# Patient Record
Sex: FEMALE | Race: WHITE | Employment: OTHER | URBAN - METROPOLITAN AREA
[De-identification: names, ages, dates, MRNs, and addresses within clinical notes are randomized per-mention and may not be internally consistent; named-entity substitution may affect disease eponyms.]

---

## 2017-01-30 PROBLEM — M25.532 LEFT WRIST PAIN: Status: ACTIVE | Noted: 2017-01-30

## 2018-03-16 ENCOUNTER — TELEPHONE (OUTPATIENT)
Dept: FAMILY MEDICINE CLINIC | Age: 78
End: 2018-03-16

## 2018-03-16 ENCOUNTER — OFFICE VISIT (OUTPATIENT)
Dept: FAMILY MEDICINE CLINIC | Age: 78
End: 2018-03-16
Payer: MEDICARE

## 2018-03-16 VITALS
WEIGHT: 208 LBS | TEMPERATURE: 98.3 F | BODY MASS INDEX: 36.86 KG/M2 | HEART RATE: 70 BPM | HEIGHT: 63 IN | OXYGEN SATURATION: 96 % | RESPIRATION RATE: 18 BRPM | DIASTOLIC BLOOD PRESSURE: 78 MMHG | SYSTOLIC BLOOD PRESSURE: 118 MMHG

## 2018-03-16 DIAGNOSIS — E78.5 DYSLIPIDEMIA: ICD-10-CM

## 2018-03-16 DIAGNOSIS — E11.9 TYPE 2 DIABETES MELLITUS WITHOUT COMPLICATION, WITHOUT LONG-TERM CURRENT USE OF INSULIN (HCC): Primary | ICD-10-CM

## 2018-03-16 DIAGNOSIS — R53.83 FATIGUE, UNSPECIFIED TYPE: ICD-10-CM

## 2018-03-16 DIAGNOSIS — B00.1 COLD SORE: ICD-10-CM

## 2018-03-16 DIAGNOSIS — R79.89 LOW VITAMIN D LEVEL: ICD-10-CM

## 2018-03-16 DIAGNOSIS — I10 ESSENTIAL HYPERTENSION: ICD-10-CM

## 2018-03-16 DIAGNOSIS — R73.01 IFG (IMPAIRED FASTING GLUCOSE): ICD-10-CM

## 2018-03-16 PROCEDURE — 1123F ACP DISCUSS/DSCN MKR DOCD: CPT | Performed by: FAMILY MEDICINE

## 2018-03-16 PROCEDURE — 1036F TOBACCO NON-USER: CPT | Performed by: FAMILY MEDICINE

## 2018-03-16 PROCEDURE — 3046F HEMOGLOBIN A1C LEVEL >9.0%: CPT | Performed by: FAMILY MEDICINE

## 2018-03-16 PROCEDURE — G8400 PT W/DXA NO RESULTS DOC: HCPCS | Performed by: FAMILY MEDICINE

## 2018-03-16 PROCEDURE — G8427 DOCREV CUR MEDS BY ELIG CLIN: HCPCS | Performed by: FAMILY MEDICINE

## 2018-03-16 PROCEDURE — 4040F PNEUMOC VAC/ADMIN/RCVD: CPT | Performed by: FAMILY MEDICINE

## 2018-03-16 PROCEDURE — 1090F PRES/ABSN URINE INCON ASSESS: CPT | Performed by: FAMILY MEDICINE

## 2018-03-16 PROCEDURE — G8482 FLU IMMUNIZE ORDER/ADMIN: HCPCS | Performed by: FAMILY MEDICINE

## 2018-03-16 PROCEDURE — 3017F COLORECTAL CA SCREEN DOC REV: CPT | Performed by: FAMILY MEDICINE

## 2018-03-16 PROCEDURE — G8417 CALC BMI ABV UP PARAM F/U: HCPCS | Performed by: FAMILY MEDICINE

## 2018-03-16 PROCEDURE — 99214 OFFICE O/P EST MOD 30 MIN: CPT | Performed by: FAMILY MEDICINE

## 2018-03-16 RX ORDER — MEMANTINE HYDROCHLORIDE 28 MG/1
CAPSULE, EXTENDED RELEASE ORAL
Qty: 90 CAPSULE | Refills: 1 | Status: SHIPPED | OUTPATIENT
Start: 2018-03-16 | End: 2018-11-06 | Stop reason: SDUPTHER

## 2018-03-16 RX ORDER — ERGOCALCIFEROL 1.25 MG/1
CAPSULE ORAL
Qty: 12 CAPSULE | Refills: 5 | Status: SHIPPED | OUTPATIENT
Start: 2018-03-16 | End: 2019-04-05 | Stop reason: SDUPTHER

## 2018-03-16 RX ORDER — ACYCLOVIR 50 MG/G
OINTMENT TOPICAL
Qty: 1 TUBE | Refills: 0 | Status: SHIPPED | OUTPATIENT
Start: 2018-03-16 | End: 2018-03-23

## 2018-03-16 RX ORDER — RAMIPRIL 5 MG/1
CAPSULE ORAL
Qty: 90 CAPSULE | Refills: 1 | Status: SHIPPED | OUTPATIENT
Start: 2018-03-16 | End: 2018-10-02 | Stop reason: SDUPTHER

## 2018-03-16 RX ORDER — HYDROCHLOROTHIAZIDE 25 MG/1
TABLET ORAL
Qty: 90 TABLET | Refills: 1 | Status: SHIPPED | OUTPATIENT
Start: 2018-03-16 | End: 2018-11-05 | Stop reason: SDUPTHER

## 2018-03-16 ASSESSMENT — ENCOUNTER SYMPTOMS
SPUTUM PRODUCTION: 0
RESPIRATORY NEGATIVE: 1
HEMOPTYSIS: 0
BACK PAIN: 1
ABDOMINAL PAIN: 0
BLURRED VISION: 0
BOWEL INCONTINENCE: 0
BLOOD IN STOOL: 0
VISUAL CHANGE: 0
SHORTNESS OF BREATH: 0
HEARTBURN: 0
EYES NEGATIVE: 1
GASTROINTESTINAL NEGATIVE: 1
ORTHOPNEA: 0
SINUS PAIN: 0

## 2018-03-16 ASSESSMENT — PATIENT HEALTH QUESTIONNAIRE - PHQ9
SUM OF ALL RESPONSES TO PHQ9 QUESTIONS 1 & 2: 0
SUM OF ALL RESPONSES TO PHQ QUESTIONS 1-9: 0
2. FEELING DOWN, DEPRESSED OR HOPELESS: 0
1. LITTLE INTEREST OR PLEASURE IN DOING THINGS: 0

## 2018-03-16 NOTE — PROGRESS NOTES
in about 3 months (around 6/16/2018).         Reviewed recent labs related to [place]'s current problems      Discussed importance of regular Health Maintenance follow up  Health Maintenance   Topic    DTaP/Tdap/Td vaccine (1 - Tdap)    Shingles Vaccine (1 of 2 - 2 Dose Series)    Diabetic foot exam     Pneumococcal low/med risk (2 of 2 - PPSV23)    Diabetic retinal exam     A1C test (Diabetic or Prediabetic)     Lipid screen     Potassium monitoring     Creatinine monitoring     Colon cancer screen colonoscopy     DEXA (modify frequency per FRAX score)     Flu vaccine

## 2018-03-16 NOTE — PATIENT INSTRUCTIONS
sugar levels. A registered dietitian or diabetes educator can help you plan how much carbohydrate to include in each meal and snack. A guideline for your daily amount of carbohydrate is:  · 45 to 60 grams at each meal. That's about the same as 3 to 4 carbohydrate servings. · 15 to 20 grams at each snack. That's about the same as 1 carbohydrate serving. The Nutrition Facts label on packaged foods tells you how much carbohydrate is in a serving of the food. First, look at the serving size on the food label. Is that the amount you eat in a serving? All of the nutrition information on a food label is based on that serving size. So if you eat more or less than that, you'll need to adjust the other numbers. Total carbohydrate is the next thing you need to look for on the label. If you count carbohydrate servings, one serving of carbohydrate is 15 grams. For foods that don't come with labels, such as fresh fruits and vegetables, you'll need a guide that lists carbohydrate in these foods. Ask your doctor, dietitian, or diabetes educator about books or other nutrition guides you can use. If you take insulin, you need to know how many grams of carbohydrate are in a meal. This lets you know how much rapid-acting insulin to take before you eat. If you use an insulin pump, you get a constant rate of insulin during the day. So the pump must be programmed at meals to give you extra insulin to cover the rise in blood sugar after meals. When you know how much carbohydrate you will eat, you can take the right amount of insulin. Or, if you always use the same amount of insulin, you need to make sure that you eat the same amount of carbohydrate at meals. If you need more help to understand carbohydrate counting and food labels, ask your doctor, dietitian, or diabetes educator. How do you get started with meal planning? Here are some tips to get started:  · Plan your meals a week at a time.  Don't forget to include snacks

## 2018-03-26 ENCOUNTER — TELEPHONE (OUTPATIENT)
Dept: FAMILY MEDICINE CLINIC | Age: 78
End: 2018-03-26

## 2018-03-27 NOTE — TELEPHONE ENCOUNTER
PA resubmitted to CoverMyMeds- awaiting response. Electronically signed by Sherita Millan MA on 3/27/18 at 8:21 AM    Outcome- This medication is on your plan's list of covered drugs. Prior authorization is not required at this time. If your pharmacy has questions regarding the processing of your prescription, please have them call the [employer] pharmacy help desk at [phone]. For additional information, the member can contact Member Services by calling the number on the back of their ID Card. Drug- Ramipril 5MG capsules    Form-[employer] Medicare Part D Electronic Prior Authorization Form    MA contacted [facility] in [place] and spoke with Hoang Crowell, [employer] who informed MA that the last Rx was picked up on 03/13/2018 and their systems states that it is too soon to fill until 04/04/2018. Ming Asher believes there is no issue with the medication coverage due to it being generic Ramipril and [facility].      Electronically signed by Sherita Millan MA on 3/27/18 at 9:31 AM

## 2018-07-06 ENCOUNTER — TELEPHONE (OUTPATIENT)
Dept: ADMINISTRATIVE | Age: 78
End: 2018-07-06

## 2018-07-16 ENCOUNTER — OFFICE VISIT (OUTPATIENT)
Dept: FAMILY MEDICINE CLINIC | Age: 78
End: 2018-07-16
Payer: MEDICARE

## 2018-07-16 VITALS
OXYGEN SATURATION: 98 % | WEIGHT: 216.2 LBS | SYSTOLIC BLOOD PRESSURE: 120 MMHG | RESPIRATION RATE: 18 BRPM | HEIGHT: 63 IN | HEART RATE: 67 BPM | DIASTOLIC BLOOD PRESSURE: 60 MMHG | BODY MASS INDEX: 38.31 KG/M2

## 2018-07-16 DIAGNOSIS — G30.1 LATE ONSET ALZHEIMER'S DISEASE WITHOUT BEHAVIORAL DISTURBANCE (HCC): ICD-10-CM

## 2018-07-16 DIAGNOSIS — F02.80 LATE ONSET ALZHEIMER'S DISEASE WITHOUT BEHAVIORAL DISTURBANCE (HCC): ICD-10-CM

## 2018-07-16 DIAGNOSIS — E11.9 TYPE 2 DIABETES MELLITUS WITHOUT COMPLICATION, WITHOUT LONG-TERM CURRENT USE OF INSULIN (HCC): Primary | ICD-10-CM

## 2018-07-16 DIAGNOSIS — E78.2 MIXED HYPERLIPIDEMIA: ICD-10-CM

## 2018-07-16 DIAGNOSIS — I10 ESSENTIAL HYPERTENSION: ICD-10-CM

## 2018-07-16 PROCEDURE — 1036F TOBACCO NON-USER: CPT | Performed by: FAMILY MEDICINE

## 2018-07-16 PROCEDURE — 1123F ACP DISCUSS/DSCN MKR DOCD: CPT | Performed by: FAMILY MEDICINE

## 2018-07-16 PROCEDURE — G8417 CALC BMI ABV UP PARAM F/U: HCPCS | Performed by: FAMILY MEDICINE

## 2018-07-16 PROCEDURE — G8400 PT W/DXA NO RESULTS DOC: HCPCS | Performed by: FAMILY MEDICINE

## 2018-07-16 PROCEDURE — 1101F PT FALLS ASSESS-DOCD LE1/YR: CPT | Performed by: FAMILY MEDICINE

## 2018-07-16 PROCEDURE — 4040F PNEUMOC VAC/ADMIN/RCVD: CPT | Performed by: FAMILY MEDICINE

## 2018-07-16 PROCEDURE — 1090F PRES/ABSN URINE INCON ASSESS: CPT | Performed by: FAMILY MEDICINE

## 2018-07-16 PROCEDURE — 99214 OFFICE O/P EST MOD 30 MIN: CPT | Performed by: FAMILY MEDICINE

## 2018-07-16 PROCEDURE — G8427 DOCREV CUR MEDS BY ELIG CLIN: HCPCS | Performed by: FAMILY MEDICINE

## 2018-07-16 ASSESSMENT — ENCOUNTER SYMPTOMS
BLURRED VISION: 0
VOMITING: 0
COUGH: 0
WHEEZING: 0
BLOOD IN STOOL: 0
EYE PAIN: 0
ORTHOPNEA: 0
GASTROINTESTINAL NEGATIVE: 1
ABDOMINAL PAIN: 0
CONSTIPATION: 0
NAUSEA: 0
HEMOPTYSIS: 0
EYE DISCHARGE: 0
SORE THROAT: 0
SPUTUM PRODUCTION: 0
DIARRHEA: 0
RESPIRATORY NEGATIVE: 1
STRIDOR: 0
SHORTNESS OF BREATH: 0
HEARTBURN: 0
PHOTOPHOBIA: 0
SINUS PAIN: 0
BACK PAIN: 1
DOUBLE VISION: 0
EYES NEGATIVE: 1
EYE REDNESS: 0

## 2018-07-16 NOTE — PROGRESS NOTES
frequency, hematuria, pelvic pain and urgency. Musculoskeletal: Positive for back pain and joint pain. Negative for falls, myalgias and neck pain. Skin: Negative for itching and rash. Neurological: Positive for weakness. Negative for dizziness, tingling, tremors, sensory change, speech change, focal weakness, seizures, loss of consciousness, numbness, headaches and paresthesias. Endo/Heme/Allergies: Negative. Negative for environmental allergies, polydipsia and polyphagia. Does not bruise/bleed easily. Psychiatric/Behavioral: Positive for memory loss. Negative for depression, hallucinations, substance abuse and suicidal ideas. The patient has insomnia. The patient is not nervous/anxious. Past Medical/Surgical Hx;  Reviewed with patient      Diagnosis Date    Dementia     Diabetes mellitus ([facility] [address]     GERD (gastroesophageal reflux disease)     Headache     Hypertension      Past Surgical History:   Procedure Laterality Date     SECTION      CHOLECYSTECTOMY, LAPAROSCOPIC      COLONOSCOPY      UPPER GASTROINTESTINAL ENDOSCOPY         Past Family Hx:  Reviewed with patient  Family History   Problem Relation Age of Onset    Heart Disease Mother        Social Hx:  Reviewed with patient  Social History   Substance Use Topics    Smoking status: Never Smoker    Smokeless tobacco: Never Used    Alcohol use No      Comment: Rare       OBJECTIVE  /60   Pulse 67   Resp 18   Ht 5' 3\" (1.6 m)   Wt 216 lb 3.2 oz (98.1 kg)   LMP  (LMP Unknown)   SpO2 98%   BMI 38.30 kg/m²     Problem List:  [place]  does not have any pertinent problems on file. PHYS EX:  Physical Exam   Constitutional: She is oriented to person, place, and time. She appears well-developed and well-nourished. No distress. HENT:   Head: Normocephalic and atraumatic.    Right Ear: External ear normal.   Left Ear: External ear normal.   Nose: Nose normal.   Mouth/Throat: Oropharynx is clear and moist. No oropharyngeal exudate. Eyes: Right eye exhibits no discharge. Left eye exhibits no discharge. No scleral icterus. Neck: Normal range of motion. Neck supple. No JVD present. No tracheal deviation present. No thyromegaly present. Cardiovascular: Normal rate, regular rhythm and normal heart sounds. Exam reveals no gallop and no friction rub. No murmur heard. Pulmonary/Chest: Effort normal and breath sounds normal. No stridor. No respiratory distress. She has no wheezes. She has no rales. She exhibits no tenderness. Abdominal: Soft. Bowel sounds are normal. She exhibits no distension and no mass. There is no tenderness. There is no rebound and no guarding. No hernia. Musculoskeletal: She exhibits tenderness. She exhibits no edema or deformity. Lumbar back: She exhibits decreased range of motion, tenderness, bony tenderness, pain and spasm. She exhibits no swelling, no edema, no deformity, no laceration and normal pulse. Back:    She has multiple joint pain of osteoarthritis    Lymphadenopathy:     She has no cervical adenopathy. Neurological: She is alert and oriented to person, place, and time. She has normal reflexes. She displays normal reflexes. No cranial nerve deficit or sensory deficit. She exhibits normal muscle tone. Coordination normal.   numbness to her fingers   Skin: Skin is warm. No rash noted. She is not diaphoretic. No erythema. No pallor. Psychiatric:   Anxiety  Alzheimer's dementia   Nursing note and vitals reviewed. ASSESSMENT/PLAN  [place] was seen today for check-up.     Diagnoses and all orders for this visit:    Type 2 diabetes mellitus without complication, without long-term current use of insulin (HCC)  --diabetic diet     Late onset Alzheimer's disease without behavioral disturbance  -  and care discussion  --stable and doing well    Essential hypertension--controlled  --low salt  -- CARDIAC--asa, ACE, beta, statin, HCTZ, ( ccb )    Mixed hyperlipidemia  - talk weight loss and diet         Outpatient Encounter Prescriptions as of 7/16/2018   Medication Sig Dispense Refill    ramipril (ALTACE) 5 MG capsule TAKE ONE CAPSULE BY MOUTH EVERY DAY 90 capsule 1    hydrochlorothiazide (HYDRODIURIL) 25 MG tablet TAKE ONE TABLET BY MOUTH EVERY DAY 90 tablet 1    memantine ER (NAMENDA XR) 28 MG CP24 extended release capsule TAKE ONE CAPSULE BY MOUTH EVERY DAY 90 capsule 1    vitamin D (ERGOCALCIFEROL) 94042 units CAPS capsule take one capsule by mouth once a week 12 capsule 5    Saccharomyces boulardii (PROBIOTIC) 250 MG CAPS Take by mouth daily       Multiple Vitamins-Minerals (EYE VITAMINS) CAPS Take by mouth      vitamin E 400 UNIT capsule Take 400 Units by mouth daily. No facility-administered encounter medications on file as of 7/16/2018. Return in about 6 months (around 1/16/2019).         Reviewed recent labs related to [place]'s current problems      Discussed importance of regular Health Maintenance follow up  Health Maintenance   Topic    DTaP/Tdap/Td vaccine (1 - Tdap)    Shingles Vaccine (1 of 2 - 2 Dose Series)    Pneumococcal low/med risk (2 of 2 - PPSV23)    Flu vaccine (1)    Potassium monitoring     Creatinine monitoring     DEXA (modify frequency per FRAX score)

## 2018-07-17 ASSESSMENT — PATIENT HEALTH QUESTIONNAIRE - PHQ9
SUM OF ALL RESPONSES TO PHQ9 QUESTIONS 1 & 2: 2
1. LITTLE INTEREST OR PLEASURE IN DOING THINGS: 1
2. FEELING DOWN, DEPRESSED OR HOPELESS: 1
SUM OF ALL RESPONSES TO PHQ QUESTIONS 1-9: 2

## 2018-07-17 ASSESSMENT — ENCOUNTER SYMPTOMS
VISUAL CHANGE: 0
BOWEL INCONTINENCE: 0

## 2018-09-26 ENCOUNTER — TELEPHONE (OUTPATIENT)
Dept: FAMILY MEDICINE CLINIC | Age: 78
End: 2018-09-26

## 2018-09-26 DIAGNOSIS — G30.1 LATE ONSET ALZHEIMER'S DISEASE WITHOUT BEHAVIORAL DISTURBANCE (HCC): Primary | ICD-10-CM

## 2018-09-26 DIAGNOSIS — F02.80 LATE ONSET ALZHEIMER'S DISEASE WITHOUT BEHAVIORAL DISTURBANCE (HCC): Primary | ICD-10-CM

## 2018-09-26 RX ORDER — DIVALPROEX SODIUM 125 MG/1
125 TABLET, DELAYED RELEASE ORAL 2 TIMES DAILY
Qty: 90 TABLET | Refills: 3 | Status: SHIPPED | OUTPATIENT
Start: 2018-09-26 | End: 2018-10-03 | Stop reason: DRUGHIGH

## 2018-09-26 NOTE — TELEPHONE ENCOUNTER
Patient's daughter contacted office stating patient is staying at [facility] and has been argumentative with residents and staff.  Patient does have appointment for 10/3, but Cheryl Marquez is asking if there is something otc her mom can take to keep her calm until she is seen

## 2018-10-02 DIAGNOSIS — E11.9 TYPE 2 DIABETES MELLITUS WITHOUT COMPLICATION, WITHOUT LONG-TERM CURRENT USE OF INSULIN (HCC): ICD-10-CM

## 2018-10-02 DIAGNOSIS — I10 ESSENTIAL HYPERTENSION: ICD-10-CM

## 2018-10-02 RX ORDER — RAMIPRIL 5 MG/1
CAPSULE ORAL
Qty: 90 CAPSULE | Refills: 0 | Status: SHIPPED | OUTPATIENT
Start: 2018-10-02 | End: 2018-12-21 | Stop reason: SDUPTHER

## 2018-10-03 ENCOUNTER — OFFICE VISIT (OUTPATIENT)
Dept: FAMILY MEDICINE CLINIC | Age: 78
End: 2018-10-03
Payer: MEDICARE

## 2018-10-03 ENCOUNTER — HOSPITAL ENCOUNTER (OUTPATIENT)
Age: 78
Discharge: HOME | End: 2018-10-05
Payer: MEDICARE

## 2018-10-03 VITALS
HEART RATE: 67 BPM | DIASTOLIC BLOOD PRESSURE: 80 MMHG | RESPIRATION RATE: 18 BRPM | HEIGHT: 63 IN | WEIGHT: 211 LBS | SYSTOLIC BLOOD PRESSURE: 118 MMHG | OXYGEN SATURATION: 95 % | BODY MASS INDEX: 37.39 KG/M2

## 2018-10-03 DIAGNOSIS — F02.80 LATE ONSET ALZHEIMER'S DISEASE WITHOUT BEHAVIORAL DISTURBANCE (HCC): ICD-10-CM

## 2018-10-03 DIAGNOSIS — G30.1 LATE ONSET ALZHEIMER'S DISEASE WITHOUT BEHAVIORAL DISTURBANCE (HCC): ICD-10-CM

## 2018-10-03 DIAGNOSIS — Z00.00 MEDICARE ANNUAL WELLNESS VISIT, INITIAL: Primary | ICD-10-CM

## 2018-10-03 DIAGNOSIS — Z00.00 ROUTINE GENERAL MEDICAL EXAMINATION AT A HEALTH CARE FACILITY: ICD-10-CM

## 2018-10-03 DIAGNOSIS — Z23 IMMUNIZATION DUE: ICD-10-CM

## 2018-10-03 LAB
BASOPHILS ABSOLUTE: 0.05 E9/L (ref 0–0.2)
BASOPHILS RELATIVE PERCENT: 0.8 % (ref 0–2)
EOSINOPHILS ABSOLUTE: 0.22 E9/L (ref 0.05–0.5)
EOSINOPHILS RELATIVE PERCENT: 3.3 % (ref 0–6)
HCT VFR BLD CALC: 43.3 % (ref 34–48)
HEMOGLOBIN: 13.4 G/DL (ref 11.5–15.5)
IMMATURE GRANULOCYTES #: 0.02 E9/L
IMMATURE GRANULOCYTES %: 0.3 % (ref 0–5)
LYMPHOCYTES ABSOLUTE: 1.76 E9/L (ref 1.5–4)
LYMPHOCYTES RELATIVE PERCENT: 26.5 % (ref 20–42)
MCH RBC QN AUTO: 28.5 PG (ref 26–35)
MCHC RBC AUTO-ENTMCNC: 30.9 % (ref 32–34.5)
MCV RBC AUTO: 92.1 FL (ref 80–99.9)
MONOCYTES ABSOLUTE: 0.68 E9/L (ref 0.1–0.95)
MONOCYTES RELATIVE PERCENT: 10.2 % (ref 2–12)
NEUTROPHILS ABSOLUTE: 3.92 E9/L (ref 1.8–7.3)
NEUTROPHILS RELATIVE PERCENT: 58.9 % (ref 43–80)
PDW BLD-RTO: 13 FL (ref 11.5–15)
PLATELET # BLD: 212 E9/L (ref 130–450)
PMV BLD AUTO: 12.2 FL (ref 7–12)
RBC # BLD: 4.7 E12/L (ref 3.5–5.5)
WBC # BLD: 6.7 E9/L (ref 4.5–11.5)

## 2018-10-03 PROCEDURE — 85025 COMPLETE CBC W/AUTO DIFF WBC: CPT

## 2018-10-03 PROCEDURE — 80061 LIPID PANEL: CPT

## 2018-10-03 PROCEDURE — 84443 ASSAY THYROID STIM HORMONE: CPT

## 2018-10-03 PROCEDURE — 82306 VITAMIN D 25 HYDROXY: CPT

## 2018-10-03 PROCEDURE — G0008 ADMIN INFLUENZA VIRUS VAC: HCPCS | Performed by: FAMILY MEDICINE

## 2018-10-03 PROCEDURE — 80048 BASIC METABOLIC PNL TOTAL CA: CPT

## 2018-10-03 PROCEDURE — 4040F PNEUMOC VAC/ADMIN/RCVD: CPT | Performed by: FAMILY MEDICINE

## 2018-10-03 PROCEDURE — G8482 FLU IMMUNIZE ORDER/ADMIN: HCPCS | Performed by: FAMILY MEDICINE

## 2018-10-03 PROCEDURE — 90662 IIV NO PRSV INCREASED AG IM: CPT | Performed by: FAMILY MEDICINE

## 2018-10-03 PROCEDURE — 83036 HEMOGLOBIN GLYCOSYLATED A1C: CPT

## 2018-10-03 PROCEDURE — G0438 PPPS, INITIAL VISIT: HCPCS | Performed by: FAMILY MEDICINE

## 2018-10-03 RX ORDER — DIVALPROEX SODIUM 125 MG/1
125 TABLET, DELAYED RELEASE ORAL 3 TIMES DAILY
Qty: 90 TABLET | Refills: 3 | Status: SHIPPED | OUTPATIENT
Start: 2018-10-03 | End: 2019-02-22 | Stop reason: SDUPTHER

## 2018-10-03 ASSESSMENT — ANXIETY QUESTIONNAIRES: GAD7 TOTAL SCORE: 0

## 2018-10-03 ASSESSMENT — LIFESTYLE VARIABLES: HOW OFTEN DO YOU HAVE A DRINK CONTAINING ALCOHOL: 0

## 2018-10-03 ASSESSMENT — PATIENT HEALTH QUESTIONNAIRE - PHQ9
SUM OF ALL RESPONSES TO PHQ QUESTIONS 1-9: 0
SUM OF ALL RESPONSES TO PHQ QUESTIONS 1-9: 0

## 2018-10-03 NOTE — PROGRESS NOTES
Series) 04/04/1992    Pneumococcal low/med risk (2 of 2 - PPSV23) 08/26/2017    Flu vaccine (1) 09/01/2018    Potassium monitoring  10/20/2018    Creatinine monitoring  10/20/2018    DEXA (modify frequency per FRAX score)  Addressed     Recommendations for Preventive Services Due: see orders and patient instructions/AVS.  .   Recommended screening schedule for the next 5-10 years is provided to the patient in written form: see Patient Instructions/AVS.

## 2018-10-04 ENCOUNTER — TELEPHONE (OUTPATIENT)
Dept: FAMILY MEDICINE CLINIC | Age: 78
End: 2018-10-04

## 2018-10-04 LAB
ANION GAP SERPL CALCULATED.3IONS-SCNC: 13 MMOL/L (ref 7–16)
BUN BLDV-MCNC: 19 MG/DL (ref 8–23)
CALCIUM SERPL-MCNC: 9.9 MG/DL (ref 8.6–10.2)
CHLORIDE BLD-SCNC: 98 MMOL/L (ref 98–107)
CHOLESTEROL, TOTAL: 224 MG/DL (ref 0–199)
CO2: 28 MMOL/L (ref 22–29)
CREAT SERPL-MCNC: 0.9 MG/DL (ref 0.5–1)
GFR AFRICAN AMERICAN: >60
GFR NON-AFRICAN AMERICAN: >60 ML/MIN/1.73
GLUCOSE BLD-MCNC: 117 MG/DL (ref 74–109)
HBA1C MFR BLD: 6.3 % (ref 4–5.6)
HDLC SERPL-MCNC: 44 MG/DL
LDL CHOLESTEROL CALCULATED: 146 MG/DL (ref 0–99)
POTASSIUM SERPL-SCNC: 5 MMOL/L (ref 3.5–5)
SODIUM BLD-SCNC: 139 MMOL/L (ref 132–146)
TRIGL SERPL-MCNC: 168 MG/DL (ref 0–149)
TSH SERPL DL<=0.05 MIU/L-ACNC: 1.34 UIU/ML (ref 0.27–4.2)
VITAMIN D 25-HYDROXY: 19 NG/ML (ref 30–100)
VLDLC SERPL CALC-MCNC: 34 MG/DL

## 2018-11-06 ENCOUNTER — TELEPHONE (OUTPATIENT)
Dept: NEUROLOGY | Age: 78
End: 2018-11-06

## 2018-11-06 RX ORDER — MEMANTINE HYDROCHLORIDE 28 MG/1
CAPSULE, EXTENDED RELEASE ORAL
Qty: 90 CAPSULE | Refills: 1 | Status: SHIPPED | OUTPATIENT
Start: 2018-11-06 | End: 2019-04-30 | Stop reason: SDUPTHER

## 2018-11-06 NOTE — TELEPHONE ENCOUNTER
MA received a message on Rx refill line from [facility] 11/5 requesting a refill for pt of Namenda XR 28mg. MA left a message on [facility] refill line stating pt has not been seen by our office since 2017 so she will need an appt prior to refills. MA also informed them that recent Rx's for Namenda have been filled by pt's PCP, Dr. Mundo Richardson.   Electronically signed by Jerman Zarco on 11/6/18 at 7:56 AM

## 2018-11-19 ENCOUNTER — TELEPHONE (OUTPATIENT)
Dept: FAMILY MEDICINE CLINIC | Age: 78
End: 2018-11-19

## 2018-12-21 DIAGNOSIS — E11.9 TYPE 2 DIABETES MELLITUS WITHOUT COMPLICATION, WITHOUT LONG-TERM CURRENT USE OF INSULIN (HCC): ICD-10-CM

## 2018-12-21 DIAGNOSIS — I10 ESSENTIAL HYPERTENSION: ICD-10-CM

## 2018-12-21 RX ORDER — RAMIPRIL 5 MG/1
CAPSULE ORAL
Qty: 90 CAPSULE | Refills: 1 | Status: SHIPPED | OUTPATIENT
Start: 2018-12-21 | End: 2020-01-11 | Stop reason: SDUPTHER

## 2018-12-21 NOTE — TELEPHONE ENCOUNTER
[facility] message on Clinical Care line requesting refill of Ramipril for patient. Medication sent to patient's pharmacy.   Electronically signed by Nadeem Duarte on 12/21/2018 at 2:38 PM

## 2019-02-22 ENCOUNTER — TELEPHONE (OUTPATIENT)
Dept: FAMILY MEDICINE CLINIC | Age: 79
End: 2019-02-22

## 2019-02-22 DIAGNOSIS — G30.1 LATE ONSET ALZHEIMER'S DISEASE WITHOUT BEHAVIORAL DISTURBANCE (HCC): ICD-10-CM

## 2019-02-22 DIAGNOSIS — F02.80 LATE ONSET ALZHEIMER'S DISEASE WITHOUT BEHAVIORAL DISTURBANCE (HCC): ICD-10-CM

## 2019-02-22 RX ORDER — DIVALPROEX SODIUM 125 MG/1
125 TABLET, DELAYED RELEASE ORAL 3 TIMES DAILY
Qty: 90 TABLET | Refills: 3 | Status: ON HOLD | OUTPATIENT
Start: 2019-02-22 | End: 2019-09-06 | Stop reason: HOSPADM

## 2019-03-06 ENCOUNTER — HOSPITAL ENCOUNTER (OUTPATIENT)
Age: 79
Discharge: HOME | End: 2019-03-08
Payer: MEDICARE

## 2019-03-06 ENCOUNTER — OFFICE VISIT (OUTPATIENT)
Dept: FAMILY MEDICINE CLINIC | Age: 79
End: 2019-03-06
Payer: MEDICARE

## 2019-03-06 VITALS
OXYGEN SATURATION: 99 % | DIASTOLIC BLOOD PRESSURE: 78 MMHG | SYSTOLIC BLOOD PRESSURE: 118 MMHG | BODY MASS INDEX: 37.21 KG/M2 | RESPIRATION RATE: 18 BRPM | TEMPERATURE: 98.7 F | HEART RATE: 71 BPM | WEIGHT: 210 LBS | HEIGHT: 63 IN

## 2019-03-06 DIAGNOSIS — E11.9 TYPE 2 DIABETES MELLITUS WITHOUT COMPLICATION, WITHOUT LONG-TERM CURRENT USE OF INSULIN (HCC): ICD-10-CM

## 2019-03-06 DIAGNOSIS — E78.2 MIXED HYPERLIPIDEMIA: ICD-10-CM

## 2019-03-06 DIAGNOSIS — R53.83 FATIGUE, UNSPECIFIED TYPE: ICD-10-CM

## 2019-03-06 DIAGNOSIS — I48.91 ATRIAL FIBRILLATION, UNSPECIFIED TYPE (HCC): ICD-10-CM

## 2019-03-06 DIAGNOSIS — I48.91 ATRIAL FIBRILLATION, UNSPECIFIED TYPE (HCC): Primary | ICD-10-CM

## 2019-03-06 DIAGNOSIS — I10 ESSENTIAL HYPERTENSION: ICD-10-CM

## 2019-03-06 LAB
ALBUMIN SERPL-MCNC: 4.1 G/DL (ref 3.5–5.2)
ALP BLD-CCNC: 63 U/L (ref 35–104)
ALT SERPL-CCNC: 12 U/L (ref 0–32)
ANION GAP SERPL CALCULATED.3IONS-SCNC: 8 MMOL/L (ref 7–16)
AST SERPL-CCNC: 17 U/L (ref 0–31)
BASOPHILS ABSOLUTE: 0.03 E9/L (ref 0–0.2)
BASOPHILS RELATIVE PERCENT: 0.4 % (ref 0–2)
BILIRUB SERPL-MCNC: 0.4 MG/DL (ref 0–1.2)
BUN BLDV-MCNC: 18 MG/DL (ref 8–23)
CALCIUM SERPL-MCNC: 9.3 MG/DL (ref 8.6–10.2)
CHLORIDE BLD-SCNC: 89 MMOL/L (ref 98–107)
CHOLESTEROL, TOTAL: 204 MG/DL (ref 0–199)
CO2: 30 MMOL/L (ref 22–29)
CREAT SERPL-MCNC: 1 MG/DL (ref 0.5–1)
EOSINOPHILS ABSOLUTE: 0.16 E9/L (ref 0.05–0.5)
EOSINOPHILS RELATIVE PERCENT: 2 % (ref 0–6)
GFR AFRICAN AMERICAN: >60
GFR NON-AFRICAN AMERICAN: 54 ML/MIN/1.73
GLUCOSE BLD-MCNC: 114 MG/DL (ref 74–99)
HBA1C MFR BLD: 6.3 % (ref 4–5.6)
HCT VFR BLD CALC: 41 % (ref 34–48)
HDLC SERPL-MCNC: 36 MG/DL
HEMOGLOBIN: 13.2 G/DL (ref 11.5–15.5)
IMMATURE GRANULOCYTES #: 0.03 E9/L
IMMATURE GRANULOCYTES %: 0.4 % (ref 0–5)
LDL CHOLESTEROL CALCULATED: 125 MG/DL (ref 0–99)
LYMPHOCYTES ABSOLUTE: 1.93 E9/L (ref 1.5–4)
LYMPHOCYTES RELATIVE PERCENT: 24.2 % (ref 20–42)
MCH RBC QN AUTO: 29.2 PG (ref 26–35)
MCHC RBC AUTO-ENTMCNC: 32.2 % (ref 32–34.5)
MCV RBC AUTO: 90.7 FL (ref 80–99.9)
MONOCYTES ABSOLUTE: 0.78 E9/L (ref 0.1–0.95)
MONOCYTES RELATIVE PERCENT: 9.8 % (ref 2–12)
NEUTROPHILS ABSOLUTE: 5.04 E9/L (ref 1.8–7.3)
NEUTROPHILS RELATIVE PERCENT: 63.2 % (ref 43–80)
PDW BLD-RTO: 12.8 FL (ref 11.5–15)
PLATELET # BLD: 178 E9/L (ref 130–450)
PMV BLD AUTO: 13.3 FL (ref 7–12)
POTASSIUM SERPL-SCNC: 4.4 MMOL/L (ref 3.5–5)
RBC # BLD: 4.52 E12/L (ref 3.5–5.5)
SODIUM BLD-SCNC: 127 MMOL/L (ref 132–146)
TOTAL PROTEIN: 7.1 G/DL (ref 6.4–8.3)
TRIGL SERPL-MCNC: 213 MG/DL (ref 0–149)
TSH SERPL DL<=0.05 MIU/L-ACNC: 1.16 UIU/ML (ref 0.27–4.2)
VLDLC SERPL CALC-MCNC: 43 MG/DL
WBC # BLD: 8 E9/L (ref 4.5–11.5)

## 2019-03-06 PROCEDURE — 99215 OFFICE O/P EST HI 40 MIN: CPT | Performed by: FAMILY MEDICINE

## 2019-03-06 PROCEDURE — 93000 ELECTROCARDIOGRAM COMPLETE: CPT | Performed by: FAMILY MEDICINE

## 2019-03-06 PROCEDURE — 1090F PRES/ABSN URINE INCON ASSESS: CPT | Performed by: FAMILY MEDICINE

## 2019-03-06 PROCEDURE — 84443 ASSAY THYROID STIM HORMONE: CPT

## 2019-03-06 PROCEDURE — 1123F ACP DISCUSS/DSCN MKR DOCD: CPT | Performed by: FAMILY MEDICINE

## 2019-03-06 PROCEDURE — G8400 PT W/DXA NO RESULTS DOC: HCPCS | Performed by: FAMILY MEDICINE

## 2019-03-06 PROCEDURE — 80053 COMPREHEN METABOLIC PANEL: CPT

## 2019-03-06 PROCEDURE — G8427 DOCREV CUR MEDS BY ELIG CLIN: HCPCS | Performed by: FAMILY MEDICINE

## 2019-03-06 PROCEDURE — 1101F PT FALLS ASSESS-DOCD LE1/YR: CPT | Performed by: FAMILY MEDICINE

## 2019-03-06 PROCEDURE — 1036F TOBACCO NON-USER: CPT | Performed by: FAMILY MEDICINE

## 2019-03-06 PROCEDURE — 4040F PNEUMOC VAC/ADMIN/RCVD: CPT | Performed by: FAMILY MEDICINE

## 2019-03-06 PROCEDURE — 85025 COMPLETE CBC W/AUTO DIFF WBC: CPT

## 2019-03-06 PROCEDURE — G8417 CALC BMI ABV UP PARAM F/U: HCPCS | Performed by: FAMILY MEDICINE

## 2019-03-06 PROCEDURE — G8482 FLU IMMUNIZE ORDER/ADMIN: HCPCS | Performed by: FAMILY MEDICINE

## 2019-03-06 PROCEDURE — 83036 HEMOGLOBIN GLYCOSYLATED A1C: CPT

## 2019-03-06 PROCEDURE — 80061 LIPID PANEL: CPT

## 2019-03-06 ASSESSMENT — ENCOUNTER SYMPTOMS
APNEA: 0
BACK PAIN: 0
VOICE CHANGE: 0
SHORTNESS OF BREATH: 0
RECTAL PAIN: 0
WHEEZING: 0
ABDOMINAL DISTENTION: 0
COLOR CHANGE: 0
SINUS PAIN: 0
BLOOD IN STOOL: 0
CHEST TIGHTNESS: 0
CHOKING: 0
ABDOMINAL PAIN: 0
SORE THROAT: 0
EYE PAIN: 0
DIARRHEA: 0
STRIDOR: 0
EYE DISCHARGE: 0
BOWEL INCONTINENCE: 0
CONSTIPATION: 0
BLURRED VISION: 0
SINUS PRESSURE: 0
ALLERGIC/IMMUNOLOGIC NEGATIVE: 1
RHINORRHEA: 0
COUGH: 0
TROUBLE SWALLOWING: 0
VISUAL CHANGE: 0
NAUSEA: 0
EYE REDNESS: 0
FACIAL SWELLING: 0
ANAL BLEEDING: 0
EYE ITCHING: 0
VOMITING: 0
ORTHOPNEA: 0
PHOTOPHOBIA: 0
RESPIRATORY NEGATIVE: 1
GASTROINTESTINAL NEGATIVE: 1

## 2019-03-06 ASSESSMENT — PATIENT HEALTH QUESTIONNAIRE - PHQ9
SUM OF ALL RESPONSES TO PHQ QUESTIONS 1-9: 0
2. FEELING DOWN, DEPRESSED OR HOPELESS: 0
1. LITTLE INTEREST OR PLEASURE IN DOING THINGS: 0
SUM OF ALL RESPONSES TO PHQ QUESTIONS 1-9: 0
SUM OF ALL RESPONSES TO PHQ9 QUESTIONS 1 & 2: 0

## 2019-04-05 DIAGNOSIS — R79.89 LOW VITAMIN D LEVEL: ICD-10-CM

## 2019-04-05 RX ORDER — ERGOCALCIFEROL 1.25 MG/1
CAPSULE ORAL
Qty: 12 CAPSULE | Refills: 2 | Status: SHIPPED | OUTPATIENT
Start: 2019-04-05 | End: 2019-12-29 | Stop reason: SDUPTHER

## 2019-04-30 DIAGNOSIS — I10 ESSENTIAL HYPERTENSION: ICD-10-CM

## 2019-04-30 RX ORDER — HYDROCHLOROTHIAZIDE 25 MG/1
TABLET ORAL
Qty: 90 TABLET | Refills: 1 | Status: SHIPPED
Start: 2019-04-30 | End: 2020-03-06 | Stop reason: ALTCHOICE

## 2019-04-30 RX ORDER — MEMANTINE HYDROCHLORIDE 28 MG/1
CAPSULE, EXTENDED RELEASE ORAL
Qty: 90 CAPSULE | Refills: 1 | Status: SHIPPED | OUTPATIENT
Start: 2019-04-30 | End: 2020-01-11 | Stop reason: SDUPTHER

## 2019-04-30 NOTE — TELEPHONE ENCOUNTER
[address] [place] [address]  called the clinical care line requesting medication refill. Chart reviewed and medication sent to the pharmacy.     Electronically signed by Candi Schilder on 4/30/2019 at 2:11 PM

## 2019-05-07 ENCOUNTER — TELEPHONE (OUTPATIENT)
Dept: FAMILY MEDICINE CLINIC | Age: 79
End: 2019-05-07

## 2019-05-07 DIAGNOSIS — R94.31 ABNORMAL EKG: ICD-10-CM

## 2019-05-07 DIAGNOSIS — R07.9 CHEST PAIN, UNSPECIFIED TYPE: Primary | ICD-10-CM

## 2019-05-07 NOTE — TELEPHONE ENCOUNTER
Patient's daughter contacted office regarding the EKG done 3/6 Carla Velasquez was not sure if a referral was going to be placed

## 2019-09-04 ENCOUNTER — HOSPITAL ENCOUNTER (INPATIENT)
Age: 79
LOS: 2 days | Discharge: HOME | DRG: 309 | End: 2019-09-06
Attending: EMERGENCY MEDICINE | Admitting: INTERNAL MEDICINE
Payer: MEDICARE

## 2019-09-04 ENCOUNTER — APPOINTMENT (OUTPATIENT)
Dept: GENERAL RADIOLOGY | Age: 79
DRG: 309 | End: 2019-09-04
Payer: MEDICARE

## 2019-09-04 DIAGNOSIS — I48.91 NEW ONSET ATRIAL FIBRILLATION (HCC): Primary | ICD-10-CM

## 2019-09-04 LAB
ALBUMIN SERPL-MCNC: 4 G/DL (ref 3.5–5.2)
ALP BLD-CCNC: 61 U/L (ref 35–104)
ALT SERPL-CCNC: 17 U/L (ref 0–32)
ANION GAP SERPL CALCULATED.3IONS-SCNC: 12 MMOL/L (ref 7–16)
AST SERPL-CCNC: 20 U/L (ref 0–31)
BACTERIA: ABNORMAL /HPF
BASOPHILS ABSOLUTE: 0.04 E9/L (ref 0–0.2)
BASOPHILS RELATIVE PERCENT: 0.6 % (ref 0–2)
BILIRUB SERPL-MCNC: 0.7 MG/DL (ref 0–1.2)
BILIRUBIN URINE: NEGATIVE
BLOOD, URINE: NEGATIVE
BUN BLDV-MCNC: 24 MG/DL (ref 8–23)
CALCIUM SERPL-MCNC: 9.4 MG/DL (ref 8.6–10.2)
CHLORIDE BLD-SCNC: 96 MMOL/L (ref 98–107)
CLARITY: CLEAR
CO2: 30 MMOL/L (ref 22–29)
COLOR: YELLOW
CREAT SERPL-MCNC: 1.2 MG/DL (ref 0.5–1)
EKG ATRIAL RATE: 105 BPM
EKG Q-T INTERVAL: 388 MS
EKG QRS DURATION: 88 MS
EKG QTC CALCULATION (BAZETT): 482 MS
EKG R AXIS: 34 DEGREES
EKG T AXIS: 105 DEGREES
EKG VENTRICULAR RATE: 93 BPM
EOSINOPHILS ABSOLUTE: 0.12 E9/L (ref 0.05–0.5)
EOSINOPHILS RELATIVE PERCENT: 1.8 % (ref 0–6)
GFR AFRICAN AMERICAN: 53
GFR NON-AFRICAN AMERICAN: 44 ML/MIN/1.73
GLUCOSE BLD-MCNC: 158 MG/DL (ref 74–99)
GLUCOSE URINE: NEGATIVE MG/DL
HCT VFR BLD CALC: 41.3 % (ref 34–48)
HEMOGLOBIN: 13.4 G/DL (ref 11.5–15.5)
IMMATURE GRANULOCYTES #: 0.02 E9/L
IMMATURE GRANULOCYTES %: 0.3 % (ref 0–5)
KETONES, URINE: NEGATIVE MG/DL
LACTIC ACID, SEPSIS: 2.4 MMOL/L (ref 0.5–1.9)
LACTIC ACID: 1.2 MMOL/L (ref 0.5–2.2)
LEUKOCYTE ESTERASE, URINE: NEGATIVE
LIPASE: 25 U/L (ref 13–60)
LYMPHOCYTES ABSOLUTE: 1.85 E9/L (ref 1.5–4)
LYMPHOCYTES RELATIVE PERCENT: 28.1 % (ref 20–42)
MAGNESIUM: 2 MG/DL (ref 1.6–2.6)
MCH RBC QN AUTO: 29.7 PG (ref 26–35)
MCHC RBC AUTO-ENTMCNC: 32.4 % (ref 32–34.5)
MCV RBC AUTO: 91.6 FL (ref 80–99.9)
MONOCYTES ABSOLUTE: 0.67 E9/L (ref 0.1–0.95)
MONOCYTES RELATIVE PERCENT: 10.2 % (ref 2–12)
NEUTROPHILS ABSOLUTE: 3.89 E9/L (ref 1.8–7.3)
NEUTROPHILS RELATIVE PERCENT: 59 % (ref 43–80)
NITRITE, URINE: NEGATIVE
PDW BLD-RTO: 13 FL (ref 11.5–15)
PH UA: 7 (ref 5–9)
PLATELET # BLD: 187 E9/L (ref 130–450)
PMV BLD AUTO: 12.4 FL (ref 7–12)
POTASSIUM SERPL-SCNC: 3.9 MMOL/L (ref 3.5–5)
PROTEIN UA: NEGATIVE MG/DL
RBC # BLD: 4.51 E12/L (ref 3.5–5.5)
RBC UA: ABNORMAL /HPF (ref 0–2)
SODIUM BLD-SCNC: 138 MMOL/L (ref 132–146)
SPECIFIC GRAVITY UA: 1.01 (ref 1–1.03)
TOTAL PROTEIN: 7.1 G/DL (ref 6.4–8.3)
TROPONIN: <0.01 NG/ML (ref 0–0.03)
TSH SERPL DL<=0.05 MIU/L-ACNC: 0.91 UIU/ML (ref 0.27–4.2)
UROBILINOGEN, URINE: 4 E.U./DL
WBC # BLD: 6.6 E9/L (ref 4.5–11.5)
WBC UA: ABNORMAL /HPF (ref 0–5)

## 2019-09-04 PROCEDURE — 83735 ASSAY OF MAGNESIUM: CPT

## 2019-09-04 PROCEDURE — 81001 URINALYSIS AUTO W/SCOPE: CPT

## 2019-09-04 PROCEDURE — 99285 EMERGENCY DEPT VISIT HI MDM: CPT

## 2019-09-04 PROCEDURE — 2580000003 HC RX 258: Performed by: STUDENT IN AN ORGANIZED HEALTH CARE EDUCATION/TRAINING PROGRAM

## 2019-09-04 PROCEDURE — 85025 COMPLETE CBC W/AUTO DIFF WBC: CPT

## 2019-09-04 PROCEDURE — 6370000000 HC RX 637 (ALT 250 FOR IP): Performed by: INTERNAL MEDICINE

## 2019-09-04 PROCEDURE — 80053 COMPREHEN METABOLIC PANEL: CPT

## 2019-09-04 PROCEDURE — 36415 COLL VENOUS BLD VENIPUNCTURE: CPT

## 2019-09-04 PROCEDURE — 87088 URINE BACTERIA CULTURE: CPT

## 2019-09-04 PROCEDURE — 71045 X-RAY EXAM CHEST 1 VIEW: CPT

## 2019-09-04 PROCEDURE — G0378 HOSPITAL OBSERVATION PER HR: HCPCS

## 2019-09-04 PROCEDURE — 84443 ASSAY THYROID STIM HORMONE: CPT

## 2019-09-04 PROCEDURE — 83690 ASSAY OF LIPASE: CPT

## 2019-09-04 PROCEDURE — 83605 ASSAY OF LACTIC ACID: CPT

## 2019-09-04 PROCEDURE — 93005 ELECTROCARDIOGRAM TRACING: CPT | Performed by: STUDENT IN AN ORGANIZED HEALTH CARE EDUCATION/TRAINING PROGRAM

## 2019-09-04 PROCEDURE — 2140000000 HC CCU INTERMEDIATE R&B

## 2019-09-04 PROCEDURE — 84484 ASSAY OF TROPONIN QUANT: CPT

## 2019-09-04 PROCEDURE — 93010 ELECTROCARDIOGRAM REPORT: CPT | Performed by: INTERNAL MEDICINE

## 2019-09-04 RX ORDER — IBUPROFEN 200 MG
400 TABLET ORAL EVERY 4 HOURS PRN
Status: ON HOLD | COMMUNITY
End: 2019-09-06 | Stop reason: HOSPADM

## 2019-09-04 RX ORDER — LACTOBACILLUS RHAMNOSUS GG 10B CELL
1 CAPSULE ORAL DAILY
Status: DISCONTINUED | OUTPATIENT
Start: 2019-09-04 | End: 2019-09-06 | Stop reason: HOSPADM

## 2019-09-04 RX ORDER — RAMIPRIL 5 MG/1
5 CAPSULE ORAL DAILY
Status: DISCONTINUED | OUTPATIENT
Start: 2019-09-04 | End: 2019-09-06 | Stop reason: HOSPADM

## 2019-09-04 RX ORDER — GUAIFENESIN AND DEXTROMETHORPHAN HYDROBROMIDE 100; 10 MG/5ML; MG/5ML
10 SOLUTION ORAL EVERY 6 HOURS PRN
COMMUNITY
End: 2022-01-17

## 2019-09-04 RX ORDER — PANTOPRAZOLE SODIUM 40 MG/1
40 TABLET, DELAYED RELEASE ORAL
Status: DISCONTINUED | OUTPATIENT
Start: 2019-09-05 | End: 2019-09-06 | Stop reason: HOSPADM

## 2019-09-04 RX ORDER — 0.9 % SODIUM CHLORIDE 0.9 %
500 INTRAVENOUS SOLUTION INTRAVENOUS ONCE
Status: COMPLETED | OUTPATIENT
Start: 2019-09-04 | End: 2019-09-04

## 2019-09-04 RX ORDER — GUAIFENESIN/DEXTROMETHORPHAN 100-10MG/5
10 SYRUP ORAL EVERY 6 HOURS PRN
Status: DISCONTINUED | OUTPATIENT
Start: 2019-09-04 | End: 2019-09-06 | Stop reason: HOSPADM

## 2019-09-04 RX ORDER — MENTHOL AND ZINC OXIDE .44; 20.625 G/100G; G/100G
OINTMENT TOPICAL DAILY PRN
COMMUNITY
End: 2022-01-17

## 2019-09-04 RX ORDER — VITS A,C,E/LUTEIN/MINERALS 300MCG-200
1 TABLET ORAL DAILY
Status: DISCONTINUED | OUTPATIENT
Start: 2019-09-04 | End: 2019-09-06 | Stop reason: HOSPADM

## 2019-09-04 RX ORDER — DIPHENHYDRAMINE HCL 25 MG
25 TABLET ORAL EVERY 6 HOURS PRN
Status: DISCONTINUED | OUTPATIENT
Start: 2019-09-04 | End: 2019-09-05

## 2019-09-04 RX ORDER — MEMANTINE HYDROCHLORIDE 28 MG/1
1 CAPSULE, EXTENDED RELEASE ORAL DAILY
Status: DISCONTINUED | OUTPATIENT
Start: 2019-09-04 | End: 2019-09-04 | Stop reason: CLARIF

## 2019-09-04 RX ORDER — SERTRALINE HYDROCHLORIDE 25 MG/1
25 TABLET, FILM COATED ORAL DAILY
Status: ON HOLD | COMMUNITY
End: 2019-09-06 | Stop reason: HOSPADM

## 2019-09-04 RX ORDER — VITAMIN E 268 MG
400 CAPSULE ORAL DAILY
Status: DISCONTINUED | OUTPATIENT
Start: 2019-09-04 | End: 2019-09-06 | Stop reason: HOSPADM

## 2019-09-04 RX ORDER — DIAPER,BRIEF,INFANT-TODD,DISP
EACH MISCELLANEOUS 2 TIMES DAILY
COMMUNITY
End: 2022-01-17

## 2019-09-04 RX ORDER — ERGOCALCIFEROL 1.25 MG/1
50000 CAPSULE ORAL WEEKLY
Status: DISCONTINUED | OUTPATIENT
Start: 2019-09-08 | End: 2019-09-06 | Stop reason: HOSPADM

## 2019-09-04 RX ORDER — MEMANTINE HYDROCHLORIDE 10 MG/1
10 TABLET ORAL 2 TIMES DAILY
Status: DISCONTINUED | OUTPATIENT
Start: 2019-09-04 | End: 2019-09-06 | Stop reason: HOSPADM

## 2019-09-04 RX ORDER — DIAPER,BRIEF,INFANT-TODD,DISP
EACH MISCELLANEOUS 2 TIMES DAILY PRN
Status: DISCONTINUED | OUTPATIENT
Start: 2019-09-04 | End: 2019-09-06 | Stop reason: HOSPADM

## 2019-09-04 RX ORDER — IBUPROFEN 200 MG
400 TABLET ORAL EVERY 4 HOURS PRN
Status: DISCONTINUED | OUTPATIENT
Start: 2019-09-04 | End: 2019-09-05

## 2019-09-04 RX ORDER — DIVALPROEX SODIUM 125 MG/1
125 CAPSULE, COATED PELLETS ORAL 3 TIMES DAILY
Status: DISCONTINUED | OUTPATIENT
Start: 2019-09-04 | End: 2019-09-06 | Stop reason: HOSPADM

## 2019-09-04 RX ORDER — DIPHENHYDRAMINE HCL 25 MG
25 CAPSULE ORAL EVERY 6 HOURS PRN
Status: ON HOLD | COMMUNITY
End: 2019-09-06 | Stop reason: HOSPADM

## 2019-09-04 RX ORDER — HYDROCHLOROTHIAZIDE 25 MG/1
25 TABLET ORAL DAILY
Status: DISCONTINUED | OUTPATIENT
Start: 2019-09-05 | End: 2019-09-06 | Stop reason: HOSPADM

## 2019-09-04 RX ADMIN — MEMANTINE HYDROCHLORIDE 10 MG: 10 TABLET, FILM COATED ORAL at 21:42

## 2019-09-04 RX ADMIN — SODIUM CHLORIDE 500 ML: 9 INJECTION, SOLUTION INTRAVENOUS at 14:29

## 2019-09-04 RX ADMIN — DIVALPROEX SODIUM 125 MG: 125 CAPSULE, COATED PELLETS ORAL at 21:41

## 2019-09-04 NOTE — ED PROVIDER NOTES
SpO2 Height Weight   09/04/19 1537 (!) 148/90 -- -- 85 14 97 % -- --   09/04/19 1338 101/80 98.2 °F (36.8 °C) Temporal -- 17 -- 5' 2\" (1.575 m) 195 lb (88.5 kg)   09/04/19 1329 -- -- -- 78 -- 96 % -- --       Oxygen Saturation Interpretation: Normal      ------------------------------------------ PROGRESS NOTES ------------------------------------------  Re-evaluation(s):    I have spoken with the patient and discussed todays results, in addition to providing specific details for the plan of care and counseling regarding the diagnosis and prognosis. Their questions are answered at this time and they are agreeable with the plan.      --------------------------------- ADDITIONAL PROVIDER NOTES ---------------------------------  Consultations:  Spoke with Dr. Violette Acosta, he will admit this patient. This patient's ED course included: a personal history and physicial examination, re-evaluation prior to disposition, multiple bedside re-evaluations, cardiac monitoring, continuous pulse oximetry and complex medical decision making and emergency management    This patient has been closely monitored during their ED course. Please note that the withdrawal or failure to initiate urgent interventions for this patient would likely result in a life threatening deterioration or permanent disability. Accordingly this patient received 0 minutes of critical care time, excluding separately billable procedures. Systems at risk for deterioration include: cardiovascular. Clinical Impression  1. New onset atrial fibrillation ([facility] [address]          Disposition  Patient's disposition: Admit to telemetry  Patient's condition is stable.        Leonie Soriano DO  Resident  09/04/19 9543

## 2019-09-05 PROCEDURE — 97530 THERAPEUTIC ACTIVITIES: CPT

## 2019-09-05 PROCEDURE — G0378 HOSPITAL OBSERVATION PER HR: HCPCS

## 2019-09-05 PROCEDURE — 6370000000 HC RX 637 (ALT 250 FOR IP): Performed by: INTERNAL MEDICINE

## 2019-09-05 PROCEDURE — 99223 1ST HOSP IP/OBS HIGH 75: CPT | Performed by: INTERNAL MEDICINE

## 2019-09-05 PROCEDURE — 97161 PT EVAL LOW COMPLEX 20 MIN: CPT

## 2019-09-05 PROCEDURE — 6370000000 HC RX 637 (ALT 250 FOR IP): Performed by: NURSE PRACTITIONER

## 2019-09-05 PROCEDURE — 2140000000 HC CCU INTERMEDIATE R&B

## 2019-09-05 RX ADMIN — DIVALPROEX SODIUM 125 MG: 125 CAPSULE, COATED PELLETS ORAL at 21:59

## 2019-09-05 RX ADMIN — DIVALPROEX SODIUM 125 MG: 125 CAPSULE, COATED PELLETS ORAL at 08:49

## 2019-09-05 RX ADMIN — DIVALPROEX SODIUM 125 MG: 125 CAPSULE, COATED PELLETS ORAL at 14:00

## 2019-09-05 RX ADMIN — RAMIPRIL 5 MG: 5 CAPSULE ORAL at 08:49

## 2019-09-05 RX ADMIN — MEMANTINE HYDROCHLORIDE 10 MG: 10 TABLET, FILM COATED ORAL at 21:59

## 2019-09-05 RX ADMIN — HYDROCHLOROTHIAZIDE 25 MG: 25 TABLET ORAL at 08:49

## 2019-09-05 RX ADMIN — Medication 400 UNITS: at 08:49

## 2019-09-05 RX ADMIN — MEMANTINE HYDROCHLORIDE 10 MG: 10 TABLET, FILM COATED ORAL at 08:49

## 2019-09-05 RX ADMIN — CYCLOPENTOLATE HYDROCHLORIDE 1 TABLET: 10 SOLUTION/ DROPS OPHTHALMIC at 08:49

## 2019-09-05 RX ADMIN — SERTRALINE 25 MG: 50 TABLET, FILM COATED ORAL at 08:49

## 2019-09-05 RX ADMIN — APIXABAN 5 MG: 5 TABLET, FILM COATED ORAL at 12:15

## 2019-09-05 RX ADMIN — PANTOPRAZOLE SODIUM 40 MG: 40 TABLET, DELAYED RELEASE ORAL at 06:58

## 2019-09-05 RX ADMIN — Medication 1 CAPSULE: at 08:49

## 2019-09-05 ASSESSMENT — PAIN SCALES - GENERAL: PAINLEVEL_OUTOF10: 0

## 2019-09-05 NOTE — CONSULTS
topically daily as needed Max 30 ml per day. Yes Historical Provider, MD   sertraline (ZOLOFT) 25 MG tablet Take 25 mg by mouth daily   Yes Historical Provider, MD   Bilberry 1000 MG CAPS Take 1 capsule by mouth daily   Yes Historical Provider, MD   Dextromethorphan-guaiFENesin  MG/5ML SYRP Take 10 mLs by mouth every 6 hours as needed for Cough   Yes Historical Provider, MD   hydrochlorothiazide (HYDRODIURIL) 25 MG tablet TAKE ONE TABLET BY MOUTH EVERY DAY 4/30/19  Yes Angel Paul Catterlin, DO   memantine ER (NAMENDA XR) 28 MG CP24 extended release capsule TAKE ONE CAPSULE BY MOUTH EVERY DAY 4/30/19  Yes Angel Paul Catterlin, DO   vitamin D (ERGOCALCIFEROL) 29109 units CAPS capsule take one capsule by mouth once a week  Patient taking differently: Take 50,000 Units by mouth once a week On sundays 4/5/19  Yes Angel Humberto Catterlin, DO   divalproex (DEPAKOTE) 125 MG DR tablet Take 1 tablet by mouth 3 times daily 2/22/19  Yes Angel Humberto Catterlin, DO   ramipril (ALTACE) 5 MG capsule TAKE ONE CAPSULE BY MOUTH EVERY DAY 12/21/18  Yes Angel Humberto Catterlin, DO   Saccharomyces boulardii (PROBIOTIC) 250 MG CAPS Take by mouth daily    Yes Historical Provider, MD   Multiple Vitamins-Minerals (EYE VITAMINS) CAPS Take 1 capsule by mouth daily    Yes Historical Provider, MD   vitamin E 400 UNIT capsule Take 400 Units by mouth daily.    Yes Historical Provider, MD       Current Medications:    Current Facility-Administered Medications: guaiFENesin-dextromethorphan (ROBITUSSIN DM) 100-10 MG/5ML syrup 10 mL, 10 mL, Oral, Q6H PRN  diphenhydrAMINE (BENADRYL) tablet 25 mg, 25 mg, Oral, Q6H PRN  divalproex (DEPAKOTE SPRINKLE) capsule 125 mg, 125 mg, Oral, TID  hydrochlorothiazide (HYDRODIURIL) tablet 25 mg, 25 mg, Oral, Daily  hydrocortisone 1 % ointment, , Topical, BID PRN  ibuprofen (ADVIL;MOTRIN) tablet 400 mg, 400 mg, Oral, Q4H PRN  antioxidant multivitamin (OCUVITE) tablet, 1 tablet, Oral, Daily  ramipril (ALTACE)

## 2019-09-06 VITALS
HEIGHT: 62 IN | SYSTOLIC BLOOD PRESSURE: 92 MMHG | OXYGEN SATURATION: 96 % | DIASTOLIC BLOOD PRESSURE: 50 MMHG | WEIGHT: 195 LBS | HEART RATE: 88 BPM | TEMPERATURE: 97.7 F | BODY MASS INDEX: 35.88 KG/M2 | RESPIRATION RATE: 16 BRPM

## 2019-09-06 LAB
ANION GAP SERPL CALCULATED.3IONS-SCNC: 7 MMOL/L (ref 7–16)
BUN BLDV-MCNC: 14 MG/DL (ref 8–23)
CALCIUM SERPL-MCNC: 8.9 MG/DL (ref 8.6–10.2)
CHLORIDE BLD-SCNC: 101 MMOL/L (ref 98–107)
CO2: 31 MMOL/L (ref 22–29)
CREAT SERPL-MCNC: 0.9 MG/DL (ref 0.5–1)
FOLATE: 7.1 NG/ML (ref 4.8–24.2)
GFR AFRICAN AMERICAN: >60
GFR NON-AFRICAN AMERICAN: >60 ML/MIN/1.73
GLUCOSE BLD-MCNC: 107 MG/DL (ref 74–99)
LV EF: 75 %
LVEF MODALITY: NORMAL
POTASSIUM SERPL-SCNC: 3.7 MMOL/L (ref 3.5–5)
SODIUM BLD-SCNC: 139 MMOL/L (ref 132–146)
URINE CULTURE, ROUTINE: NORMAL
VITAMIN B-12: 644 PG/ML (ref 211–946)

## 2019-09-06 PROCEDURE — 6370000000 HC RX 637 (ALT 250 FOR IP): Performed by: INTERNAL MEDICINE

## 2019-09-06 PROCEDURE — 82607 VITAMIN B-12: CPT

## 2019-09-06 PROCEDURE — G0378 HOSPITAL OBSERVATION PER HR: HCPCS

## 2019-09-06 PROCEDURE — 93306 TTE W/DOPPLER COMPLETE: CPT

## 2019-09-06 PROCEDURE — 97535 SELF CARE MNGMENT TRAINING: CPT

## 2019-09-06 PROCEDURE — 36415 COLL VENOUS BLD VENIPUNCTURE: CPT

## 2019-09-06 PROCEDURE — 99232 SBSQ HOSP IP/OBS MODERATE 35: CPT | Performed by: INTERNAL MEDICINE

## 2019-09-06 PROCEDURE — 97166 OT EVAL MOD COMPLEX 45 MIN: CPT

## 2019-09-06 PROCEDURE — 82746 ASSAY OF FOLIC ACID SERUM: CPT

## 2019-09-06 PROCEDURE — 80048 BASIC METABOLIC PNL TOTAL CA: CPT

## 2019-09-06 RX ORDER — PANTOPRAZOLE SODIUM 40 MG/1
40 TABLET, DELAYED RELEASE ORAL
Qty: 30 TABLET | Refills: 3 | Status: SHIPPED | OUTPATIENT
Start: 2019-09-07 | End: 2019-12-28 | Stop reason: SDUPTHER

## 2019-09-06 RX ADMIN — Medication 1 CAPSULE: at 09:34

## 2019-09-06 RX ADMIN — HYDROCHLOROTHIAZIDE 25 MG: 25 TABLET ORAL at 09:34

## 2019-09-06 RX ADMIN — DIVALPROEX SODIUM 125 MG: 125 CAPSULE, COATED PELLETS ORAL at 15:00

## 2019-09-06 RX ADMIN — DIVALPROEX SODIUM 125 MG: 125 CAPSULE, COATED PELLETS ORAL at 09:34

## 2019-09-06 RX ADMIN — CYCLOPENTOLATE HYDROCHLORIDE 1 TABLET: 10 SOLUTION/ DROPS OPHTHALMIC at 09:35

## 2019-09-06 RX ADMIN — PANTOPRAZOLE SODIUM 40 MG: 40 TABLET, DELAYED RELEASE ORAL at 06:41

## 2019-09-06 RX ADMIN — MEMANTINE HYDROCHLORIDE 10 MG: 10 TABLET, FILM COATED ORAL at 09:34

## 2019-09-06 RX ADMIN — APIXABAN 5 MG: 5 TABLET, FILM COATED ORAL at 15:30

## 2019-09-06 RX ADMIN — RAMIPRIL 5 MG: 5 CAPSULE ORAL at 09:34

## 2019-09-06 RX ADMIN — Medication 400 UNITS: at 09:34

## 2019-09-06 ASSESSMENT — PAIN SCALES - GENERAL
PAINLEVEL_OUTOF10: 0

## 2019-09-06 NOTE — PLAN OF CARE
Problem: Falls - Risk of:  Goal: Will remain free from falls  Description  Will remain free from falls  Outcome: Completed  Goal: Absence of physical injury  Description  Absence of physical injury  Outcome: Completed     Problem: Risk for Impaired Skin Integrity  Goal: Tissue integrity - skin and mucous membranes  Description  Structural intactness and normal physiological function of skin and  mucous membranes.   Outcome: Completed

## 2019-09-06 NOTE — PROGRESS NOTES
the last 72 hours. APTT:   No results for input(s): APTT in the last 72 hours. Fasting Lipid Panel:    Lab Results   Component Value Date    CHOL 204 03/06/2019    TRIG 213 03/06/2019    HDL 36 03/06/2019       Cardiac Enzymes:    Lab Results   Component Value Date    TROPONINI <0.01 09/04/2019       Resident's Assessment and Plan     Assessment  1. Atrial fibrillation, rate controlled without addition of rate controlling meds  2. HTN, on hctz and ramipril. 3. JOSE DE JESUS, resolved with fluids. 4. DM  5. GERD, on protonix. Epigastric pain improved today. 6. Dementia, on namenda    Plan  1. To start Eliquis but we will hold for now as she might have scopes for GI. Still for Echo.  2. Continue meds for HTN  3.  Hold zoloft and diphenhydramine  4. GI consult    PT/OT evaluation: consulted  DVT prophylaxis/ GI prophylaxis: diet/protonix  Diet: General  Disposition: GI work up then possible discharge    Dahlia Hashimoto, MD, PGY-3  Attending physician: Dr. Marge Centeno

## 2019-09-06 NOTE — PROGRESS NOTES
Occupational Therapy  OCCUPATIONAL THERAPY INITIAL EVALUATION      Date:2019  Patient Name: Homa Maher  MRN: [id number]  : 1940  Room: [address] [place] [address]-A    Evaluating OT:  Keena Soto, SCOTT, OTR/L #[id number]      AM-PAC Daily Activity Raw Score:  15/24  Recommended Adaptive Equipment:  TBD     Reason for Admission:  Pt was admitted w/ abnormal EKG     Diagnosis:  New Onset A Fib      Procedures this admission:  None     Pertinent Medical History:  Dementia, DM, HTN       Precautions:  Falls  General Diet    Home Living: Pt is a Resident of [facility]   Bathroom setup:  [facility] w/ shower chair, standard commode, grab bars throughout   Equipment owned:  [address] [place] [address] Street:  Staff assist, Aides from [address] [place] [address] 4 days/week 7-9 AM and PM    Prior Level of Function:  Receives assist PRN with ADLs, IND w/ Transfers and Mobility using FWW for ambulation.    Driving:  No  Occupation:  None reported    Pain Level:  denies;  Nsg Notified   Additional Complaints:  General Fatigue    Vitals/Lab Values:  WFL Room Air    Cognition: A & O x 3 - generally oriented   Able to Follow Multi-Step Commands w/ Min VCs   Memory:  fair    Sequencing:  fair    Problem solving:  fair    Judgement/safety:  fair   Additional Comments:  Pt was pleasant, cooperative, slightly impulsive, unsafe w/ walker and transfers       Functional Assessment:   Initial Eval Status  Date: 19 Treatment Status  Date: Short Term Goals  Treatment frequency: PRN 1-3 x/week   Feeding SUP/Set up    Required occasional Assist to open containers on tray, able to feed self w/ utensils, drink from cup     Grooming Min A/Set up/Min VCs    Able to stand at sink to wash hands, initiate task of combing hair, leaning on elbows during ax, increased back pain w/ standing upright - declined to complete task of combing hair d/t back discomfort  SUP  Standing At The Sink   UB Dressing Min A/Set up    Required Min A to don SUP/Assist w/ Pt safety, Proper Positioning, ADLs, Transfers and Functional Mobility as noted above, as well as set up and clean up for session. Skilled monitoring of Vitals and pts response to treatment. Consulted RN, Dtr     [] Malnutrition indicators have been identified and nursing has been notified to ensure a dietitian consult is ordered. Comments/Treatment:  Upon arrival, pt was found semi-supine in bed. She was agreeable to participate in assessment ax. Her Dtr was present during assessment. Received permission from RN prior to engaging pt in assessment ax. At the end of the session, patient was properly positioned in b/s chair w/ Chair Alarm in place, call light and phone within reach, all lines and tubes intact. Oriented pt to call bell. Made all appropriate Environmental Modifications to facilitate pt's level of IND and safety. All needs met. Dtr remained at b/s.   RN made aware of pt in chair w/ chair alarm       Pt would benefit from continued skilled OT services to increase safety and independence with completion of ADL/IADL tasks for functional independence and quality of life    Eval Complexity: Moderate     Assessment of current deficits   Functional mobility [x]  ADLs [x] Strength []  Cognition [x]  Functional transfers  [x] IADLs [x] Safety Awareness [x]  Endurance [x]  Fine Motor Coordination [] Balance [x] Vision/perception [] Sensation []   Gross Motor Coordination [] ROM [] Delirium []                  Motor Control []    Plan of Care:   ADL retraining [x]   Equipment needs [x]   Neuromuscular re-education [x] Energy Conservation Techniques [x]  Functional Transfer training [x] Patient and/or Family Education [x]  Functional Mobility training [x]  Environmental Modifications [x]  Cognitive re-training [x]   Compensatory techniques for ADLs [x]  Splinting Needs []   Positioning to improve overall function [x]   Therapeutic Activity [x]   Therapeutic Exercise

## 2019-09-06 NOTE — PROGRESS NOTES
Report called to [facility] spoke with [facility]

## 2019-09-06 NOTE — PROGRESS NOTES
30* 31*   BUN 24* 14   CREATININE 1.2* 0.9   GLUCOSE 158* 107*   CALCIUM 9.4 8.9     Lab Results   Component Value Date    MG 2.0 09/04/2019     Recent Labs     09/04/19  1428   ALKPHOS 61   ALT 17   AST 20   PROT 7.1   BILITOT 0.7   LABALBU 4.0     Recent Labs     09/04/19  1428   WBC 6.6   RBC 4.51   HGB 13.4   HCT 41.3   MCV 91.6   MCH 29.7   MCHC 32.4   RDW 13.0      MPV 12.4*     Lab Results   Component Value Date    TROPONINI <0.01 09/04/2019     No results found for: INR, PROTIME  Lab Results   Component Value Date    TSH 0.907 09/04/2019     Lab Results   Component Value Date    LABA1C 6.3 (H) 03/06/2019     No results found for: EAG  Lab Results   Component Value Date    CHOL 204 (H) 03/06/2019    CHOL 224 (H) 10/03/2018    CHOL 231 (H) 10/20/2017     Lab Results   Component Value Date    TRIG 213 (H) 03/06/2019    TRIG 168 (H) 10/03/2018    TRIG 268 (H) 10/20/2017     Lab Results   Component Value Date    HDL 36 03/06/2019    HDL 44 10/03/2018    HDL 45 10/20/2017     Lab Results   Component Value Date    LDLCALC 125 (H) 03/06/2019    LDLCALC 146 (H) 10/03/2018    LDLCALC 132 (H) 10/20/2017     Lab Results   Component Value Date    LABVLDL 43 03/06/2019    LABVLDL 34 10/03/2018    LABVLDL 54 10/20/2017     No results found for: CHOLHDLRATIO    Cardiac Tests:  Telemetry findings reviewed: A. fib at rate 90s, no new tachy/bradyarrhythmias overnight     EKG: Atrial fibrillation with controlled rate, nonspecific ST-T changes, abnormal EKG.     Labs were reviewed: Bun/creatinine 24/1.2>>14/0.9, lactic acidosis 2.4 and repeat was 1.2, troponin less than 0.01, LFTs are normal, TSH 0.9, CBC normal.     TTE- LVEF >75% with hyperdynamic LV function. Normal RV size and function.   No VHD    Vitals blood pressure 117/62.     Assessment:  · Persistent atrial fibrillation with controlled rate  · [id number] Vasc score-5  · Hypertension well-controlled  · Hyperlipidemia  · Type 2 diabetes  · Alzheimer's

## 2019-09-09 ENCOUNTER — CARE COORDINATION (OUTPATIENT)
Dept: CARE COORDINATION | Age: 79
End: 2019-09-09

## 2019-09-09 NOTE — CARE COORDINATION
Spoke with Tomás Hastings discussed with her RN CTN was informed by nurse at [facility] patient is not longer a patient with Dr. Saira Hale. Patient changed PCP's approximately 1 month ago to Dr. Erma Olszewski as Dr. Erma Olszewski comes to the [place].

## 2019-09-30 NOTE — DISCHARGE SUMMARY
EXAMINATION:  VITAL SIGNS:  On admission, blood pressure 117/62, pulse 92, temperature  97.4, respirations 18, height 5 feet 2 inches, weight 195 pounds. GENERAL:  The patient is well-developed, well-nourished woman who  appeared pleasant. HEENT:  Head normocephalic. Eyes:  PERRLA. Ears, nose, and throat:   Essentially negative. NECK:  Supple. No JVD, no thyromegaly, no bruits. CHEST:  Symmetric. LUNGS:  Clear to P and A. HEART:  Irregularly irregular. No murmurs or gallops. ABDOMEN:  No hepatosplenomegaly. No guarding. Rotund. EXTREMITIES:  No cyanosis, no edema, no clubbing. Distal pulses are  intact. IMPRESSION:  1. Atrial fibrillation, rate controlled, which is apparently subacute  or chronic at this time. 2.  Hypertension, on hydrochlorothiazide and ramipril. 3.  Acute kidney injury. Received fluid in emergency and probably  dehydration. 4.  Diabetes type 2, well controlled. 5.  GERD. Has not been on Protonix and is exhibiting active symptoms at  this time. 6.  Dementia, on Namenda, which is probably vascular versus Alzheimer's. Only time will tell. PLAN:  We will have Cardiology see her. Rehydrate her. Have continue  her Namenda and any brain imagery studies that we might need to, we will  order. In the meantime, we will have PT/OT see her, but she seems to be  independent at least recently in her ADL activities, but not in her  instrumental activities of daily living. MEDICATIONS ON DISCHARGE:  Include:  1. Eliquis 5 mg b.i.d.  2.  Protonix 40 mg before breakfast.  3.  Robitussin-DM q.i.d.  4.  Hydrochlorothiazide 25 mg daily. 5.  Memantine extended release 20 mg daily. 6.  Vitamin D 50,000 weekly. 7.  Ramipril 5 mg daily. 8.  Multivitamin one daily. HOSPITAL COURSE[de-identified]  So, in the hospital, the patient had a noninvasive  workup.  _____ Cardiology consultation and their impression why is that  she should be kept on lifetime Eliquis.   Her [id number] score was 5 to  6.7,

## 2019-10-26 ENCOUNTER — HOSPITAL ENCOUNTER (EMERGENCY)
Age: 79
Discharge: HOME | End: 2019-10-26
Payer: MEDICARE

## 2019-10-26 ENCOUNTER — APPOINTMENT (OUTPATIENT)
Dept: CT IMAGING | Age: 79
End: 2019-10-26
Payer: MEDICARE

## 2019-10-26 ENCOUNTER — APPOINTMENT (OUTPATIENT)
Dept: GENERAL RADIOLOGY | Age: 79
End: 2019-10-26
Payer: MEDICARE

## 2019-10-26 VITALS
OXYGEN SATURATION: 97 % | HEART RATE: 102 BPM | TEMPERATURE: 98.7 F | BODY MASS INDEX: 35.67 KG/M2 | RESPIRATION RATE: 16 BRPM | WEIGHT: 195 LBS | DIASTOLIC BLOOD PRESSURE: 80 MMHG | SYSTOLIC BLOOD PRESSURE: 134 MMHG

## 2019-10-26 DIAGNOSIS — S40.011A CONTUSION OF RIGHT SHOULDER, INITIAL ENCOUNTER: ICD-10-CM

## 2019-10-26 DIAGNOSIS — S09.90XA MINOR CLOSED HEAD INJURY: Primary | ICD-10-CM

## 2019-10-26 PROCEDURE — 70450 CT HEAD/BRAIN W/O DYE: CPT

## 2019-10-26 PROCEDURE — 99284 EMERGENCY DEPT VISIT MOD MDM: CPT

## 2019-10-26 PROCEDURE — 73030 X-RAY EXAM OF SHOULDER: CPT

## 2019-10-26 PROCEDURE — 72125 CT NECK SPINE W/O DYE: CPT

## 2019-10-26 RX ORDER — ACETAMINOPHEN 325 MG/1
650 TABLET ORAL EVERY 4 HOURS PRN
COMMUNITY

## 2019-10-26 RX ORDER — SPIRONOLACTONE 25 MG
TABLET ORAL DAILY
COMMUNITY
End: 2022-01-17

## 2019-10-26 RX ORDER — DIVALPROEX SODIUM 125 MG/1
125 TABLET, DELAYED RELEASE ORAL 3 TIMES DAILY
COMMUNITY
End: 2019-12-06 | Stop reason: SDUPTHER

## 2019-10-26 ASSESSMENT — PAIN SCALES - WONG BAKER: WONGBAKER_NUMERICALRESPONSE: 8

## 2019-10-26 ASSESSMENT — PAIN DESCRIPTION - LOCATION: LOCATION: ARM

## 2019-10-26 ASSESSMENT — PAIN DESCRIPTION - ORIENTATION: ORIENTATION: RIGHT

## 2019-12-06 RX ORDER — DIVALPROEX SODIUM 125 MG/1
125 TABLET, DELAYED RELEASE ORAL 3 TIMES DAILY
Qty: 90 TABLET | Refills: 5 | Status: SHIPPED | OUTPATIENT
Start: 2019-12-06 | End: 2020-01-11 | Stop reason: SDUPTHER

## 2019-12-15 RX ORDER — DONEPEZIL HYDROCHLORIDE 5 MG/1
5 TABLET, FILM COATED ORAL
Qty: 30 TABLET | Refills: 3 | Status: SHIPPED | OUTPATIENT
Start: 2019-12-15 | End: 2020-01-11 | Stop reason: SDUPTHER

## 2019-12-17 RX ORDER — MEGESTROL ACETATE 40 MG/ML
400 SUSPENSION ORAL DAILY
Qty: 240 ML | Refills: 3 | Status: SHIPPED | OUTPATIENT
Start: 2019-12-17 | End: 2020-01-22 | Stop reason: SDUPTHER

## 2019-12-28 RX ORDER — PANTOPRAZOLE SODIUM 40 MG/1
40 TABLET, DELAYED RELEASE ORAL
Qty: 30 TABLET | Refills: 10 | Status: SHIPPED | OUTPATIENT
Start: 2019-12-28 | End: 2020-01-11 | Stop reason: SDUPTHER

## 2019-12-29 DIAGNOSIS — R79.89 LOW VITAMIN D LEVEL: ICD-10-CM

## 2019-12-29 RX ORDER — ERGOCALCIFEROL 1.25 MG/1
CAPSULE ORAL
Qty: 12 CAPSULE | Refills: 2 | Status: SHIPPED | OUTPATIENT
Start: 2019-12-29 | End: 2020-01-11 | Stop reason: SDUPTHER

## 2019-12-29 RX ORDER — CLOTRIMAZOLE 1 %
CREAM (GRAM) TOPICAL
Qty: 1 TUBE | Refills: 2 | Status: SHIPPED | OUTPATIENT
Start: 2019-12-29 | End: 2020-01-05

## 2020-01-11 RX ORDER — DIVALPROEX SODIUM 125 MG/1
125 TABLET, DELAYED RELEASE ORAL 3 TIMES DAILY
Qty: 270 TABLET | Refills: 3 | Status: SHIPPED
Start: 2020-01-11 | End: 2022-01-17 | Stop reason: ALTCHOICE

## 2020-01-11 RX ORDER — RAMIPRIL 5 MG/1
CAPSULE ORAL
Qty: 90 CAPSULE | Refills: 3 | Status: SHIPPED
Start: 2020-01-11 | End: 2020-03-06 | Stop reason: ALTCHOICE

## 2020-01-11 RX ORDER — DONEPEZIL HYDROCHLORIDE 5 MG/1
2.5 TABLET, FILM COATED ORAL
Qty: 90 TABLET | Refills: 3 | Status: ON HOLD | OUTPATIENT
Start: 2020-01-11 | End: 2020-02-03 | Stop reason: HOSPADM

## 2020-01-11 RX ORDER — ERGOCALCIFEROL 1.25 MG/1
CAPSULE ORAL
Qty: 12 CAPSULE | Refills: 3 | Status: SHIPPED
Start: 2020-01-11 | End: 2022-01-17 | Stop reason: ALTCHOICE

## 2020-01-11 RX ORDER — MEMANTINE HYDROCHLORIDE 28 MG/1
CAPSULE, EXTENDED RELEASE ORAL
Qty: 90 CAPSULE | Refills: 3 | Status: SHIPPED
Start: 2020-01-11 | End: 2022-01-17 | Stop reason: ALTCHOICE

## 2020-01-11 RX ORDER — PANTOPRAZOLE SODIUM 40 MG/1
40 TABLET, DELAYED RELEASE ORAL
Qty: 90 TABLET | Refills: 3 | Status: SHIPPED
Start: 2020-01-11 | End: 2020-04-28 | Stop reason: ALTCHOICE

## 2020-01-22 RX ORDER — MEGESTROL ACETATE 40 MG/ML
200 SUSPENSION ORAL DAILY
Qty: 240 ML | Refills: 3 | Status: ON HOLD
Start: 2020-01-22 | End: 2022-02-09 | Stop reason: HOSPADM

## 2020-01-30 ENCOUNTER — APPOINTMENT (OUTPATIENT)
Dept: GENERAL RADIOLOGY | Age: 80
DRG: 310 | End: 2020-01-30
Payer: MEDICARE

## 2020-01-30 ENCOUNTER — HOSPITAL ENCOUNTER (INPATIENT)
Age: 80
LOS: 2 days | Discharge: ANOTHER HOSPITAL | DRG: 310 | End: 2020-02-03
Attending: EMERGENCY MEDICINE | Admitting: INTERNAL MEDICINE
Payer: MEDICARE

## 2020-01-30 PROBLEM — I48.92 ATRIAL FLUTTER WITH RAPID VENTRICULAR RESPONSE (HCC): Status: ACTIVE | Noted: 2020-01-30

## 2020-01-30 LAB
ALBUMIN SERPL-MCNC: 3.6 G/DL (ref 3.5–5.2)
ALP BLD-CCNC: 47 U/L (ref 35–104)
ALT SERPL-CCNC: 7 U/L (ref 0–32)
ANION GAP SERPL CALCULATED.3IONS-SCNC: 10 MMOL/L (ref 7–16)
AST SERPL-CCNC: 15 U/L (ref 0–31)
BASOPHILS ABSOLUTE: 0.03 E9/L (ref 0–0.2)
BASOPHILS RELATIVE PERCENT: 0.4 % (ref 0–2)
BILIRUB SERPL-MCNC: 0.5 MG/DL (ref 0–1.2)
BUN BLDV-MCNC: 21 MG/DL (ref 8–23)
CALCIUM SERPL-MCNC: 9.2 MG/DL (ref 8.6–10.2)
CHLORIDE BLD-SCNC: 108 MMOL/L (ref 98–107)
CO2: 21 MMOL/L (ref 22–29)
CREAT SERPL-MCNC: 1.1 MG/DL (ref 0.5–1)
EKG ATRIAL RATE: 60 BPM
EKG P AXIS: 79 DEGREES
EKG P-R INTERVAL: 180 MS
EKG Q-T INTERVAL: 418 MS
EKG QRS DURATION: 76 MS
EKG QTC CALCULATION (BAZETT): 418 MS
EKG R AXIS: 38 DEGREES
EKG T AXIS: 83 DEGREES
EKG VENTRICULAR RATE: 60 BPM
EOSINOPHILS ABSOLUTE: 0.09 E9/L (ref 0.05–0.5)
EOSINOPHILS RELATIVE PERCENT: 1.3 % (ref 0–6)
GFR AFRICAN AMERICAN: 58
GFR NON-AFRICAN AMERICAN: 48 ML/MIN/1.73
GLUCOSE BLD-MCNC: 132 MG/DL (ref 74–99)
HCT VFR BLD CALC: 39.9 % (ref 34–48)
HEMOGLOBIN: 12.3 G/DL (ref 11.5–15.5)
IMMATURE GRANULOCYTES #: 0.02 E9/L
IMMATURE GRANULOCYTES %: 0.3 % (ref 0–5)
LYMPHOCYTES ABSOLUTE: 2.3 E9/L (ref 1.5–4)
LYMPHOCYTES RELATIVE PERCENT: 34.1 % (ref 20–42)
MCH RBC QN AUTO: 28.7 PG (ref 26–35)
MCHC RBC AUTO-ENTMCNC: 30.8 % (ref 32–34.5)
MCV RBC AUTO: 93 FL (ref 80–99.9)
MONOCYTES ABSOLUTE: 0.71 E9/L (ref 0.1–0.95)
MONOCYTES RELATIVE PERCENT: 10.5 % (ref 2–12)
NEUTROPHILS ABSOLUTE: 3.59 E9/L (ref 1.8–7.3)
NEUTROPHILS RELATIVE PERCENT: 53.4 % (ref 43–80)
PDW BLD-RTO: 13 FL (ref 11.5–15)
PLATELET # BLD: 167 E9/L (ref 130–450)
PMV BLD AUTO: 12.4 FL (ref 7–12)
POTASSIUM REFLEX MAGNESIUM: 4.1 MMOL/L (ref 3.5–5)
PRO-BNP: 1702 PG/ML (ref 0–450)
RBC # BLD: 4.29 E12/L (ref 3.5–5.5)
REASON FOR REJECTION: NORMAL
REJECTED TEST: NORMAL
SODIUM BLD-SCNC: 139 MMOL/L (ref 132–146)
TOTAL PROTEIN: 6.5 G/DL (ref 6.4–8.3)
TROPONIN: <0.01 NG/ML (ref 0–0.03)
WBC # BLD: 6.7 E9/L (ref 4.5–11.5)

## 2020-01-30 PROCEDURE — 96374 THER/PROPH/DIAG INJ IV PUSH: CPT

## 2020-01-30 PROCEDURE — 93005 ELECTROCARDIOGRAM TRACING: CPT | Performed by: EMERGENCY MEDICINE

## 2020-01-30 PROCEDURE — 96375 TX/PRO/DX INJ NEW DRUG ADDON: CPT

## 2020-01-30 PROCEDURE — 71045 X-RAY EXAM CHEST 1 VIEW: CPT

## 2020-01-30 PROCEDURE — G0378 HOSPITAL OBSERVATION PER HR: HCPCS

## 2020-01-30 PROCEDURE — 2500000003 HC RX 250 WO HCPCS: Performed by: EMERGENCY MEDICINE

## 2020-01-30 PROCEDURE — 36415 COLL VENOUS BLD VENIPUNCTURE: CPT

## 2020-01-30 PROCEDURE — 84484 ASSAY OF TROPONIN QUANT: CPT

## 2020-01-30 PROCEDURE — 83880 ASSAY OF NATRIURETIC PEPTIDE: CPT

## 2020-01-30 PROCEDURE — 85025 COMPLETE CBC W/AUTO DIFF WBC: CPT

## 2020-01-30 PROCEDURE — 99285 EMERGENCY DEPT VISIT HI MDM: CPT

## 2020-01-30 PROCEDURE — 93010 ELECTROCARDIOGRAM REPORT: CPT | Performed by: INTERNAL MEDICINE

## 2020-01-30 PROCEDURE — 94760 N-INVAS EAR/PLS OXIMETRY 1: CPT

## 2020-01-30 PROCEDURE — 2580000003 HC RX 258: Performed by: EMERGENCY MEDICINE

## 2020-01-30 PROCEDURE — 6370000000 HC RX 637 (ALT 250 FOR IP): Performed by: INTERNAL MEDICINE

## 2020-01-30 PROCEDURE — 80053 COMPREHEN METABOLIC PANEL: CPT

## 2020-01-30 RX ORDER — 0.9 % SODIUM CHLORIDE 0.9 %
500 INTRAVENOUS SOLUTION INTRAVENOUS ONCE
Status: COMPLETED | OUTPATIENT
Start: 2020-01-30 | End: 2020-01-30

## 2020-01-30 RX ORDER — LACTOBACILLUS RHAMNOSUS GG 10B CELL
1 CAPSULE ORAL DAILY
Status: DISCONTINUED | OUTPATIENT
Start: 2020-01-31 | End: 2020-02-04 | Stop reason: HOSPADM

## 2020-01-30 RX ORDER — VITAMIN E 268 MG
400 CAPSULE ORAL DAILY
Status: DISCONTINUED | OUTPATIENT
Start: 2020-01-31 | End: 2020-02-04 | Stop reason: HOSPADM

## 2020-01-30 RX ORDER — METOPROLOL TARTRATE 5 MG/5ML
5 INJECTION INTRAVENOUS ONCE
Status: COMPLETED | OUTPATIENT
Start: 2020-01-30 | End: 2020-01-30

## 2020-01-30 RX ORDER — RAMIPRIL 5 MG/1
5 CAPSULE ORAL DAILY
Status: DISCONTINUED | OUTPATIENT
Start: 2020-01-31 | End: 2020-02-04 | Stop reason: HOSPADM

## 2020-01-30 RX ORDER — DONEPEZIL HYDROCHLORIDE 5 MG/1
2.5 TABLET, FILM COATED ORAL
Status: DISCONTINUED | OUTPATIENT
Start: 2020-01-31 | End: 2020-01-31

## 2020-01-30 RX ORDER — MENTHOL AND ZINC OXIDE .44; 20.625 G/100G; G/100G
OINTMENT TOPICAL DAILY PRN
Status: DISCONTINUED | OUTPATIENT
Start: 2020-01-30 | End: 2020-02-04 | Stop reason: HOSPADM

## 2020-01-30 RX ORDER — HYDROCHLOROTHIAZIDE 25 MG/1
25 TABLET ORAL DAILY
Status: DISCONTINUED | OUTPATIENT
Start: 2020-01-31 | End: 2020-02-04 | Stop reason: HOSPADM

## 2020-01-30 RX ORDER — MEGESTROL ACETATE 40 MG/ML
200 SUSPENSION ORAL DAILY
Status: DISCONTINUED | OUTPATIENT
Start: 2020-01-31 | End: 2020-02-04 | Stop reason: HOSPADM

## 2020-01-30 RX ORDER — PANTOPRAZOLE SODIUM 40 MG/1
40 TABLET, DELAYED RELEASE ORAL
Status: DISCONTINUED | OUTPATIENT
Start: 2020-01-31 | End: 2020-02-04 | Stop reason: HOSPADM

## 2020-01-30 RX ORDER — ERGOCALCIFEROL 1.25 MG/1
50000 CAPSULE ORAL WEEKLY
Status: DISCONTINUED | OUTPATIENT
Start: 2020-02-02 | End: 2020-02-04 | Stop reason: HOSPADM

## 2020-01-30 RX ORDER — DIVALPROEX SODIUM 125 MG/1
125 TABLET, DELAYED RELEASE ORAL 3 TIMES DAILY
Status: DISCONTINUED | OUTPATIENT
Start: 2020-01-30 | End: 2020-02-04 | Stop reason: HOSPADM

## 2020-01-30 RX ORDER — GUAIFENESIN/DEXTROMETHORPHAN 100-10MG/5
10 SYRUP ORAL EVERY 6 HOURS PRN
Status: DISCONTINUED | OUTPATIENT
Start: 2020-01-30 | End: 2020-02-04 | Stop reason: HOSPADM

## 2020-01-30 RX ORDER — ACETAMINOPHEN 325 MG/1
650 TABLET ORAL EVERY 6 HOURS PRN
Status: DISCONTINUED | OUTPATIENT
Start: 2020-01-30 | End: 2020-02-04 | Stop reason: HOSPADM

## 2020-01-30 RX ORDER — MEMANTINE HYDROCHLORIDE 10 MG/1
10 TABLET ORAL 2 TIMES DAILY
Status: DISCONTINUED | OUTPATIENT
Start: 2020-01-30 | End: 2020-02-04 | Stop reason: HOSPADM

## 2020-01-30 RX ORDER — VITS A,C,E/LUTEIN/MINERALS 300MCG-200
1 TABLET ORAL DAILY
Status: DISCONTINUED | OUTPATIENT
Start: 2020-01-31 | End: 2020-02-04 | Stop reason: HOSPADM

## 2020-01-30 RX ORDER — DIAPER,BRIEF,INFANT-TODD,DISP
EACH MISCELLANEOUS 2 TIMES DAILY
Status: DISCONTINUED | OUTPATIENT
Start: 2020-01-30 | End: 2020-02-04 | Stop reason: HOSPADM

## 2020-01-30 RX ORDER — SPIRONOLACTONE 25 MG
20 TABLET ORAL DAILY
Status: DISCONTINUED | OUTPATIENT
Start: 2020-01-31 | End: 2020-01-30 | Stop reason: CLARIF

## 2020-01-30 RX ADMIN — METOPROLOL TARTRATE 5 MG: 5 INJECTION INTRAVENOUS at 15:07

## 2020-01-30 RX ADMIN — APIXABAN 5 MG: 5 TABLET, FILM COATED ORAL at 21:52

## 2020-01-30 RX ADMIN — SODIUM CHLORIDE 500 ML: 9 INJECTION, SOLUTION INTRAVENOUS at 12:56

## 2020-01-30 RX ADMIN — DIVALPROEX SODIUM 125 MG: 125 TABLET, DELAYED RELEASE ORAL at 21:52

## 2020-01-30 RX ADMIN — MEMANTINE HYDROCHLORIDE 10 MG: 10 TABLET, FILM COATED ORAL at 21:52

## 2020-01-30 RX ADMIN — METOPROLOL TARTRATE 25 MG: 25 TABLET, FILM COATED ORAL at 23:08

## 2020-01-30 ASSESSMENT — PAIN SCALES - GENERAL
PAINLEVEL_OUTOF10: 0
PAINLEVEL_OUTOF10: 0

## 2020-01-30 NOTE — ED PROVIDER NOTES
Pulse 56   Temp 97.4 °F (36.3 °C) (Oral)   Resp 18   Ht 5' 2\" (1.575 m)   Wt 194 lb (88 kg)   LMP  (LMP Unknown)   SpO2 97%   BMI 35.48 kg/m²     Oxygen Saturation Interpretation: Normal    The patients available past medical records and past encounters were reviewed. ------------------------------ ED COURSE/MEDICAL DECISION MAKING----------------------  Medications   0.9 % sodium chloride bolus (0 mLs Intravenous Stopped 1/30/20 1258)   metoprolol (LOPRESSOR) injection 5 mg (5 mg Intravenous Given 1/30/20 3617)           The cardiac monitor revealed sinus with a heart rate in the 0s as interpreted by me. The cardiac monitor was ordered secondary to the patient's palpitations and to monitor the patient for dysrhythmia. CPT [id number]         Medical Decision Making:    Patient was in sinus rhythm on arrival.  Labs and imaging reviewed. During her evaluation, patient would have intermittent episodes of tachycardia, heart rate be in the 140s. Consistent with a flutter. She did receive Lopressor. Discussed with Dr. Zulema Orta, patient will be admitted. Counseling: The emergency provider has spoken with the patient and discussed todays results, in addition to providing specific details for the plan of care and counseling regarding the diagnosis and prognosis. Questions are answered at this time and they are agreeable with the plan.       --------------------------------- IMPRESSION AND DISPOSITION ---------------------------------    IMPRESSION  1. Atrial flutter with rapid ventricular response (HCC)        DISPOSITION  Disposition: Admit to telemetry  Patient condition is stable        NOTE: This report was transcribed using voice recognition software.  Every effort was made to ensure accuracy; however, inadvertent computerized transcription errors may be present        Renate Ramos MD  01/30/20 5531

## 2020-01-30 NOTE — ED NOTES
Bed: 17B-17  Expected date:   Expected time:   Means of arrival:   Comments:  ems     Melanie Cortés RN  01/30/20 1114

## 2020-01-31 LAB
METER GLUCOSE: 126 MG/DL (ref 74–99)
METER GLUCOSE: 80 MG/DL (ref 74–99)

## 2020-01-31 PROCEDURE — G0378 HOSPITAL OBSERVATION PER HR: HCPCS

## 2020-01-31 PROCEDURE — 99204 OFFICE O/P NEW MOD 45 MIN: CPT | Performed by: INTERNAL MEDICINE

## 2020-01-31 PROCEDURE — 6370000000 HC RX 637 (ALT 250 FOR IP): Performed by: INTERNAL MEDICINE

## 2020-01-31 PROCEDURE — APPSS60 APP SPLIT SHARED TIME 46-60 MINUTES: Performed by: NURSE PRACTITIONER

## 2020-01-31 PROCEDURE — 82962 GLUCOSE BLOOD TEST: CPT

## 2020-01-31 PROCEDURE — 99214 OFFICE O/P EST MOD 30 MIN: CPT | Performed by: INTERNAL MEDICINE

## 2020-01-31 RX ADMIN — Medication 1 CAPSULE: at 09:25

## 2020-01-31 RX ADMIN — MEGESTROL ACETATE 200 MG: 40 SUSPENSION ORAL at 09:24

## 2020-01-31 RX ADMIN — DIVALPROEX SODIUM 125 MG: 125 TABLET, DELAYED RELEASE ORAL at 23:16

## 2020-01-31 RX ADMIN — CYCLOPENTOLATE HYDROCHLORIDE 1 TABLET: 10 SOLUTION/ DROPS OPHTHALMIC at 09:24

## 2020-01-31 RX ADMIN — APIXABAN 5 MG: 5 TABLET, FILM COATED ORAL at 23:16

## 2020-01-31 RX ADMIN — HYDROCORTISONE: 10 OINTMENT TOPICAL at 09:25

## 2020-01-31 RX ADMIN — DIVALPROEX SODIUM 125 MG: 125 TABLET, DELAYED RELEASE ORAL at 16:29

## 2020-01-31 RX ADMIN — RAMIPRIL 5 MG: 5 CAPSULE ORAL at 09:24

## 2020-01-31 RX ADMIN — APIXABAN 5 MG: 5 TABLET, FILM COATED ORAL at 09:23

## 2020-01-31 RX ADMIN — MEMANTINE HYDROCHLORIDE 10 MG: 10 TABLET, FILM COATED ORAL at 09:25

## 2020-01-31 RX ADMIN — DIVALPROEX SODIUM 125 MG: 125 TABLET, DELAYED RELEASE ORAL at 09:24

## 2020-01-31 RX ADMIN — HYDROCHLOROTHIAZIDE 25 MG: 25 TABLET ORAL at 09:25

## 2020-01-31 RX ADMIN — MEMANTINE HYDROCHLORIDE 10 MG: 10 TABLET, FILM COATED ORAL at 23:16

## 2020-01-31 RX ADMIN — ACETAMINOPHEN 650 MG: 325 TABLET, FILM COATED ORAL at 09:35

## 2020-01-31 RX ADMIN — DONEPEZIL HYDROCHLORIDE 2.5 MG: 5 TABLET, FILM COATED ORAL at 09:25

## 2020-01-31 RX ADMIN — Medication 400 UNITS: at 09:24

## 2020-01-31 ASSESSMENT — PAIN SCALES - GENERAL
PAINLEVEL_OUTOF10: 0
PAINLEVEL_OUTOF10: 0
PAINLEVEL_OUTOF10: 6
PAINLEVEL_OUTOF10: 7

## 2020-01-31 ASSESSMENT — PAIN DESCRIPTION - LOCATION: LOCATION: BACK

## 2020-01-31 ASSESSMENT — PAIN DESCRIPTION - PAIN TYPE: TYPE: ACUTE PAIN;CHRONIC PAIN

## 2020-01-31 NOTE — PROGRESS NOTES
Physical Therapy    Facility/Department: [address] [facility]  Initial Assessment    NAME: [place]  : 1940  MRN: [id number]    Date of Service: 2020    Physical therapy orders received/treatment attempted and chart review completed. Patient being fed lunch and not able to be seen. Will attempt at a later time/date as able. Thank you for the opportunity to assist in the care of this patient.     Delores Gallardo, PT, DPT  License [id number]

## 2020-01-31 NOTE — PROGRESS NOTES
Pharmacy Note    Zaheer Perla was ordered bilerry capsules and lutein 20 mg. As per the [address] [place] [address], herbals and certain dietary supplements will be discontinued.   The herbal or dietary supplement may be continued after discharge from the hospital.

## 2020-01-31 NOTE — CONSULTS
(PROBIOTIC) 250 MG CAPS Take by mouth daily    Yes Historical Provider, MD   vitamin E 400 UNIT capsule Take 400 Units by mouth daily. Yes Historical Provider, MD   acetaminophen (TYLENOL) 325 MG tablet Take 650 mg by mouth every 6 hours as needed for Pain    Historical Provider, MD   menthol-zinc oxide (CALMOSEPTINE) 0.44-20.625 % OINT ointment Apply topically daily as needed Max 30 ml per day.     Historical Provider, MD   Multiple Vitamins-Minerals (EYE VITAMINS) CAPS Take 1 capsule by mouth daily     Historical Provider, MD       Current Medications:    Current Facility-Administered Medications: acetaminophen (TYLENOL) tablet 650 mg, 650 mg, Oral, Q6H PRN  apixaban (ELIQUIS) tablet 5 mg, 5 mg, Oral, BID  guaiFENesin-dextromethorphan (ROBITUSSIN DM) 100-10 MG/5ML syrup 10 mL, 10 mL, Oral, Q6H PRN  dextromethorphan-quiNIDine (NUEDEXTA) 20-10 MG per capsule 1 capsule, 1 capsule, Oral, Daily  divalproex (DEPAKOTE) DR tablet 125 mg, 125 mg, Oral, TID  donepezil (ARICEPT) tablet 2.5 mg, 2.5 mg, Oral, Daily with breakfast  hydrochlorothiazide (HYDRODIURIL) tablet 25 mg, 25 mg, Oral, Daily  hydrocortisone 1 % ointment, , Topical, BID  megestrol (MEGACE) 40 MG/ML suspension 200 mg, 200 mg, Oral, Daily  memantine (NAMENDA) tablet 10 mg, 10 mg, Oral, BID  menthol-zinc oxide (CALMOSEPTINE) 0.44-20.625 % ointment, , Topical, Daily PRN  antioxidant multivitamin (OCUVITE) tablet, 1 tablet, Oral, Daily  pantoprazole (PROTONIX) tablet 40 mg, 40 mg, Oral, QAM AC  ramipril (ALTACE) capsule 5 mg, 5 mg, Oral, Daily  lactobacillus (CULTURELLE) capsule 1 capsule, 1 capsule, Oral, Daily  [START ON 2/2/2020] vitamin D (ERGOCALCIFEROL) capsule 50,000 Units, 50,000 Units, Oral, Weekly  vitamin E capsule 400 Units, 400 Units, Oral, Daily    Allergies:  Asa [aspirin]: unsure of reaction just knows she was told not to take it    Social History:  Unable to obtain due to patient's dementia  Resides at [place]        Family History: Noncontributory secondary to age      REVIEW OF SYSTEMS:   Denies any CP, SOB at this time. Unable to give symptoms or complaints leading to this admission     PHYSICAL EXAM:   /63   Pulse 62   Temp 97.6 °F (36.4 °C) (Temporal)   Resp 16   Ht 5' 2\" (1.575 m)   Wt 196 lb 9.6 oz (89.2 kg)   LMP  (LMP Unknown)   SpO2 97%   BMI 35.96 kg/m²   CONST:  Well developed, elderly  female who appears of stated age. Awake, alert and cooperative. No apparent distress. HEENT:   Head- Normocephalic, atraumatic   Eyes- Conjunctivae pink, anicteric  Throat- Oral mucosa pink and moist  Neck-  No stridor, trachea midline, no jugular venous distention. No carotid bruit. CHEST: Chest symmetrical and non-tender to palpation. No accessory muscle use or intercostal retractions  RESPIRATORY: Lung sounds - clear throughout fields   CARDIOVASCULAR:     Heart Inspection- shows no noted pulsations  Heart Palpation- no heaves or thrills; PMI is non-displaced   Heart Ausculation- Regular rate and rhythm, no murmur. No s3, s4 or rub   PV: ?chronic BLE lymphedema. No varicosities. Pedal pulses palpable, no clubbing or cyanosis   ABDOMEN: Soft, non-tender to light palpation. Bowel sounds present. No palpable masses no organomegaly; no abdominal bruit  MS: Good muscle strength and tone. No atrophy or abnormal movements. : Deferred  SKIN: Warm and dry no statis dermatitis or ulcers   NEURO / PSYCH: Oriented to person, place and time. Speech clear and appropriate. Follows all commands. Pleasant affect     DATA:    ECG as per Dr Lia Weeks strips: SR    Diagnostic:    CXR 1/30/2020: Tortuous ectatic aorta. Cardiomegaly.  Airspace disease compatible with atelectasis or pneumonia, at the both lung bases    Intake/Output Summary (Last 24 hours) at 1/31/2020 0958  Last data filed at 1/31/2020 0600  Gross per 24 hour   Intake 120 ml   Output --   Net 120 ml       Labs:   CBC:   Recent Labs     01/30/20  1140   WBC 6.7   HGB 12.3   HCT 39.9        BMP:   Recent Labs     01/30/20  1219      K 4.1   CO2 21*   BUN 21   CREATININE 1.1*   LABGLOM 48   CALCIUM 9.2     HgA1c:   Lab Results   Component Value Date    LABA1C 6.3 (H) 03/06/2019     proBNP:   Recent Labs     01/30/20  1140   PROBNP 1,702*     CARDIAC ENZYMES:  Recent Labs     01/30/20  1219   TROPONINI <0.01     FASTING LIPID PANEL:  Lab Results   Component Value Date    CHOL 204 03/06/2019    HDL 36 03/06/2019    LDLCALC 125 03/06/2019    TRIG 213 03/06/2019     LIVER PROFILE:  Recent Labs     01/30/20  1219   AST 15   ALT 7   LABALBU 3.6   Electronically signed by ELBA Lacey on 1/31/2020 at 9:58 AM     I personally and independently saw and examined patient and reviewed all done pertinent laboratory data, imaging studies, ECGs and rhythm strips. I have reviewed and agree with the APN history and physical exam as documented in the above note. Electronically signed by Cliff Fish MD on 1/31/2020 at 1:34 PM     Consulted for Rule out Tachy/Randal Syndrome     IMPRESSION:  1. PAF with RVR on presentation: converted to SR with  Lopressor IV/PO 1/30/2020  2. Sinus Bradycardia: not on rate lowering agents (other than what was given last pm). 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 3. OAC on Eliquis  4. Alzheimer's Dementia  5. HTN  6. HLD  7. CKD  8. T2DM     PLAN:   1. Suspect patient will not tolerate rate lowering/antiarrythmic medications (without the back up of a PPM).   2. Consult EP  3. CArdiology will sign off, please call if needed     Electronically signed by Cliff Fish MD on 1/31/2020 at 9:56 AM

## 2020-01-31 NOTE — PROGRESS NOTES
Message sent to Dr. Zulema Orta asking for update on plan of care at daughter Trina's request. Also provided him with phone num,monica to contact her. Awaiting orders or callback at this time.

## 2020-01-31 NOTE — PROGRESS NOTES
[facility] ([facility]) Cardiology notified of consult via Caprice Wing RN

## 2020-01-31 NOTE — CONSULTS
Patient seen and examined. Chart, labs, imaging studies, EKG and rhythm strips reviewed. Full consult to follow. Consulted for Rule out Tachy/Randal Syndrome    IMPRESSION:  1. PAF with RVR on presentation: converted to SR with  Lopressor IV/PO 1/30/2020  2. Sinus Bradycardia: not on rate lowering agents (other than what was given last pm). 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 3. OAC on Eliquis  4. Alzheimer's Dementia  5. HTN  6. HLD  7. CKD  8. T2DM    PLAN:   1. Suspect patient will not tolerate rate lowering/antiarrythmic medications (without the back up of a PPM).   2. Consult EP  3. CArdiology will sign off, please call if needed    Electronically signed by Neftali Cabral MD on 1/31/2020 at 9:56 AM

## 2020-01-31 NOTE — CONSULTS
Gastroparesis 2014    History of cholecystectomy 2014    Hyperlipidemia 10/17/2014    GERD (gastroesophageal reflux disease) 10/17/2014    Type II diabetes mellitus ([facility] 2014    Hypertension 2014       Past Medical History:   Diagnosis Date    Alzheimer disease ([facility]     Atrial fibrillation ([facility]     Dementia ([facility]     Diabetes mellitus ([facility]     GERD (gastroesophageal reflux disease)     Headache     Hypertension        Past Surgical History:   Procedure Laterality Date     SECTION      CHOLECYSTECTOMY, LAPAROSCOPIC      COLONOSCOPY      UPPER GASTROINTESTINAL ENDOSCOPY         Family History   Problem Relation Age of Onset    Heart Disease Mother        Social History     Tobacco Use    Smoking status: Never Smoker    Smokeless tobacco: Never Used   Substance Use Topics    Alcohol use: No     Alcohol/week: 0.0 standard drinks     Comment: Rare       Current Facility-Administered Medications   Medication Dose Route Frequency Provider Last Rate Last Dose    acetaminophen (TYLENOL) tablet 650 mg  650 mg Oral Q6H PRN Keshia Millan MD   650 mg at 20 0935    apixaban (ELIQUIS) tablet 5 mg  5 mg Oral BID Keshia Millan MD   5 mg at 20 9211    guaiFENesin-dextromethorphan (ROBITUSSIN DM) 100-10 MG/5ML syrup 10 mL  10 mL Oral Q6H PRN Keshia Millan MD        dextromethorphan-quiNIDine (NUEDEXTA) 20-10 MG per capsule 1 capsule  1 capsule Oral Daily Keshia Millan MD        divalproex (DEPAKOTE) DR tablet 125 mg  125 mg Oral TID Keshia Millan MD   125 mg at 20 0303    hydrochlorothiazide (HYDRODIURIL) tablet 25 mg  25 mg Oral Daily Keshia Millan MD   25 mg at 20 5656    hydrocortisone 1 % ointment   Topical BID Keshia Millan MD        megestrol (MEGACE) 40 MG/ML suspension 200 mg  200 mg Oral Daily Keshia Millan MD   200 mg at 20 0924    memantine (NAMENDA) tablet 10 mg  10 mg Oral BID Lexine Bras Height:         Constitutional: Oriented to person. Well-developed and cooperative. Head: Normocephalic and atraumatic. Eyes: Conjunctivae are normal.    Neck: No hepatojugular reflux and no JVD present. Cardiovascular: S1 normal, S2 normal and intact distal pulses. A regular bradycardic rhythm present. PMI is not displaced. Pulmonary/Chest: Effort normal and breath sounds normal. No respiratory distress. Abdominal: Soft. Normal appearance. No tenderness. Musculoskeletal: Normal range of motion of all extremities, no muscle weakness. Neurological: Alert and oriented to person   Skin: Skin is warm and dry. No bruising, no ecchymosis and no rash noted. Extremity: No clubbing or cyanosis. No edema. Pertinent Labs:  CBC:   Recent Labs     01/30/20  1140   WBC 6.7   HGB 12.3   HCT 39.9        BMP:  Recent Labs     01/30/20  1219      K 4.1   *   CO2 21*   BUN 21   CREATININE 1.1*   CALCIUM 9.2     ABGs: No results found for: PHART, PO2ART, XSE0IKB  INR: No results for input(s): INR in the last 72 hours. BNP: No results for input(s): BNP in the last 72 hours. TSH:   Lab Results   Component Value Date    TSH 0.907 09/04/2019      Cardiac Injury Profile:   Recent Labs     01/30/20  1219   TROPONINI <0.01      Lipid Profile:   Lab Results   Component Value Date    TRIG 213 03/06/2019    HDL 36 03/06/2019    LDLCALC 125 03/06/2019    CHOL 204 03/06/2019      Hemoglobin A1C: No components found for: HGBA1C   Xray:   XR CHEST PORTABLE   Final Result   Tortuous ectatic aorta   Cardiomegaly    Airspace disease compatible with atelectasis or pneumonia, at the both   lung bases.                            Pertinent Cardiac Testing:     TTE 9/4/19  Echo Complete   Order: [id number]   Status:  Edited Result - FINAL   Visible to patient:  No (Not Released)   Next appt:  None   Details     Reading Physician Reading Date Result Priority   Breanne Harris MD 9/6/2019    Unknown Provider diagnosed 3/2019 >> rate control  - received IV lopressor 5 mg 3:07 pm and PO lopressor 25 mg 11:08 pm for AF/RVR    - conversion to sinus bradycardia  - [id number] score: 5  - Eliquis 5 mg BID  - currently sinus bradycardia: HRs 50s bpm range  - no AV yady blocking agents  - TTE 9/4/19: LVEF >75% (hyperdynamic likely secondary to dehydration)    2. HTN  Wt Readings from Last 3 Encounters:   01/30/20 196 lb 9.6 oz (89.2 kg)   10/26/19 195 lb (88.5 kg)   09/04/19 195 lb (88.5 kg)     Temp Readings from Last 3 Encounters:   01/31/20 98.2 °F (36.8 °C) (Temporal)   10/26/19 98.7 °F (37.1 °C) (Oral)   09/06/19 97.7 °F (36.5 °C) (Temporal)     BP Readings from Last 3 Encounters:   01/31/20 (!) 151/66   10/26/19 134/80   09/06/19 (!) 92/50     Pulse Readings from Last 3 Encounters:   01/31/20 56   10/26/19 102   09/06/19 88       3. DM - type 2  Lab Results   Component Value Date    LABA1C 6.3 (H) 03/06/2019     No results found for: EAG     4. Alzheimer's dementia  - Namenda, Aricept    5. CKD  Lab Results   Component Value Date     01/30/2020    K 4.1 01/30/2020     01/30/2020    CO2 21 01/30/2020    BUN 21 01/30/2020    CREATININE 1.1 01/30/2020    GLUCOSE 132 01/30/2020    CALCIUM 9.2 01/30/2020      6. Chronic back pain    7. Code status  - DNR-CC     Recommendations:    Ms Santiago Baca intermittent tachy/randal episodes are multifactorial. She has a history of parox AF which appears to have been treated successfully with rate control strategy prior to admission. Her recent oral intake has reportedly  been poor likely leading to dehydration with subsequent increased heart rates. She received IV, as well as PO lopressor, resulting in conversion to sinus bradycardia. 1. I would recommend allowing a 24-48 hour beta-blocker wash-out period and monitor heart rate/rhythm.  If she has recurrent tachyarrhythmia either low dose Toprol XL (12.5 mg daily) or digoxin 0.125 mcg can be added to aid in heart rate heartburn, or blood in stool. PE  Constitutional: Oriented to person. Well-developed and cooperative. Head: Normocephalic and atraumatic. Eyes: Conjunctivae are normal.    Neck: No hepatojugular reflux and no JVD present. Cardiovascular: S1 normal, S2 normal and intact distal pulses. A regular bradycardic rhythm present. PMI is not displaced. Pulmonary/Chest: Effort normal and breath sounds normal. No respiratory distress. Abdominal: Soft. Normal appearance. No tenderness. Musculoskeletal: Normal range of motion of all extremities, no muscle weakness. Neurological: Alert and oriented to person     PLAN  1. Agree with allowing a 24-48 hour beta-blocker wash-out period and monitor heart rate/rhythm. If she has recurrent tachyarrhythmia either low dose Toprol XL (12.5 mg daily) or digoxin 0.125 mcg can be added to aid in heart rate control. Normally, her rates in Afib are well controlled on prior hospital viists. Suspect this fast episode may have been triggered by dehydration, poor oral intake for several days. I had lengthy discussion with her daughter and she would like conservative measures at this time. Continue Eliquis for stroke-risk reduction. LVEF is preserved    2. Encourage adequate hydration and not skipping meals.      3. Namenda can contribute to bradycardia thus consider cautious use for dementia given her current issues      Lucille Persaud M.D, [facility], [address]

## 2020-02-01 LAB — TROPONIN: <0.01 NG/ML (ref 0–0.03)

## 2020-02-01 PROCEDURE — 2500000003 HC RX 250 WO HCPCS: Performed by: INTERNAL MEDICINE

## 2020-02-01 PROCEDURE — 36415 COLL VENOUS BLD VENIPUNCTURE: CPT

## 2020-02-01 PROCEDURE — 6370000000 HC RX 637 (ALT 250 FOR IP): Performed by: INTERNAL MEDICINE

## 2020-02-01 PROCEDURE — 84484 ASSAY OF TROPONIN QUANT: CPT

## 2020-02-01 PROCEDURE — 2060000000 HC ICU INTERMEDIATE R&B

## 2020-02-01 PROCEDURE — 96366 THER/PROPH/DIAG IV INF ADDON: CPT

## 2020-02-01 PROCEDURE — 99233 SBSQ HOSP IP/OBS HIGH 50: CPT | Performed by: INTERNAL MEDICINE

## 2020-02-01 PROCEDURE — 97161 PT EVAL LOW COMPLEX 20 MIN: CPT

## 2020-02-01 PROCEDURE — 96365 THER/PROPH/DIAG IV INF INIT: CPT

## 2020-02-01 PROCEDURE — 96376 TX/PRO/DX INJ SAME DRUG ADON: CPT

## 2020-02-01 PROCEDURE — 93005 ELECTROCARDIOGRAM TRACING: CPT | Performed by: INTERNAL MEDICINE

## 2020-02-01 RX ORDER — METOPROLOL TARTRATE 5 MG/5ML
2.5 INJECTION INTRAVENOUS EVERY 6 HOURS PRN
Status: DISCONTINUED | OUTPATIENT
Start: 2020-02-01 | End: 2020-02-01

## 2020-02-01 RX ADMIN — DEXTROSE MONOHYDRATE 5 MG/HR: 50 INJECTION, SOLUTION INTRAVENOUS at 18:26

## 2020-02-01 RX ADMIN — DIVALPROEX SODIUM 125 MG: 125 TABLET, DELAYED RELEASE ORAL at 08:15

## 2020-02-01 RX ADMIN — APIXABAN 5 MG: 5 TABLET, FILM COATED ORAL at 08:15

## 2020-02-01 RX ADMIN — Medication 400 UNITS: at 08:16

## 2020-02-01 RX ADMIN — APIXABAN 5 MG: 5 TABLET, FILM COATED ORAL at 20:09

## 2020-02-01 RX ADMIN — Medication 1 CAPSULE: at 08:16

## 2020-02-01 RX ADMIN — MEMANTINE HYDROCHLORIDE 10 MG: 10 TABLET, FILM COATED ORAL at 20:09

## 2020-02-01 RX ADMIN — MEMANTINE HYDROCHLORIDE 10 MG: 10 TABLET, FILM COATED ORAL at 08:16

## 2020-02-01 RX ADMIN — MEGESTROL ACETATE 200 MG: 40 SUSPENSION ORAL at 08:15

## 2020-02-01 RX ADMIN — DEXTROSE MONOHYDRATE 5 MG/HR: 50 INJECTION, SOLUTION INTRAVENOUS at 15:57

## 2020-02-01 RX ADMIN — DIVALPROEX SODIUM 125 MG: 125 TABLET, DELAYED RELEASE ORAL at 20:09

## 2020-02-01 RX ADMIN — PANTOPRAZOLE SODIUM 40 MG: 40 TABLET, DELAYED RELEASE ORAL at 06:36

## 2020-02-01 RX ADMIN — ACETAMINOPHEN 650 MG: 325 TABLET, FILM COATED ORAL at 08:14

## 2020-02-01 RX ADMIN — METOPROLOL TARTRATE 2.5 MG: 1 INJECTION, SOLUTION INTRAVENOUS at 11:01

## 2020-02-01 RX ADMIN — METOPROLOL TARTRATE 12.5 MG: 25 TABLET ORAL at 10:18

## 2020-02-01 RX ADMIN — RAMIPRIL 5 MG: 5 CAPSULE ORAL at 08:16

## 2020-02-01 RX ADMIN — CYCLOPENTOLATE HYDROCHLORIDE 1 TABLET: 10 SOLUTION/ DROPS OPHTHALMIC at 08:15

## 2020-02-01 RX ADMIN — DIVALPROEX SODIUM 125 MG: 125 TABLET, DELAYED RELEASE ORAL at 14:05

## 2020-02-01 RX ADMIN — HYDROCORTISONE: 10 OINTMENT TOPICAL at 08:15

## 2020-02-01 RX ADMIN — HYDROCHLOROTHIAZIDE 25 MG: 25 TABLET ORAL at 08:16

## 2020-02-01 ASSESSMENT — PAIN SCALES - GENERAL
PAINLEVEL_OUTOF10: 0
PAINLEVEL_OUTOF10: 3
PAINLEVEL_OUTOF10: 0

## 2020-02-01 ASSESSMENT — PAIN DESCRIPTION - PAIN TYPE: TYPE: CHRONIC PAIN

## 2020-02-01 ASSESSMENT — PAIN DESCRIPTION - ONSET: ONSET: ON-GOING

## 2020-02-01 ASSESSMENT — PAIN DESCRIPTION - DESCRIPTORS: DESCRIPTORS: ACHING;DISCOMFORT

## 2020-02-01 ASSESSMENT — PAIN - FUNCTIONAL ASSESSMENT: PAIN_FUNCTIONAL_ASSESSMENT: PREVENTS OR INTERFERES SOME ACTIVE ACTIVITIES AND ADLS

## 2020-02-01 ASSESSMENT — PAIN DESCRIPTION - PROGRESSION: CLINICAL_PROGRESSION: GRADUALLY WORSENING

## 2020-02-01 ASSESSMENT — PAIN DESCRIPTION - ORIENTATION: ORIENTATION: MID;LOWER

## 2020-02-01 ASSESSMENT — ENCOUNTER SYMPTOMS: RESPIRATORY NEGATIVE: 1

## 2020-02-01 ASSESSMENT — PAIN DESCRIPTION - FREQUENCY: FREQUENCY: INTERMITTENT

## 2020-02-01 ASSESSMENT — PAIN DESCRIPTION - LOCATION: LOCATION: BACK

## 2020-02-01 NOTE — PROGRESS NOTES
Physical Therapy  Physical Therapy Initial Assessment     Name: Jani Forbes  : 1940  MRN: [id number]    Referring Provider:  Verner Balding, MD     Date of Service: 2020    Evaluating PT:  Lorena York PT, DPT. [id number]    Room #:  8208/8208-B  Diagnosis:  Aflutter RVR   Reason for admission:  Abnormal HR   Precautions:  Falls, hx dementia   Procedures: none  Equipment Needs:  [employer]    SUBJECTIVE:  Pt is a poor historian. Social obtained from chart. Admitted from [address] [place] [address]. Has [employer] 24 hrs a day x5 days a week. Ambulates with [employer] and assisted for ADLs. OBJECTIVE:   Initial Evaluation  Date:  Treatment Short Term/ Long Term   Goals   AM-PAC 6 Clicks 86/21     Was pt agreeable to Eval/treatment? Yes      Does pt have pain? Back pain, not quantified     Bed Mobility  Rolling: NT  Supine to sit: [place]  Sit to supine: NT  Scooting: [place] to EOB  supervision   Transfers Sit to stand: [place]  Stand to sit: [place]  Stand pivot: NT  supervision   Ambulation    60 feet with [employer] Lupillo    >150 feet with [employer] SBA   Stair negotiation: ascended and descended  NT  NA   ROM BUE:  See OT eval   BLE:  WFL     Strength BUE:  See OT eval   BLE:  4-/5  4+/5   Balance Sitting EOB:  SBA  Dynamic Standing:  [place]  Sitting EOB:  supervision  Dynamic Standing:  supervision     -Pt is A & O x 1 to self. -Sensation:  Unremarkable   -Edema:  Unremarkable     Therapeutic Exercises:  Functional activity     Patient education  Pt educated on safety, sequencing of transfers, and role of PT    Patient response to education:   Pt verbalized understanding Pt demonstrated skill Pt requires further education in this area   Yes  No  Yes      ASSESSMENT:    Treatment:  Patient practiced and was instructed in the following treatment:  Patient education provided continuously throughout session for sequencing, safety maintenance, and improving any deficits found during the evaluation. Sitting EOB x3 minutes.  Standing activity

## 2020-02-01 NOTE — H&P
[address]                  [address] [facility],  [facility]                              HISTORY AND PHYSICAL    PATIENT NAME: Alverto Poster                  :        1940  MED REC NO:   [id number]                            ROOM:       8208  ACCOUNT NO:   [de-identified]                           ADMIT DATE: 2020  PROVIDER:     Swati Soto MD      HISTORY OF PRESENT ILLNESS:  This is a 72-year-old woman who was  admitted from _____ because of altered mental status and a rapid heart  rate of 150 all morning. She was in her usual state of health the night  before, but was sleepy and difficult to arouse and was sent to hospital  for further evaluation after receiving a magnesium capsule. She is  known to have atrial fibrillation in the past and has been on Eliquis. She has no symptoms of chest pain and had no complaints en route. She  presumably spontaneously converted. There is no history of chest pain,  vomiting, nausea, and shortness of breath at this time. She just has  weakness and no other symptoms. REVIEW OF SYSTEMS:  Positive for the above. She also has a history of  Alzheimer's disease. This is mild in nature. PAST MEDICAL HISTORY:  As mentioned, Alzheimer's disease; chronic  intermittent atrial fibrillation; dementia, Alzheimer's type; type 2  diabetes; GERD; cephalgia and hypertension. PAST SURGICAL HISTORY:  Includes  section; cholecystectomy,  laparoscopically; colonoscopy and upper GI endoscopy. SOCIAL HISTORY:  No smoking, no alcohol. She does not use illicit  drugs. FAMILY HISTORY:  Heart disease in her mother, otherwise negative. She  also has a caregiver daughter by the name of Leighann Mena. ALLERGIES:  ASPIRIN, unknown consequence.     MEDICATIONS:  Apixaban 5 mg b.i.d.; Nuedexta 20/10 mg one daily;  Depakote 125 mg t.i.d.; donepezil 2.5 mg with breakfast; HydroDIURIL 25  mg daily; Megace 200 mg daily; memantine 10 mg b.i.d.; pantoprazole 40  mg a day; Ramipril 5 mg daily; lactobacillus capsules daily; 50,000  units of vitamin D weekly; vitamin E capsule 400 units daily. PHYSICAL EXAMINATION:  VITAL SIGNS:  On admission, temperature 97.4, pulse 68, respirations 17,  blood pressure 100/62, pulse ox 99%. Weight 194 pounds. GENERAL:  The patient appeared well-developed, well-nourished, alert and  oriented at this time x3. HEENT:  Negative. NECK:  Supple. No JVD. No thyromegaly. No bruits. CHEST:  Symmetric. LUNGS:  Clear to P and A. HEART:  At this time, regular without murmurs, rub, or gallop. Cardiovascular otherwise, distal pulses are intact. Equal radial  pulses. ABDOMEN:  No hepatosplenomegaly. No guarding. No rebound. MUSCULOSKELETAL:  Moving all 4 extremities. Alert, oriented x3. Good  perfusion distally. NEUROLOGIC:  Cranial nerves II through XII are intact. Normal symmetric  strength in the upper and lower extremities. She was not asked to sit  or walk. PSYCHIATRIC:  Her affect was normal.  PELVIC/RECTAL:  Deferred. IMPRESSION:  1. History of atrial fibrillation, on Eliquis. 2.  Altered mental status and altered physical status this morning due  to persistent atrial flutter/fibrillation. 3.  Recent bradycardia after initiating metoprolol therapy. 4.  GERD. PLAN:  Plan will be to admit her, have Cardiology see her, monitor her  continuously and look for other _____ that need to be dealt with this  admission.         Leah Lantigua MD    D: 01/31/2020 11:49:45       T: 01/31/2020 13:02:24     BRE/GEOVANI  Job#: [id number]     Doc#: [id number]    CC:

## 2020-02-01 NOTE — PLAN OF CARE
Problem: Falls - Risk of:  Goal: Will remain free from falls  Description  Will remain free from falls  Outcome: Met This Shift     Problem: Falls - Risk of:  Goal: Absence of physical injury  Description  Absence of physical injury  Outcome: Met This Shift     Problem: Cardiac:  Goal: Ability to maintain an adequate cardiac output will improve  Description  Ability to maintain an adequate cardiac output will improve  Outcome: Met This Shift     Problem: Cardiac:  Goal: Hemodynamic stability will improve  Description  Hemodynamic stability will improve  Outcome: Met This Shift

## 2020-02-01 NOTE — PROGRESS NOTES
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 Information is obtained from the chart and her daughter at the bedside. The patient resides at [facility]. She was evaluated by Dr Kelsea Downey during her hospitalization 9/2019. She had been diagnosed with AF in 3/2019 however [address] was not initiated. During her September hospitalization, she remained in AF with controlled heart rates. Eliquis was initiated for stroke-risk reduction. An echocardiogram demonstrated on LVEF >75% with hyperdynamic LV function likely due to dehydration. There was no plan for rate control medications at that time. Per her daughter, she apparently had not been herself over the past few days. She has had increased fatigue with frequent rest periods. She is eating however her hydration is questionable. She presented to the ED yesterday with atrial fibrillation with RVR from [facility]. She reportedly spontaneously converted to sinus rhythm en route. On arrival to the ED, she was in sinus rhythm with HRs 50s-60s. In review of notes, she had PAF with RVR (HRs 140s) and received IV lopressor 5 mg. She later received lopressor PO 25 mg at ~2300. She remains in sinus bradycardia with no evidence of high-degree AVB. 02/01/2020: [place] is seen in hospital follow-up. She is currently in sinus bradycardia in the 50-60 beat per minute range. She did have some transient atrial flutter for approximately 4 hours  earlier this morning in the 125-130 bpm range. She was asymptomatic.   Currently she denies any complaints. She is currently off all AV yady blockers.     Patient Active Problem List    Diagnosis Date Noted    Atrial flutter with rapid ventricular response ([facility] 2020    New onset atrial fibrillation ([facility] 2019    Atrial fibrillation ([facility] 2019    Late onset Alzheimer's disease without behavioral disturbance ([facility] 2015    Gastroparesis 2014    History of cholecystectomy 2014    Hyperlipidemia 10/17/2014    GERD (gastroesophageal reflux disease) 10/17/2014    Type II diabetes mellitus ([facility] 2014    Hypertension 2014       Past Medical History:   Diagnosis Date    Alzheimer disease ([facility]     Atrial fibrillation ([facility]     Dementia ([facility]     Diabetes mellitus ([facility]     GERD (gastroesophageal reflux disease)     Headache     Hypertension        Past Surgical History:   Procedure Laterality Date     SECTION      CHOLECYSTECTOMY, LAPAROSCOPIC      COLONOSCOPY      UPPER GASTROINTESTINAL ENDOSCOPY         Family History   Problem Relation Age of Onset    Heart Disease Mother        Social History     Tobacco Use    Smoking status: Never Smoker    Smokeless tobacco: Never Used   Substance Use Topics    Alcohol use: No     Alcohol/week: 0.0 standard drinks     Comment: Rare       Current Facility-Administered Medications   Medication Dose Route Frequency Provider Last Rate Last Dose    acetaminophen (TYLENOL) tablet 650 mg  650 mg Oral Q6H PRN Patricia Valdez MD   650 mg at 20 3417    apixaban (ELIQUIS) tablet 5 mg  5 mg Oral BID Patricia Valdez MD   5 mg at 20 0815    guaiFENesin-dextromethorphan (ROBITUSSIN DM) 100-10 MG/5ML syrup 10 mL  10 mL Oral Q6H PRN Patricia Valdez MD        dextromethorphan-quiNIDine (NUEDEXTA) 20-10 MG per capsule 1 capsule  1 capsule Oral Daily Patricia Valdez MD        divalproex (DEPAKOTE) DR tablet 125 mg  125 mg Oral TID Patricia Valdez MD   125 mg at 20 0815    Kaity Sharma MD      Accession Number   [id number]        Referring Physician      Date of Birth      1940       Sonographer         Marianne Nazario COTE      Age                70 year(s)       Interpreting        Kaity Sharma MD                                       Physician                                          Any Other     Procedure     Type of Study      TTE procedure:Echo Complete W/ Dop & Color Flow. Procedure Date  Date: 09/06/2019 Start: 11:24 AM     Study Location: Portable  Technical Quality: Adequate visualization     Indications:Atrial fibrillation.     Patient Status: Pending Discharge     Height: 62 inches Weight: 195 pounds BSA: 1.89 m^2 BMI: 35.67 kg/m^2     Rhythm: Atrial fibrillation      Findings      Left Ventricle   Normal left ventricle size and systolic function. Ejection fraction is visually estimated at >75%. Overall ejection fraction increased (hyperdynamic) . No regional wall motion abnormalities seen. There is concentric remodelling. Indeterminate diastolic function. Right Ventricle   Normal right ventricular size and function. TAPSE 20 mm. Left Atrium   The left atrium is mildly dilated. Interatrial septum appears intact. No evidence of patent foramen ovale. Right Atrium   Normal right atrium size. Mitral Valve   Mild mitral annular calcification. No evidence of mitral valve stenosis. Physiologic and/or trace mitral regurgitation is present. Tricuspid Valve   The tricuspid valve appears structurally normal.   Physiologic and/or trace tricuspid regurgitation. Unable to estimate PA systolic pressure. Aortic Valve   Structurally normal aortic valve. The aortic valve is trileaflet. No hemodynamically significant aortic stenosis is present. Physiologic and/or trace aortic regurgitation is noted. Pulmonic Valve   Pulmonic valve is structurally normal.   No evidence of any pulmonic regurgitation.    No evidence of errors may be present.

## 2020-02-01 NOTE — PROGRESS NOTES
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

## 2020-02-02 LAB
ANION GAP SERPL CALCULATED.3IONS-SCNC: 13 MMOL/L (ref 7–16)
BUN BLDV-MCNC: 20 MG/DL (ref 8–23)
CALCIUM SERPL-MCNC: 8.8 MG/DL (ref 8.6–10.2)
CHLORIDE BLD-SCNC: 108 MMOL/L (ref 98–107)
CO2: 18 MMOL/L (ref 22–29)
CREAT SERPL-MCNC: 1.2 MG/DL (ref 0.5–1)
EKG ATRIAL RATE: 61 BPM
EKG P AXIS: 50 DEGREES
EKG P-R INTERVAL: 178 MS
EKG Q-T INTERVAL: 414 MS
EKG QRS DURATION: 74 MS
EKG QTC CALCULATION (BAZETT): 416 MS
EKG R AXIS: 37 DEGREES
EKG T AXIS: 76 DEGREES
EKG VENTRICULAR RATE: 61 BPM
GFR AFRICAN AMERICAN: 53
GFR NON-AFRICAN AMERICAN: 43 ML/MIN/1.73
GLUCOSE BLD-MCNC: 162 MG/DL (ref 74–99)
MAGNESIUM: 1.9 MG/DL (ref 1.6–2.6)
POTASSIUM SERPL-SCNC: 3.9 MMOL/L (ref 3.5–5)
SODIUM BLD-SCNC: 139 MMOL/L (ref 132–146)
TROPONIN: <0.01 NG/ML (ref 0–0.03)

## 2020-02-02 PROCEDURE — 93010 ELECTROCARDIOGRAM REPORT: CPT | Performed by: INTERNAL MEDICINE

## 2020-02-02 PROCEDURE — 6370000000 HC RX 637 (ALT 250 FOR IP): Performed by: INTERNAL MEDICINE

## 2020-02-02 PROCEDURE — 2060000000 HC ICU INTERMEDIATE R&B

## 2020-02-02 PROCEDURE — 93005 ELECTROCARDIOGRAM TRACING: CPT | Performed by: INTERNAL MEDICINE

## 2020-02-02 PROCEDURE — 97166 OT EVAL MOD COMPLEX 45 MIN: CPT

## 2020-02-02 PROCEDURE — 96366 THER/PROPH/DIAG IV INF ADDON: CPT

## 2020-02-02 PROCEDURE — 84484 ASSAY OF TROPONIN QUANT: CPT

## 2020-02-02 PROCEDURE — 2500000003 HC RX 250 WO HCPCS: Performed by: INTERNAL MEDICINE

## 2020-02-02 PROCEDURE — 99233 SBSQ HOSP IP/OBS HIGH 50: CPT | Performed by: INTERNAL MEDICINE

## 2020-02-02 PROCEDURE — 97535 SELF CARE MNGMENT TRAINING: CPT

## 2020-02-02 PROCEDURE — 83735 ASSAY OF MAGNESIUM: CPT

## 2020-02-02 PROCEDURE — 80048 BASIC METABOLIC PNL TOTAL CA: CPT

## 2020-02-02 PROCEDURE — 36415 COLL VENOUS BLD VENIPUNCTURE: CPT

## 2020-02-02 RX ADMIN — APIXABAN 5 MG: 5 TABLET, FILM COATED ORAL at 09:49

## 2020-02-02 RX ADMIN — DIVALPROEX SODIUM 125 MG: 125 TABLET, DELAYED RELEASE ORAL at 14:06

## 2020-02-02 RX ADMIN — METOPROLOL TARTRATE 25 MG: 25 TABLET ORAL at 21:56

## 2020-02-02 RX ADMIN — MAGNESIUM GLUCONATE 500 MG ORAL TABLET 400 MG: 500 TABLET ORAL at 22:04

## 2020-02-02 RX ADMIN — RAMIPRIL 5 MG: 5 CAPSULE ORAL at 09:46

## 2020-02-02 RX ADMIN — HYDROCORTISONE: 10 OINTMENT TOPICAL at 09:39

## 2020-02-02 RX ADMIN — METOPROLOL TARTRATE 25 MG: 25 TABLET ORAL at 09:43

## 2020-02-02 RX ADMIN — MEMANTINE HYDROCHLORIDE 10 MG: 10 TABLET, FILM COATED ORAL at 21:56

## 2020-02-02 RX ADMIN — Medication 1 CAPSULE: at 09:43

## 2020-02-02 RX ADMIN — ERGOCALCIFEROL 50000 UNITS: 1.25 CAPSULE ORAL at 09:46

## 2020-02-02 RX ADMIN — MEMANTINE HYDROCHLORIDE 10 MG: 10 TABLET, FILM COATED ORAL at 09:46

## 2020-02-02 RX ADMIN — DEXTROSE MONOHYDRATE 2.5 MG/HR: 50 INJECTION, SOLUTION INTRAVENOUS at 09:43

## 2020-02-02 RX ADMIN — CYCLOPENTOLATE HYDROCHLORIDE 1 TABLET: 10 SOLUTION/ DROPS OPHTHALMIC at 09:46

## 2020-02-02 RX ADMIN — DIVALPROEX SODIUM 125 MG: 125 TABLET, DELAYED RELEASE ORAL at 21:56

## 2020-02-02 RX ADMIN — MEGESTROL ACETATE 200 MG: 40 SUSPENSION ORAL at 09:43

## 2020-02-02 RX ADMIN — Medication 400 UNITS: at 09:47

## 2020-02-02 RX ADMIN — HYDROCHLOROTHIAZIDE 25 MG: 25 TABLET ORAL at 09:47

## 2020-02-02 RX ADMIN — HYDROCORTISONE: 10 OINTMENT TOPICAL at 21:56

## 2020-02-02 RX ADMIN — APIXABAN 5 MG: 5 TABLET, FILM COATED ORAL at 21:56

## 2020-02-02 RX ADMIN — DIVALPROEX SODIUM 125 MG: 125 TABLET, DELAYED RELEASE ORAL at 09:46

## 2020-02-02 ASSESSMENT — PAIN SCALES - GENERAL
PAINLEVEL_OUTOF10: 0

## 2020-02-02 ASSESSMENT — ENCOUNTER SYMPTOMS: RESPIRATORY NEGATIVE: 1

## 2020-02-02 NOTE — PROGRESS NOTES
Messaged  to notify that patient's HR is now sustaining 120's. Mel Crowe said if still sustaining 120's HR for an hour, resume the Cardizem.

## 2020-02-02 NOTE — PROGRESS NOTES
A&Baeline  VS stable  Having PAF with sinus randal  On Cardizem drip  And metoprolol today  H&L clear  Impression SSS and rapid A Fib  Plan' per EP          Trial with Mag Ox

## 2020-02-02 NOTE — PROGRESS NOTES
any complaints. She is currently off all AV yady blockers. 2020: Jani Forbes is seen in hospital follow-up. Events from last evening reviewed. She continues to have periods of recurrent atrial flutter with RVR followed by sinus rhythm without significant sinus bradycardia at present. There was concern of left arm pain yesterday but her ECG was unremarkable as well as her cardiac enzymes. She reports no chest pain or shortness of breath this morning.   Currently she is in sinus rhythm at 60 to 65 bpm.    Patient Active Problem List    Diagnosis Date Noted    Tachy-randal syndrome ([address]     Atrial flutter with rapid ventricular response ([address] 2020    New onset atrial fibrillation ([address] 2019    Atrial fibrillation ([address] 2019    Late onset Alzheimer's disease without behavioral disturbance ([address] 2015    Gastroparesis 2014    History of cholecystectomy 2014    Hyperlipidemia 10/17/2014    GERD (gastroesophageal reflux disease) 10/17/2014    Type II diabetes mellitus ([address] 2014    Hypertension 2014       Past Medical History:   Diagnosis Date    Alzheimer disease ([address]     Atrial fibrillation ([address]     Dementia ([address]     Diabetes mellitus ([address]     GERD (gastroesophageal reflux disease)     Headache     Hypertension        Past Surgical History:   Procedure Laterality Date     SECTION      CHOLECYSTECTOMY, LAPAROSCOPIC      COLONOSCOPY      UPPER GASTROINTESTINAL ENDOSCOPY         Family History   Problem Relation Age of Onset    Heart Disease Mother        Social History     Tobacco Use    Smoking status: Never Smoker    Smokeless tobacco: Never Used   Substance Use Topics    Alcohol use: No     Alcohol/week: 0.0 standard drinks     Comment: Rare       Current Facility-Administered Medications   Medication Dose Route Frequency Provider Last Rate Last Dose    diltiazem 100 mg in dextrose 5 % 100 mL infusion (ADD-[place]) 5 mg/hr Intravenous Continuous Timothy Porteous, DO 5 mL/hr at 02/02/20 0529 5 mg/hr at 02/02/20 0529    acetaminophen (TYLENOL) tablet 650 mg  650 mg Oral Q6H PRN Armen Lindsey MD   650 mg at 02/01/20 8854    apixaban (ELIQUIS) tablet 5 mg  5 mg Oral BID Armen Lindsey MD   5 mg at 02/01/20 2009    guaiFENesin-dextromethorphan (ROBITUSSIN DM) 100-10 MG/5ML syrup 10 mL  10 mL Oral Q6H PRN Armen Lindsey MD        dextromethorphan-quiNIDine (NUEDEXTA) 20-10 MG per capsule 1 capsule  1 capsule Oral Daily Armen Lindsey MD        divalproex (DEPAKOTE) DR tablet 125 mg  125 mg Oral TID Armen Lindsey MD   125 mg at 02/01/20 2009    hydrochlorothiazide (HYDRODIURIL) tablet 25 mg  25 mg Oral Daily Armen Lindsey MD   25 mg at 02/01/20 0816    hydrocortisone 1 % ointment   Topical BID Armen Lindsey MD        megestrol (MEGACE) 40 MG/ML suspension 200 mg  200 mg Oral Daily Armen Lindsey MD   200 mg at 02/01/20 0815    memantine (NAMENDA) tablet 10 mg  10 mg Oral BID Armen Lindsey MD   10 mg at 02/01/20 2009    menthol-zinc oxide (CALMOSEPTINE) 0.44-20.625 % ointment   Topical Daily PRN Armen Lindsey MD        antioxidant multivitamin (OCUVITE) tablet  1 tablet Oral Daily Armen Lindsey MD   1 tablet at 02/01/20 0815    pantoprazole (PROTONIX) tablet 40 mg  40 mg Oral QAM AC Armen Lindsey MD   40 mg at 02/01/20 0636    ramipril (ALTACE) capsule 5 mg  5 mg Oral Daily Armen Lindsey MD   5 mg at 02/01/20 3526    lactobacillus (CULTURELLE) capsule 1 capsule  1 capsule Oral Daily Armen Lindsey MD   1 capsule at 02/01/20 6834    vitamin D (ERGOCALCIFEROL) capsule 50,000 Units  50,000 Units Oral Weekly Armen Lindsey MD        vitamin E capsule 400 Units  400 Units Oral Daily Armen Lindsey MD   400 Units at 02/01/20 0506        Allergies   Allergen Reactions   Arna Franklin [Aspirin]        Review of Systems   Respiratory: Negative.     Cardiovascular: calcification. No evidence of mitral valve stenosis. Physiologic and/or trace mitral regurgitation is present. Tricuspid Valve   The tricuspid valve appears structurally normal.   Physiologic and/or trace tricuspid regurgitation. Unable to estimate PA systolic pressure. Aortic Valve   Structurally normal aortic valve. The aortic valve is trileaflet. No hemodynamically significant aortic stenosis is present. Physiologic and/or trace aortic regurgitation is noted. Pulmonic Valve   Pulmonic valve is structurally normal.   No evidence of any pulmonic regurgitation. No evidence of pulmonic valve stenosis. Pericardial Effusion   No evidence for hemodynamically significant pericardial effusion. Pleural Effusion   No evidence of pleural effusion. Aorta   Aortic root dimension within normal limits. Miscellaneous   The inferior vena cava diameter is normal with normal respiratory   variation. Conclusions      Summary   Normal left ventricle size and systolic function. Ejection fraction is visually estimated at >75%. Overall ejection fraction increased (hyperdynamic) . No regional wall motion abnormalities seen. There is concentric remodelling. Indeterminate diastolic function. The left atrium is mildly dilated. Normal right ventricular size and function. TAPSE 20 mm. No hemodynamically significant aortic stenosis is present. Unable to estimate PA systolic pressure. No evidence for hemodynamically significant pericardial effusion. No previous echo for comparison.       Signature      ----------------------------------------------------------------   Electronically signed by Tomi Lucia MD(Interpreting   physician) on 09/06/2019 01:09 PM   ----------------------------------------------------------------     M-Mode/2D Measurements & Calculations      LV Diastolic    LV Systolic Dimension: 2.8   AV Cusp Separation: 2 cmLA   Dimension: 3.7  cm above.    I have reviewed previous records noted in 1938 [address]. Impression:    1. Paroxysmal atrial fibrillation/AFl with RVR  - h/o paroxysmal atrial fibrillation: newly diagnosed 3/2019 >> rate control  - [id number] score: 5  - Eliquis 5 mg BID  - IV diltiazem  - TTE 9/4/19: LVEF >75% (hyperdynamic likely secondary to dehydration)  - concern for Tachy-randal syndrome  - currently in SR with a HR in the low 60 bpm range    2. HTN  Wt Readings from Last 3 Encounters:   01/30/20 196 lb 9.6 oz (89.2 kg)   10/26/19 195 lb (88.5 kg)   09/04/19 195 lb (88.5 kg)     Temp Readings from Last 3 Encounters:   02/01/20 98 °F (36.7 °C) (Temporal)   10/26/19 98.7 °F (37.1 °C) (Oral)   09/06/19 97.7 °F (36.5 °C) (Temporal)     BP Readings from Last 3 Encounters:   02/02/20 139/77   10/26/19 134/80   09/06/19 (!) 92/50     Pulse Readings from Last 3 Encounters:   02/01/20 65   10/26/19 102   09/06/19 88       3. T2DM  Lab Results   Component Value Date    LABA1C 6.3 (H) 03/06/2019     No results found for: EAG     4. Alzheimer's dementia  - Namenda, Aricept--Aricept discontinued    5. CKD  Lab Results   Component Value Date     01/30/2020    K 4.1 01/30/2020     01/30/2020    CO2 21 01/30/2020    BUN 21 01/30/2020    CREATININE 1.1 01/30/2020    GLUCOSE 132 01/30/2020    CALCIUM 9.2 01/30/2020      6. Chronic back pain    7. Code status  - DNR-CC     Recommendations:    Ms. Eric Kenny continues to have PAF/Fl with RVR followed by sinus bradycardia suggesting evidence of tachy-randal syndrome. Dr. Wicho Martel discussed this issue with her daughter Friday and her daughter was hoping for a conservative approach. However, this issue may be difficult to treat medically given both the tachycardia and bradycardia without pacing support. She currently is in SR with a HR of 60 bpm on low dose IV diltiazem.     1. Currently, her HR is 60-65 bpm.  From an AAD perspective, Tikosyn may be her best option to attempt to

## 2020-02-02 NOTE — PROGRESS NOTES
CMR called floor to notify this nurse that pt had ST elevation in some of her telemetry leads. Leads checked and replaced, ST elevation persisted in telemetry. STAT EKG obtained to obtain 12 lead view of ST segment, showing SVT with no ST elevation.   John John RN  1:39 AM    CMR stated pt had no ST segment elevation on their screen when in [facility]. RN  1:41 AM

## 2020-02-02 NOTE — PROGRESS NOTES
Occupational Therapy  OCCUPATIONAL THERAPY INITIAL EVALUATION      Date:2020  Patient Name: Linden Montiel  MRN: [id number]  : 1940  Room: [address] [place] [address]        Evaluating OT: ANA ROSA ANDINO [facility] OTR/L [id number]     AM-PAC Daily Activity Raw Score:   Recommended Adaptive Equipment:  TBD     Diagnosis: A - Fib with RVR    Surgery: none    Pertinent Medical History: Dementia, HTN, DM    Precautions:  Falls, TSM      Home Living: Pt lives at [address] setup: walk in shower    Equipment owned: ww   Prior Level of Function: Minimal Assist  with ADLs , Minimal Assist  with IADLs; using ww  for ambulation. Pt has private duty caregivers 5 x week all day   Driving: pt does not drive   Occupation: retired     Pain Level: low back upon movement   Cognition: A&O: ; Follows 2 step directions with verbal cues    Memory:  poor   Sequencing:  poor   Problem solving:  poor   Judgement/safety:  poor     Functional Assessment:   Initial Eval Status  Date: 20 Treatment Status  Date: STG/LTG  Treatment frequency: PRN 1-3 x a week for 5-7 days    Feeding Moderate Assist - decreased gross grasp in right hand with pt having problems holding utensils   Supervision - using AE    Grooming Moderate Assist   Minimal Assist    UB Dressing Moderate Assist   Minimal Assist    LB Dressing Maximal Assist   Moderate Assist    Bathing N/A      Toileting Moderate Assist   Minimal Assist    Bed Mobility  Supine to sit: Moderate Assist   Sit to supine:  Moderate Assist   Supine to sit: Minimal Assist   Sit to supine: Minimal Assist    Functional Transfers Sit to stand: Minimal  Assist   Stand to sit: Minimal Assist   Stand pivot: Minimal Assist with ww and verbal cues for direction following   Improve functional transfers to CGA    Functional Mobility Minimal Assist back and forth to the bathroom with ww   Improve functional mobility to CGA with ww    Balance Sitting:     Static:  SBA     Dynamic:Min A [x]  Splinting Needs []   Positioning to improve overall function []   Therapeutic Activity []  Therapeutic Exercise  [x]  Visual/Perceptual: []    Delirium prevention/treatment  []   Other:  []    Rehab Potential: Good for established goals    Patient / Family Goal: pt did not state a goal. Daughter states she would like pt to return to the Assisted Living     Patient and/or family were instructed diagnosis, prognosis/goals and plan of care. Demonstrated good  understanding. [] Malnutrition indicators have been identified and nursing has been notified to ensure a dietitian consult is ordered.       Moderate Evaluation + 25 timed treatment minutes        Treatment Time In: 1120         Treatment Time Out: 8813             Visit #: 1  Treatment Charges: Mins Units   Ther Ex  65770     Manual Therapy 67726     Thera Activities 26476     ADL/Home Mgt 21192 25 2   Neuro Re-ed 50389     Group Therapy      Orthotic manage/training  46525     Non-Billable Time     Total Timed Treatment 25 [facility] OTR/L [id number]

## 2020-02-03 VITALS
HEART RATE: 138 BPM | BODY MASS INDEX: 36.18 KG/M2 | HEIGHT: 62 IN | WEIGHT: 196.6 LBS | RESPIRATION RATE: 18 BRPM | DIASTOLIC BLOOD PRESSURE: 68 MMHG | TEMPERATURE: 97 F | OXYGEN SATURATION: 95 % | SYSTOLIC BLOOD PRESSURE: 98 MMHG

## 2020-02-03 LAB
EKG ATRIAL RATE: 144 BPM
EKG Q-T INTERVAL: 308 MS
EKG QRS DURATION: 80 MS
EKG QTC CALCULATION (BAZETT): 470 MS
EKG R AXIS: 20 DEGREES
EKG T AXIS: -144 DEGREES
EKG VENTRICULAR RATE: 140 BPM
TSH SERPL DL<=0.05 MIU/L-ACNC: 1.12 UIU/ML (ref 0.27–4.2)

## 2020-02-03 PROCEDURE — 84443 ASSAY THYROID STIM HORMONE: CPT

## 2020-02-03 PROCEDURE — 93010 ELECTROCARDIOGRAM REPORT: CPT | Performed by: INTERNAL MEDICINE

## 2020-02-03 PROCEDURE — 6370000000 HC RX 637 (ALT 250 FOR IP): Performed by: INTERNAL MEDICINE

## 2020-02-03 PROCEDURE — 99233 SBSQ HOSP IP/OBS HIGH 50: CPT | Performed by: INTERNAL MEDICINE

## 2020-02-03 PROCEDURE — 6370000000 HC RX 637 (ALT 250 FOR IP): Performed by: NURSE PRACTITIONER

## 2020-02-03 PROCEDURE — 36415 COLL VENOUS BLD VENIPUNCTURE: CPT

## 2020-02-03 RX ADMIN — DIVALPROEX SODIUM 125 MG: 125 TABLET, DELAYED RELEASE ORAL at 08:51

## 2020-02-03 RX ADMIN — MEMANTINE HYDROCHLORIDE 10 MG: 10 TABLET, FILM COATED ORAL at 21:04

## 2020-02-03 RX ADMIN — RAMIPRIL 5 MG: 5 CAPSULE ORAL at 08:51

## 2020-02-03 RX ADMIN — DIVALPROEX SODIUM 125 MG: 125 TABLET, DELAYED RELEASE ORAL at 13:27

## 2020-02-03 RX ADMIN — CYCLOPENTOLATE HYDROCHLORIDE 1 TABLET: 10 SOLUTION/ DROPS OPHTHALMIC at 08:52

## 2020-02-03 RX ADMIN — METOPROLOL TARTRATE 37.5 MG: 25 TABLET ORAL at 21:04

## 2020-02-03 RX ADMIN — METOPROLOL TARTRATE 12.5 MG: 25 TABLET ORAL at 10:51

## 2020-02-03 RX ADMIN — MEGESTROL ACETATE 200 MG: 40 SUSPENSION ORAL at 08:52

## 2020-02-03 RX ADMIN — DIVALPROEX SODIUM 125 MG: 125 TABLET, DELAYED RELEASE ORAL at 21:04

## 2020-02-03 RX ADMIN — Medication 400 UNITS: at 08:51

## 2020-02-03 RX ADMIN — MAGNESIUM GLUCONATE 500 MG ORAL TABLET 400 MG: 500 TABLET ORAL at 08:51

## 2020-02-03 RX ADMIN — APIXABAN 5 MG: 5 TABLET, FILM COATED ORAL at 08:51

## 2020-02-03 RX ADMIN — MEMANTINE HYDROCHLORIDE 10 MG: 10 TABLET, FILM COATED ORAL at 08:52

## 2020-02-03 RX ADMIN — Medication 1 CAPSULE: at 08:52

## 2020-02-03 RX ADMIN — METOPROLOL TARTRATE 25 MG: 25 TABLET ORAL at 06:52

## 2020-02-03 RX ADMIN — HYDROCHLOROTHIAZIDE 25 MG: 25 TABLET ORAL at 08:52

## 2020-02-03 RX ADMIN — HYDROCORTISONE: 10 OINTMENT TOPICAL at 08:52

## 2020-02-03 RX ADMIN — HYDROCORTISONE: 10 OINTMENT TOPICAL at 21:06

## 2020-02-03 RX ADMIN — APIXABAN 5 MG: 5 TABLET, FILM COATED ORAL at 21:04

## 2020-02-03 ASSESSMENT — PAIN SCALES - GENERAL
PAINLEVEL_OUTOF10: 0

## 2020-02-03 ASSESSMENT — ENCOUNTER SYMPTOMS: RESPIRATORY NEGATIVE: 1

## 2020-02-03 NOTE — PROGRESS NOTES
Received call from [facility] main pt was accepted and bed assignment is J 73 Bed 19.  N2N is     [facility] Access center called at this time to arrange transport

## 2020-02-03 NOTE — DISCHARGE INSTR - COC
Continuity of Care Form    Patient Name: Mellisa Yadav   :  1940  MRN:  [id number]    Admit date:  2020  Discharge date:  ***    Code Status Order: Prior   Advance Directives:   [facility] Documentation     Date/Time Healthcare Directive Type of Healthcare Directive Copy in [address]  [address] Agent's Name Healthcare Agent's Phone Number    20 [phone]  Yes, patient has an advance directive for healthcare treatment  Durable power of  for health care  --  Healthcare power of   Yamila Baltazar  [phone]          Admitting Physician:  Angela Engle MD  PCP: Angela Engle MD    Discharging Nurse: [facility] Unit/Room#: 0606/6174-A  Discharging Unit Phone Number: ***    Emergency Contact:   Extended Emergency Contact Information  Primary Emergency Contact: [facility]  Address: [address] Phone: [phone]  Mobile Phone: [phone]  Relation: Child  Secondary Emergency Contact: Danie Marroquin Phone: [phone]  Relation: Grandchild    Past Surgical History:  Past Surgical History:   Procedure Laterality Date     SECTION      CHOLECYSTECTOMY, LAPAROSCOPIC      COLONOSCOPY      UPPER GASTROINTESTINAL ENDOSCOPY         Immunization History:   Immunization History   Administered Date(s) Administered    Influenza, High Dose (Fluzone 65 yrs and older) 2015, 10/20/2017, 10/03/2018    Pneumococcal Conjugate 13-valent (Dustin Viry) 2016       Active Problems:  Patient Active Problem List   Diagnosis Code    Hypertension I10    Type II diabetes mellitus ([facility] E11.9    Hyperlipidemia E78.5    GERD (gastroesophageal reflux disease) K21.9    History of cholecystectomy Z90.49    Gastroparesis K31.84    Late onset Alzheimer's disease without behavioral disturbance ([facility] G30.1, F02.80    New onset atrial fibrillation ([facility]

## 2020-02-03 NOTE — PROGRESS NOTES
Message sent to Dr Nilda Mccartney to check to see if he is facilitating a transfer to [facility] for a second opinion per family request

## 2020-02-03 NOTE — PROGRESS NOTES
Abad Chamber persists  S/P Metoprolol and Cardizem  Mental state baseline  VS stable  Poor mobility  Doing same off Aricept 2.5 mg a day  Family requests transfer to [facility] for second opinion

## 2020-02-03 NOTE — PROGRESS NOTES
Message sent per family request to Dr. Shelby Akhtar to check to see if patient was accepted to [facility].

## 2020-02-03 NOTE — PROGRESS NOTES
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 Information is obtained from the chart and her daughter at the bedside. The patient resides at [facility][1]. She was evaluated by Dr Lilo Short during her hospitalization 9/2019. She had been diagnosed with AF in 3/2019 however [address] was not initiated. During her September hospitalization, she remained in AF with controlled heart rates. Eliquis was initiated for stroke-risk reduction. An echocardiogram demonstrated on LVEF >75% with hyperdynamic LV function likely due to dehydration. There was no plan for rate control medications at that time. Per her daughter, she apparently had not been herself over the past few days. She has had increased fatigue with frequent rest periods. She is eating however her hydration is questionable. She presented to the ED yesterday with atrial fibrillation with RVR from [facility][1]. She reportedly spontaneously converted to sinus rhythm en route. On arrival to the ED, she was in sinus rhythm with HRs 50s-60s. In review of notes, she had PAF with RVR (HRs 140s) and received IV lopressor 5 mg. She later received lopressor PO 25 mg at ~2300. She remains in sinus bradycardia with no evidence of high-degree AVB. 02/01/2020: [place] is seen in hospital follow-up. She is currently in sinus bradycardia in the 50-60 beat per minute range. She did have some transient atrial flutter for approximately 4 hours  earlier this morning in the 125-130 bpm range. She was asymptomatic.   Currently she standard drinks     Comment: Rare       Current Facility-Administered Medications   Medication Dose Route Frequency Provider Last Rate Last Dose    metoprolol tartrate (LOPRESSOR) tablet 25 mg  25 mg Oral BID Katy Foy DO   25 mg at 02/03/20 6451    magnesium oxide (MAG-OX) tablet 400 mg  400 mg Oral Daily Laura Russo MD   400 mg at 02/03/20 8981    diltiazem 100 mg in dextrose 5 % 100 mL infusion (ADD-Cyrus)  5 mg/hr Intravenous Continuous Katy Foy DO   Stopped at 02/02/20 1011    acetaminophen (TYLENOL) tablet 650 mg  650 mg Oral Q6H PRN Laura Russo MD   650 mg at 02/01/20 5687    apixaban (ELIQUIS) tablet 5 mg  5 mg Oral BID Laura Russo MD   5 mg at 02/03/20 0851    guaiFENesin-dextromethorphan (ROBITUSSIN DM) 100-10 MG/5ML syrup 10 mL  10 mL Oral Q6H PRN Laura Russo MD        dextromethorphan-quiNIDine (NUEDEXTA) 20-10 MG per capsule 1 capsule  1 capsule Oral Daily Laura Russo MD        divalproex (DEPAKOTE) DR tablet 125 mg  125 mg Oral TID Laura Russo MD   125 mg at 02/03/20 0910    hydrochlorothiazide (HYDRODIURIL) tablet 25 mg  25 mg Oral Daily Laura Russo MD   25 mg at 02/03/20 8262    hydrocortisone 1 % ointment   Topical BID Laura Russo MD        megestrol (MEGACE) 40 MG/ML suspension 200 mg  200 mg Oral Daily Laura Russo MD   200 mg at 02/03/20 0775    memantine (NAMENDA) tablet 10 mg  10 mg Oral BID Laura Russo MD   10 mg at 02/03/20 2761    menthol-zinc oxide (CALMOSEPTINE) 0.44-20.625 % ointment   Topical Daily PRN Laura Russo MD        antioxidant multivitamin (OCUVITE) tablet  1 tablet Oral Daily Laura Russo MD   1 tablet at 02/03/20 2908    pantoprazole (PROTONIX) tablet 40 mg  40 mg Oral QAM AC Laura Russo MD   40 mg at 02/01/20 0636    ramipril (ALTACE) capsule 5 mg  5 mg Oral Daily Laura Russo MD   5 mg at 02/03/20 0851    lactobacillus (CULTURELLE) capsule 1 capsule  1 capsule Oral Daily Latasha Mukherjee MD   1 capsule at 02/03/20 6623    vitamin D (ERGOCALCIFEROL) capsule 50,000 Units  50,000 Units Oral Weekly Latasha Mukherjee MD   50,000 Units at 02/02/20 0529    vitamin E capsule 400 Units  400 Units Oral Daily Latasha Mukherjee MD   400 Units at 02/03/20 9855        Allergies   Allergen Reactions   Kassy Raya [Aspirin]        Review of Systems   Respiratory: Negative. Cardiovascular: Negative. All other systems reviewed and are negative. PHYSICAL EXAM:  Vitals:    02/02/20 1645 02/02/20 2330 02/03/20 0652 02/03/20 0744   BP:  (!) 118/55 (!) 119/56 (!) 144/89   Pulse: 51 68 142 130   Resp:  16  16   Temp:  98.2 °F (36.8 °C)  97.8 °F (36.6 °C)   TempSrc:  Temporal  Temporal   SpO2:  96%  95%   Weight:       Height:         Head: Normocephalic and atraumatic. Eyes: Conjunctivae are normal.    Neck: No hepatojugular reflux and no JVD present. Cardiovascular: S1 normal, S2 normal and intact distal pulses. Tachycardic. PMI is not displaced. Pulmonary/Chest: Effort normal and breath sounds normal. No respiratory distress. Abdominal: Soft. Normal appearance. No tenderness. Neurological: Alert and oriented to person   Skin: Skin is warm and dry. No bruising, no ecchymosis and no rash noted. Extremity: No clubbing or cyanosis. No edema. Pertinent Labs:  CBC:   No results for input(s): WBC, HGB, HCT, PLT in the last 72 hours. BMP:  Recent Labs     02/02/20  1102      K 3.9   *   CO2 18*   BUN 20   CREATININE 1.2*   CALCIUM 8.8     ABGs: No results found for: PHART, PO2ART, JJS5ETB  INR: No results for input(s): INR in the last 72 hours. BNP: No results for input(s): BNP in the last 72 hours.    TSH:   Lab Results   Component Value Date    TSH 0.907 09/04/2019      Cardiac Injury Profile:   Recent Labs     02/02/20  2122   TROPONINI <0.01      Lipid Profile:   Lab Results   Component Value Date    TRIG 213 03/06/2019    HDL 36 03/06/2019    [address] 125 03/06/2019    CHOL 204 03/06/2019      Hemoglobin A1C: No components found for: HGBA1C   Xray:   XR CHEST PORTABLE   Final Result   Tortuous ectatic aorta   Cardiomegaly    Airspace disease compatible with atelectasis or pneumonia, at the both   lung bases. Pertinent Cardiac Testing:     TTE 9/4/19  Echo Complete   Order: [id number]   Status:  Edited Result - FINAL   Visible to patient:  No (Not Released)   Next appt:  None   Details     Reading Physician Reading Date Result Priority   Joann Nelson MD 9/6/2019    Unknown Provider Result 9/6/2019       Narrative & Impression     Transthoracic Echocardiography Report (TTE)      Demographics      Patient Name       Kayode Corrales  Gender              Female                      M      Medical Record     [id number]         Room Number         2665   Number      Account #          [de-identified]        Procedure Date      09/06/2019      Corporate ID                        Ordering Physician  Tomi Lucia MD      Accession Number   [id number]        Referring Physician      Date of Birth      1940       Sonographer         Ciara COTE      Age                70 year(s)       Interpreting        Tmoi Lucia MD                                       Physician                                          Any Other     Procedure     Type of Study      TTE procedure:Echo Complete W/ Dop & Color Flow. Procedure Date  Date: 09/06/2019 Start: 11:24 AM     Study Location: Portable  Technical Quality: Adequate visualization     Indications:Atrial fibrillation.     Patient Status: Pending Discharge     Height: 62 inches Weight: 195 pounds BSA: 1.89 m^2 BMI: 35.67 kg/m^2     Rhythm: Atrial fibrillation      Findings      Left Ventricle   Normal left ventricle size and systolic function. Ejection fraction is visually estimated at >75%. Overall ejection fraction increased (hyperdynamic) . No regional wall motion abnormalities seen.    There is concentric remodelling. Indeterminate diastolic function. Right Ventricle   Normal right ventricular size and function. TAPSE 20 mm. Left Atrium   The left atrium is mildly dilated. Interatrial septum appears intact. No evidence of patent foramen ovale. Right Atrium   Normal right atrium size. Mitral Valve   Mild mitral annular calcification. No evidence of mitral valve stenosis. Physiologic and/or trace mitral regurgitation is present. Tricuspid Valve   The tricuspid valve appears structurally normal.   Physiologic and/or trace tricuspid regurgitation. Unable to estimate PA systolic pressure. Aortic Valve   Structurally normal aortic valve. The aortic valve is trileaflet. No hemodynamically significant aortic stenosis is present. Physiologic and/or trace aortic regurgitation is noted. Pulmonic Valve   Pulmonic valve is structurally normal.   No evidence of any pulmonic regurgitation. No evidence of pulmonic valve stenosis. Pericardial Effusion   No evidence for hemodynamically significant pericardial effusion. Pleural Effusion   No evidence of pleural effusion. Aorta   Aortic root dimension within normal limits. Miscellaneous   The inferior vena cava diameter is normal with normal respiratory   variation. Conclusions      Summary   Normal left ventricle size and systolic function. Ejection fraction is visually estimated at >75%. Overall ejection fraction increased (hyperdynamic) . No regional wall motion abnormalities seen. There is concentric remodelling. Indeterminate diastolic function. The left atrium is mildly dilated. Normal right ventricular size and function. TAPSE 20 mm. No hemodynamically significant aortic stenosis is present. Unable to estimate PA systolic pressure. No evidence for hemodynamically significant pericardial effusion. No previous echo for comparison.       Signature ----------------------------------------------------------------   Electronically signed by Georgia Vasques MD(Interpreting   physician) on 09/06/2019 01:09 PM   ----------------------------------------------------------------     M-Mode/2D Measurements & Calculations      LV Diastolic    LV Systolic Dimension: 2.8   AV Cusp Separation: 2 cmLA   Dimension: 3.7  cm                           Dimension: 3.5 cmAO Root   cm              LV Volume Diastolic: 18.8 ml Dimension: 3.5 cm   LV FS:24.3 %    LV Volume Systolic: 01.3 ml   LV PW           LV EDV/LV EDV Index: 91.5   Diastolic: 1.6  OB/47 NP/R^0DW ESV/LV ESV   cm              Index: 28.5 ml/15ml/ m^2     RV Diastolic Dimension: 2.2   Septum          EF Calculated: 50.5 %        cm   Diastolic: 1.1  LV Mass Index: 93 l/min*m^2   cm              LV Length: 5.8 cm                                                LA volume/Index: 74.4 ml   LV Mass: 176.56 LVOT: 2 cm                   /39.39ml/m^2   g                                            RA Area: 17.5 cm^2                                                   IVC Expiration: 1.4 cm     Doppler Measurements & Calculations      MV Peak E-Wave: 0.83 AV Peak Velocity: 0.89 LVOT Peak Velocity: 0.57 m/s   m/s                  m/s                    LVOT Mean Velocity: 0.41 m/s                        AV Peak Gradient: 3.2  LVOT Peak Gradient: 1.3   MV Peak Gradient:    mmHg                   mmHgLVOT Mean Gradient: 0.8   2.7 mmHg             AV Mean Velocity: 0.66 mmHg   MV Mean Gradient:    m/s   1.5 mmHg             AV Mean Gradient: 1.9   MV Mean Velocity:    mmHg   0.56 m/s             AV VTI: 17.3 cm                        AV Area   MV Area              (Continuity):2.03 cm^2 PV Peak Velocity: 0.88 m/s   (continuity): 2.1                           PV Peak Gradient: 3.08 mmHg   cm^2                 LVOT VTI: 11.2 cm      PV Mean Velocity: 0.66 m/s                                               PV Mean Gradient: 1.9 mmHg

## 2020-02-05 ENCOUNTER — TELEPHONE (OUTPATIENT)
Dept: CARDIOLOGY CLINIC | Age: 80
End: 2020-02-05

## 2020-02-24 NOTE — DISCHARGE SUMMARY
[address]                  [address] [facility]  [facility]                               DISCHARGE SUMMARY    PATIENT NAME: Tam Verma                  :        1940  MED REC NO:   [id number]                            ROOM:       7412  ACCOUNT NO:   [de-identified]                           ADMIT DATE: 2020  PROVIDER:     Kendall Milton MD                  DISCHARGE DATE:  2020      HISTORY:  The patient is a 56-year-old woman who was a resident of  [facility], who was admitted at this time with a fast pulse. She  appeared to have a flutter at the facility and it did not seem to  improve easily. She was admitted for further evaluation. 52 ____ she  was in her usual state of andrey, ____ any other symptoms of shortness of  breath, but was sleepy and difficult to arise; therefore, sent to  hospital.  She is known to have AFib in the past and had been on  Eliquis. She had no symptoms of chest pain, had no complaints of chest  pain 105 ____. She spontaneously recovered and converted. She has no  history of chest pain as mentioned, vomiting, nausea, or shortness of  breath. Just weakness and no other symptoms. REVIEW OF SYSTEMS:  Positive for diabetes and Alzheimer disease, mild. She has a history of type 2 diabetes, GERD, cephalgia, hypertension. PAST SURGICAL HISTORY:  Insignificant. MEDICATIONS:  On admission were apixaban 5 mg b.i.d., Nuedexta 20/10 mg  daily, Depakote 125 mg t.i.d., 136 ____ mg on breakfast, HCTZ 25 mg  daily, Megace 200 mg daily, memantine 10 mg b.i.d., Protonix 40 mg  daily, ramipril 5 mg daily, lactobacillus daily, 61303 units of vitamin  D weekly, vitamin E 400 units daily. PHYSICAL EXAMINATION:  VITAL SIGNS:  158 ____ was negative, her pulse was 68, pulse ox was 99%. HOSPITAL COURSE:  She did have signs of recurrent tachycardia and  bradycardia, probable sick sinus syndrome.   Eliquis was continued and  low dose of metoprolol was continued. Aricept was held 219 ____ low  dose. Cardiology was asked to see her. She did again go to paroxysmal  AFib, placed on a Cardizem drip, started magnesium as well empirically. Family felt that they wanted to have a second opinion at [facility] as possibility of pacemaker, came into the interview. She was  stabilized and finally sent to [facility] for further  evaluation of her sick sinus syndrome and 60_____ her treatment. I will  see not be seeing her until she returns. DISCHARGE MEDICATIONS:  Basically will be the same.         Jo-Ann Gotti MD    D: 02/23/2020 19:05:24       T: 02/23/2020 21:41:56     [email or web address]  Job#: [id number]     Doc#: [id number]    CC:

## 2020-03-06 ENCOUNTER — OFFICE VISIT (OUTPATIENT)
Dept: CARDIOLOGY CLINIC | Age: 80
End: 2020-03-06
Payer: MEDICARE

## 2020-03-06 VITALS
HEART RATE: 50 BPM | HEIGHT: 63 IN | DIASTOLIC BLOOD PRESSURE: 72 MMHG | RESPIRATION RATE: 16 BRPM | WEIGHT: 210 LBS | SYSTOLIC BLOOD PRESSURE: 126 MMHG | BODY MASS INDEX: 37.21 KG/M2

## 2020-03-06 PROCEDURE — G8484 FLU IMMUNIZE NO ADMIN: HCPCS | Performed by: INTERNAL MEDICINE

## 2020-03-06 PROCEDURE — 1123F ACP DISCUSS/DSCN MKR DOCD: CPT | Performed by: INTERNAL MEDICINE

## 2020-03-06 PROCEDURE — G8427 DOCREV CUR MEDS BY ELIG CLIN: HCPCS | Performed by: INTERNAL MEDICINE

## 2020-03-06 PROCEDURE — G8400 PT W/DXA NO RESULTS DOC: HCPCS | Performed by: INTERNAL MEDICINE

## 2020-03-06 PROCEDURE — 99214 OFFICE O/P EST MOD 30 MIN: CPT | Performed by: INTERNAL MEDICINE

## 2020-03-06 PROCEDURE — G8417 CALC BMI ABV UP PARAM F/U: HCPCS | Performed by: INTERNAL MEDICINE

## 2020-03-06 PROCEDURE — 93000 ELECTROCARDIOGRAM COMPLETE: CPT | Performed by: INTERNAL MEDICINE

## 2020-03-06 PROCEDURE — 1090F PRES/ABSN URINE INCON ASSESS: CPT | Performed by: INTERNAL MEDICINE

## 2020-03-06 PROCEDURE — 1036F TOBACCO NON-USER: CPT | Performed by: INTERNAL MEDICINE

## 2020-03-06 PROCEDURE — 4040F PNEUMOC VAC/ADMIN/RCVD: CPT | Performed by: INTERNAL MEDICINE

## 2020-03-06 RX ORDER — FLECAINIDE ACETATE 100 MG/1
100 TABLET ORAL 2 TIMES DAILY
Status: ON HOLD | COMMUNITY
End: 2022-01-27 | Stop reason: HOSPADM

## 2020-03-06 RX ORDER — DOCUSATE SODIUM 100 MG/1
100 CAPSULE, LIQUID FILLED ORAL 2 TIMES DAILY
COMMUNITY
End: 2022-01-17

## 2020-03-06 RX ORDER — METOPROLOL SUCCINATE 25 MG/1
12.5 TABLET, EXTENDED RELEASE ORAL 2 TIMES DAILY
COMMUNITY
End: 2020-03-06 | Stop reason: SDUPTHER

## 2020-03-06 RX ORDER — METOPROLOL SUCCINATE 25 MG/1
12.5 TABLET, EXTENDED RELEASE ORAL DAILY
Qty: 15 TABLET | Refills: 3 | Status: ON HOLD
Start: 2020-03-06 | End: 2022-01-27 | Stop reason: HOSPADM

## 2020-03-06 NOTE — PROGRESS NOTES
severe Alzheimer's dementia  · Obesity  · History of acute kidney injury resolved. Plan:   · Will decrease Toprol-XL dose to 12.5 mg p.o. daily due to bradycardia  · Continue flecainide 100 mg twice daily  · Would recommend repeating EKG in 1week to document sinus rhythm, currently she is in junctional bradycardia  · Continue Eliquis 5 mg twice daily for anticoagulation  · Continue PT/OT  · Follow up at [facility] EP department as scheduled  In 4/2020  · Follow-up with me in 1 month    The patient's current medication list, allergies, problem list and results of all previously ordered testing were reviewed at today's visit.   Tomi West MD  [facility]) Cardiology

## 2020-03-06 NOTE — PATIENT INSTRUCTIONS
· We will decrease Toprol-XL dose to 12.5 mg p.o. daily due to bradycardia  · Continue flecainide 100 mg twice daily  · Would recommend repeating EKG in 1week to document sinus rhythm, currently she is in junctional bradycardia  · Continue Eliquis 5 mg twice daily for anticoagulation  · Continue PT OT  · Follow-up with me in 1 month

## 2020-03-11 ENCOUNTER — TELEPHONE (OUTPATIENT)
Dept: CARDIOLOGY CLINIC | Age: 80
End: 2020-03-11

## 2020-03-26 ENCOUNTER — TELEPHONE (OUTPATIENT)
Dept: CARDIOLOGY CLINIC | Age: 80
End: 2020-03-26

## 2020-04-17 ENCOUNTER — TELEPHONE (OUTPATIENT)
Dept: CARDIOLOGY CLINIC | Age: 80
End: 2020-04-17

## 2020-04-28 ENCOUNTER — VIRTUAL VISIT (OUTPATIENT)
Dept: CARDIOLOGY CLINIC | Age: 80
End: 2020-04-28
Payer: MEDICARE

## 2020-04-28 VITALS
DIASTOLIC BLOOD PRESSURE: 62 MMHG | HEIGHT: 62 IN | SYSTOLIC BLOOD PRESSURE: 110 MMHG | RESPIRATION RATE: 18 BRPM | WEIGHT: 178 LBS | BODY MASS INDEX: 32.76 KG/M2 | HEART RATE: 82 BPM

## 2020-04-28 PROCEDURE — 99442 PR PHYS/QHP TELEPHONE EVALUATION 11-20 MIN: CPT | Performed by: INTERNAL MEDICINE

## 2020-04-28 RX ORDER — CITALOPRAM 10 MG/1
10 TABLET ORAL DAILY
COMMUNITY
Start: 2020-04-22

## 2020-04-28 NOTE — PROGRESS NOTES
Blane Liu is a 68 y.o. female evaluated via telephone on 4/28/2020. Consent:  She and/or health care decision maker is aware that that she may receive a bill for this telephone service, depending on her insurance coverage, and has provided verbal consent to proceed: Yes    Patient was evaluated at the nursing home over the phone from [facility], [address] [place] [address]. She was evaluated with the help of her nurse who was with her at the time of evaluation. Documentation:  I communicated with the patient and/or health care decision maker about. Mrs. Geoff Mckeon is a 79-year-old female with history of Alzheimer's dementia, hypertension, hyperlipidemia, type 2 diabetes, obesity and was diagnosed with atrial fibrillation in March 2019. She was seen as a new consult on 9/5/2019 and was diagnosed with a persistent atrial fibrillation, hypertension, hyperlipidemia, type 2 diabetes, Alzheimer's dementia, obesity and evidence of acute kidney injury. Her OTG0VK2 Vasc score was 5 and anticoagulation risks and benefits were discussed with the daughter at that time and she was agreeable. Patient was initiated on Eliquis 5 mg p.o. twice daily which she is been taking without any bleeding complications. She had an echocardiogram at that time showed hyperdynamic LV function with an EF of more than 75% without any significant valvular heart disease. She recently underwent SVT ablation for AVNRT at [facility] clinic on 2/7/2020. She has been in sinus rhythm since then. She denies any chest pain, dyspnea, palpitations, syncope or presyncope. Currently, she lives in a nursing home/rehab facility. She was last seen in the office on 7/6/2020 last visit, she has not had any further hospitalizations or ER visits. According to the nurse, she does not have any dyspnea, palpitations, swelling, chest pains, or bleeding issues.   She had an EKG done yesterday at the nursing home and a copy of the EKG was

## 2020-05-21 ENCOUNTER — HOSPITAL ENCOUNTER (OUTPATIENT)
Age: 80
Discharge: HOME | End: 2020-05-23
Payer: MEDICARE

## 2020-05-21 PROCEDURE — U0003 INFECTIOUS AGENT DETECTION BY NUCLEIC ACID (DNA OR RNA); SEVERE ACUTE RESPIRATORY SYNDROME CORONAVIRUS 2 (SARS-COV-2) (CORONAVIRUS DISEASE [COVID-19]), AMPLIFIED PROBE TECHNIQUE, MAKING USE OF HIGH THROUGHPUT TECHNOLOGIES AS DESCRIBED BY CMS-2020-01-R: HCPCS

## 2020-05-26 LAB
SARS-COV-2: NOT DETECTED
SOURCE: NORMAL

## 2020-09-01 ENCOUNTER — HOSPITAL ENCOUNTER (OUTPATIENT)
Age: 80
Discharge: HOME | End: 2020-09-03
Payer: MEDICARE

## 2020-09-01 PROCEDURE — U0003 INFECTIOUS AGENT DETECTION BY NUCLEIC ACID (DNA OR RNA); SEVERE ACUTE RESPIRATORY SYNDROME CORONAVIRUS 2 (SARS-COV-2) (CORONAVIRUS DISEASE [COVID-19]), AMPLIFIED PROBE TECHNIQUE, MAKING USE OF HIGH THROUGHPUT TECHNOLOGIES AS DESCRIBED BY CMS-2020-01-R: HCPCS

## 2020-09-09 ENCOUNTER — HOSPITAL ENCOUNTER (OUTPATIENT)
Age: 80
Discharge: HOME | End: 2020-09-11
Payer: MEDICARE

## 2020-09-09 LAB
SARS-COV-2: NOT DETECTED
SOURCE: NORMAL

## 2020-09-09 PROCEDURE — U0003 INFECTIOUS AGENT DETECTION BY NUCLEIC ACID (DNA OR RNA); SEVERE ACUTE RESPIRATORY SYNDROME CORONAVIRUS 2 (SARS-COV-2) (CORONAVIRUS DISEASE [COVID-19]), AMPLIFIED PROBE TECHNIQUE, MAKING USE OF HIGH THROUGHPUT TECHNOLOGIES AS DESCRIBED BY CMS-2020-01-R: HCPCS

## 2020-09-11 LAB
SARS-COV-2: NOT DETECTED
SOURCE: NORMAL

## 2020-09-25 ENCOUNTER — HOSPITAL ENCOUNTER (OUTPATIENT)
Age: 80
Discharge: HOME | End: 2020-09-27
Payer: MEDICARE

## 2020-09-25 LAB
ALBUMIN SERPL-MCNC: 3.3 G/DL (ref 3.5–5.2)
ALP BLD-CCNC: 68 U/L (ref 35–104)
ALT SERPL-CCNC: 13 U/L (ref 0–32)
AST SERPL-CCNC: 13 U/L (ref 0–31)
BILIRUB SERPL-MCNC: 0.4 MG/DL (ref 0–1.2)
BILIRUBIN DIRECT: <0.2 MG/DL (ref 0–0.3)
BILIRUBIN, INDIRECT: ABNORMAL MG/DL (ref 0–1)
TOTAL PROTEIN: 6.6 G/DL (ref 6.4–8.3)

## 2020-09-25 PROCEDURE — 80076 HEPATIC FUNCTION PANEL: CPT

## 2020-09-25 PROCEDURE — 36415 COLL VENOUS BLD VENIPUNCTURE: CPT

## 2020-10-05 ENCOUNTER — HOSPITAL ENCOUNTER (OUTPATIENT)
Age: 80
Discharge: HOME | End: 2020-10-07
Payer: MEDICARE

## 2020-10-05 LAB
ALBUMIN SERPL-MCNC: 3.2 G/DL (ref 3.5–5.2)
ALP BLD-CCNC: 71 U/L (ref 35–104)
ALT SERPL-CCNC: 27 U/L (ref 0–32)
AST SERPL-CCNC: 18 U/L (ref 0–31)
BILIRUB SERPL-MCNC: 0.5 MG/DL (ref 0–1.2)
BILIRUBIN DIRECT: <0.2 MG/DL (ref 0–0.3)
BILIRUBIN, INDIRECT: ABNORMAL MG/DL (ref 0–1)
TOTAL PROTEIN: 6.5 G/DL (ref 6.4–8.3)

## 2020-10-05 PROCEDURE — 36415 COLL VENOUS BLD VENIPUNCTURE: CPT

## 2020-10-05 PROCEDURE — 80076 HEPATIC FUNCTION PANEL: CPT

## 2020-10-12 ENCOUNTER — APPOINTMENT (OUTPATIENT)
Dept: CT IMAGING | Age: 80
DRG: 871 | End: 2020-10-12
Payer: MEDICARE

## 2020-10-12 ENCOUNTER — HOSPITAL ENCOUNTER (INPATIENT)
Age: 80
LOS: 4 days | Discharge: NURSING, REHAB OR LONG-TERM CARE | DRG: 871 | End: 2020-10-16
Attending: EMERGENCY MEDICINE | Admitting: INTERNAL MEDICINE
Payer: MEDICARE

## 2020-10-12 ENCOUNTER — APPOINTMENT (OUTPATIENT)
Dept: GENERAL RADIOLOGY | Age: 80
DRG: 871 | End: 2020-10-12
Payer: MEDICARE

## 2020-10-12 PROBLEM — R65.20 SEVERE SEPSIS (HCC): Status: ACTIVE | Noted: 2020-10-12

## 2020-10-12 PROBLEM — A41.9 SEVERE SEPSIS (HCC): Status: ACTIVE | Noted: 2020-10-12

## 2020-10-12 LAB
ALBUMIN SERPL-MCNC: 3.5 G/DL (ref 3.5–5.2)
ALP BLD-CCNC: 87 U/L (ref 35–104)
ALT SERPL-CCNC: 125 U/L (ref 0–32)
ANION GAP SERPL CALCULATED.3IONS-SCNC: 14 MMOL/L (ref 7–16)
ANION GAP SERPL CALCULATED.3IONS-SCNC: 9 MMOL/L (ref 7–16)
AST SERPL-CCNC: 64 U/L (ref 0–31)
B.E.: -4.9 MMOL/L (ref -3–3)
BASOPHILS ABSOLUTE: 0.06 E9/L (ref 0–0.2)
BASOPHILS RELATIVE PERCENT: 0.3 % (ref 0–2)
BETA-HYDROXYBUTYRATE: 1.31 MMOL/L (ref 0.02–0.27)
BILIRUB SERPL-MCNC: 1.4 MG/DL (ref 0–1.2)
BUN BLDV-MCNC: 58 MG/DL (ref 8–23)
BUN BLDV-MCNC: 62 MG/DL (ref 8–23)
CALCIUM SERPL-MCNC: 8.4 MG/DL (ref 8.6–10.2)
CALCIUM SERPL-MCNC: 9.2 MG/DL (ref 8.6–10.2)
CHLORIDE BLD-SCNC: 115 MMOL/L (ref 98–107)
CHLORIDE BLD-SCNC: 125 MMOL/L (ref 98–107)
CHP ED QC CHECK: NORMAL
CO2: 21 MMOL/L (ref 22–29)
CO2: 22 MMOL/L (ref 22–29)
COHB: 1.4 % (ref 0–1.5)
CREAT SERPL-MCNC: 1.3 MG/DL (ref 0.5–1)
CREAT SERPL-MCNC: 1.5 MG/DL (ref 0.5–1)
CRITICAL: ABNORMAL
DATE ANALYZED: ABNORMAL
DATE OF COLLECTION: ABNORMAL
EKG ATRIAL RATE: 170 BPM
EKG Q-T INTERVAL: 246 MS
EKG QRS DURATION: 108 MS
EKG QTC CALCULATION (BAZETT): 411 MS
EKG R AXIS: -13 DEGREES
EKG T AXIS: -145 DEGREES
EKG VENTRICULAR RATE: 168 BPM
EOSINOPHILS ABSOLUTE: 0 E9/L (ref 0.05–0.5)
EOSINOPHILS RELATIVE PERCENT: 0 % (ref 0–6)
GFR AFRICAN AMERICAN: 41
GFR AFRICAN AMERICAN: 48
GFR NON-AFRICAN AMERICAN: 34 ML/MIN/1.73
GFR NON-AFRICAN AMERICAN: 40 ML/MIN/1.73
GLUCOSE BLD-MCNC: 500 MG/DL
GLUCOSE BLD-MCNC: 623 MG/DL (ref 74–99)
GLUCOSE BLD-MCNC: 683 MG/DL (ref 74–99)
HCO3: 19.6 MMOL/L (ref 22–26)
HCT VFR BLD CALC: 52 % (ref 34–48)
HEMOGLOBIN: 16.2 G/DL (ref 11.5–15.5)
HHB: 0.4 % (ref 0–5)
IMMATURE GRANULOCYTES #: 0.13 E9/L
IMMATURE GRANULOCYTES %: 0.6 % (ref 0–5)
LAB: ABNORMAL
LACTIC ACID: 2.4 MMOL/L (ref 0.5–2.2)
LACTIC ACID: 5.1 MMOL/L (ref 0.5–2.2)
LYMPHOCYTES ABSOLUTE: 1.75 E9/L (ref 1.5–4)
LYMPHOCYTES RELATIVE PERCENT: 8.2 % (ref 20–42)
Lab: ABNORMAL
MAGNESIUM: 2.3 MG/DL (ref 1.6–2.6)
MCH RBC QN AUTO: 30.9 PG (ref 26–35)
MCHC RBC AUTO-ENTMCNC: 31.2 % (ref 32–34.5)
MCV RBC AUTO: 99.2 FL (ref 80–99.9)
METER GLUCOSE: 397 MG/DL (ref 74–99)
METER GLUCOSE: 399 MG/DL (ref 74–99)
METER GLUCOSE: 462 MG/DL (ref 74–99)
METER GLUCOSE: 468 MG/DL (ref 74–99)
METER GLUCOSE: >500 MG/DL (ref 74–99)
METER GLUCOSE: >500 MG/DL (ref 74–99)
METHB: 0.3 % (ref 0–1.5)
MODE: ABNORMAL
MONOCYTES ABSOLUTE: 0.94 E9/L (ref 0.1–0.95)
MONOCYTES RELATIVE PERCENT: 4.4 % (ref 2–12)
NEUTROPHILS ABSOLUTE: 18.58 E9/L (ref 1.8–7.3)
NEUTROPHILS RELATIVE PERCENT: 86.5 % (ref 43–80)
O2 CONTENT: 21.8 ML/DL
O2 SATURATION: 99.6 % (ref 92–98.5)
O2HB: 97.9 % (ref 94–97)
OPERATOR ID: 7874
PATIENT TEMP: 37 C
PCO2: 35 MMHG (ref 35–45)
PDW BLD-RTO: 14.5 FL (ref 11.5–15)
PH BLOOD GAS: 7.37 (ref 7.35–7.45)
PHOSPHORUS: 3.9 MG/DL (ref 2.5–4.5)
PLATELET # BLD: 194 E9/L (ref 130–450)
PMV BLD AUTO: 13.5 FL (ref 7–12)
PO2: 239.9 MMHG (ref 75–100)
POTASSIUM REFLEX MAGNESIUM: 4.5 MMOL/L (ref 3.5–5)
POTASSIUM SERPL-SCNC: 4 MMOL/L (ref 3.5–5)
PRO-BNP: 2990 PG/ML (ref 0–450)
RBC # BLD: 5.24 E12/L (ref 3.5–5.5)
REASON FOR REJECTION: NORMAL
REJECTED TEST: NORMAL
SARS-COV-2, NAAT: NOT DETECTED
SODIUM BLD-SCNC: 150 MMOL/L (ref 132–146)
SODIUM BLD-SCNC: 156 MMOL/L (ref 132–146)
SOURCE, BLOOD GAS: ABNORMAL
THB: 15.5 G/DL (ref 11.5–16.5)
TIME ANALYZED: 1544
TOTAL PROTEIN: 7 G/DL (ref 6.4–8.3)
VALPROIC ACID LEVEL: 4 MCG/ML (ref 50–100)
WBC # BLD: 21.5 E9/L (ref 4.5–11.5)

## 2020-10-12 PROCEDURE — 87088 URINE BACTERIA CULTURE: CPT

## 2020-10-12 PROCEDURE — U0002 COVID-19 LAB TEST NON-CDC: HCPCS

## 2020-10-12 PROCEDURE — 2580000003 HC RX 258: Performed by: INTERNAL MEDICINE

## 2020-10-12 PROCEDURE — 87186 SC STD MICRODIL/AGAR DIL: CPT

## 2020-10-12 PROCEDURE — 6360000002 HC RX W HCPCS: Performed by: HOSPITALIST

## 2020-10-12 PROCEDURE — 2580000003 HC RX 258: Performed by: HOSPITALIST

## 2020-10-12 PROCEDURE — 83605 ASSAY OF LACTIC ACID: CPT

## 2020-10-12 PROCEDURE — 94640 AIRWAY INHALATION TREATMENT: CPT

## 2020-10-12 PROCEDURE — 96375 TX/PRO/DX INJ NEW DRUG ADDON: CPT

## 2020-10-12 PROCEDURE — 36415 COLL VENOUS BLD VENIPUNCTURE: CPT

## 2020-10-12 PROCEDURE — 83880 ASSAY OF NATRIURETIC PEPTIDE: CPT

## 2020-10-12 PROCEDURE — 2500000003 HC RX 250 WO HCPCS: Performed by: HOSPITALIST

## 2020-10-12 PROCEDURE — 71045 X-RAY EXAM CHEST 1 VIEW: CPT

## 2020-10-12 PROCEDURE — 99284 EMERGENCY DEPT VISIT MOD MDM: CPT

## 2020-10-12 PROCEDURE — 6370000000 HC RX 637 (ALT 250 FOR IP): Performed by: HOSPITALIST

## 2020-10-12 PROCEDURE — 80048 BASIC METABOLIC PNL TOTAL CA: CPT

## 2020-10-12 PROCEDURE — 99223 1ST HOSP IP/OBS HIGH 75: CPT | Performed by: HOSPITALIST

## 2020-10-12 PROCEDURE — 96365 THER/PROPH/DIAG IV INF INIT: CPT

## 2020-10-12 PROCEDURE — 2580000003 HC RX 258: Performed by: EMERGENCY MEDICINE

## 2020-10-12 PROCEDURE — 83735 ASSAY OF MAGNESIUM: CPT

## 2020-10-12 PROCEDURE — 6360000002 HC RX W HCPCS: Performed by: STUDENT IN AN ORGANIZED HEALTH CARE EDUCATION/TRAINING PROGRAM

## 2020-10-12 PROCEDURE — 6370000000 HC RX 637 (ALT 250 FOR IP): Performed by: EMERGENCY MEDICINE

## 2020-10-12 PROCEDURE — 85025 COMPLETE CBC W/AUTO DIFF WBC: CPT

## 2020-10-12 PROCEDURE — 82962 GLUCOSE BLOOD TEST: CPT

## 2020-10-12 PROCEDURE — 80164 ASSAY DIPROPYLACETIC ACD TOT: CPT

## 2020-10-12 PROCEDURE — 36592 COLLECT BLOOD FROM PICC: CPT

## 2020-10-12 PROCEDURE — 2000000000 HC ICU R&B

## 2020-10-12 PROCEDURE — 2500000003 HC RX 250 WO HCPCS: Performed by: STUDENT IN AN ORGANIZED HEALTH CARE EDUCATION/TRAINING PROGRAM

## 2020-10-12 PROCEDURE — 2500000003 HC RX 250 WO HCPCS: Performed by: EMERGENCY MEDICINE

## 2020-10-12 PROCEDURE — 2580000003 HC RX 258: Performed by: STUDENT IN AN ORGANIZED HEALTH CARE EDUCATION/TRAINING PROGRAM

## 2020-10-12 PROCEDURE — 70450 CT HEAD/BRAIN W/O DYE: CPT

## 2020-10-12 PROCEDURE — 87081 CULTURE SCREEN ONLY: CPT

## 2020-10-12 PROCEDURE — 06HY33Z INSERTION OF INFUSION DEVICE INTO LOWER VEIN, PERCUTANEOUS APPROACH: ICD-10-PCS | Performed by: INTERNAL MEDICINE

## 2020-10-12 PROCEDURE — 93005 ELECTROCARDIOGRAM TRACING: CPT | Performed by: STUDENT IN AN ORGANIZED HEALTH CARE EDUCATION/TRAINING PROGRAM

## 2020-10-12 PROCEDURE — 80053 COMPREHEN METABOLIC PANEL: CPT

## 2020-10-12 PROCEDURE — 6360000002 HC RX W HCPCS: Performed by: EMERGENCY MEDICINE

## 2020-10-12 PROCEDURE — 87077 CULTURE AEROBIC IDENTIFY: CPT

## 2020-10-12 PROCEDURE — 82010 KETONE BODYS QUAN: CPT

## 2020-10-12 PROCEDURE — 82805 BLOOD GASES W/O2 SATURATION: CPT

## 2020-10-12 PROCEDURE — 84100 ASSAY OF PHOSPHORUS: CPT

## 2020-10-12 RX ORDER — SODIUM CHLORIDE 9 MG/ML
INJECTION, SOLUTION INTRAVENOUS EVERY 12 HOURS
Status: DISCONTINUED | OUTPATIENT
Start: 2020-10-12 | End: 2020-10-13

## 2020-10-12 RX ORDER — SODIUM CHLORIDE 0.9 % (FLUSH) 0.9 %
10 SYRINGE (ML) INJECTION EVERY 12 HOURS SCHEDULED
Status: DISCONTINUED | OUTPATIENT
Start: 2020-10-12 | End: 2020-10-16 | Stop reason: HOSPADM

## 2020-10-12 RX ORDER — SODIUM CHLORIDE 0.9 % (FLUSH) 0.9 %
10 SYRINGE (ML) INJECTION PRN
Status: DISCONTINUED | OUTPATIENT
Start: 2020-10-12 | End: 2020-10-16 | Stop reason: HOSPADM

## 2020-10-12 RX ORDER — DEXTROSE, SODIUM CHLORIDE, AND POTASSIUM CHLORIDE 5; .45; .075 G/100ML; G/100ML; G/100ML
INJECTION INTRAVENOUS CONTINUOUS
Status: DISCONTINUED | OUTPATIENT
Start: 2020-10-12 | End: 2020-10-12

## 2020-10-12 RX ORDER — DEXTROSE MONOHYDRATE 25 G/50ML
12.5 INJECTION, SOLUTION INTRAVENOUS PRN
Status: DISCONTINUED | OUTPATIENT
Start: 2020-10-12 | End: 2020-10-16 | Stop reason: HOSPADM

## 2020-10-12 RX ORDER — ONDANSETRON 2 MG/ML
4 INJECTION INTRAMUSCULAR; INTRAVENOUS EVERY 6 HOURS PRN
Status: DISCONTINUED | OUTPATIENT
Start: 2020-10-12 | End: 2020-10-16 | Stop reason: HOSPADM

## 2020-10-12 RX ORDER — 0.9 % SODIUM CHLORIDE 0.9 %
1000 INTRAVENOUS SOLUTION INTRAVENOUS ONCE
Status: COMPLETED | OUTPATIENT
Start: 2020-10-12 | End: 2020-10-12

## 2020-10-12 RX ORDER — POTASSIUM CHLORIDE 7.45 MG/ML
10 INJECTION INTRAVENOUS PRN
Status: DISCONTINUED | OUTPATIENT
Start: 2020-10-12 | End: 2020-10-13

## 2020-10-12 RX ORDER — PROMETHAZINE HYDROCHLORIDE 25 MG/1
12.5 TABLET ORAL EVERY 6 HOURS PRN
Status: DISCONTINUED | OUTPATIENT
Start: 2020-10-12 | End: 2020-10-16 | Stop reason: HOSPADM

## 2020-10-12 RX ORDER — DEXTROSE AND SODIUM CHLORIDE 5; .45 G/100ML; G/100ML
INJECTION, SOLUTION INTRAVENOUS CONTINUOUS PRN
Status: DISCONTINUED | OUTPATIENT
Start: 2020-10-12 | End: 2020-10-12

## 2020-10-12 RX ORDER — DEXTROSE AND SODIUM CHLORIDE 5; .45 G/100ML; G/100ML
INJECTION, SOLUTION INTRAVENOUS CONTINUOUS
Status: DISCONTINUED | OUTPATIENT
Start: 2020-10-12 | End: 2020-10-13

## 2020-10-12 RX ORDER — MAGNESIUM SULFATE 1 G/100ML
1 INJECTION INTRAVENOUS PRN
Status: DISCONTINUED | OUTPATIENT
Start: 2020-10-12 | End: 2020-10-13

## 2020-10-12 RX ORDER — SODIUM CHLORIDE 450 MG/100ML
INJECTION, SOLUTION INTRAVENOUS CONTINUOUS
Status: DISCONTINUED | OUTPATIENT
Start: 2020-10-12 | End: 2020-10-13

## 2020-10-12 RX ORDER — DILTIAZEM HYDROCHLORIDE 5 MG/ML
10 INJECTION INTRAVENOUS ONCE
Status: COMPLETED | OUTPATIENT
Start: 2020-10-12 | End: 2020-10-12

## 2020-10-12 RX ORDER — ACETAMINOPHEN 325 MG/1
650 TABLET ORAL EVERY 6 HOURS PRN
Status: DISCONTINUED | OUTPATIENT
Start: 2020-10-12 | End: 2020-10-16 | Stop reason: HOSPADM

## 2020-10-12 RX ORDER — IPRATROPIUM BROMIDE AND ALBUTEROL SULFATE 2.5; .5 MG/3ML; MG/3ML
1 SOLUTION RESPIRATORY (INHALATION) 4 TIMES DAILY
Status: DISCONTINUED | OUTPATIENT
Start: 2020-10-12 | End: 2020-10-16 | Stop reason: HOSPADM

## 2020-10-12 RX ORDER — POLYETHYLENE GLYCOL 3350 17 G/17G
17 POWDER, FOR SOLUTION ORAL DAILY PRN
Status: DISCONTINUED | OUTPATIENT
Start: 2020-10-12 | End: 2020-10-16 | Stop reason: HOSPADM

## 2020-10-12 RX ORDER — ACETAMINOPHEN 650 MG/1
650 SUPPOSITORY RECTAL EVERY 6 HOURS PRN
Status: DISCONTINUED | OUTPATIENT
Start: 2020-10-12 | End: 2020-10-16 | Stop reason: HOSPADM

## 2020-10-12 RX ORDER — DIGOXIN 0.25 MG/ML
125 INJECTION INTRAMUSCULAR; INTRAVENOUS ONCE
Status: DISCONTINUED | OUTPATIENT
Start: 2020-10-12 | End: 2020-10-13

## 2020-10-12 RX ADMIN — SODIUM CHLORIDE 1000 ML: 9 INJECTION, SOLUTION INTRAVENOUS at 16:51

## 2020-10-12 RX ADMIN — DILTIAZEM HYDROCHLORIDE 10 MG: 5 INJECTION INTRAVENOUS at 15:09

## 2020-10-12 RX ADMIN — IPRATROPIUM BROMIDE AND ALBUTEROL SULFATE 1 AMPULE: .5; 3 SOLUTION RESPIRATORY (INHALATION) at 20:22

## 2020-10-12 RX ADMIN — DEXTROSE MONOHYDRATE 250 MG: 50 INJECTION, SOLUTION INTRAVENOUS at 22:32

## 2020-10-12 RX ADMIN — SODIUM CHLORIDE: 4.5 INJECTION, SOLUTION INTRAVENOUS at 19:04

## 2020-10-12 RX ADMIN — CEFEPIME HYDROCHLORIDE 2 G: 2 INJECTION, POWDER, FOR SOLUTION INTRAVENOUS at 16:58

## 2020-10-12 RX ADMIN — DOXYCYCLINE 100 MG: 100 INJECTION, POWDER, LYOPHILIZED, FOR SOLUTION INTRAVENOUS at 17:11

## 2020-10-12 RX ADMIN — SODIUM CHLORIDE, PRESERVATIVE FREE 10 ML: 5 INJECTION INTRAVENOUS at 20:32

## 2020-10-12 RX ADMIN — VANCOMYCIN HYDROCHLORIDE 1250 MG: 10 INJECTION, POWDER, LYOPHILIZED, FOR SOLUTION INTRAVENOUS at 21:20

## 2020-10-12 RX ADMIN — SODIUM CHLORIDE: 4.5 INJECTION, SOLUTION INTRAVENOUS at 23:40

## 2020-10-12 RX ADMIN — SODIUM CHLORIDE 1000 ML: 9 INJECTION, SOLUTION INTRAVENOUS at 15:13

## 2020-10-12 RX ADMIN — SODIUM CHLORIDE 7.8 UNITS/HR: 9 INJECTION, SOLUTION INTRAVENOUS at 19:05

## 2020-10-12 RX ADMIN — ENOXAPARIN SODIUM 40 MG: 40 INJECTION SUBCUTANEOUS at 20:31

## 2020-10-12 ASSESSMENT — PAIN SCALES - WONG BAKER
WONGBAKER_NUMERICALRESPONSE: 0
WONGBAKER_NUMERICALRESPONSE: 0

## 2020-10-12 NOTE — ED NOTES
Received patient from Dr. Jayce Hamilton and Dr. Stephon Willis. Evaluation of the labs reveals patient is septic likely related to pneumonia on x-ray. Patient was in A. fib RVR but rate now controlled after Cardizem bolus. Patient receiving fluids appears patient is in HHS. Patient is severely hypernatremic. It was felt patient warranted ICU admission and central line to be placed prior to my entering the case. Discussed case with Dr. Haylee Gregory she recommended patient be placed on insulin drip with D5 half-normal saline. It was felt we can keep patient off Cardizem at this time. Patient was also given digoxin for A. fib. She accepted the patient to the ICU and case was discussed with Dr. Felicitas Villanueva who will admit.    --------------------------------------------- PAST HISTORY ---------------------------------------------  Past Medical History:  has a past medical history of Alzheimer disease ([facility] [address] Atrial fibrillation ([facility] Dementia ([facility] Diabetes mellitus ([facility] GERD (gastroesophageal reflux disease), Headache, and Hypertension. Past Surgical History:  has a past surgical history that includes  section; Cholecystectomy, laparoscopic; Colonoscopy; and Upper gastrointestinal endoscopy. Social History:  reports that she has never smoked. She has never used smokeless tobacco. She reports that she does not drink alcohol or use drugs. Family History: family history includes Heart Disease in her mother. The patients home medications have been reviewed.     Allergies: Asa [aspirin]    -------------------------------------------------- RESULTS -------------------------------------------------    Lab  Results for orders placed or performed during the hospital encounter of 10/12/20   CBC Auto Differential   Result Value Ref Range    WBC 21.5 (H) 4.5 - 11.5 E9/L    RBC 5.24 3.50 - 5.50 E12/L    Hemoglobin 16.2 (H) 11.5 - 15.5 g/dL    Hematocrit 52.0 (H) 34.0 - 48.0 %    MCV 99.2 80.0 - 99.9 fL    MCH 30.9 26.0 - 35.0 pg    MCHC 31.2 (L) 32.0 - 34.5 %    RDW 14.5 11.5 - 15.0 fL    Platelets 315 208 - 972 E9/L    MPV 13.5 (H) 7.0 - 12.0 fL    Neutrophils % 86.5 (H) 43.0 - 80.0 %    Immature Granulocytes % 0.6 0.0 - 5.0 %    Lymphocytes % 8.2 (L) 20.0 - 42.0 %    Monocytes % 4.4 2.0 - 12.0 %    Eosinophils % 0.0 0.0 - 6.0 %    Basophils % 0.3 0.0 - 2.0 %    Neutrophils Absolute 18.58 (H) 1.80 - 7.30 E9/L    Immature Granulocytes # 0.13 E9/L    Lymphocytes Absolute 1.75 1.50 - 4.00 E9/L    Monocytes Absolute 0.94 0.10 - 0.95 E9/L    Eosinophils Absolute 0.00 (L) 0.05 - 0.50 E9/L    Basophils Absolute 0.06 0.00 - 0.20 E9/L   Brain Natriuretic Peptide   Result Value Ref Range    Pro-BNP 2,990 (H) 0 - 450 pg/mL   Lactic Acid, Plasma   Result Value Ref Range    Lactic Acid 5.1 (HH) 0.5 - 2.2 mmol/L   Beta-Hydroxybutyrate   Result Value Ref Range    Beta-Hydroxybutyrate 1.31 (H) 0.02 - 0.27 mmol/L   SPECIMEN REJECTION   Result Value Ref Range    Rejected Test CMPX, TROP     Reason for Rejection see below    Comprehensive Metabolic Panel w/ Reflex to MG   Result Value Ref Range    Sodium 150 (H) 132 - 146 mmol/L    Potassium reflex Magnesium 4.5 3.5 - 5.0 mmol/L    Chloride 115 (H) 98 - 107 mmol/L    CO2 21 (L) 22 - 29 mmol/L    Anion Gap 14 7 - 16 mmol/L    Glucose 683 (HH) 74 - 99 mg/dL    BUN 62 (H) 8 - 23 mg/dL    CREATININE 1.5 (H) 0.5 - 1.0 mg/dL    GFR Non-African American 34 >=60 mL/min/1.73    GFR African American 41     Calcium 9.2 8.6 - 10.2 mg/dL    Total Protein 7.0 6.4 - 8.3 g/dL    Alb 3.5 3.5 - 5.2 g/dL    Total Bilirubin 1.4 (H) 0.0 - 1.2 mg/dL    Alkaline Phosphatase 87 35 - 104 U/L     (H) 0 - 32 U/L    AST 64 (H) 0 - 31 U/L   COVID-19   Result Value Ref Range    SARS-CoV-2, NAAT Not Detected Not Detected   Blood Gas, Arterial   Result Value Ref Range    Date Analyzed [id number]     Time Analyzed 4368     Source: Blood Arterial     pH, Blood Gas 7.366 7.350 - 7.450    PCO2 35.0 35.0 - 45.0 mmHg    PO2 239.9 (H) 75.0 - 100.0 mmHg    HCO3 19.6 (L) 22.0 - 26.0 mmol/L    B.E. -4.9 (L) -3.0 - 3.0 mmol/L    O2 Sat 99.6 (H) 92.0 - 98.5 %    O2Hb 97.9 (H) 94.0 - 97.0 %    COHb 1.4 0.0 - 1.5 %    MetHb 0.3 0.0 - 1.5 %    O2 Content 21.8 mL/dL    HHb 0.4 0.0 - 5.0 %    tHb (est) 15.5 11.5 - 16.5 g/dL    Mode NRB 15L     Date Of Collection      Time Collected      Pt Temp 37.0 C     ID [id number]     Lab [id number]     Critical(s) Notified . No Critical Values    POCT Glucose   Result Value Ref Range    Meter Glucose >500 (H) 74 - 99 mg/dL   POCT Glucose   Result Value Ref Range    Glucose 500 mg/dL    QC OK? k    EKG 12 Lead   Result Value Ref Range    Ventricular Rate 168 BPM    Atrial Rate 170 BPM    QRS Duration 108 ms    Q-T Interval 246 ms    QTc Calculation (Bazett) 411 ms    R Axis -13 degrees    T Axis -145 degrees       Radiology  Ct Head Wo Contrast    Result Date: 10/12/2020  EXAMINATION: CT OF THE HEAD WITHOUT CONTRAST  10/12/2020 4:21 pm TECHNIQUE: CT of the head was performed without the administration of intravenous contrast. Dose modulation, iterative reconstruction, and/or weight based adjustment of the mA/kV was utilized to reduce the radiation dose to as low as reasonably achievable. COMPARISON: CT head from October 26, 2019. HISTORY: ORDERING SYSTEM PROVIDED HISTORY: AMS TECHNOLOGIST PROVIDED HISTORY: Reason for exam:->AMS Has a \"code stroke\" or \"stroke alert\" been called? ->No FINDINGS: BRAIN/VENTRICLES: Moderate to severe periventricular and deep white matter hypoattenuating regions are suggestive of areas of chronic microvascular ischemic change. Chronic encephalomalacia involving the posterior right parietal lobe series 2, image 29 is unchanged from prior exam.  Intracranial atherosclerotic disease present. The ventricles and CSF containing spaces are prominent consistent with involutional change. ORBITS: The visualized portion of the orbits demonstrate no acute abnormality.  SINUSES: The visualized paranasal sinuses and mastoid air cells demonstrate no acute abnormality. SOFT TISSUES/SKULL:  No acute abnormality of the visualized skull or soft tissues. 1.  No acute intracranial abnormality. Advanced senescent changes and involutional changes. Intracranial atherosclerotic disease. 2.  Sequela of a prior focal ischemic event is stable in the posterior right parietal lobe. Xr Chest Portable    Result Date: 10/12/2020  EXAMINATION: ONE XRAY VIEW OF THE CHEST 10/12/2020 2:30 pm COMPARISON: 01/30/2020 HISTORY: ORDERING SYSTEM PROVIDED HISTORY: AMS TECHNOLOGIST PROVIDED HISTORY: Reason for exam:->AMS FINDINGS: The heart is normal in size. There are no findings of failure. The right lung is clear. There is a patchy infiltrate seen within the left lower lobe. There is no pleural effusion     1. Small patchy infiltrate within the left lower lobe     ------------------------- NURSING NOTES AND VITALS REVIEWED ---------------------------  Date / Time Roomed:  10/12/2020  1:59 PM  ED Bed Assignment:  18/18    The nursing notes within the ED encounter and vital signs as below have been reviewed. Patient Vitals for the past 24 hrs:   BP Temp Temp src Pulse Resp SpO2 Height Weight   10/12/20 1722 91/68 -- -- 99 18 96 % -- --   10/12/20 1627 104/70 -- -- 109 18 98 % -- --   10/12/20 1431 105/75 98 °F (36.7 °C) Axillary 180 18 96 % 5' 4\" (1.626 m) 171 lb (77.6 kg)       Oxygen Saturation Interpretation: Improved after treatment      ------------------------------------------ PROGRESS NOTES ------------------------------------------  I have spoken with the patient and discussed todays results, in addition to providing specific details for the plan of care and counseling regarding the diagnosis and prognosis.   Their questions are answered at this time and they are agreeable with the plan.      --------------------------------- ADDITIONAL PROVIDER NOTES ---------------------------------  This patient's ED course included: a personal history and physicial examination, re-evaluation prior to disposition, multiple bedside re-evaluations, IV medications, cardiac monitoring and continuous pulse oximetry    This patient has been closely monitored during their ED course. Please note that the withdrawal or failure to initiate urgent interventions for this patient would likely result in a life threatening deterioration or permanent disability. Accordingly this patient received 30 minutes of critical care time, excluding separately billable procedures. Clinical Impression  1. Respiratory failure with hypoxia, unspecified chronicity ([address]    2. Pneumonia due to organism    3. Altered mental status, unspecified altered mental status type    4. HHS (hypothenar hammer syndrome) ([address]    5. Severe sepsis ([address]    6. Atrial fibrillation with RVR ([address]    7. Hypernatremia          Disposition  Patient's disposition: Admit to CCU/ICU  Patient's condition is serious.        Jose Leal DO  10/12/20 1731

## 2020-10-12 NOTE — CARE COORDINATION
Social Work/Transition of Care:    Pt is from [facility] and if admitted will be a bed hold per Sanket Sauceda for the facility.     Electronically signed by Jama Yoder on 21/29/5152 at 2:52 PM

## 2020-10-12 NOTE — ED NOTES
Bed:  [place] [address]  Expected date:   Expected time:   Means of arrival:   Comments:  EMS/     Niru Paredes RN  10/12/20 7159

## 2020-10-12 NOTE — ED NOTES
Central Line Placement Procedure Note    Indication: vascular access, poor peripheral access and need for frequent blood draws    Consent: Unable to be obtained due to the emergent nature of this procedure. Procedure: The patient was positioned appropriately and the skin over the right femoral vein was prepped with chlorhexidine and Chloraprep and draped in sterile fashion. Local anesthesia was obtained by infiltration using 4cc of 1% Lidocaine without epinephrine. Ultrasound and a large bore needle was used to identify the vein. A guide wire was then inserted into the vein through the needle. A triple lumen catheter was then inserted into the vessel over the guide wire using the Seldinger technique. All ports showed good, free flowing blood return and were flushed with saline solution. The catheter was then securely fastened to the skin with suture at 0 cm. Two sutures were placed into the proximal eyelets. An antibiotic disk was placed and the site was then covered with a sterile dressing. A post procedure X-ray was not indicated. The patient tolerated the procedure well.     Complications: None         [address] Bea[address] DO  Resident  10/12/20 9837

## 2020-10-12 NOTE — ED PROVIDER NOTES
Notable for the following components:       Result Value    Organism Klebsiella pneumoniae ssp pneumoniae (*)     All other components within normal limits    Narrative:     Source: URINE       Site:              CBC WITH AUTO DIFFERENTIAL - Abnormal; Notable for the following components:    WBC 21.5 (*)     Hemoglobin 16.2 (*)     Hematocrit 52.0 (*)     MCHC 31.2 (*)     MPV 13.5 (*)     Neutrophils % 86.5 (*)     Lymphocytes % 8.2 (*)     Neutrophils Absolute 18.58 (*)     Eosinophils Absolute 0.00 (*)     All other components within normal limits   BRAIN NATRIURETIC PEPTIDE - Abnormal; Notable for the following components:    Pro-BNP 2,990 (*)     All other components within normal limits   LACTIC ACID, PLASMA - Abnormal; Notable for the following components:    Lactic Acid 5.1 (*)     All other components within normal limits    Narrative:     CALL  Shay RIOS tel. [phone],  Rejected Test Name/Called to: Danny Molina / Randi Rodgers RN, 10/12/2020 15:02, by  Natalia Rendon tel. [phone],  Chemistry results called to and read back by [facility] RN, 10/12/2020  15:08, by [facility]   BETA-HYDROXYBUTYRATE - Abnormal; Notable for the following components:    Beta-Hydroxybutyrate 1.31 (*)     All other components within normal limits    Narrative:     CALL  Shay RIOS tel. [phone],  Rejected Test Name/Called to: Danny Molina / Randi Rodgers RN, 10/12/2020 15:02, by  [address] [place] [address] W/ REFLEX TO MG FOR LOW K - Abnormal; Notable for the following components:    Sodium 150 (*)     Chloride 115 (*)     CO2 21 (*)     Glucose 683 (*)     BUN 62 (*)     CREATININE 1.5 (*)     Total Bilirubin 1.4 (*)      (*)     AST 64 (*)     All other components within normal limits    Narrative:     Davey Quiroz tel. [phone],  Chemistry results called to and read back by Erasmo Mendez RN, 10/12/2020  16:57, by Jaya, ARTERIAL - Abnormal; Notable for the following components:    PO2 239.9 (*)     HCO3 19.6 (*)     B.E. -4.9 (*)     O2 Sat 99.6 (*)     O2Hb 97.9 (*)     All other components within normal limits   LACTIC ACID, PLASMA - Abnormal; Notable for the following components:    Lactic Acid 2.4 (*)     All other components within normal limits   BASIC METABOLIC PANEL - Abnormal; Notable for the following components:    Sodium 156 (*)     Chloride 125 (*)     Glucose 623 (*)     BUN 58 (*)     CREATININE 1.3 (*)     Calcium 8.4 (*)     All other components within normal limits    Narrative:     CALL  Day  H2WB tel. ,  Chemistry results called to and read back by Alonso Santoyo RN, 10/12/2020  19:51, by Fabio Sullivan - Abnormal; Notable for the following components:    Sodium 153 (*)     Potassium 3.0 (*)     Chloride 123 (*)     CO2 21 (*)     Glucose 408 (*)     BUN 52 (*)     CREATININE 1.1 (*)     Calcium 8.2 (*)     All other components within normal limits   BASIC METABOLIC PANEL - Abnormal; Notable for the following components:    Sodium 152 (*)     Chloride 124 (*)     Anion Gap 6 (*)     Glucose 238 (*)     BUN 45 (*)     Calcium 8.2 (*)     All other components within normal limits   PHOSPHORUS - Abnormal; Notable for the following components:    Phosphorus 1.9 (*)     All other components within normal limits   PHOSPHORUS - Abnormal; Notable for the following components:    Phosphorus 1.6 (*)     All other components within normal limits   CBC WITH AUTO DIFFERENTIAL - Abnormal; Notable for the following components:    WBC 16.2 (*)     MCHC 30.8 (*)     Platelets 920 (*)     MPV 12.9 (*)     Lymphocytes % 16.8 (*)     Neutrophils Absolute 12.54 (*)     All other components within normal limits   LACTIC ACID, PLASMA - Abnormal; Notable for the following components:    Lactic Acid 3.9 (*)     All other components within normal limits   VALPROIC ACID LEVEL, TOTAL - Abnormal; Notable for the following components:    Valproic Acid Lvl 4 (*)     All other components within normal limits   OSMOLALITY, SERUM - Abnormal; Notable for the following components:    Osmolality 323 (*)     All other components within normal limits   HEPATIC FUNCTION PANEL - Abnormal; Notable for the following components:     Total Protein 5.6 (*)     Alb 2.9 (*)     ALT 68 (*)     All other components within normal limits   HEMOGLOBIN A1C - Abnormal; Notable for the following components:    Hemoglobin A1C 10.3 (*)     All other components within normal limits   DIGOXIN LEVEL - Abnormal; Notable for the following components:    Digoxin Lvl <0.3 (*)     All other components within normal limits   BASIC METABOLIC PANEL - Abnormal; Notable for the following components:    Sodium 149 (*)     Chloride 123 (*)     CO2 21 (*)     Anion Gap 5 (*)     Glucose 207 (*)     BUN 35 (*)     Calcium 8.1 (*)     All other components within normal limits   CBC WITH AUTO DIFFERENTIAL - Abnormal; Notable for the following components:    Hemoglobin 11.4 (*)     MCHC 31.3 (*)     Platelets 701 (*)     MPV 13.3 (*)     Lymphocytes % 15.3 (*)     Neutrophils Absolute 7.44 (*)     Lymphocytes Absolute 1.49 (*)     All other components within normal limits   BASIC METABOLIC PANEL - Abnormal; Notable for the following components:    Chloride 115 (*)     CO2 19 (*)     Anion Gap 6 (*)     Glucose 326 (*)     Calcium 7.9 (*)     All other components within normal limits   PHOSPHORUS - Abnormal; Notable for the following components:    Phosphorus 1.7 (*)     All other components within normal limits   POCT GLUCOSE - Abnormal; Notable for the following components:    Meter Glucose >500 (*)     All other components within normal limits   POCT GLUCOSE - Abnormal; Notable for the following components:    Meter Glucose >500 (*)     All other components within normal limits   POCT GLUCOSE - Abnormal; Notable for the following components:    Meter Glucose 468 (*)     All other components within normal limits   POCT GLUCOSE - Abnormal; Notable for the following components:    Meter Glucose 462 (*)     All other components within normal limits   POCT GLUCOSE - Abnormal; Notable for the following components:    Meter Glucose 399 (*)     All other components within normal limits   POCT GLUCOSE - Abnormal; Notable for the following components:    Meter Glucose 397 (*)     All other components within normal limits   POCT GLUCOSE - Abnormal; Notable for the following components:    Meter Glucose 309 (*)     All other components within normal limits   POCT GLUCOSE - Abnormal; Notable for the following components:    Meter Glucose 290 (*)     All other components within normal limits   POCT GLUCOSE - Abnormal; Notable for the following components:    Meter Glucose 203 (*)     All other components within normal limits   POCT GLUCOSE - Abnormal; Notable for the following components:    Meter Glucose 264 (*)     All other components within normal limits   POCT GLUCOSE - Abnormal; Notable for the following components:    Meter Glucose 213 (*)     All other components within normal limits   POCT GLUCOSE - Abnormal; Notable for the following components:    Meter Glucose 238 (*)     All other components within normal limits   POCT GLUCOSE - Abnormal; Notable for the following components:    Meter Glucose 292 (*)     All other components within normal limits   POCT GLUCOSE - Abnormal; Notable for the following components:    Meter Glucose 200 (*)     All other components within normal limits   POCT GLUCOSE - Abnormal; Notable for the following components:    Meter Glucose 176 (*)     All other components within normal limits   POCT GLUCOSE - Abnormal; Notable for the following components:    Meter Glucose 161 (*)     All other components within normal limits   POCT GLUCOSE - Abnormal; Notable for the following components:    Meter Glucose 232 (*)     All other components within normal limits   POCT GLUCOSE - Abnormal; Notable for the following components: Meter Glucose 239 (*)     All other components within normal limits   POCT GLUCOSE - Abnormal; Notable for the following components:    Meter Glucose 319 (*)     All other components within normal limits   POCT GLUCOSE - Abnormal; Notable for the following components:    Meter Glucose 303 (*)     All other components within normal limits   POCT GLUCOSE - Normal   CULTURE, MRSA, SCREENING    Narrative:     Source: NARES       Site: Swab&Swab             SPECIMEN REJECTION    Narrative:     Poncho RIOS tel. [phone],  Rejected Test Name/Called to: MANISH POPE / Serenity Ro RN, 10/12/2020 15:02, by  [facility]   COVID-19   MAGNESIUM    Narrative:     CALL  Day  H2WB tel. ,  Chemistry results called to and read back by Briana Vazquez RN, 10/12/2020  19:51, by Mary Bhatia   MAGNESIUM   MAGNESIUM   PHOSPHORUS    Narrative:     CALL  Day  H2WB tel. ,  Chemistry results called to and read back by Briana Vazquez RN, 10/12/2020  19:51, by Mary Bhatia   LACTIC ACID, PLASMA   LACTIC ACID, PLASMA   MAGNESIUM   TSH WITHOUT REFLEX   T3, FREE   T4, FREE   CORTISOL TOTAL   URINALYSIS   POCT GLUCOSE   POCT GLUCOSE   POCT GLUCOSE   POCT GLUCOSE   POCT GLUCOSE   POCT GLUCOSE   POCT GLUCOSE     XR CHEST PORTABLE   Final Result   Persistent left lower lobe focal infiltrate. CT Head WO Contrast   Final Result   1. No acute intracranial abnormality. Advanced senescent changes and   involutional changes. Intracranial atherosclerotic disease. 2.  Sequela of a prior focal ischemic event is stable in the posterior right   parietal lobe. XR CHEST PORTABLE   Final Result   1. Small patchy infiltrate within the left lower lobe           EKG #1:  I personally interpreted this EKG  Time:  1411    Rate: 168  Rhythm: Atrial fibrillation. Interpretation: Atrial fibrillation with RVR.       MDM  Number of Diagnoses or Management Options  Altered mental status, unspecified altered mental status type:   Atrial fibrillation with RVR ([address]): or sandhya. [JV]      ED Course User Index  [JV] Danielle Bartholomew MD       --------------------------------------------- PAST HISTORY ---------------------------------------------  Past Medical History:  has a past medical history of Alzheimer disease ([facility] Atrial fibrillation ([facility] Dementia ([facility] Diabetes mellitus ([facility] GERD (gastroesophageal reflux disease), Headache, and Hypertension. Past Surgical History:  has a past surgical history that includes  section; Cholecystectomy, laparoscopic; Colonoscopy; and Upper gastrointestinal endoscopy. Social History:  reports that she has never smoked. She has never used smokeless tobacco. She reports that she does not drink alcohol or use drugs. Family History: family history includes Heart Disease in her mother. The patients home medications have been reviewed. Allergies: Asa [aspirin]    -------------------------------------------------- RESULTS -------------------------------------------------    LABS:  Results for orders placed or performed during the hospital encounter of 10/12/20   Culture, Urine    Specimen: Urine, clean catch   Result Value Ref Range    Organism Klebsiella pneumoniae ssp pneumoniae (A)     Urine Culture, Routine >100,000 CFU/ml        Susceptibility    Klebsiella pneumoniae ssp pneumoniae - BACTERIAL SUSCEPTIBILITY PANEL BY BERNICE     ampicillin >=^32 Resistant mcg/mL     ceFAZolin <=^4 Sensitive mcg/mL     cefepime <=^0.12 Sensitive mcg/mL     cefTRIAXone <=^0.25 Sensitive mcg/mL     Confirmatory Extended Spectrum Beta-Lactamase Neg  mcg/mL     ertapenem <=^0.12 Sensitive mcg/mL     gentamicin <=^1 Sensitive mcg/mL     levofloxacin <=^0.12 Sensitive mcg/mL     nitrofurantoin ^64 Intermediate mcg/mL     piperacillin-tazobactam <=^4 Sensitive mcg/mL     trimethoprim-sulfamethoxazole <=^20 Sensitive mcg/mL   Culture, MRSA, Screening    Specimen: Nares;  Swab   Result Value Ref Range    MRSA Culture Only Methicillin resistant Staph aureus not isolated    CBC Auto Differential   Result Value Ref Range    WBC 21.5 (H) 4.5 - 11.5 E9/L    RBC 5.24 3.50 - 5.50 E12/L    Hemoglobin 16.2 (H) 11.5 - 15.5 g/dL    Hematocrit 52.0 (H) 34.0 - 48.0 %    MCV 99.2 80.0 - 99.9 fL    MCH 30.9 26.0 - 35.0 pg    MCHC 31.2 (L) 32.0 - 34.5 %    RDW 14.5 11.5 - 15.0 fL    Platelets 832 602 - 773 E9/L    MPV 13.5 (H) 7.0 - 12.0 fL    Neutrophils % 86.5 (H) 43.0 - 80.0 %    Immature Granulocytes % 0.6 0.0 - 5.0 %    Lymphocytes % 8.2 (L) 20.0 - 42.0 %    Monocytes % 4.4 2.0 - 12.0 %    Eosinophils % 0.0 0.0 - 6.0 %    Basophils % 0.3 0.0 - 2.0 %    Neutrophils Absolute 18.58 (H) 1.80 - 7.30 E9/L    Immature Granulocytes # 0.13 E9/L    Lymphocytes Absolute 1.75 1.50 - 4.00 E9/L    Monocytes Absolute 0.94 0.10 - 0.95 E9/L    Eosinophils Absolute 0.00 (L) 0.05 - 0.50 E9/L    Basophils Absolute 0.06 0.00 - 0.20 E9/L   Brain Natriuretic Peptide   Result Value Ref Range    Pro-BNP 2,990 (H) 0 - 450 pg/mL   Lactic Acid, Plasma   Result Value Ref Range    Lactic Acid 5.1 (HH) 0.5 - 2.2 mmol/L   Beta-Hydroxybutyrate   Result Value Ref Range    Beta-Hydroxybutyrate 1.31 (H) 0.02 - 0.27 mmol/L   SPECIMEN REJECTION   Result Value Ref Range    Rejected Test CMPX, TROP     Reason for Rejection see below    Comprehensive Metabolic Panel w/ Reflex to MG   Result Value Ref Range    Sodium 150 (H) 132 - 146 mmol/L    Potassium reflex Magnesium 4.5 3.5 - 5.0 mmol/L    Chloride 115 (H) 98 - 107 mmol/L    CO2 21 (L) 22 - 29 mmol/L    Anion Gap 14 7 - 16 mmol/L    Glucose 683 (HH) 74 - 99 mg/dL    BUN 62 (H) 8 - 23 mg/dL    CREATININE 1.5 (H) 0.5 - 1.0 mg/dL    GFR Non-African American 34 >=60 mL/min/1.73    GFR African American 41     Calcium 9.2 8.6 - 10.2 mg/dL    Total Protein 7.0 6.4 - 8.3 g/dL    Alb 3.5 3.5 - 5.2 g/dL    Total Bilirubin 1.4 (H) 0.0 - 1.2 mg/dL    Alkaline Phosphatase 87 35 - 104 U/L     (H) 0 - 32 U/L    AST 64 (H) 0 - 31 U/L   COVID-19 Result Value Ref Range    SARS-CoV-2, NAAT Not Detected Not Detected   Blood Gas, Arterial   Result Value Ref Range    Date Analyzed 20201012     Time Analyzed 1544     Source: Blood Arterial     pH, Blood Gas 7.366 7.350 - 7.450    PCO2 35.0 35.0 - 45.0 mmHg    PO2 239.9 (H) 75.0 - 100.0 mmHg    HCO3 19.6 (L) 22.0 - 26.0 mmol/L    B.E. -4.9 (L) -3.0 - 3.0 mmol/L    O2 Sat 99.6 (H) 92.0 - 98.5 %    O2Hb 97.9 (H) 94.0 - 97.0 %    COHb 1.4 0.0 - 1.5 %    MetHb 0.3 0.0 - 1.5 %    O2 Content 21.8 mL/dL    HHb 0.4 0.0 - 5.0 %    tHb (est) 15.5 11.5 - 16.5 g/dL    Mode NRB 15L     Date Of Collection      Time Collected      Pt Temp 37.0 C     ID [id number]     Lab [id number]     Critical(s) Notified .  No Critical Values    Lactic Acid, Plasma   Result Value Ref Range    Lactic Acid 2.4 (H) 0.5 - 2.2 mmol/L   Basic metabolic panel   Result Value Ref Range    Sodium 156 (H) 132 - 146 mmol/L    Potassium 4.0 3.5 - 5.0 mmol/L    Chloride 125 (H) 98 - 107 mmol/L    CO2 22 22 - 29 mmol/L    Anion Gap 9 7 - 16 mmol/L    Glucose 623 (HH) 74 - 99 mg/dL    BUN 58 (H) 8 - 23 mg/dL    CREATININE 1.3 (H) 0.5 - 1.0 mg/dL    GFR Non-African American 40 >=60 mL/min/1.73    GFR African American 48     Calcium 8.4 (L) 8.6 - 10.2 mg/dL   Basic metabolic panel   Result Value Ref Range    Sodium 153 (H) 132 - 146 mmol/L    Potassium 3.0 (L) 3.5 - 5.0 mmol/L    Chloride 123 (H) 98 - 107 mmol/L    CO2 21 (L) 22 - 29 mmol/L    Anion Gap 9 7 - 16 mmol/L    Glucose 408 (H) 74 - 99 mg/dL    BUN 52 (H) 8 - 23 mg/dL    CREATININE 1.1 (H) 0.5 - 1.0 mg/dL    GFR Non-African American 48 >=60 mL/min/1.73    GFR African American 58     Calcium 8.2 (L) 8.6 - 10.2 mg/dL   Basic metabolic panel   Result Value Ref Range    Sodium 152 (H) 132 - 146 mmol/L    Potassium 3.5 3.5 - 5.0 mmol/L    Chloride 124 (H) 98 - 107 mmol/L    CO2 22 22 - 29 mmol/L    Anion Gap 6 (L) 7 - 16 mmol/L    Glucose 238 (H) 74 - 99 mg/dL    BUN 45 (H) 8 - 23 mg/dL    CREATININE 0.9 0.5 - 1.0 mg/dL    GFR Non-African American >60 >=60 mL/min/1.73    GFR African American >60     Calcium 8.2 (L) 8.6 - 10.2 mg/dL   Magnesium   Result Value Ref Range    Magnesium 2.3 1.6 - 2.6 mg/dL   Magnesium   Result Value Ref Range    Magnesium 2.1 1.6 - 2.6 mg/dL   Magnesium   Result Value Ref Range    Magnesium 2.0 1.6 - 2.6 mg/dL   PHOSPHORUS   Result Value Ref Range    Phosphorus 3.9 2.5 - 4.5 mg/dL   PHOSPHORUS   Result Value Ref Range    Phosphorus 1.9 (L) 2.5 - 4.5 mg/dL   PHOSPHORUS   Result Value Ref Range    Phosphorus 1.6 (L) 2.5 - 4.5 mg/dL   CBC auto differential   Result Value Ref Range    WBC 16.2 (H) 4.5 - 11.5 E9/L    RBC 3.90 3.50 - 5.50 E12/L    Hemoglobin 12.0 11.5 - 15.5 g/dL    Hematocrit 38.9 34.0 - 48.0 %    MCV 99.7 80.0 - 99.9 fL    MCH 30.8 26.0 - 35.0 pg    MCHC 30.8 (L) 32.0 - 34.5 %    RDW 14.4 11.5 - 15.0 fL    Platelets 220 (L) 052 - 450 E9/L    MPV 12.9 (H) 7.0 - 12.0 fL    Neutrophils % 77.5 43.0 - 80.0 %    Immature Granulocytes % 0.6 0.0 - 5.0 %    Lymphocytes % 16.8 (L) 20.0 - 42.0 %    Monocytes % 4.1 2.0 - 12.0 %    Eosinophils % 0.6 0.0 - 6.0 %    Basophils % 0.4 0.0 - 2.0 %    Neutrophils Absolute 12.54 (H) 1.80 - 7.30 E9/L    Immature Granulocytes # 0.09 E9/L    Lymphocytes Absolute 2.72 1.50 - 4.00 E9/L    Monocytes Absolute 0.66 0.10 - 0.95 E9/L    Eosinophils Absolute 0.09 0.05 - 0.50 E9/L    Basophils Absolute 0.07 0.00 - 0.20 E9/L   Lactic acid, plasma   Result Value Ref Range    Lactic Acid 3.9 (H) 0.5 - 2.2 mmol/L   Valproic acid level, total   Result Value Ref Range    Valproic Acid Lvl 4 (L) 50 - 100 mcg/mL   Lactic acid, plasma   Result Value Ref Range    Lactic Acid 2.0 0.5 - 2.2 mmol/L   Lactic acid, plasma   Result Value Ref Range    Lactic Acid 1.8 0.5 - 2.2 mmol/L   OSMOLALITY, SERUM   Result Value Ref Range    Osmolality 323 (H) 285 - 310 mOsm/Kg   Hepatic function panel   Result Value Ref Range    Total Protein 5.6 (L) 6.4 - 8.3 g/dL    Alb 2.9 (L) 3.5 - 5.2 g/dL    Alkaline Phosphatase 91 35 - 104 U/L    ALT 68 (H) 0 - 32 U/L    AST 30 0 - 31 U/L    Total Bilirubin 0.8 0.0 - 1.2 mg/dL    Bilirubin, Direct 0.3 0.0 - 0.3 mg/dL    Bilirubin, Indirect 0.5 0.0 - 1.0 mg/dL   Hemoglobin A1C   Result Value Ref Range    Hemoglobin A1C 10.3 (H) 4.0 - 5.6 %   Digoxin Level   Result Value Ref Range    Digoxin Lvl <0.3 (L) 0.8 - 2.0 ng/mL   Basic metabolic panel   Result Value Ref Range    Sodium 149 (H) 132 - 146 mmol/L    Potassium 4.1 3.5 - 5.0 mmol/L    Chloride 123 (H) 98 - 107 mmol/L    CO2 21 (L) 22 - 29 mmol/L    Anion Gap 5 (L) 7 - 16 mmol/L    Glucose 207 (H) 74 - 99 mg/dL    BUN 35 (H) 8 - 23 mg/dL    CREATININE 0.7 0.5 - 1.0 mg/dL    GFR Non-African American >60 >=60 mL/min/1.73    GFR African American >60     Calcium 8.1 (L) 8.6 - 10.2 mg/dL   CBC WITH AUTO DIFFERENTIAL   Result Value Ref Range    WBC 9.7 4.5 - 11.5 E9/L    RBC 3.72 3.50 - 5.50 E12/L    Hemoglobin 11.4 (L) 11.5 - 15.5 g/dL    Hematocrit 36.4 34.0 - 48.0 %    MCV 97.8 80.0 - 99.9 fL    MCH 30.6 26.0 - 35.0 pg    MCHC 31.3 (L) 32.0 - 34.5 %    RDW 13.8 11.5 - 15.0 fL    Platelets 638 (L) 482 - 450 E9/L    MPV 13.3 (H) 7.0 - 12.0 fL    Neutrophils % 76.7 43.0 - 80.0 %    Immature Granulocytes % 0.8 0.0 - 5.0 %    Lymphocytes % 15.3 (L) 20.0 - 42.0 %    Monocytes % 5.1 2.0 - 12.0 %    Eosinophils % 1.7 0.0 - 6.0 %    Basophils % 0.4 0.0 - 2.0 %    Neutrophils Absolute 7.44 (H) 1.80 - 7.30 E9/L    Immature Granulocytes # 0.08 E9/L    Lymphocytes Absolute 1.49 (L) 1.50 - 4.00 E9/L    Monocytes Absolute 0.50 0.10 - 0.95 E9/L    Eosinophils Absolute 0.17 0.05 - 0.50 E9/L    Basophils Absolute 0.04 0.00 - 0.20 N7/N   Basic metabolic panel   Result Value Ref Range    Sodium 140 132 - 146 mmol/L    Potassium 4.3 3.5 - 5.0 mmol/L    Chloride 115 (H) 98 - 107 mmol/L    CO2 19 (L) 22 - 29 mmol/L    Anion Gap 6 (L) 7 - 16 mmol/L    Glucose 326 (H) 74 - 99 mg/dL    BUN 21 8 - 23 mg/dL    CREATININE 0.6 0.5 - 1.0 mg/dL    GFR Non-African American >60 >=60 mL/min/1.73    GFR African American >60     Calcium 7.9 (L) 8.6 - 10.2 mg/dL   Magnesium   Result Value Ref Range    Magnesium 1.7 1.6 - 2.6 mg/dL   PHOSPHORUS   Result Value Ref Range    Phosphorus 1.7 (L) 2.5 - 4.5 mg/dL   TSH without Reflex   Result Value Ref Range    TSH 2.910 0.270 - 4.200 uIU/mL   T3, free   Result Value Ref Range    T3, Free 2.4 2.0 - 4.4 pg/mL   T4, free   Result Value Ref Range    T4 Free 1.40 0.93 - 1.70 ng/dL   Cortisol   Result Value Ref Range    Cortisol 10.99 2.68 - 18.40 mcg/dL   POCT Glucose   Result Value Ref Range    Meter Glucose >500 (H) 74 - 99 mg/dL   POCT Glucose   Result Value Ref Range    Glucose 500 mg/dL    QC OK? k    POCT Glucose   Result Value Ref Range    Meter Glucose >500 (H) 74 - 99 mg/dL   POCT Glucose   Result Value Ref Range    Meter Glucose 468 (H) 74 - 99 mg/dL   POCT Glucose   Result Value Ref Range    Meter Glucose 462 (H) 74 - 99 mg/dL   POCT Glucose   Result Value Ref Range    Meter Glucose 399 (H) 74 - 99 mg/dL   POCT Glucose   Result Value Ref Range    Meter Glucose 397 (H) 74 - 99 mg/dL   POCT Glucose   Result Value Ref Range    Meter Glucose 309 (H) 74 - 99 mg/dL   POCT Glucose   Result Value Ref Range    Meter Glucose 290 (H) 74 - 99 mg/dL   POCT Glucose   Result Value Ref Range    Meter Glucose 203 (H) 74 - 99 mg/dL   POCT Glucose   Result Value Ref Range    Meter Glucose 264 (H) 74 - 99 mg/dL   POCT Glucose   Result Value Ref Range    Meter Glucose 213 (H) 74 - 99 mg/dL   POCT Glucose   Result Value Ref Range    Meter Glucose 238 (H) 74 - 99 mg/dL   POCT Glucose   Result Value Ref Range    Meter Glucose 292 (H) 74 - 99 mg/dL   POCT Glucose   Result Value Ref Range    Meter Glucose 200 (H) 74 - 99 mg/dL   POCT Glucose   Result Value Ref Range    Meter Glucose 176 (H) 74 - 99 mg/dL   POCT Glucose   Result Value Ref Range    Meter Glucose 161 (H) 74 - 99 mg/dL   POCT Glucose   Result Value Ref Range    Meter Glucose 232 (H) 74 - 99 mg/dL   POCT Glucose   Result Value Ref Range    Meter Glucose 239 (H) 74 - 99 mg/dL   POCT Glucose   Result Value Ref Range    Meter Glucose 319 (H) 74 - 99 mg/dL   POCT Glucose   Result Value Ref Range    Meter Glucose 303 (H) 74 - 99 mg/dL   EKG 12 Lead   Result Value Ref Range    Ventricular Rate 168 BPM    Atrial Rate 170 BPM    QRS Duration 108 ms    Q-T Interval 246 ms    QTc Calculation (Bazett) 411 ms    R Axis -13 degrees    T Axis -145 degrees       RADIOLOGY:  XR CHEST PORTABLE   Final Result   Persistent left lower lobe focal infiltrate. CT Head WO Contrast   Final Result   1. No acute intracranial abnormality. Advanced senescent changes and   involutional changes. Intracranial atherosclerotic disease. 2.  Sequela of a prior focal ischemic event is stable in the posterior right   parietal lobe. XR CHEST PORTABLE   Final Result   1. Small patchy infiltrate within the left lower lobe             ------------------------- NURSING NOTES AND VITALS REVIEWED ---------------------------  Date / Time Roomed:  10/12/2020  1:59 PM  ED Bed Assignment:  [id number]    The nursing notes within the ED encounter and vital signs as below have been reviewed.      Patient Vitals for the past 24 hrs:   BP Temp Temp src Pulse Resp SpO2 Height Weight   10/14/20 1027 -- -- -- -- -- -- 5' 4\" (1.626 m) --   10/14/20 0600 112/67 -- -- 97 20 -- -- --   10/14/20 0500 (!) 107/54 -- -- 92 20 100 % -- 186 lb 4.6 oz (84.5 kg)   10/14/20 0400 100/60 97.8 °F (36.6 °C) Axillary 101 10 100 % -- --   10/14/20 0300 105/61 -- -- 100 10 100 % -- --   10/14/20 0200 (!) 102/56 -- -- 106 15 99 % -- --   10/14/20 0100 -- -- -- -- -- 100 % -- --   10/14/20 0000 108/63 97.8 °F (36.6 °C) Axillary 89 17 99 % -- --   10/13/20 2300 (!) 101/59 -- -- 106 20 100 % -- --   10/13/20 2200 98/60 -- -- 100 (!) 37 98 % -- --   10/13/20 2100 106/63 -- -- 112 19 100 % -- --   10/13/20 2000 125/67 96.7 °F (35.9 °C) Axillary 99 18 100 % -- --   10/13/20 1900 113/72 -- -- 103 16 100 % -- --   10/13/20 1800 120/75 -- -- 105 16 98 % -- --   10/13/20 1700 (!) 76/44 -- -- 93 20 98 % -- --   10/13/20 1600 (!) 85/49 97.6 °F (36.4 °C) Axillary 102 13 97 % -- --   10/13/20 1500 90/68 -- -- 103 (!) 35 95 % -- --       Oxygen Saturation Interpretation: Abnormal    ------------------------------------------ PROGRESS NOTES ------------------------------------------    Counseling:  I have spoken with the patient and discussed todays results, in addition to providing specific details for the plan of care and counseling regarding the diagnosis and prognosis. Their questions are answered at this time and they are agreeable with the plan of admission.    --------------------------------- ADDITIONAL PROVIDER NOTES ---------------------------------  Consultations:  Spoke with PCP,  Discussed case. They will admit the patient. This patient's ED course included: a personal history and physicial examination, re-evaluation prior to disposition, multiple bedside re-evaluations, IV medications, cardiac monitoring and continuous pulse oximetry    This patient has remained hemodynamically stable during their ED course. Diagnosis:  1. Respiratory failure with hypoxia, unspecified chronicity ([facility]    2. Pneumonia due to organism    3. Altered mental status, unspecified altered mental status type    4. HHS (hypothenar hammer syndrome) ([facility]    5. Severe sepsis ([facility]    6. Atrial fibrillation with RVR ([facility]    7. Hypernatremia        Disposition:  Patient's disposition: Admit to CCU/ICU  Patient's condition is fair.             Trevor Stack DO  Resident  10/14/20 09236 Jorge Luis Martinez DO  Resident  10/14/20 5933

## 2020-10-13 ENCOUNTER — APPOINTMENT (OUTPATIENT)
Dept: GENERAL RADIOLOGY | Age: 80
DRG: 871 | End: 2020-10-13
Payer: MEDICARE

## 2020-10-13 LAB
ALBUMIN SERPL-MCNC: 2.9 G/DL (ref 3.5–5.2)
ALP BLD-CCNC: 91 U/L (ref 35–104)
ALT SERPL-CCNC: 68 U/L (ref 0–32)
ANION GAP SERPL CALCULATED.3IONS-SCNC: 5 MMOL/L (ref 7–16)
ANION GAP SERPL CALCULATED.3IONS-SCNC: 6 MMOL/L (ref 7–16)
ANION GAP SERPL CALCULATED.3IONS-SCNC: 9 MMOL/L (ref 7–16)
AST SERPL-CCNC: 30 U/L (ref 0–31)
BASOPHILS ABSOLUTE: 0.07 E9/L (ref 0–0.2)
BASOPHILS RELATIVE PERCENT: 0.4 % (ref 0–2)
BILIRUB SERPL-MCNC: 0.8 MG/DL (ref 0–1.2)
BILIRUBIN DIRECT: 0.3 MG/DL (ref 0–0.3)
BILIRUBIN, INDIRECT: 0.5 MG/DL (ref 0–1)
BUN BLDV-MCNC: 35 MG/DL (ref 8–23)
BUN BLDV-MCNC: 45 MG/DL (ref 8–23)
BUN BLDV-MCNC: 52 MG/DL (ref 8–23)
CALCIUM SERPL-MCNC: 8.1 MG/DL (ref 8.6–10.2)
CALCIUM SERPL-MCNC: 8.2 MG/DL (ref 8.6–10.2)
CALCIUM SERPL-MCNC: 8.2 MG/DL (ref 8.6–10.2)
CHLORIDE BLD-SCNC: 123 MMOL/L (ref 98–107)
CHLORIDE BLD-SCNC: 123 MMOL/L (ref 98–107)
CHLORIDE BLD-SCNC: 124 MMOL/L (ref 98–107)
CO2: 21 MMOL/L (ref 22–29)
CO2: 21 MMOL/L (ref 22–29)
CO2: 22 MMOL/L (ref 22–29)
CREAT SERPL-MCNC: 0.7 MG/DL (ref 0.5–1)
CREAT SERPL-MCNC: 0.9 MG/DL (ref 0.5–1)
CREAT SERPL-MCNC: 1.1 MG/DL (ref 0.5–1)
DIGOXIN LEVEL: <0.3 NG/ML (ref 0.8–2)
EOSINOPHILS ABSOLUTE: 0.09 E9/L (ref 0.05–0.5)
EOSINOPHILS RELATIVE PERCENT: 0.6 % (ref 0–6)
GFR AFRICAN AMERICAN: 58
GFR AFRICAN AMERICAN: >60
GFR AFRICAN AMERICAN: >60
GFR NON-AFRICAN AMERICAN: 48 ML/MIN/1.73
GFR NON-AFRICAN AMERICAN: >60 ML/MIN/1.73
GFR NON-AFRICAN AMERICAN: >60 ML/MIN/1.73
GLUCOSE BLD-MCNC: 207 MG/DL (ref 74–99)
GLUCOSE BLD-MCNC: 238 MG/DL (ref 74–99)
GLUCOSE BLD-MCNC: 408 MG/DL (ref 74–99)
HBA1C MFR BLD: 10.3 % (ref 4–5.6)
HCT VFR BLD CALC: 38.9 % (ref 34–48)
HEMOGLOBIN: 12 G/DL (ref 11.5–15.5)
IMMATURE GRANULOCYTES #: 0.09 E9/L
IMMATURE GRANULOCYTES %: 0.6 % (ref 0–5)
LACTIC ACID: 1.8 MMOL/L (ref 0.5–2.2)
LACTIC ACID: 2 MMOL/L (ref 0.5–2.2)
LACTIC ACID: 3.9 MMOL/L (ref 0.5–2.2)
LYMPHOCYTES ABSOLUTE: 2.72 E9/L (ref 1.5–4)
LYMPHOCYTES RELATIVE PERCENT: 16.8 % (ref 20–42)
MAGNESIUM: 2 MG/DL (ref 1.6–2.6)
MAGNESIUM: 2.1 MG/DL (ref 1.6–2.6)
MCH RBC QN AUTO: 30.8 PG (ref 26–35)
MCHC RBC AUTO-ENTMCNC: 30.8 % (ref 32–34.5)
MCV RBC AUTO: 99.7 FL (ref 80–99.9)
METER GLUCOSE: 161 MG/DL (ref 74–99)
METER GLUCOSE: 176 MG/DL (ref 74–99)
METER GLUCOSE: 200 MG/DL (ref 74–99)
METER GLUCOSE: 203 MG/DL (ref 74–99)
METER GLUCOSE: 213 MG/DL (ref 74–99)
METER GLUCOSE: 232 MG/DL (ref 74–99)
METER GLUCOSE: 238 MG/DL (ref 74–99)
METER GLUCOSE: 239 MG/DL (ref 74–99)
METER GLUCOSE: 264 MG/DL (ref 74–99)
METER GLUCOSE: 290 MG/DL (ref 74–99)
METER GLUCOSE: 292 MG/DL (ref 74–99)
METER GLUCOSE: 309 MG/DL (ref 74–99)
MONOCYTES ABSOLUTE: 0.66 E9/L (ref 0.1–0.95)
MONOCYTES RELATIVE PERCENT: 4.1 % (ref 2–12)
NEUTROPHILS ABSOLUTE: 12.54 E9/L (ref 1.8–7.3)
NEUTROPHILS RELATIVE PERCENT: 77.5 % (ref 43–80)
OSMOLALITY: 323 MOSM/KG (ref 285–310)
PDW BLD-RTO: 14.4 FL (ref 11.5–15)
PHOSPHORUS: 1.6 MG/DL (ref 2.5–4.5)
PHOSPHORUS: 1.9 MG/DL (ref 2.5–4.5)
PLATELET # BLD: 127 E9/L (ref 130–450)
PMV BLD AUTO: 12.9 FL (ref 7–12)
POTASSIUM SERPL-SCNC: 3 MMOL/L (ref 3.5–5)
POTASSIUM SERPL-SCNC: 3.5 MMOL/L (ref 3.5–5)
POTASSIUM SERPL-SCNC: 4.1 MMOL/L (ref 3.5–5)
RBC # BLD: 3.9 E12/L (ref 3.5–5.5)
SODIUM BLD-SCNC: 149 MMOL/L (ref 132–146)
SODIUM BLD-SCNC: 152 MMOL/L (ref 132–146)
SODIUM BLD-SCNC: 153 MMOL/L (ref 132–146)
TOTAL PROTEIN: 5.6 G/DL (ref 6.4–8.3)
WBC # BLD: 16.2 E9/L (ref 4.5–11.5)

## 2020-10-13 PROCEDURE — 99233 SBSQ HOSP IP/OBS HIGH 50: CPT | Performed by: INTERNAL MEDICINE

## 2020-10-13 PROCEDURE — 36592 COLLECT BLOOD FROM PICC: CPT

## 2020-10-13 PROCEDURE — 94640 AIRWAY INHALATION TREATMENT: CPT

## 2020-10-13 PROCEDURE — 6370000000 HC RX 637 (ALT 250 FOR IP): Performed by: EMERGENCY MEDICINE

## 2020-10-13 PROCEDURE — 2000000000 HC ICU R&B

## 2020-10-13 PROCEDURE — 2580000003 HC RX 258: Performed by: HOSPITALIST

## 2020-10-13 PROCEDURE — 80076 HEPATIC FUNCTION PANEL: CPT

## 2020-10-13 PROCEDURE — 6360000002 HC RX W HCPCS: Performed by: INTERNAL MEDICINE

## 2020-10-13 PROCEDURE — 6360000002 HC RX W HCPCS: Performed by: HOSPITALIST

## 2020-10-13 PROCEDURE — 2580000003 HC RX 258: Performed by: INTERNAL MEDICINE

## 2020-10-13 PROCEDURE — 6360000002 HC RX W HCPCS: Performed by: EMERGENCY MEDICINE

## 2020-10-13 PROCEDURE — 80048 BASIC METABOLIC PNL TOTAL CA: CPT

## 2020-10-13 PROCEDURE — 2700000000 HC OXYGEN THERAPY PER DAY

## 2020-10-13 PROCEDURE — 2580000003 HC RX 258: Performed by: EMERGENCY MEDICINE

## 2020-10-13 PROCEDURE — 99221 1ST HOSP IP/OBS SF/LOW 40: CPT | Performed by: PHYSICIAN ASSISTANT

## 2020-10-13 PROCEDURE — 71045 X-RAY EXAM CHEST 1 VIEW: CPT

## 2020-10-13 PROCEDURE — 36415 COLL VENOUS BLD VENIPUNCTURE: CPT

## 2020-10-13 PROCEDURE — 6370000000 HC RX 637 (ALT 250 FOR IP): Performed by: HOSPITALIST

## 2020-10-13 PROCEDURE — 2500000003 HC RX 250 WO HCPCS: Performed by: HOSPITALIST

## 2020-10-13 PROCEDURE — 82962 GLUCOSE BLOOD TEST: CPT

## 2020-10-13 PROCEDURE — 83605 ASSAY OF LACTIC ACID: CPT

## 2020-10-13 PROCEDURE — 84100 ASSAY OF PHOSPHORUS: CPT

## 2020-10-13 PROCEDURE — 83735 ASSAY OF MAGNESIUM: CPT

## 2020-10-13 PROCEDURE — 85025 COMPLETE CBC W/AUTO DIFF WBC: CPT

## 2020-10-13 PROCEDURE — 6370000000 HC RX 637 (ALT 250 FOR IP): Performed by: INTERNAL MEDICINE

## 2020-10-13 PROCEDURE — 83930 ASSAY OF BLOOD OSMOLALITY: CPT

## 2020-10-13 PROCEDURE — 2500000003 HC RX 250 WO HCPCS: Performed by: EMERGENCY MEDICINE

## 2020-10-13 PROCEDURE — 83036 HEMOGLOBIN GLYCOSYLATED A1C: CPT

## 2020-10-13 PROCEDURE — 80162 ASSAY OF DIGOXIN TOTAL: CPT

## 2020-10-13 RX ORDER — NICOTINE POLACRILEX 4 MG
15 LOZENGE BUCCAL PRN
Status: DISCONTINUED | OUTPATIENT
Start: 2020-10-13 | End: 2020-10-16 | Stop reason: HOSPADM

## 2020-10-13 RX ORDER — METOPROLOL SUCCINATE 25 MG/1
12.5 TABLET, EXTENDED RELEASE ORAL DAILY
Status: DISCONTINUED | OUTPATIENT
Start: 2020-10-13 | End: 2020-10-16 | Stop reason: HOSPADM

## 2020-10-13 RX ORDER — DIVALPROEX SODIUM 125 MG/1
125 TABLET, DELAYED RELEASE ORAL 2 TIMES DAILY
Status: DISCONTINUED | OUTPATIENT
Start: 2020-10-13 | End: 2020-10-16 | Stop reason: HOSPADM

## 2020-10-13 RX ORDER — FLECAINIDE ACETATE 100 MG/1
100 TABLET ORAL EVERY 12 HOURS SCHEDULED
Status: DISCONTINUED | OUTPATIENT
Start: 2020-10-13 | End: 2020-10-16 | Stop reason: HOSPADM

## 2020-10-13 RX ORDER — SODIUM CHLORIDE 9 MG/ML
INJECTION, SOLUTION INTRAVENOUS EVERY 12 HOURS
Status: DISCONTINUED | OUTPATIENT
Start: 2020-10-13 | End: 2020-10-14

## 2020-10-13 RX ORDER — SODIUM CHLORIDE, SODIUM LACTATE, POTASSIUM CHLORIDE, CALCIUM CHLORIDE 600; 310; 30; 20 MG/100ML; MG/100ML; MG/100ML; MG/100ML
INJECTION, SOLUTION INTRAVENOUS ONCE
Status: COMPLETED | OUTPATIENT
Start: 2020-10-13 | End: 2020-10-13

## 2020-10-13 RX ORDER — DEXTROSE MONOHYDRATE 50 MG/ML
100 INJECTION, SOLUTION INTRAVENOUS PRN
Status: DISCONTINUED | OUTPATIENT
Start: 2020-10-13 | End: 2020-10-16 | Stop reason: HOSPADM

## 2020-10-13 RX ORDER — DEXTROSE MONOHYDRATE 25 G/50ML
12.5 INJECTION, SOLUTION INTRAVENOUS PRN
Status: DISCONTINUED | OUTPATIENT
Start: 2020-10-13 | End: 2020-10-16 | Stop reason: HOSPADM

## 2020-10-13 RX ORDER — INSULIN GLARGINE 100 [IU]/ML
15 INJECTION, SOLUTION SUBCUTANEOUS DAILY
Status: DISCONTINUED | OUTPATIENT
Start: 2020-10-13 | End: 2020-10-14

## 2020-10-13 RX ADMIN — SODIUM CHLORIDE, PRESERVATIVE FREE 10 ML: 5 INJECTION INTRAVENOUS at 08:01

## 2020-10-13 RX ADMIN — CEFEPIME 2 G: 2 INJECTION, POWDER, FOR SOLUTION INTRAVENOUS at 04:08

## 2020-10-13 RX ADMIN — SODIUM CHLORIDE 10.68 UNITS/HR: 9 INJECTION, SOLUTION INTRAVENOUS at 06:45

## 2020-10-13 RX ADMIN — IPRATROPIUM BROMIDE AND ALBUTEROL SULFATE 1 AMPULE: .5; 3 SOLUTION RESPIRATORY (INHALATION) at 13:12

## 2020-10-13 RX ADMIN — POTASSIUM CHLORIDE: 2 INJECTION, SOLUTION, CONCENTRATE INTRAVENOUS at 17:16

## 2020-10-13 RX ADMIN — POTASSIUM CHLORIDE: 2 INJECTION, SOLUTION, CONCENTRATE INTRAVENOUS at 10:17

## 2020-10-13 RX ADMIN — INSULIN LISPRO 2 UNITS: 100 INJECTION, SOLUTION INTRAVENOUS; SUBCUTANEOUS at 12:33

## 2020-10-13 RX ADMIN — POTASSIUM CHLORIDE 10 MEQ: 10 INJECTION, SOLUTION INTRAVENOUS at 08:20

## 2020-10-13 RX ADMIN — POTASSIUM CHLORIDE 10 MEQ: 10 INJECTION, SOLUTION INTRAVENOUS at 01:55

## 2020-10-13 RX ADMIN — POTASSIUM CHLORIDE 10 MEQ: 10 INJECTION, SOLUTION INTRAVENOUS at 07:32

## 2020-10-13 RX ADMIN — POTASSIUM CHLORIDE 10 MEQ: 10 INJECTION, SOLUTION INTRAVENOUS at 03:48

## 2020-10-13 RX ADMIN — DEXTROSE AND SODIUM CHLORIDE: 5; 450 INJECTION, SOLUTION INTRAVENOUS at 07:36

## 2020-10-13 RX ADMIN — ENOXAPARIN SODIUM 40 MG: 40 INJECTION SUBCUTANEOUS at 08:01

## 2020-10-13 RX ADMIN — SODIUM PHOSPHATE, MONOBASIC, MONOHYDRATE 15 MMOL: 276; 142 INJECTION, SOLUTION INTRAVENOUS at 07:54

## 2020-10-13 RX ADMIN — GENTAMICIN SULFATE 0.5 INCH: 3 OINTMENT OPHTHALMIC at 20:49

## 2020-10-13 RX ADMIN — POTASSIUM CHLORIDE 10 MEQ: 10 INJECTION, SOLUTION INTRAVENOUS at 02:47

## 2020-10-13 RX ADMIN — CEFEPIME 2 G: 2 INJECTION, POWDER, FOR SOLUTION INTRAVENOUS at 16:03

## 2020-10-13 RX ADMIN — SODIUM CHLORIDE: 9 INJECTION, SOLUTION INTRAVENOUS at 20:55

## 2020-10-13 RX ADMIN — INSULIN LISPRO 4 UNITS: 100 INJECTION, SOLUTION INTRAVENOUS; SUBCUTANEOUS at 17:08

## 2020-10-13 RX ADMIN — IPRATROPIUM BROMIDE AND ALBUTEROL SULFATE 1 AMPULE: .5; 3 SOLUTION RESPIRATORY (INHALATION) at 20:23

## 2020-10-13 RX ADMIN — SODIUM CHLORIDE, PRESERVATIVE FREE 10 ML: 5 INJECTION INTRAVENOUS at 20:42

## 2020-10-13 RX ADMIN — APIXABAN 5 MG: 5 TABLET, FILM COATED ORAL at 20:43

## 2020-10-13 RX ADMIN — IPRATROPIUM BROMIDE AND ALBUTEROL SULFATE 1 AMPULE: .5; 3 SOLUTION RESPIRATORY (INHALATION) at 08:29

## 2020-10-13 RX ADMIN — SODIUM CHLORIDE: 9 INJECTION, SOLUTION INTRAVENOUS at 01:39

## 2020-10-13 RX ADMIN — GENTAMICIN SULFATE 0.5 INCH: 3 OINTMENT OPHTHALMIC at 13:52

## 2020-10-13 RX ADMIN — FLECAINIDE ACETATE 100 MG: 100 TABLET ORAL at 20:43

## 2020-10-13 RX ADMIN — IPRATROPIUM BROMIDE AND ALBUTEROL SULFATE 1 AMPULE: .5; 3 SOLUTION RESPIRATORY (INHALATION) at 17:24

## 2020-10-13 RX ADMIN — DEXTROSE MONOHYDRATE 250 MG: 50 INJECTION, SOLUTION INTRAVENOUS at 06:39

## 2020-10-13 RX ADMIN — DEXTROSE AND SODIUM CHLORIDE: 5; 450 INJECTION, SOLUTION INTRAVENOUS at 02:52

## 2020-10-13 RX ADMIN — INSULIN LISPRO 2 UNITS: 100 INJECTION, SOLUTION INTRAVENOUS; SUBCUTANEOUS at 20:55

## 2020-10-13 RX ADMIN — SODIUM CHLORIDE, POTASSIUM CHLORIDE, SODIUM LACTATE AND CALCIUM CHLORIDE: 600; 310; 30; 20 INJECTION, SOLUTION INTRAVENOUS at 16:59

## 2020-10-13 RX ADMIN — DIVALPROEX SODIUM 125 MG: 125 TABLET, DELAYED RELEASE ORAL at 20:43

## 2020-10-13 RX ADMIN — POTASSIUM CHLORIDE 10 MEQ: 10 INJECTION, SOLUTION INTRAVENOUS at 09:09

## 2020-10-13 RX ADMIN — GENTAMICIN SULFATE 0.5 INCH: 3 OINTMENT OPHTHALMIC at 10:17

## 2020-10-13 RX ADMIN — INSULIN GLARGINE 15 UNITS: 100 INJECTION, SOLUTION SUBCUTANEOUS at 10:17

## 2020-10-13 RX ADMIN — POTASSIUM CHLORIDE 10 MEQ: 10 INJECTION, SOLUTION INTRAVENOUS at 05:28

## 2020-10-13 ASSESSMENT — PAIN SCALES - WONG BAKER
WONGBAKER_NUMERICALRESPONSE: 0

## 2020-10-13 ASSESSMENT — PAIN SCALES - GENERAL
PAINLEVEL_OUTOF10: 0

## 2020-10-13 NOTE — CONSULTS
Critical Care Admit/Consult Note         Patient - Carmen Jc   MRN -  [id number]   Jeronimo # - [de-identified]   - 1940      Date of Admission -  10/12/2020  1:59 PM  Date of evaluation -  10/13/2020  0214/0214-A   Hospital Day - 1            ADMIT/CONSULT DETAILS     Reason for Admit/Consult   HHS  A. Fib w/RVR  JOSE DE JESUS    Consulting Service/Physician   Consulting - Cornelio Chapman*  Primary Care Physician - Belkys Gil MD   Critical Care Attending: Dr. Shaquille FORTUNE   The patient is a 68 y.o. female with significant past medical history of HTN, HLD, A. Fib (on Eliquis), dementia and possibly undiagnosed DM who presented to the ED yesterday with worsening AMS. Work up revealed significant hyperglycemia (> 600) with mild ketonemia, indicating the patient was likely in HHS. An insulin drip was started. The patient was also in A. Fib with RVR and was given a one time dose of cardizem. A central line was placed in the patient's right femoral vein. Past Medical History         Diagnosis Date    Alzheimer disease ([address]     Atrial fibrillation ([address]     Dementia ([address]     Diabetes mellitus ([address]     GERD (gastroesophageal reflux disease)     Headache     Hypertension         Past Surgical History           Procedure Laterality Date     SECTION      CHOLECYSTECTOMY, LAPAROSCOPIC      COLONOSCOPY      UPPER GASTROINTESTINAL ENDOSCOPY         SOCIAL AND OCCUPATIONAL HEALTH:  The patient has never been a smoker.     Lines and Devices   Central Line: Right Femoral vein on 10/12    Current Medications   Current Medications    apixaban  5 mg Oral BID    insulin glargine  15 Units Subcutaneous Daily    insulin lispro  0-12 Units Subcutaneous TID WC    insulin lispro  0-6 Units Subcutaneous Nightly    gentamicin  0.5 inch Both Eyes TID    flecainide  100 mg Oral 2 times per day    metoprolol succinate  12.5 mg Oral Daily    divalproex  125 mg Oral BID    sodium chloride Asa [aspirin]    Social History   Tobacco   reports that she has never smoked. She has never used smokeless tobacco.    Alcohol     reports no history of alcohol use. Occupational history :    Family History         Problem Relation Age of Onset    Heart Disease Mother        Sleep History   n/a    ROS     REVIEW OF SYSTEMS:  Review of Systems   Unable to perform ROS: Dementia         Mechanical Ventilation Data   VENT SETTINGS (Comprehensive)  Vent Information  SpO2: 100 %  Additional Respiratory  Assessments  Pulse: 91  Resp: 15  SpO2: 100 %    ABG  Lab Results   Component Value Date    PH 7.366 10/12/2020    PCO2 35.0 10/12/2020    PO2 239.9 10/12/2020    HCO3 19.6 10/12/2020    O2SAT 99.6 10/12/2020     Lab Results   Component Value Date    MODE NRB 15L 10/12/2020           Vitals    height is 5' 4\" (1.626 m) and weight is 175 lb 0.7 oz (79.4 kg). Her infrared temperature is 96.8 °F (36 °C). Her blood pressure is 83/54 (abnormal) and her pulse is 91. Her respiration is 15 and oxygen saturation is 100%.        Temperature Range: Temp: 96.8 °F (36 °C) Temp  Av.5 °F (36.4 °C)  Min: 96.4 °F (35.8 °C)  Max: 98.4 °F (36.9 °C)  BP Range:  Systolic (72NBF), PPC:67 , Min:79 , FZZ:446     Diastolic (38THH), ZIC:29, Min:49, Max:75    Pulse Range: Pulse  Av.7  Min: 91  Max: 180  Respiration Range: Resp  Av  Min: 15  Max: 23  Current Pulse Ox[de-identified]  SpO2: 100 %  24HR Pulse Ox Range:  SpO2  Av.2 %  Min: 93 %  Max: 100 %  Oxygen Amount and Delivery: O2 Flow Rate (L/min): 2 L/min      I/O (24 Hours)    Patient Vitals for the past 8 hrs:   BP Temp Temp src Pulse Resp SpO2   10/13/20 0649 (!) 83/54 -- -- 91 15 100 %   10/13/20 0600 (!) 86/49 96.8 °F (36 °C) Infrared 96 16 100 %   10/13/20 0500 (!) 92/52 -- -- 110 15 99 %   10/13/20 0400 (!) 86/58 -- -- 101 16 99 %       Intake/Output Summary (Last 24 hours) at 10/13/2020 1151  Last data filed at 10/13/2020 0649  Gross per 24 hour   Intake 3096 ml   Output 1175 ml   Net 1921 ml     I/O last 3 completed shifts:   In: 3842 [I.V.:2396; IV Piggyback:700]  Out: 5924 [Urine:1175]   Date 10/13/20 0000 - 10/13/20 2359   Shift 3467-8261 2404-6230 6024-1199 24 Hour Total   INTAKE   I.V.(mL/kg) 1541(19.4)   1541(19.4)   IV Piggyback(mL/kg) 400(5)   400(5)   Shift Total(mL/kg) 3828(62.3)   1941(24.4)   OUTPUT   Urine(mL/kg/hr) 700(1.1)   700   Shift Total(mL/kg) 700(8.8)   700(8.8)   Weight (kg) 79.4 79.4 79.4 79.4     Patient Vitals for the past 96 hrs (Last 3 readings):   Weight   10/12/20 2039 175 lb 0.7 oz (79.4 kg)   10/12/20 1431 171 lb (77.6 kg)         Drains/Tubes Outputs  Segura placed 10/12    Exam     PHYSICAL EXAM:    General appearance - resting comfortably in no acute distress  Mental status - oriented to self only  Eyes - pupils equal and reactive, extraocular eye movements intact, conjunctiva injected with notable chemosis and appearance of blepharitis with right eye appearing worse than the left  Ears - external ear normal  Nose - normal and patent, nasal canula in place  Mouth - mucous membranes dry  Neck - supple, no significant adenopathy  Chest - LCTAB with no w/r/r appreciated, symmetric air entry  Heart - RR with irregular rhythm, no m/g/r noted  Abdomen - soft, nontender and nondistended, no masses or organomegaly  Neurological - alert and oriented to self only  Extremities - peripheral pulses normal, no edema; deep tissue injury noted to the left heel with no open area or drainage noted  Skin - normal coloration and turgor; small stage 2 pressure ulcer noted over the coccyx; there is a small dimple noted distal to the wound that appears open and may track toward the wound; no abnormal drainage noted from the wound and there is no overlying erythema    Data   Old records and images have been reviewed    Lab Results   CBC     Lab Results   Component Value Date    WBC 16.2 10/13/2020    RBC 3.90 10/13/2020    HGB 12.0 10/13/2020    HCT 38.9 10/13/2020    PLT Treated with Cefepime, Doxy and Vanco in ED   - Will continue Cefepime, but others Dc'd    Cardiovascular   History of HTN, HLD and A. Fib   - Flecainide 100 mg BID   - Toprol XL 12.5 mg QD    - hold for hypotension  EKG in the ED showed A.  Fib with RVR   - patient was given 10 mg bolus of Cardizem   - drip ordered, but held   - one time dose of Digoxin ordered, but not given due to hypotension  No history of CHF   - ProBNP was 2990 in ED   - will repeat and diurese as necessary  DVT PPx: continue home Eliquis 5 mg BID    Gastrointestinal   Decreased appetite per family   - Drinks 1-2 Ensures daily at nursing home to supplement protein  No GI PPx at this time as patient is not intubated  Will do a swallow study when able     Renal   No history of kidney disease  JOSE DE JESUS on arrival   - likely 2/2 dehydration   - Cr 1.3 initially, but decreased to 0.9 with fluids  Hypernatremia   - HHS + dehydration   - Corrected Sodium: initially 164, 154 on repeat   - Fluids switched to D5 1/4 NS with 20 mEq KCl at 150 cc/hr  Potassium 3.5 today   - will monitor and replete as necessary   - supplemental KCl given in fluids as above  UA sent today as there was none reported from ED stay     Infectious Disease   Leukocytosis on arrival   - WBCs = 21.5 --> down to 16.2 today   - Doses of cefepime, doxy and vanco in ED; will continue cefepime   - Daily CBC  Questionable PNA as above  UA   - pending  Blepharitis   - present in both eyes   - notable chemosis and conjunctival injection   - patient started on Gentamicin ointment TID   - will continue to monitor  Lactic Acid 5.1 on arrival   - will trend  There are multiple skin/soft tissue pressure ulcers noted on exam   - one on the left heel   - one on the sacrum with a distal dimple present with possible tracking between the two    Hematology/Oncology   No concerns    Endocrine   Questionable history of DM   - patient started on Metformin at one time 6-8 years ago, but did not tolerate   - provided care for the patient. Radiographs, labs and medication list were reviewed by me independently. I spoke with bedside nursing, therapists and consultants. Critical care services and times documented are independent of procedures and multidisciplinary rounds with Residents. Additionally comprehensive, multidisciplinary rounds were conducted with the MICU team. The case was discussed in detail and plans for care were established. Review of Residents documentation was conducted and revisions were made as appropriate. I agree with the above documented exam, problem list and plan of care.     Aida Wiseman   CCT excluding procedures:45'

## 2020-10-13 NOTE — DISCHARGE INSTR - COC
Continuity of Care Form    Patient Name: Eb Aranda   :  1940  MRN:  [id number]    Admit date:  10/12/2020  Discharge date:  10/16.2020    Code Status Order: Full Code   Advance Directives:      Admitting Physician:  Crista Lozano MD  PCP: Ricardo Pavon MD    Discharging Nurse: [address], [address] Unit/Room#: 0214/0214-A  Discharging Unit Phone Number: [phone]    Emergency Contact:   Extended Emergency Contact Information  Primary Emergency Contact: [facility]  Address: [place] [address], [address] [place] [address] Phone: [phone]  Mobile Phone: [phone]  Relation: Child  Secondary Emergency Contact: Danie Marroquin Phone: [phone]  Relation: Grandchild    Past Surgical History:  Past Surgical History:   Procedure Laterality Date     SECTION      CHOLECYSTECTOMY, LAPAROSCOPIC      COLONOSCOPY      UPPER GASTROINTESTINAL ENDOSCOPY         Immunization History:   Immunization History   Administered Date(s) Administered    Influenza, High Dose (Fluzone 65 yrs and older) 2015, 10/20/2017, 10/03/2018    Pneumococcal Conjugate 13-valent (Mardel Davion) 2016       Active Problems:  Patient Active Problem List   Diagnosis Code    Hypertension I10    Type II diabetes mellitus ([address] E11.9    Hyperlipidemia E78.5    GERD (gastroesophageal reflux disease) K21.9    History of cholecystectomy Z90.49    Gastroparesis K31.84    Late onset Alzheimer's disease without behavioral disturbance (HCC) G30.1, F02.80    New onset atrial fibrillation (HCC) I48.91    Atrial fibrillation (HCC) I48.91    Atrial flutter with rapid ventricular response (HCC) I48.92    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    Severe sepsis ([facility] A41.9, R65.20       Isolation/Infection:   Isolation            No Isolation          Patient Infection Status       Infection Onset Added Last Indicated Last Indicated By Review Planned Expiration Resolved Resolved By    None active    Resolved    COVID-19 Rule Out 10/12/20 10/12/20 10/12/20 COVID-19 (Ordered)   10/12/20 Rule-Out Test Resulted    COVID-19 Rule Out 09/08/20 09/09/20 09/08/20 Covid-19 Ambulatory (Ordered)   09/11/20 Rule-Out Test Resulted    COVID-19 Rule Out 09/01/20 09/01/20 09/01/20 Covid-19 Ambulatory (Ordered)   09/03/20 Rule-Out Test Resulted    COVID-19 Rule Out 05/21/20 05/21/20 05/21/20 Covid-19 Ambulatory (Ordered)   05/26/20 Rule-Out Test Resulted            Nurse Assessment:  Last Vital Signs: BP (!) 83/54   Pulse 91   Temp 96.8 °F (36 °C) (Infrared)   Resp 15   Ht 5' 4\" (1.626 m)   Wt 175 lb 0.7 oz (79.4 kg)   LMP  (LMP Unknown)   SpO2 100%   BMI 30.05 kg/m²     Last documented pain score (0-10 scale):    Last Weight:   Wt Readings from Last 1 Encounters:   10/12/20 175 lb 0.7 oz (79.4 kg)     Mental Status:  disoriented    IV Access:  - None    Nursing Mobility/ADLs:  Walking   Dependent  Transfer  Dependent  Bathing  Dependent  Dressing  Dependent  Toileting  Dependent  Feeding  Dependent  Med Admin  Dependent  Med Delivery   whole and prefers mixed with applesauce    Wound Care Documentation and Therapy:        Elimination:  Continence:   · Bowel: No  · Bladder: No  Urinary Catheter: Removal Date 10/16/2020   Colostomy/Ileostomy/Ileal Conduit: No       Date of Last BM: 10/16/2020    Intake/Output Summary (Last 24 hours) at 10/13/2020 1050  Last data filed at 10/13/2020 0649  Gross per 24 hour   Intake 3096 ml   Output 1175 ml   Net 1921 ml     I/O last 3 completed shifts:   In: 3096 [I.V.:2396; IV Piggyback:700]  Out: 1175 [Urine:1175]    Safety Concerns:     Sundowners [place]rome, At Risk for Falls and Aspiration Risk    Impairments/Disabilities:      Hearing    Nutrition Therapy:  Current Nutrition Therapy:   - Oral Diet:  Carb Control 3 carbs/meal (1500kcals/day)  DYSPHAGIA DIAGNOSIS: oropharyngeal dysphagia-overt s/s of aspiration observed                            DIET RECOMMENDATIONS:  Dysphagia 1, Pureed solids with  honey consistency (moderately thick) liquids-NO STRAWS    Routes of Feeding: Oral  Liquids: Honey Thick Liquids/ puree diet  Daily Fluid Restriction: no  Last Modified Barium Swallow with Video (Video Swallowing Test): 10/15/2020    Treatments at the Time of Hospital Discharge:   Respiratory Treatments:   Oxygen Therapy:  is not on home oxygen therapy. Ventilator:    - No ventilator support    Rehab Therapies: Physical Therapy, Occupational Therapy and Speech/Language Therapy  Weight Bearing Status/Restrictions: No weight bearing restirctions  Other Medical Equipment (for information only, NOT a DME order):  wheelchair  Other Treatments: wound care to coccyx and left heel   Cleanse coccyx with NSS. Apply opticell then sacral foam dressing. Change qod and prn. Patient's personal belongings (please select all that are sent with patient):  {CHP DME Belongings:[id number]}    RN SIGNATURE:  {Esignature:[id number]}    CASE MANAGEMENT/SOCIAL WORK SECTION    Inpatient Status Date: ***    Readmission Risk Assessment Score:  Readmission Risk              Risk of Unplanned Readmission:        22           Discharging to Facility/ Agency   · Name: [facility]        Address: [address]  · Phone: [phone]  · Fax: [phone]  ·   Dialysis Facility (if applicable)   · Name:  · Address:  · Dialysis Schedule:  · Phone:  · Fax:    / signature: Electronically signed by Geoffrey Paul RN on 10/13/2020 at 10:52 AM      PHYSICIAN SECTION    Prognosis: Fair    Condition at Discharge: Stable    Rehab Potential (if transferring to Rehab):  Fair    Recommended Labs or Other Treatments After Discharge: Check blood sugar ACHS and provide Lantus and ISS as ordered    Physician Certification: I certify the above information and transfer of Goldie Miles  is necessary for the continuing

## 2020-10-13 NOTE — H&P
[facility] History and Physical      CHIEF COMPLAINT:   Sent from NH for AMS     History of Present Illness:         Patient is a 70-year-old female with a past medical history of hypertension, type 2 diabetes, hyperlipidemia, atrial fibrillation on Eliquis from [address] [place] [address] who presents today for altered mental status, hyperglycemia, and tachycardia. EMS states when they arrived on scene she was found to be tachycardic with a heart rate in the 150s, hyperglycemic with a glucose greater than 500. She is altered, however responds to painful stimuli. Patient has a history of atrial fibrillation. cxr showed infiltrate in lung. She had elevated lactate on presentation . She was started on iv fluids, insulin drip and broad spectrum antibiotics. She is admitted to icu           REVIEW OF SYSTEMS:     Unable to assess due to mental status change     PMH:  Past Medical History:   Diagnosis Date    Alzheimer disease ([facility]     Atrial fibrillation ([facility]     Dementia ([facility]     Diabetes mellitus ([address]     GERD (gastroesophageal reflux disease)     Headache     Hypertension        Surgical History:  Past Surgical History:   Procedure Laterality Date     SECTION      CHOLECYSTECTOMY, LAPAROSCOPIC      COLONOSCOPY      UPPER GASTROINTESTINAL ENDOSCOPY         Medications Prior to Admission:    Prior to Admission medications    Medication Sig Start Date End Date Taking?  Authorizing Provider   citalopram (CELEXA) 10 MG tablet 10 mg daily  20   Historical Provider, MD   docusate sodium (COLACE) 100 MG capsule Take 100 mg by mouth 2 times daily    Historical Provider, MD   flecainide (TAMBOCOR) 100 MG tablet Take 100 mg by mouth 2 times daily    Historical Provider, MD   metoprolol succinate (TOPROL XL) 25 MG extended release tablet Take 0.5 tablets by mouth daily 3/6/20   Fede Williamson MD   megestrol (MEGACE ORAL) 40 MG/ML suspension Take 5 mLs by mouth daily 1/22/20   Abhi Treadwell MD   vitamin D (ERGOCALCIFEROL) 1.25 MG (54521 UT) CAPS capsule take one capsule by mouth once a week 1/11/20   Abhi Treadwell MD   divalproex (DEPAKOTE) 125 MG DR tablet Take 1 tablet by mouth 3 times daily  Patient taking differently: Take 125 mg by mouth 2 times daily  1/11/20   Abhi Treadwell MD   apixaban (ELIQUIS) 5 MG TABS tablet Take 1 tablet by mouth 2 times daily 1/11/20   Abhi Treadwell MD   memantine ER (NAMENDA XR) 28 MG CP24 extended release capsule TAKE ONE CAPSULE BY MOUTH EVERY DAY 1/11/20   Abhi Treadwell MD   Lutein 20 MG CAPS Take by mouth daily    Historical Provider, MD   acetaminophen (TYLENOL) 325 MG tablet Take 650 mg by mouth every 6 hours as needed for Pain    Historical Provider, MD   hydrocortisone 1 % ointment Apply topically 2 times daily Apply topically 2 times daily. Historical Provider, MD   menthol-zinc oxide (CALMOSEPTINE) 0.44-20.625 % OINT ointment Apply topically daily as needed Max 30 ml per day. Historical Provider, MD   Bilberry 1000 MG CAPS Take 1 capsule by mouth daily    Historical Provider, MD   Dextromethorphan-guaiFENesin  MG/5ML SYRP Take 10 mLs by mouth every 6 hours as needed for Cough    Historical Provider, MD   Multiple Vitamins-Minerals (EYE VITAMINS) CAPS Take 1 capsule by mouth daily     Historical Provider, MD       Allergies:    Asa [aspirin]    Social History:    reports that she has never smoked. She has never used smokeless tobacco. She reports that she does not drink alcohol or use drugs. Family History:   family history includes Heart Disease in her mother.         PHYSICAL EXAM:  Vitals:  /62   Pulse 98   Temp 98 °F (36.7 °C) (Axillary)   Resp 18   Ht 5' 4\" (1.626 m)   Wt 171 lb (77.6 kg)   LMP  (LMP Unknown)   SpO2 96%   BMI 29.35 kg/m²      General Appearance: ill appearing   Skin: warm and dry  Head: normocephalic and atraumatic  Eyes: pupils equal, round, and reactive to light, extraocular eye movements intact, conjunctivae normal  Neck: neck supple and non tender without mass   Pulmonary/Chest: scattered rhonchi  Cardiovascular: + tachycardia  Abdomen: soft, non-tender, non-distended, normal bowel sounds, no masses or organomegaly  Extremities: no cyanosis, no clubbing and no edema  Neurologic: responds to pain stimuli         LABS:  Recent Labs     10/12/20  1515 10/12/20  1520 10/12/20  1900   *  --  156*   K 4.5  --  4.0   *  --  125*   CO2 21*  --  22   BUN 62*  --  58*   CREATININE 1.5*  --  1.3*   GLUCOSE 683* 500 623*   CALCIUM 9.2  --  8.4*       Recent Labs     10/12/20  1426   WBC 21.5*   RBC 5.24   HGB 16.2*   HCT 52.0*   MCV 99.2   MCH 30.9   MCHC 31.2*   RDW 14.5      MPV 13.5*       No results for input(s): POCGLU in the last 72 hours. Radiology:   CT Head WO Contrast   Final Result   1. No acute intracranial abnormality. Advanced senescent changes and   involutional changes. Intracranial atherosclerotic disease. 2.  Sequela of a prior focal ischemic event is stable in the posterior right   parietal lobe. XR CHEST PORTABLE   Final Result   1. Small patchy infiltrate within the left lower lobe             EKG: Sinus tachycardia      ASSESSMENT:      Active Problems:    Severe sepsis ([address]  Resolved Problems:    * No resolved hospital problems. *  A fib with rvr   Uncontrolled DM with normal anion gap   Respiratory failure with hypoxia   AMS   Severe sepsis syndrome, lactic acidosis  Pneumonia , unclear cause   dehydration    PLAN:   diltiazem push given for tachycardia and on diltiazem drip   Empiric vancomycin, cefepime  NPO until mental status better and able to swallow meds   Iv fluids d5, 1/2 NS, monitor poct glucose on insulin drip   intensivist consulted   Valproic acid iv while not able to take po.  Check valproic acid level   Reconcile prior to admission meds  Code Status:  full  DVT prophylaxis:  enoxaparin Electronically signed by Austyn Terrazas MD on 10/12/2020 at 8:03 PM

## 2020-10-13 NOTE — FLOWSHEET NOTE
Inpatient Wound Care    Admit Date: 10/12/2020  1:59 PM    Reason for consult:  Coccyx, heels    Significant history:  Admitted with severe sepsis. History includes: Alzheimer's, A-fib, DM, HTN and GERD. Wound history:  POA    Findings:       10/13/20 1339   Wound 10/13/20 Heel Left;Lateral   Date First Assessed/Time First Assessed: 10/13/20 1339   Present on Hospital Admission: Yes  Location: Heel  Wound Location Orientation: Left;Lateral   Wound Image    Wound Etiology Deep tissue/Injury   Wound Length (cm) 3 cm   Wound Width (cm) 2.5 cm   Wound Surface Area (cm^2) 7.5 cm^2   Wound Assessment Erythema  (purple)   Drainage Amount None   Odor None   Christie-wound Assessment Dry/flaky   Wound 10/13/20 Sacrum   Date First Assessed/Time First Assessed: 10/13/20 1341   Present on Hospital Admission: Yes  Location: Sacrum   Wound Image    Wound Etiology Pressure Stage  2   Dressing Status Intact   Wound Cleansed Cleansed with saline   Dressing/Treatment Alginate; Foam   Dressing Change Due 10/15/20   Wound Length (cm) 1 cm   Wound Width (cm) 0.5 cm   Wound Depth (cm) 0.2 cm   Wound Surface Area (cm^2) 0.5 cm^2   Wound Volume (cm^3) 0.1 cm^3   Wound Assessment Erythema   Drainage Amount None   Odor None   Christie-wound Assessment   (Dimple noted distal to wound)       Impression:  L lateral heel DTI,     Interventions in place:  Dressing intact to coccyx (alginate and sacral foam). Heel boots on. SOS precautions in place. Plan: QOD dressing changes to coccyx, Heel boots, SOS precautions.        Albesa Antis 10/13/2020 1:44 PM

## 2020-10-13 NOTE — PROGRESS NOTES
30.05 kg/m²   General Appearance: alert and oriented to person, place and time, well-developed and well-nourished, in no acute distress  Skin: warm and dry, no rash or erythema  Head: normocephalic and atraumatic  Eyes: pupils equal, round, and reactive to light, extraocular eye movements intact, conjunctivae normal  ENT: tympanic membrane, external ear and ear canal normal bilaterally, oropharynx clear and moist with normal mucous membranes  Neck: neck supple and non tender without mass, no thyromegaly or thyroid nodules, no cervical lymphadenopathy   Pulmonary/Chest: clear to auscultation bilaterally- no wheezes, rales or rhonchi, normal air movement, no respiratory distress  Cardiovascular: normal rate, normal S1 and S2, no gallops, intact distal pulses and no carotid bruits  Abdomen: soft, non-tender, non-distended, normal bowel sounds, no masses or organomegaly  Does have Segura catheter, do make urine. Recent Labs     10/12/20  1900 10/12/20  2345 10/13/20  0530   * 153* 152*   K 4.0 3.0* 3.5   * 123* 124*   CO2 22 21* 22   BUN 58* 52* 45*   CREATININE 1.3* 1.1* 0.9   GLUCOSE 623* 408* 238*   CALCIUM 8.4* 8.2* 8.2*       Recent Labs     10/12/20  1426 10/13/20  0530   WBC 21.5* 16.2*   RBC 5.24 3.90   HGB 16.2* 12.0   HCT 52.0* 38.9   MCV 99.2 99.7   MCH 30.9 30.8   MCHC 31.2* 30.8*   RDW 14.5 14.4    127*   MPV 13.5* 12.9*       Urine growing more than 100,000 CFU  WBC has decreased to 16.2 from 21.5  Sodium 152  Hemoglobin A1c 10.3    Radiology:   CT Head WO Contrast   Final Result   1. No acute intracranial abnormality. Advanced senescent changes and   involutional changes. Intracranial atherosclerotic disease. 2.  Sequela of a prior focal ischemic event is stable in the posterior right   parietal lobe. XR CHEST PORTABLE   Final Result   1.  Small patchy infiltrate within the left lower lobe         XR CHEST PORTABLE    (Results Pending)       Assessment:    Active Problems:    Severe sepsis ([facility] [address]  Resolved Problems:    * No resolved hospital problems. *      Plan:  1. Uncontrolled diabetes, patient is off insulin drip, at this time controlled with sliding scale. 2.         Change in mental status, multifactorial, due to UTI, due to uncontrolled diabetes, due to hyponatremia, slowly improving, will take at least couple of days. 3.          UTI, growing gram-negative rods, remains on cefepime. 4.          Did not do well with swallowing, more once again with recurrent acute illness, patient is on IV fluids, being observed in ICU, will need support with NG if no improvement, for now we will continue observing. 5.         A. fib, rate is controlled, patient has been ordered Eliquis. 6.         Hypertension, stable.       Electronically signed by Lon Pepper MD on 10/13/2020 at 8:56 AM

## 2020-10-13 NOTE — CONSULTS
Palliative Care Department  [phone]  Palliative Care Initial Consult  Provider Paresh Naranjo PA-C    Boni Oden  [id number]  Hospital Day: 2  Date of Initial Consult: 10/13/2020  Referring Provider: Yun Wood DO   Palliative Medicine was consulted for assistance with: Goals of care    CHIEF COMPLAINT of altered mental status and hyperglycemia    ASSESSMENT/PLAN:   Outcome of goals of care meeting:  Discussed with ICU resident, follow-up 10/14 when daughter arrives and introduced palliative medicine. Family updated by ICU staff in regards to medical diagnosis, prognosis and levels of CODE STATUS and goals of care. After the extensive conversation per resident, he was advised to follow-up tomorrow and further discuss with family. Pertinent Hospital Diagnoses    Dementia- FAST?  Pneumonia -cefepime per ICU   Mental status changes -likely multifactorial, improved with treatment of pneumonia and hyperglycemia, follow uncertain of baseline   HHS -treatment per ICU    Palliative Care Encounter / Counseling Regarding Goals of Care  Please see detailed goals of care discussion as below   At this time, Boni Oden, Does Not have capacity for medical decision-making. Capacity is time limited and situation/question specific   During encounter daughter was surrogate medical decision-maker      Code status Full Code   Advanced Directives: no POA or living will in epic   Surrogate/Legal NOK:    o Daughter Jenny Mcmanus [phone]    Spiritual assessment: no spiritual distress identified  Bereavement and grief: to be determined    Referrals to: none today    HPI:   Boni Oden is a 68 y.o. with a past medical history of dementia, diabetes mellitus, GERD, hypertension, atrial fibrillation on Eliquis who presented to the emergency department from a skilled facility on 10/12/2020 with altered mental status and hyperglycemia.   She had a work-up in the hospital and was admitted to the non tender, non distended, bowel sounds present  : Catheter  Ext: Edema of lower extremities bilaterally 1+  Skin: Pale   neuro: Decreased level of consciousness/awareness, able to fully assess orientation or neuro exam    Objective data reviewed: labs, images, records, medication use, vitals and chart  Relevant data: Albumin 2.9    Time/Communication  Greater than 50% of time spent, total 30 minutes in counseling and coordination of care at the bedside regarding see above. Thank you for allowing Palliative Medicine to participate in the care of [place].    Note: This report was completed using computerize voiced recognition software. Every effort has been made to ensure accuracy; however, inadvertent computerized transcription errors may be present.

## 2020-10-13 NOTE — CARE COORDINATION
COVID negative 10/12. From [facility] PTA-Per Buffy @ [facility], pt is a bedhold- will need precert to return skilled-DO NOT have to wait for auth on discharge- will need PT/OT evals when appropriate. Hx dementia. On iv abx, insulin drip, O2 2lNC. Phone conversation w/ daughter Livia Olsen [phone] and confirmed plan is to return to [facility] on discharge. N-17 initiated.  Will follow Barbara Medrano

## 2020-10-14 LAB
ANION GAP SERPL CALCULATED.3IONS-SCNC: 6 MMOL/L (ref 7–16)
BASOPHILS ABSOLUTE: 0.04 E9/L (ref 0–0.2)
BASOPHILS RELATIVE PERCENT: 0.4 % (ref 0–2)
BUN BLDV-MCNC: 21 MG/DL (ref 8–23)
CALCIUM SERPL-MCNC: 7.9 MG/DL (ref 8.6–10.2)
CHLORIDE BLD-SCNC: 115 MMOL/L (ref 98–107)
CO2: 19 MMOL/L (ref 22–29)
CORTISOL TOTAL: 10.99 MCG/DL (ref 2.68–18.4)
CREAT SERPL-MCNC: 0.6 MG/DL (ref 0.5–1)
EOSINOPHILS ABSOLUTE: 0.17 E9/L (ref 0.05–0.5)
EOSINOPHILS RELATIVE PERCENT: 1.7 % (ref 0–6)
GFR AFRICAN AMERICAN: >60
GFR NON-AFRICAN AMERICAN: >60 ML/MIN/1.73
GLUCOSE BLD-MCNC: 326 MG/DL (ref 74–99)
HCT VFR BLD CALC: 36.4 % (ref 34–48)
HEMOGLOBIN: 11.4 G/DL (ref 11.5–15.5)
IMMATURE GRANULOCYTES #: 0.08 E9/L
IMMATURE GRANULOCYTES %: 0.8 % (ref 0–5)
LYMPHOCYTES ABSOLUTE: 1.49 E9/L (ref 1.5–4)
LYMPHOCYTES RELATIVE PERCENT: 15.3 % (ref 20–42)
MAGNESIUM: 1.7 MG/DL (ref 1.6–2.6)
MCH RBC QN AUTO: 30.6 PG (ref 26–35)
MCHC RBC AUTO-ENTMCNC: 31.3 % (ref 32–34.5)
MCV RBC AUTO: 97.8 FL (ref 80–99.9)
METER GLUCOSE: 135 MG/DL (ref 74–99)
METER GLUCOSE: 150 MG/DL (ref 74–99)
METER GLUCOSE: 303 MG/DL (ref 74–99)
METER GLUCOSE: 319 MG/DL (ref 74–99)
MONOCYTES ABSOLUTE: 0.5 E9/L (ref 0.1–0.95)
MONOCYTES RELATIVE PERCENT: 5.1 % (ref 2–12)
MRSA CULTURE ONLY: NORMAL
NEUTROPHILS ABSOLUTE: 7.44 E9/L (ref 1.8–7.3)
NEUTROPHILS RELATIVE PERCENT: 76.7 % (ref 43–80)
ORGANISM: ABNORMAL
PDW BLD-RTO: 13.8 FL (ref 11.5–15)
PHOSPHORUS: 1.7 MG/DL (ref 2.5–4.5)
PLATELET # BLD: 104 E9/L (ref 130–450)
PMV BLD AUTO: 13.3 FL (ref 7–12)
POTASSIUM SERPL-SCNC: 4.3 MMOL/L (ref 3.5–5)
RBC # BLD: 3.72 E12/L (ref 3.5–5.5)
SODIUM BLD-SCNC: 140 MMOL/L (ref 132–146)
T3 FREE: 2.4 PG/ML (ref 2–4.4)
T4 FREE: 1.4 NG/DL (ref 0.93–1.7)
TSH SERPL DL<=0.05 MIU/L-ACNC: 2.91 UIU/ML (ref 0.27–4.2)
URINE CULTURE, ROUTINE: ABNORMAL
WBC # BLD: 9.7 E9/L (ref 4.5–11.5)

## 2020-10-14 PROCEDURE — 2500000003 HC RX 250 WO HCPCS: Performed by: INTERNAL MEDICINE

## 2020-10-14 PROCEDURE — 2060000000 HC ICU INTERMEDIATE R&B

## 2020-10-14 PROCEDURE — 2580000003 HC RX 258: Performed by: INTERNAL MEDICINE

## 2020-10-14 PROCEDURE — 82533 TOTAL CORTISOL: CPT

## 2020-10-14 PROCEDURE — 80048 BASIC METABOLIC PNL TOTAL CA: CPT

## 2020-10-14 PROCEDURE — 84443 ASSAY THYROID STIM HORMONE: CPT

## 2020-10-14 PROCEDURE — 6370000000 HC RX 637 (ALT 250 FOR IP): Performed by: INTERNAL MEDICINE

## 2020-10-14 PROCEDURE — 97161 PT EVAL LOW COMPLEX 20 MIN: CPT

## 2020-10-14 PROCEDURE — 36592 COLLECT BLOOD FROM PICC: CPT

## 2020-10-14 PROCEDURE — 2580000003 HC RX 258: Performed by: HOSPITALIST

## 2020-10-14 PROCEDURE — 92526 ORAL FUNCTION THERAPY: CPT | Performed by: SPEECH-LANGUAGE PATHOLOGIST

## 2020-10-14 PROCEDURE — 99233 SBSQ HOSP IP/OBS HIGH 50: CPT | Performed by: INTERNAL MEDICINE

## 2020-10-14 PROCEDURE — 83735 ASSAY OF MAGNESIUM: CPT

## 2020-10-14 PROCEDURE — 97165 OT EVAL LOW COMPLEX 30 MIN: CPT

## 2020-10-14 PROCEDURE — 36415 COLL VENOUS BLD VENIPUNCTURE: CPT

## 2020-10-14 PROCEDURE — 84439 ASSAY OF FREE THYROXINE: CPT

## 2020-10-14 PROCEDURE — 6360000002 HC RX W HCPCS: Performed by: INTERNAL MEDICINE

## 2020-10-14 PROCEDURE — 92610 EVALUATE SWALLOWING FUNCTION: CPT | Performed by: SPEECH-LANGUAGE PATHOLOGIST

## 2020-10-14 PROCEDURE — 99233 SBSQ HOSP IP/OBS HIGH 50: CPT | Performed by: PHYSICIAN ASSISTANT

## 2020-10-14 PROCEDURE — 82962 GLUCOSE BLOOD TEST: CPT

## 2020-10-14 PROCEDURE — 6370000000 HC RX 637 (ALT 250 FOR IP): Performed by: HOSPITALIST

## 2020-10-14 PROCEDURE — 6370000000 HC RX 637 (ALT 250 FOR IP): Performed by: EMERGENCY MEDICINE

## 2020-10-14 PROCEDURE — 85025 COMPLETE CBC W/AUTO DIFF WBC: CPT

## 2020-10-14 PROCEDURE — 94640 AIRWAY INHALATION TREATMENT: CPT

## 2020-10-14 PROCEDURE — 84100 ASSAY OF PHOSPHORUS: CPT

## 2020-10-14 PROCEDURE — 84481 FREE ASSAY (FT-3): CPT

## 2020-10-14 RX ORDER — INSULIN GLARGINE 100 [IU]/ML
5 INJECTION, SOLUTION SUBCUTANEOUS ONCE
Status: COMPLETED | OUTPATIENT
Start: 2020-10-14 | End: 2020-10-14

## 2020-10-14 RX ORDER — POTASSIUM CHLORIDE AND SODIUM CHLORIDE 450; 150 MG/100ML; MG/100ML
INJECTION, SOLUTION INTRAVENOUS CONTINUOUS
Status: DISCONTINUED | OUTPATIENT
Start: 2020-10-14 | End: 2020-10-14

## 2020-10-14 RX ORDER — INSULIN GLARGINE 100 [IU]/ML
20 INJECTION, SOLUTION SUBCUTANEOUS DAILY
Status: DISCONTINUED | OUTPATIENT
Start: 2020-10-15 | End: 2020-10-16 | Stop reason: HOSPADM

## 2020-10-14 RX ORDER — SODIUM CHLORIDE 450 MG/100ML
INJECTION, SOLUTION INTRAVENOUS CONTINUOUS
Status: DISCONTINUED | OUTPATIENT
Start: 2020-10-14 | End: 2020-10-16 | Stop reason: HOSPADM

## 2020-10-14 RX ORDER — SODIUM CHLORIDE 450 MG/100ML
INJECTION, SOLUTION INTRAVENOUS CONTINUOUS
Status: DISCONTINUED | OUTPATIENT
Start: 2020-10-14 | End: 2020-10-14

## 2020-10-14 RX ADMIN — INSULIN LISPRO 2 UNITS: 100 INJECTION, SOLUTION INTRAVENOUS; SUBCUTANEOUS at 17:12

## 2020-10-14 RX ADMIN — INSULIN GLARGINE 5 UNITS: 100 INJECTION, SOLUTION SUBCUTANEOUS at 10:58

## 2020-10-14 RX ADMIN — SODIUM CHLORIDE, PRESERVATIVE FREE 10 ML: 5 INJECTION INTRAVENOUS at 21:48

## 2020-10-14 RX ADMIN — POTASSIUM CHLORIDE: 2 INJECTION, SOLUTION, CONCENTRATE INTRAVENOUS at 00:20

## 2020-10-14 RX ADMIN — SODIUM CHLORIDE: 4.5 INJECTION, SOLUTION INTRAVENOUS at 10:58

## 2020-10-14 RX ADMIN — IPRATROPIUM BROMIDE AND ALBUTEROL SULFATE 1 AMPULE: .5; 3 SOLUTION RESPIRATORY (INHALATION) at 12:21

## 2020-10-14 RX ADMIN — POTASSIUM CHLORIDE: 2 INJECTION, SOLUTION, CONCENTRATE INTRAVENOUS at 06:32

## 2020-10-14 RX ADMIN — FLECAINIDE ACETATE 100 MG: 100 TABLET ORAL at 08:27

## 2020-10-14 RX ADMIN — INSULIN GLARGINE 15 UNITS: 100 INJECTION, SOLUTION SUBCUTANEOUS at 08:29

## 2020-10-14 RX ADMIN — IPRATROPIUM BROMIDE AND ALBUTEROL SULFATE 1 AMPULE: .5; 3 SOLUTION RESPIRATORY (INHALATION) at 20:31

## 2020-10-14 RX ADMIN — GENTAMICIN SULFATE 0.5 INCH: 3 OINTMENT OPHTHALMIC at 08:30

## 2020-10-14 RX ADMIN — IPRATROPIUM BROMIDE AND ALBUTEROL SULFATE 1 AMPULE: .5; 3 SOLUTION RESPIRATORY (INHALATION) at 16:06

## 2020-10-14 RX ADMIN — CEFEPIME HYDROCHLORIDE 1 G: 1 INJECTION, POWDER, FOR SOLUTION INTRAMUSCULAR; INTRAVENOUS at 04:16

## 2020-10-14 RX ADMIN — FLECAINIDE ACETATE 100 MG: 100 TABLET ORAL at 21:48

## 2020-10-14 RX ADMIN — SODIUM PHOSPHATE, MONOBASIC, MONOHYDRATE 13.53 MMOL: 276; 142 INJECTION, SOLUTION INTRAVENOUS at 12:38

## 2020-10-14 RX ADMIN — GENTAMICIN SULFATE 0.5 INCH: 3 OINTMENT OPHTHALMIC at 21:48

## 2020-10-14 RX ADMIN — CEFTRIAXONE 1 G: 1 INJECTION, POWDER, FOR SOLUTION INTRAMUSCULAR; INTRAVENOUS at 15:41

## 2020-10-14 RX ADMIN — SODIUM CHLORIDE, PRESERVATIVE FREE 10 ML: 5 INJECTION INTRAVENOUS at 08:30

## 2020-10-14 RX ADMIN — INSULIN LISPRO 8 UNITS: 100 INJECTION, SOLUTION INTRAVENOUS; SUBCUTANEOUS at 12:45

## 2020-10-14 RX ADMIN — APIXABAN 5 MG: 5 TABLET, FILM COATED ORAL at 08:27

## 2020-10-14 RX ADMIN — ACETAMINOPHEN 650 MG: 325 TABLET ORAL at 18:47

## 2020-10-14 RX ADMIN — GENTAMICIN SULFATE 0.5 INCH: 3 OINTMENT OPHTHALMIC at 15:41

## 2020-10-14 RX ADMIN — INSULIN LISPRO 8 UNITS: 100 INJECTION, SOLUTION INTRAVENOUS; SUBCUTANEOUS at 08:29

## 2020-10-14 RX ADMIN — SODIUM CHLORIDE: 4.5 INJECTION, SOLUTION INTRAVENOUS at 22:01

## 2020-10-14 RX ADMIN — APIXABAN 5 MG: 5 TABLET, FILM COATED ORAL at 21:48

## 2020-10-14 RX ADMIN — SODIUM CHLORIDE: 9 INJECTION, SOLUTION INTRAVENOUS at 08:28

## 2020-10-14 RX ADMIN — Medication 10 ML: at 16:02

## 2020-10-14 RX ADMIN — IPRATROPIUM BROMIDE AND ALBUTEROL SULFATE 1 AMPULE: .5; 3 SOLUTION RESPIRATORY (INHALATION) at 07:51

## 2020-10-14 RX ADMIN — SODIUM CHLORIDE: 4.5 INJECTION, SOLUTION INTRAVENOUS at 17:11

## 2020-10-14 ASSESSMENT — PAIN SCALES - GENERAL
PAINLEVEL_OUTOF10: 3
PAINLEVEL_OUTOF10: 0

## 2020-10-14 NOTE — PLAN OF CARE
Problem: OXYGENATION/RESPIRATORY FUNCTION  Goal: Patient will maintain patent airway  Outcome: Met This Shift  Goal: Patient will achieve/maintain normal respiratory rate/effort  Description: Respiratory rate and effort will be within normal limits for the patient  Outcome: Met This Shift     Problem: MECHANICAL VENTILATION  Goal: Patient will maintain patent airway  Outcome: Met This Shift  Goal: Oral health is maintained or improved  Outcome: Met This Shift     Problem: SKIN INTEGRITY  Goal: Skin integrity is maintained or improved  Outcome: Met This Shift     Problem: Skin Integrity:  Goal: Will show no infection signs and symptoms  Description: Will show no infection signs and symptoms  Outcome: Met This Shift  Goal: Absence of new skin breakdown  Description: Absence of new skin breakdown  Outcome: Met This Shift     Problem: Physical Regulation:  Goal: Ability to maintain clinical measurements within normal limits will improve  Description: Ability to maintain clinical measurements within normal limits will improve  Outcome: Ongoing  Goal: Will show no signs and symptoms of electrolyte imbalance  Description: Will show no signs and symptoms of electrolyte imbalance  Outcome: Ongoing     Problem: MECHANICAL VENTILATION  Goal: Mobility/activity is maintained at optimum level for patient  Outcome: Ongoing     Problem: Fluid Volume:  Goal: Ability to achieve a balanced intake and output will improve  Description: Ability to achieve a balanced intake and output will improve  Outcome: Not Met This Shift     Problem: MECHANICAL VENTILATION  Goal: Ability to express needs and understand communication  Outcome: Not Met This Shift     Problem: NUTRITION  Goal: Nutritional status is improving  Outcome: Not Met This Shift     Problem: MECHANICAL VENTILATION  Goal: Tracheostomy will be managed safely  Outcome: Completed  Note: Not applicable. Pt does not have tracheostomy.   Goal: ET tube will be managed safely  Outcome:

## 2020-10-14 NOTE — PROGRESS NOTES
[facility] AT [facility] Hospitalist   Progress Note    Admitting Date and Time: 10/12/2020  1:59 PM  Admit Dx: Severe sepsis (HCC) [A41.9, R65.20]  Severe sepsis ([address] [A41.9, R65.20]  Severe sepsis ([address] [A41.9, R65.20]    Subjective: Admitted in the evening of 12th, sent from [facility], due to change in mental state, known diabetes, hypertension, dementia, hyperlipidemia, atrial fibrillation, on anticoagulation with Eliquis. Patient was noted tachycardic also with glucose more than 500. Also increased lactate as well as chest x-ray with infiltrates. Admitted to ICU on insulin drip. Patient with hyponatremia. Initial impression of severe sepsis syndrome. Seen by palliative care. Klebsiella growing in urine, sensitive to multiple antibiotics. Patient was admitted with Severe sepsis ([address] [A41.9, R65.20]  Severe sepsis ([address] [A41.9, R65.20]  Severe sepsis ([address] [A41.9, R65.20]. Patient is more awake, comfortable, does communicate, better than yesterday. Just had a peripheral IV placed. Per RN: Possibly out of ICU.    ROS: denies fever, chills, cp, sob, n/v, HA unless stated above.      cefTRIAXone (ROCEPHIN) IV  1 g Intravenous Q24H    [START ON 10/15/2020] insulin glargine  20 Units Subcutaneous Daily    apixaban  5 mg Oral BID    insulin lispro  0-12 Units Subcutaneous TID WC    insulin lispro  0-6 Units Subcutaneous Nightly    gentamicin  0.5 inch Both Eyes TID    flecainide  100 mg Oral 2 times per day    metoprolol succinate  12.5 mg Oral Daily    divalproex  125 mg Oral BID    enoxaparin  40 mg Subcutaneous Daily    sodium chloride flush  10 mL Intravenous 2 times per day    ipratropium-albuterol  1 ampule Inhalation 4x daily     sodium phosphate IVPB, 0.16 mmol/kg, PRN    Or  sodium phosphate IVPB, 0.32 mmol/kg, PRN  glucose, 15 g, PRN  dextrose, 12.5 g, PRN  glucagon (rDNA), 1 mg, PRN  dextrose, 100 mL/hr, PRN  dextrose, 12.5 g, PRN  sodium chloride flush, 10 mL, PRN  acetaminophen, 650 mg, Q6H PRN    Or  acetaminophen, 650 mg, Q6H PRN  polyethylene glycol, 17 g, Daily PRN  promethazine, 12.5 mg, Q6H PRN    Or  ondansetron, 4 mg, Q6H PRN         Objective:    /67   Pulse 97   Temp 97.8 °F (36.6 °C) (Axillary)   Resp 20   Ht 5' 4\" (1.626 m)   Wt 186 lb 4.6 oz (84.5 kg)   LMP  (LMP Unknown)   SpO2 100%   BMI 31.98 kg/m²   General Appearance: alert and oriented to person, place and time, well-developed and well-nourished, in no acute distress  Skin: warm and dry, no rash or erythema  Head: normocephalic and atraumatic  Eyes: pupils equal, round, and reactive to light, extraocular eye movements intact, conjunctivae normal  ENT: tympanic membrane, external ear and ear canal normal bilaterally, oropharynx clear and moist with normal mucous membranes  Neck: neck supple and non tender without mass, no thyromegaly or thyroid nodules, no cervical lymphadenopathy   Pulmonary/Chest: clear to auscultation bilaterally- no wheezes, rales or rhonchi, normal air movement, no respiratory distress  Cardiovascular: normal rate, normal S1 and S2, no gallops, intact distal pulses and no carotid bruits  Abdomen: soft, non-tender, non-distended, normal bowel sounds, no masses or organomegaly  Does have Segura catheter, do make urine.       Recent Labs     10/13/20  0530 10/13/20  1355 10/14/20  0541   * 149* 140   K 3.5 4.1 4.3   * 123* 115*   CO2 22 21* 19*   BUN 45* 35* 21   CREATININE 0.9 0.7 0.6   GLUCOSE 238* 207* 326*   CALCIUM 8.2* 8.1* 7.9*       Recent Labs     10/12/20  1426 10/13/20  0530 10/14/20  0541   WBC 21.5* 16.2* 9.7   RBC 5.24 3.90 3.72   HGB 16.2* 12.0 11.4*   HCT 52.0* 38.9 36.4   MCV 99.2 99.7 97.8   MCH 30.9 30.8 30.6   MCHC 31.2* 30.8* 31.3*   RDW 14.5 14.4 13.8    127* 104*   MPV 13.5* 12.9* 13.3*       Urine growing more than 100,000 CFU  WBC has decreased to 16.2 from 21.5 /now in normal range at 9.7  Sodium 152 /140  Hemoglobin A1c 10.3    Radiology:   XR

## 2020-10-14 NOTE — PATIENT CARE CONFERENCE
Intensive Care Daily Quality Rounding Checklist        ICU Team Members: Dr. Sandip Zamudio, Parul Gusman (residents), charge nurse, bedside nurse, clinical pharmacist     ICU Day #: 3     Intubation Date:  N/A     Ventilator Day #: N/A     Central Line Insertion Date:  10/12/20                                                    Day #: 3      Arterial Line Insertion Date:  N/A                             Day #: N/A     DVT Prophylaxis: Lovenox    GI Prophylaxis: N/A     Segura Catheter Insertion Date:  10/12/20                                        Day #: 3                             Continued need (if yes, reason documented and discussed with physician): Yes; Strict I's & O's     Skin Issues/ Wounds and ordered treatment discussed on rounds: yes, consult wound care, SOS precautions     Goals/ Plans for the Day: Daily labs, swallow evaluation today, replace e- as needed, transfer to IM unit, decrease ivf,

## 2020-10-14 NOTE — CONSULTS
Comprehensive Nutrition Assessment    Type and Reason for Visit:  Initial, Wound, Consult    Nutrition Recommendations/Plan: Continue dysphagia diet/thickened liquids per SLP recommendations and continue Boost Pudding ONS with all meals     Nutrition Assessment:  Diet consult for wound. Pt w/ PMH alzheimer's, dementia, T2DM admin for AMS, hyperglycemia, severe sepsis. Pt's family report poor appetite PTA    Malnutrition Assessment:  Malnutrition Status: At risk for malnutrition (Comment)    Context: Chronic illness    Findings of the 6 clinical characteristics of malnutrition:  Energy Intake:  Mild decrease in energy intake (Comment)  Weight Loss:  10% wt loss x7mo     Body Fat Loss:  Unable to assess     Muscle Mass Loss:  Unable to assess    Fluid Accumulation:  Unable to assess     Strength:  Not Performed    Estimated Daily Nutrient Needs:  Energy (kcal):  MSJ;  kcal/d; Weight Used for Energy Requirements:  Current     Protein (g):  65-80 g/d(1.2-1.5 g/kg IBW); Weight Used for Protein Requirements:  Ideal        Fluid (ml/day):  Per medical team     Nutrition Related Findings:  Confused,  I/O +6L, trace edema, rounded abd w/ active BS, glucose trending high(10/14 326 mg/dl) , dysphagia    Wounds:  DTI heel, stage II PI - per wound care consult      Anthropometric Measures:  · Height: 5' 4\" (162.6 cm)  · Current Body Weight: 186 lb 4.6 oz (84.5 kg)(10/14 bed scale)   · Admission Body Weight: 171 lb (77.6 kg)(10/12 No method)    · Usual Body Weight: 190 lb (86.2 kg)(Per encounter note 2/3/20)     · Ideal Body Weight: 120 lbs; % Ideal Body Weight 155.2 %   · BMI: 32  · BMI Categories: Obese Class 1 (BMI 30.0-34. 9)       Nutrition Diagnosis:   · Inadequate oral intake related to cognitive or neurological impairment as evidenced by poor appetite/intake prior to admission    Nutrition Interventions:   Food and/or Nutrient Delivery:  Continue Current Diet  Nutrition Education/Counseling:  No recommendation at this time, Education not indicated   Coordination of Nutrition Care:  Continued Inpatient Monitoring    Goals:  PO >50% at meals/ONS. Nutrition Monitoring and Evaluation:   Physical Signs/Symptoms Outcomes:  Biochemical Data, Chewing or Swallowing, GI Status, Fluid Status or Edema, Hemodynamic Status, Nutrition Focused Physical Findings, Weight     Discharge Planning: To soon to determine.     Electronically signed by Zafar Estrella RD, CNSC, LD on 10/14/20 at 11:02 AM EDT    Contact: [phone]

## 2020-10-14 NOTE — PROGRESS NOTES
Critical Care Team - Daily Progress Note      Date and time: 10/14/2020 8:05 AM  Patient's name:  Judd Chapman Record Number: [id number]  Patient's account/billing number: [de-identified]  Patient's YOB: 1940  Age: 68 y.o. Date of Admission: 10/12/2020  1:59 PM  Length of stay during current admission: 2      Primary Care Physician: Joelle Rebolledo MD  ICU Attending Physician: Dr. Rogerio Buckley Status: Full Code    Reason for ICU admission:   HHS  A. Fib w/RVR  [facility]US      SUBJECTIVE:     OVERNIGHT EVENTS:   Brief episode of hypotension overnight, given 1L bolus of LR with appropriate resolution. No other issues and patient is improved this morning.         AWAKE & FOLLOWING COMMANDS:  [] No   [x] Yes    CURRENT VENTILATION STATUS:     [] Ventilator  [] BIPAP  [] Nasal Cannula [x] Room Air      IF INTUBATED, ET TUBE MARKING AT LOWER LIP:       cms    SECRETIONS Amount:  [] Small [] Moderate  [] Large  [x] None  Color:     [] White [] Colored  [] Bloody    SEDATION:  RAAS Score:  [] Propofol gtt  [] Versed gtt  [] Ativan gtt   [x] No Sedation    PARALYZED:  [x] No    [] Yes    DIARRHEA:                [x] No                [] Yes  (C. Difficile status: [] positive                                                                                                                       [] negative                                                                                                                     [] pending)    VASOPRESSORS:  [x] No    [] Yes    If yes -   [] Levophed       [] Dopamine     [] Vasopressin       [] Dobutamine  [] Phenylephrine         [] Epinephrine    CENTRAL LINES:     [] No   [x] Yes   (Date of Insertion: 10/12   )           If yes -     [] Right IJ     [] Left IJ [x] Right Femoral [] Left Femoral                   [] Right Subclavian [] Left Subclavian       ROY CATHETER:   [] No   [x] Yes  (Date of Insertion:   )         OBJECTIVE:     VITAL SIGNS:  /67 Pulse 97   Temp 97.8 °F (36.6 °C) (Axillary)   Resp 20   Ht 5' 4\" (1.626 m)   Wt 186 lb 4.6 oz (84.5 kg)   LMP  (LMP Unknown)   SpO2 100%   BMI 31.98 kg/m²   Tmax over 24 hours:  Temp (24hrs), Av.5 °F (36.4 °C), Min:96.7 °F (35.9 °C), Max:97.8 °F (36.6 °C)      Patient Vitals for the past 6 hrs:   BP Temp Temp src Pulse Resp SpO2 Weight   10/14/20 0600 112/67 -- -- 97 20 -- --   10/14/20 0500 (!) 107/54 -- -- 92 20 100 % 186 lb 4.6 oz (84.5 kg)   10/14/20 0400 100/60 97.8 °F (36.6 °C) Axillary 101 10 100 % --   10/14/20 0300 105/61 -- -- 100 10 100 % --         Intake/Output Summary (Last 24 hours) at 10/14/2020 0805  Last data filed at 10/14/2020 0545  Gross per 24 hour   Intake 5460 ml   Output 890 ml   Net 4570 ml     Wt Readings from Last 2 Encounters:   10/14/20 186 lb 4.6 oz (84.5 kg)   20 178 lb (80.7 kg)     Body mass index is 31.98 kg/m².         PHYSICAL EXAMINATION:    General appearance - resting comfortably in NAD   Mental status - A&O x1; patient is interactive, but nonsensical; not following commands  Eyes - pupils equal and reactive, extraocular eye movements intact, conjunctiva improved today with minimal drainage still present  Ears - external ear normal  Nose - normal and patent  Mouth - lips and mucous membranes dry  Neck - supple, no significant adenopathy  Chest - LCTAB with no w/r/r appreciated, symmetric air entry  Heart -RR with irregular rhythm, no m/g/r noted  Abdomen - soft, nontender and nondistended, no masses or organomegaly  Neurological - A&O to self only; interactive and talkative, but not making sense; will not follow commands  Extremities - peripheral pulses normal, no edema; deep tissue injury noted to the left heel with no open area or drainage noted  Skin - normal coloration and turgor; small stage 2 pressure ulcer noted over the coccyx; there is a small dimple noted distal to the wound that appears open and may track toward the wound; no abnormal drainage noted 10/13/20  0530 10/13/20  1355 10/14/20  0541   GLUCOSE 238* 207* 326*        PT/INR:  No results found for: PROTIME, INR  PTT:  No results found for: APTT, PTT    Comprehensive Metabolic Profile:   Recent Labs     10/13/20  0530 10/13/20  1355 10/14/20  0541   * 149* 140   K 3.5 4.1 4.3   * 123* 115*   CO2 22 21* 19*   BUN 45* 35* 21   CREATININE 0.9 0.7 0.6   GLUCOSE 238* 207* 326*   CALCIUM 8.2* 8.1* 7.9*   PROT  --  5.6*  --    LABALBU  --  2.9*  --    BILITOT  --  0.8  --    ALKPHOS  --  91  --    AST  --  30  --    ALT  --  68*  --       Magnesium:   Lab Results   Component Value Date    MG 1.7 10/14/2020     Phosphorus:   Lab Results   Component Value Date    PHOS 1.7 10/14/2020     Ionized Calcium: No results found for: CAION       Urinalysis:     Troponin: No results for input(s): TROPONINI in the last 72 hours. Cultures     Cultures during this admission:     Blood cultures:                 [x] None drawn      [] Negative             []  Positive (Details:  )  Urine Culture:  Collected 10/12                 [] None drawn      [] Negative             []  Positive (Details: Klebsiella pneumoniae  )  Sputum Culture:               [x] None drawn       [] Negative             []  Positive (Details:  )   Endotracheal aspirate:     [x] None drawn       [] Negative             []  Positive (Details:  )     No results for input(s): BC in the last 72 hours. No results for input(s): April  in the last 72 hours.     Recent Labs     10/12/20  1604   LABURIN >100,000 CFU/ml       Other pertinent Labs:   MRSA screen negative    Radiology/Imaging:   No new imaging today    SYSTEMS ASSESSMENT  Neuro   History of dementia              - on Namenda XR  History of Mood Disorder              - Celexa              - Valproic Acid as mood stabilizer  AMS              - likely 2/2 to [facility]              - Head CT done in ED                          - no acute intracranial pathology                          -

## 2020-10-14 NOTE — PROGRESS NOTES
Palliative Care Department  [phone]  Palliative Care Progress Note  Provider Karena Fong PA-C    Carlo Mask  [id number]  Hospital Day: 3  Date of Initial Consult: 10/13/2020  Referring Provider: Vj Mayberry DO   Palliative Medicine was consulted for assistance with: Goals of care    CHIEF COMPLAINT of altered mental status and hyperglycemia    ASSESSMENT/PLAN:   Outcome of goals of care meeting:  No change in CODE STATUS currently but daughter wishes to review. Pertinent Hospital Diagnoses    Dementia- FAST 6   Pneumonia -cefepime per ICU   Mental status changes -likely multifactorial, improved with treatment of pneumonia and hyperglycemia, follow uncertain of baseline   HHS -treatment per ICU    Palliative Care Encounter / Counseling Regarding Goals of Care  Please see detailed goals of care discussion as below   At this time, Carlo Mask, Does Not have capacity for medical decision-making. Capacity is time limited and situation/question specific   During encounter daughter was surrogate medical decision-maker   Code status Full Code   Advanced Directives: no POA or living will in epic   Surrogate/Legal NOK:    o Daughter [facility] [phone]    Spiritual assessment: no spiritual distress identified  Bereavement and grief: to be determined    Referrals to: none today    Subjective   Chart reviewed, discussed with bedside RN and ICU team    Details of Conversation: Palliative medicine services introduced to the patient's daughter at bedside. We reviewed her previous performance status, patient required assist for all ADLs, was wheelchair-bound however able to converse and have full conversations. She did require a modified diet and assistance eating. We reviewed use of a feeding tube in patients with dementia had varying prognoses. We additionally reviewed levels of CODE STATUS and a pamphlet was provided.   At this point she wishes to continue current care, continue assessment for swallowing and continue with a modified diet, review CODE STATUS pamphlet and will advise if changes need to be made. Past Medical History:   Diagnosis Date    Alzheimer disease ([facility]     Atrial fibrillation ([facility]     Dementia ([facility]     Diabetes mellitus ([facility]     GERD (gastroesophageal reflux disease)     Headache     Hypertension        Past Surgical History:   Procedure Laterality Date     SECTION      CHOLECYSTECTOMY, LAPAROSCOPIC      COLONOSCOPY      UPPER GASTROINTESTINAL ENDOSCOPY       Inpatient medications reviewed: yes  Home Medications reviewed: yes    Allergies   Allergen Reactions    Asa [Aspirin]        Family History   Problem Relation Age of Onset    Heart Disease Mother        Social history:  Carson status: no  Marital status:   Living status: nursing home   Work history: retired  Tobacco use: no  Drug use: no  Alcohol use: no    OBJECTIVE:   Prognosis: unknown    Physical Exam:  /60   Pulse 106   Temp 97.8 °F (36.6 °C) (Axillary)   Resp 13   Ht 5' 4\" (1.626 m)   Wt 186 lb 4.6 oz (84.5 kg)   LMP  (LMP Unknown)   SpO2 100%   BMI 31.98 kg/m²   Gen:  Elderly, NAD, responds verbally but still not opening eyes  HEENT:  Normocephalic, atraumatic  Neck:  Supple, trachea midline  Lungs: Respirations unlabored, no supplemental oxygen currently   heart[de-identified] Irregular rhythm  Abd: No bowel movement documented since admission  : Catheter  Ext: Edema of lower extremities bilaterally 1+  Skin: Pale   neuro: Still does not open eyes but follows commands  Objective data reviewed: labs, images, records, medication use, vitals and chart  Relevant data: Thyroid functions within normal limits, cortisol normal    Time/Communication  Greater than 50% of time spent, total 50 minutes in counseling and coordination of care at the bedside regarding see above.     Thank you for allowing Palliative Medicine to participate in the care of [place].    Note: This report was completed using Zbird voiced recognition software. Every effort has been made to ensure accuracy; however, inadvertent computerized transcription errors may be present.

## 2020-10-14 NOTE — PROGRESS NOTES
Occupational Therapy  OCCUPATIONAL THERAPY INITIAL EVALUATION      Date:10/14/2020  Patient Name: Renee Schwarz  MRN: [id number]  : 1940  Room: [address] [place] [address]    Referring Provider: Dafne Sotelo MD    Evaluating OT: Anna Powers OTR/L [id number]    AM-PAC Daily Activity Raw Score:     Recommended Adaptive Equipment: TBD    Diagnosis: Severe Sepsis. Pt presents to ED from [facility] with AMS and tachycardia. Pertinent Medical History: Alzheimer's disease, HTN, DM   Precautions:  Falls, O2     Home Living: PLOF from daughter report. Pts daughter reports she is a LTC resident of [address] [place] [address]. Daughter reports she assumes use of a coleen lift in transfers and has staff assist in all mobility wc level. Pt requires total staff assist in all ADL/IADLs. Daughter reports pt typically keeps her eyes closed but will interact minimally. Pain Level: observable signs of pain with PROM L LE    Cognition: A&O: . Pt responds to her name. Pt does verbalize but has nonsensical speech. Inconsistent answers to simple questions. Confused throughout. Does not open eyes. Does not follow 1 step motor commands. Problem solving:  poor   Judgement/safety:  poor     Functional Assessment:   Initial Eval Status  Date: 10/14/20 Treatment session:  Short Term Goals  Treatment frequency: 1-3x/wk PRN x1-3 wks     Feeding Dependent  Max A   Grooming Dependent  Max A   UB Dressing Dependent  Max A   LB Dressing Dependent  Max A    Bathing Dependent  Max A   Toileting Dependent  Max A   Bed Mobility  Long sitting in bed: Dependent     Functional Transfers NA     Functional Mobility NA     Balance Sitting: dependent       Activity Tolerance poor  Pt will attend and follow simple ADL tasks x2-3 min with min to mod cues   B UE No active follow through of any motor commands.  PROM to tolerance B UEs 1 set x3 reps  Pt will tolerate A/AROM and contracture management techniques B UEs to maximize quality of life       Treatment: Patient educated on techniques for completion of ADL. Patient required cues for follow through with proper hand/foot placement, pacing, safety, attention, sequencing and technique in bed mobility and ROM in preparation for maximum independence in all self care tasks.      Hand Dominance: Right []  Left []   Strength ROM Additional Info:    BUE  No follow through of motor commands or functional attempts to  Utilize UEs during assessment PROM SH: flex approx 100 degrees, elbow WFL but stiff at end ranges  Wrist approx 5 degrees flex/ext primarily fixed in neutral  Digits postured in flexion and stiffness present        Hearing: appears functional  Vision: keeps eyes closed throughout session, per daughter this is her baseline  Sensation:  No c/o numbness or tingling   Edema: B UE/LEs                            Long Term Goal (1-3 wks): Pt will maximize functional performance in all self care taskswith good follow through of all trained techniques for safe transition to next level of care    Eval Complexity: Low    Assessment of current deficits   Functional mobility [x]  ADLs [x] Strength [x]  Cognition [x]  Functional transfers  [x] IADLs [x] Safety Awareness [x]  Endurance [x]  Fine Motor Coordination [x] Balance [x] Vision/perception [] Sensation []   Gross Motor Coordination [x] ROM [x] Delirium []                  Motor Control [x]    Plan of Care:   [x] ADL retraining/AE recommendations specific to dementia, weakness, fatigue, decreased engagement in environment  [x] Energy Conservation Techniques/Strategies      [] Neuromuscular Re-Education      [x] Functional Transfer Training         [x] Functional Mobility Training          [] Cognitive Re-Training         [] Splinting/Positioning Needs           [x] Therapeutic Activity   [x]Therapeutic Exercise   [] Visual/Perceptual  [x] Delirium Prevention/Treatment   [x] Positioning to Improve Functional [place], Safety, and Skin Integrity   [x] Patient and/or Family

## 2020-10-14 NOTE — PROGRESS NOTES
Physical Therapy    Facility/Department: [facility] 1S ICU  Initial Assessment    NAME: Davion Knowles  : 1940  MRN: [id number]    Date of Service: 10/14/2020      Patient Diagnosis(es): The primary encounter diagnosis was Respiratory failure with hypoxia, unspecified chronicity ([address] Diagnoses of Pneumonia due to organism, Altered mental status, unspecified altered mental status type, HHS (hypothenar hammer syndrome) ([facility] [facility] Severe sepsis ([address] Atrial fibrillation with RVR ([address] and Hypernatremia were also pertinent to this visit. has a past medical history of Alzheimer disease ([facility] [address] [facility] Atrial fibrillation ([facility] [address] Dementia ([facility] [address] Diabetes mellitus ([facility] [address] [facility] GERD (gastroesophageal reflux disease), Headache, and Hypertension. has a past surgical history that includes  section; Cholecystectomy, laparoscopic; Colonoscopy; and Upper gastrointestinal endoscopy. Referring Provider:  Rimma Elena MD       Evaluating Therapist: [facility] PT      Room #:214  DIAGNOSIS: Severe Sepsis  Additional Pertinent History: dementia, A-fib,  PRECAUTIONS: falls    Social:  Pt admitted from [facility]. Dependent with coleen lift for transfers     Initial Evaluation  Date: 10/14/20 Treatment      Short Term/ Long Term   Goals   Was pt agreeable to Eval/treatment?  family agreed     Does pt have pain?  Pain L LE and UE with ROM     Bed Mobility  Rolling: dependent  Supine to long sit dependent  Scooting: dependent  Max   Transfers Sit to stand: NT  Stand to sit: NT  Stand pivot: NT  N/A recommend coleen lift   Ambulation    NT  N/A pt non-ambulatory   Stair Negotiation  Ascended and descended  NT   N/A   LE strength     R LE grossly 2-/5        balance      NT     AM-PAC Raw score                        Pt is confused, eyes closed during session  LE ROM: WFL however increased pain L LE with ROM  Sensation: NT  Endurance: poor     ASSESSMENT  Pt displays functional ability as noted in the objective portion of this evaluation. Patient education  Pt educated on PT objectives    Comments:  Pt with pain with ROM L LE    Pt's/ family goals   1. None stated    Patient and or family understand(s) diagnosis, prognosis, and plan of care. family    PLAN:    PLAN OF CARE:    Current Treatment Recommendations     [x] Strengthening     [x] ROM   [] Balance Training   [] Endurance Training   [] Transfer Training   [] Gait Training   [] Stair Training   [x] Positioning   [x] Safety and Education Training   [x] Patient/Caregiver Education   [] HEP  [] Other     Frequency of treatments: 1-3x/week x 3 .days    Time in  1140  Time out  1155      Evaluation Time includes thorough review of current medical information, gathering information on past medical history/social history and prior level of function, completion of standardized testing/informal observation of tasks, assessment of data and education on plan of care and goals.     CPT codes:  [x] Low Complexity PT evaluation 66154  [] Moderate Complexity PT evaluation 56671  [] High Complexity PT evaluation 58049  [] PT Re-evaluation 10511  [] Gait training 72197 minutes  [] Manual therapy 38594 minutes  [] Therapeutic activities 17597 minutes  [] Therapeutic exercises 89513 minutes  [] Neuromuscular reeducation 40704 minutes     Fabi PT [id number]

## 2020-10-14 NOTE — CARE COORDINATION
COVID negative 10/12. Downgraded to intermediate LOC. From [address] PTA- bedhold- per [facility], they will submit today to insurance but [address] is able to accept whenever pt is discharged- no need to wait for auth. PT am-pac 6. Palliative care following- full code at present. Will follow.  Lionel Bennett

## 2020-10-15 LAB
ANION GAP SERPL CALCULATED.3IONS-SCNC: 8 MMOL/L (ref 7–16)
BASOPHILS ABSOLUTE: 0.02 E9/L (ref 0–0.2)
BASOPHILS RELATIVE PERCENT: 0.3 % (ref 0–2)
BUN BLDV-MCNC: 13 MG/DL (ref 8–23)
CALCIUM SERPL-MCNC: 8.4 MG/DL (ref 8.6–10.2)
CHLORIDE BLD-SCNC: 113 MMOL/L (ref 98–107)
CO2: 20 MMOL/L (ref 22–29)
CREAT SERPL-MCNC: 0.6 MG/DL (ref 0.5–1)
EOSINOPHILS ABSOLUTE: 0.15 E9/L (ref 0.05–0.5)
EOSINOPHILS RELATIVE PERCENT: 1.9 % (ref 0–6)
GFR AFRICAN AMERICAN: >60
GFR NON-AFRICAN AMERICAN: >60 ML/MIN/1.73
GLUCOSE BLD-MCNC: 137 MG/DL (ref 74–99)
HCT VFR BLD CALC: 37.7 % (ref 34–48)
HEMOGLOBIN: 12 G/DL (ref 11.5–15.5)
IMMATURE GRANULOCYTES #: 0.07 E9/L
IMMATURE GRANULOCYTES %: 0.9 % (ref 0–5)
LYMPHOCYTES ABSOLUTE: 1.91 E9/L (ref 1.5–4)
LYMPHOCYTES RELATIVE PERCENT: 23.9 % (ref 20–42)
MAGNESIUM: 1.8 MG/DL (ref 1.6–2.6)
MCH RBC QN AUTO: 30.4 PG (ref 26–35)
MCHC RBC AUTO-ENTMCNC: 31.8 % (ref 32–34.5)
MCV RBC AUTO: 95.4 FL (ref 80–99.9)
METER GLUCOSE: 135 MG/DL (ref 74–99)
METER GLUCOSE: 136 MG/DL (ref 74–99)
METER GLUCOSE: 163 MG/DL (ref 74–99)
METER GLUCOSE: 221 MG/DL (ref 74–99)
MONOCYTES ABSOLUTE: 0.55 E9/L (ref 0.1–0.95)
MONOCYTES RELATIVE PERCENT: 6.9 % (ref 2–12)
NEUTROPHILS ABSOLUTE: 5.28 E9/L (ref 1.8–7.3)
NEUTROPHILS RELATIVE PERCENT: 66.1 % (ref 43–80)
PDW BLD-RTO: 13.6 FL (ref 11.5–15)
PHOSPHORUS: 2 MG/DL (ref 2.5–4.5)
PLATELET # BLD: 108 E9/L (ref 130–450)
PMV BLD AUTO: 13.3 FL (ref 7–12)
POTASSIUM SERPL-SCNC: 3.8 MMOL/L (ref 3.5–5)
RBC # BLD: 3.95 E12/L (ref 3.5–5.5)
SODIUM BLD-SCNC: 141 MMOL/L (ref 132–146)
WBC # BLD: 8 E9/L (ref 4.5–11.5)

## 2020-10-15 PROCEDURE — 2060000000 HC ICU INTERMEDIATE R&B

## 2020-10-15 PROCEDURE — 6360000002 HC RX W HCPCS: Performed by: INTERNAL MEDICINE

## 2020-10-15 PROCEDURE — 85025 COMPLETE CBC W/AUTO DIFF WBC: CPT

## 2020-10-15 PROCEDURE — 2580000003 HC RX 258: Performed by: INTERNAL MEDICINE

## 2020-10-15 PROCEDURE — 6370000000 HC RX 637 (ALT 250 FOR IP): Performed by: INTERNAL MEDICINE

## 2020-10-15 PROCEDURE — 80048 BASIC METABOLIC PNL TOTAL CA: CPT

## 2020-10-15 PROCEDURE — 99233 SBSQ HOSP IP/OBS HIGH 50: CPT | Performed by: INTERNAL MEDICINE

## 2020-10-15 PROCEDURE — 83735 ASSAY OF MAGNESIUM: CPT

## 2020-10-15 PROCEDURE — 82962 GLUCOSE BLOOD TEST: CPT

## 2020-10-15 PROCEDURE — 84100 ASSAY OF PHOSPHORUS: CPT

## 2020-10-15 PROCEDURE — 36415 COLL VENOUS BLD VENIPUNCTURE: CPT

## 2020-10-15 PROCEDURE — 92526 ORAL FUNCTION THERAPY: CPT | Performed by: SPEECH-LANGUAGE PATHOLOGIST

## 2020-10-15 PROCEDURE — 99231 SBSQ HOSP IP/OBS SF/LOW 25: CPT | Performed by: PHYSICIAN ASSISTANT

## 2020-10-15 PROCEDURE — 94640 AIRWAY INHALATION TREATMENT: CPT

## 2020-10-15 PROCEDURE — 2500000003 HC RX 250 WO HCPCS: Performed by: INTERNAL MEDICINE

## 2020-10-15 RX ADMIN — IPRATROPIUM BROMIDE AND ALBUTEROL SULFATE 1 AMPULE: .5; 3 SOLUTION RESPIRATORY (INHALATION) at 09:01

## 2020-10-15 RX ADMIN — IPRATROPIUM BROMIDE AND ALBUTEROL SULFATE 1 AMPULE: .5; 3 SOLUTION RESPIRATORY (INHALATION) at 12:42

## 2020-10-15 RX ADMIN — FLECAINIDE ACETATE 100 MG: 100 TABLET ORAL at 08:44

## 2020-10-15 RX ADMIN — INSULIN LISPRO 4 UNITS: 100 INJECTION, SOLUTION INTRAVENOUS; SUBCUTANEOUS at 11:15

## 2020-10-15 RX ADMIN — APIXABAN 5 MG: 5 TABLET, FILM COATED ORAL at 20:39

## 2020-10-15 RX ADMIN — METOPROLOL SUCCINATE 12.5 MG: 25 TABLET, EXTENDED RELEASE ORAL at 08:40

## 2020-10-15 RX ADMIN — CEFTRIAXONE 1 G: 1 INJECTION, POWDER, FOR SOLUTION INTRAMUSCULAR; INTRAVENOUS at 15:43

## 2020-10-15 RX ADMIN — DIVALPROEX SODIUM 125 MG: 125 TABLET, DELAYED RELEASE ORAL at 08:44

## 2020-10-15 RX ADMIN — SODIUM CHLORIDE: 4.5 INJECTION, SOLUTION INTRAVENOUS at 08:40

## 2020-10-15 RX ADMIN — ACETAMINOPHEN 650 MG: 325 TABLET ORAL at 10:37

## 2020-10-15 RX ADMIN — INSULIN GLARGINE 20 UNITS: 100 INJECTION, SOLUTION SUBCUTANEOUS at 08:44

## 2020-10-15 RX ADMIN — GENTAMICIN SULFATE 0.5 INCH: 3 OINTMENT OPHTHALMIC at 15:47

## 2020-10-15 RX ADMIN — IPRATROPIUM BROMIDE AND ALBUTEROL SULFATE 1 AMPULE: .5; 3 SOLUTION RESPIRATORY (INHALATION) at 21:00

## 2020-10-15 RX ADMIN — Medication 10 ML: at 10:11

## 2020-10-15 RX ADMIN — GENTAMICIN SULFATE 0.5 INCH: 3 OINTMENT OPHTHALMIC at 08:44

## 2020-10-15 RX ADMIN — SODIUM PHOSPHATE, MONOBASIC, MONOHYDRATE 13.53 MMOL: 276; 142 INJECTION, SOLUTION INTRAVENOUS at 10:35

## 2020-10-15 RX ADMIN — APIXABAN 5 MG: 5 TABLET, FILM COATED ORAL at 08:44

## 2020-10-15 RX ADMIN — GENTAMICIN SULFATE 0.5 INCH: 3 OINTMENT OPHTHALMIC at 20:40

## 2020-10-15 RX ADMIN — Medication 10 ML: at 15:48

## 2020-10-15 RX ADMIN — INSULIN LISPRO 1 UNITS: 100 INJECTION, SOLUTION INTRAVENOUS; SUBCUTANEOUS at 20:38

## 2020-10-15 RX ADMIN — ENOXAPARIN SODIUM 40 MG: 40 INJECTION SUBCUTANEOUS at 08:44

## 2020-10-15 RX ADMIN — SODIUM CHLORIDE, PRESERVATIVE FREE 10 ML: 5 INJECTION INTRAVENOUS at 08:53

## 2020-10-15 RX ADMIN — FLECAINIDE ACETATE 100 MG: 100 TABLET ORAL at 20:39

## 2020-10-15 ASSESSMENT — PAIN SCALES - GENERAL
PAINLEVEL_OUTOF10: 3
PAINLEVEL_OUTOF10: 0
PAINLEVEL_OUTOF10: 0

## 2020-10-15 ASSESSMENT — PAIN SCALES - PAIN ASSESSMENT IN ADVANCED DEMENTIA (PAINAD)
FACIALEXPRESSION: 0
NEGVOCALIZATION: 0
CONSOLABILITY: 0
BODYLANGUAGE: 0
BREATHING: 0
TOTALSCORE: 0

## 2020-10-15 NOTE — PROGRESS NOTES
SPEECH LANGUAGE PATHOLOGY  DAILY PROGRESS NOTE        PATIENT NAME:  [place]      :  1940          TODAY'S DATE:  10/15/2020 ROOM:  5504/8763-O        SWALLOWING    Pt in bed, eyes closed however responding verbally. Confusion continues. Staff who fed pt AM meal reports good toleration of current diet with oral delay reported. Reports consumed approximately 50% with no overt s/s of aspiration. DYSPHAGIA DIAGNOSIS: oropharyngeal dysphagia-overt s/s of aspiration observed                            DIET RECOMMENDATIONS:  Dysphagia 1, Pureed solids with  honey consistency (moderately thick) liquids-NO STRAWS                 FEEDING RECOMMENDATIONS:                              Assistance level:  Full assistance is needed during all oral intake                             Compensatory strategies recommended: Small bites/sips, Alternate solids and liquids, Check for oral pocketing and No straw, cues to initiate swallow as needed        Education- Pt educated on results and POC. Pt trained on compensatory strategies for safe swallow with fair-poor outcome. Questions answered. Will continue SP intervention as per previously established POC. Eun JENKINS CCC/SLP [id number]  Speech Pathologist              CPT code(s) 65591  dysphagia tx  Total minutes :  15 minutes

## 2020-10-15 NOTE — PLAN OF CARE
Problem: Fluid Volume:  Goal: Ability to achieve a balanced intake and output will improve  Description: Ability to achieve a balanced intake and output will improve  10/15/2020 0013 by Rosa Dominguez RN  Outcome: Met This Shift  10/14/2020 1520 by Rosalinda Shi RN  Outcome: Ongoing     Problem: Physical Regulation:  Goal: Ability to maintain clinical measurements within normal limits will improve  Description: Ability to maintain clinical measurements within normal limits will improve  10/15/2020 0013 by Rosa Dominguez RN  Outcome: Met This Shift  10/14/2020 1520 by Rosalinda Shi RN  Outcome: Met This Shift  Goal: Will show no signs and symptoms of electrolyte imbalance  Description: Will show no signs and symptoms of electrolyte imbalance  10/14/2020 1520 by Rosalinda Shi RN  Outcome: Met This Shift     Problem: OXYGENATION/RESPIRATORY FUNCTION  Goal: Patient will maintain patent airway  10/15/2020 0013 by Rosa Dominguez RN  Outcome: Met This Shift  10/14/2020 1520 by Rosalinda Shi RN  Outcome: Met This Shift  Goal: Patient will achieve/maintain normal respiratory rate/effort  Description: Respiratory rate and effort will be within normal limits for the patient  10/14/2020 1520 by Rosalinda Shi RN  Outcome: Met This Shift     Problem: MECHANICAL VENTILATION  Goal: Patient will maintain patent airway  10/14/2020 1520 by Rosalinda Shi RN  Outcome: Met This Shift  Goal: Oral health is maintained or improved  10/14/2020 1520 by Rosalinda Shi RN  Outcome: Met This Shift     Problem: Skin Integrity:  Goal: Will show no infection signs and symptoms  Description: Will show no infection signs and symptoms  10/14/2020 1520 by Rosalinda Shi RN  Outcome: Met This Shift     Problem: Inadequate protein-energy intake  (NI-5.3)  Goal: Food and/or Nutrient Delivery  Description: Individualized approach for food/nutrient provision.   10/14/2020 1111 by Abisai Rey RD, CNSC, LD  Outcome: Met This Shift Problem: SKIN INTEGRITY  Goal: Skin integrity is maintained or improved  10/15/2020 0013 by Larry Middleton RN  Outcome: Not Met This Shift  10/14/2020 1520 by Broderick Nguyen RN  Outcome: Met This Shift     Problem: NUTRITION  Goal: Nutritional status is improving  Outcome: Not Met This Shift

## 2020-10-15 NOTE — PROGRESS NOTES
[facility] ([facility]) Hospitalist Progress Note    Admission Date  10/12/2020  1:59 PM  Chief Complaint Hyperglycemia, AMS  Admit Dx   Severe sepsis ([facility] [A41.9, R65.20]    Subjective  History of Present Illness  10/15 [place] is a 76F w PMH Alzheimer dementia, a fib on Eliquis, HTN, GERD, apparent hx of DM (EMR showed no insulin or meds) who was admitted on 10/12 from SNF with tachycardia, hyperglycemia, and with worsened confusion. CXR showed infiltrates, also noted to be hyperNa. Pt admitted to ICU on insulin gtt. BG improved. On Rocephin which urine cx (Klebsiella) shows sensitivity to. Now out of ICU. Continues on IV abx, today is day 2. Pt is a bed hold at [facility].       Review of Systems - 12-point review of systems has been reviewed and is otherwise negative except as listed in the HPI    Objective  Physical Exam  Vitals: /75   Pulse 100   Temp 97.9 °F (36.6 °C) (Oral)   Resp 18   Ht 5' 4\" (1.626 m)   Wt 190 lb 1.6 oz (86.2 kg)   LMP  (LMP Unknown)   SpO2 99%   BMI 32.63 kg/m²   General: well-developed, well-nourished, no acute distress, cooperative  Skin: warm, dry, intact, normal color without cyanosis  HEENT: normocephalic, atraumatic, mucous membranes normal  Respiratory: clear to auscultation bilaterally without respiratory distress  Cardiovascular: regular rate and rhythm without murmur / rub / gallop  Abdominal: soft, nontender, nondistended, normoactive bowel sounds, Segura in place  Extremities: no mottling, pulses intact, no edema  Neurologic: awake, alert confused, no focal deficits  Psychiatric: normal affect, cooperative    Assessment / Plan  Multifactorial AMS - combo of dementia / Rowan Martina / UTI / hyperglycemia - improving w treatment of underlying issues    Klebsiella UTI - on Rocephin started 10/14, no longer meets septic criteria    Uncontrolled DM - a1c 10.3, on Lantus 20 U nightly and ISS, running ~130 this morning    HyperNa - currently resolved at 141, monitor on labs    A fib on Eliquis - rate-controlled on home metoprolol, continue home Eliquis    HTN - continue home metoprolol    Oropharyngeal dysphagia - noted by speech 10/14, on dysphagia diet; unsure if this is her baseline or just d/t encephalopathy; does appear to be doing better with eating    Code status  Full  DVT prophylaxis Lovenox  Disposition  SNF - pt is bed hold, poss dc tomorrow after 3 days abx    Electronically signed by Sneha Santizo DO on 10/15/2020 at 1:51 PM

## 2020-10-15 NOTE — PROGRESS NOTES
Palliative Care Department  [phone]  Palliative Care Progress Note  Provider Ludwig Hatchet PA-C    [place]  [id number]  Hospital Day: 4  Date of Initial Consult: 10/13/2020  Referring Provider: Sary Freeman DO   Palliative Medicine was consulted for assistance with: Goals of care    CHIEF COMPLAINT of altered mental status and hyperglycemia    ASSESSMENT/PLAN:   Outcome of goals of care meeting:  Provided pamphlet    Pertinent Hospital Diagnoses    Dementia- FAST 6   Pneumonia   Mental status changes resolved   HHS resolved   Dysphagia- modified diet, speech therapy    Palliative Care Encounter / Counseling Regarding Goals of Care  Please see detailed goals of care discussion as below   At this time, [place], Does Not have capacity for medical decision-making. Capacity is time limited and situation/question specific   During encounter daughter was surrogate medical decision-maker   Code status Full Code   Advanced Directives: no POA or living will in epic   Surrogate/Legal NOK:    o Daughter Dannie Izquierdo [phone]    Spiritual assessment: no spiritual distress identified  Bereavement and grief: to be determined    Referrals to: none today. Discharge to facility today    Subjective   Chart reviewed  Patient transferred from ICU  Speech therapy following, modified diet ordered  No needs, plan to d/c    Details of Conversation:   No additional palliative medicine needs. Provided pamphlet to review DNR.   Support at bedside    Past Medical History:   Diagnosis Date    Alzheimer disease ([address]     Atrial fibrillation ([address]     Dementia ([address]     Diabetes mellitus ([address]     GERD (gastroesophageal reflux disease)     Headache     Hypertension        Past Surgical History:   Procedure Laterality Date     SECTION      CHOLECYSTECTOMY, LAPAROSCOPIC      COLONOSCOPY      UPPER GASTROINTESTINAL ENDOSCOPY       Inpatient medications reviewed: yes  Home Medications reviewed: yes    Allergies   Allergen Reactions    Asa [Aspirin]        Family History   Problem Relation Age of Onset    Heart Disease Mother        Social history:   status: no  Marital status:   Living status: nursing home   Work history: retired  Tobacco use: no  Drug use: no  Alcohol use: no    OBJECTIVE:   Prognosis: unknown    Physical Exam:  /75   Pulse 100   Temp 97.9 °F (36.6 °C) (Oral)   Resp 18   Ht 5' 4\" (1.626 m)   Wt 190 lb 1.6 oz (86.2 kg)   LMP  (LMP Unknown)   SpO2 99%   BMI 32.63 kg/m²   Gen:  Elderly, NAD, responds verbally but still not opening eyes  HEENT:  Normocephalic, atraumatic  Neck:  Supple, trachea midline  Lungs: Respirations unlabored, no supplemental oxygen currently   heart[de-identified] Irregular rhythm  Abd: No bowel movement documented since admission  : Catheter  Ext: Edema of lower extremities bilaterally 1+  Skin: Pale   neuro: Still does not open eyes but follows commands  Objective data reviewed: labs, images, records, medication use, vitals and chart  Relevant data: Thyroid functions within normal limits, cortisol normal    Time/Communication  Greater than 50% of time spent, total 15 minutes in counseling and coordination of care at the bedside regarding see above. Thank you for allowing Palliative Medicine to participate in the care of [place].    Note: This report was completed using computerBambeco voiced recognition software. Every effort has been made to ensure accuracy; however, inadvertent computerized transcription errors may be present.

## 2020-10-15 NOTE — PROGRESS NOTES
Physician Progress Note      PATIENT:               Izzy Smith  CSN #:                  [id number]  :                       1940  ADMIT DATE:       10/12/2020 1:59 PM  [facility] DATE:  RESPONDING  PROVIDER #:        Aruna Mathur DO          QUERY TEXT:    Dear Attending,    Pt admitted with AMS. Pt noted to have UTI and  on admission. Per IM   notes, \". Saida  Saida  Change in mental status, multifactorial, due to UTI, due to   uncontrolled diabetes, due to hyponatremia, slowly improving, will take at   least couple of days. Saida  Saida  \". If possible, please document in the progress notes   and discharge summary if you are evaluating and / or treating any of the   following: The medical record reflects the following:  Risk Factors: UTI, DM, advanced age  Clinical Indicators: per IM, \". Saida  Siada  Change in mental status, multifactorial, due   to UTI, due to uncontrolled diabetes, due to hyponatremia, slowly improving,   will take at least couple of days. Saida  Saida  \";  per CC, \". ..AMS...likely 2/2 to   [facility]. Saida  Saida  Head CT done in ED. ..no acute intracranial pathology. Saida  Saida  \";  Treatment: CT head;  Options provided:  -- Metabolic encephalopathy  -- Other - I will add my own diagnosis  -- Disagree - Not applicable / Not valid  -- Disagree - Clinically unable to determine / Unknown  -- Refer to Clinical Documentation Reviewer    PROVIDER RESPONSE TEXT:    This patient has metabolic encephalopathy.     Query created by: Martin Mcfarland on 10/15/2020 11:24 AM      Electronically signed by:  Aruna Mathur DO 10/15/2020 1:47 PM

## 2020-10-16 VITALS
BODY MASS INDEX: 34.72 KG/M2 | SYSTOLIC BLOOD PRESSURE: 144 MMHG | WEIGHT: 203.4 LBS | TEMPERATURE: 96.7 F | RESPIRATION RATE: 16 BRPM | HEART RATE: 105 BPM | OXYGEN SATURATION: 98 % | HEIGHT: 64 IN | DIASTOLIC BLOOD PRESSURE: 85 MMHG

## 2020-10-16 LAB
ANION GAP SERPL CALCULATED.3IONS-SCNC: 8 MMOL/L (ref 7–16)
BASOPHILS ABSOLUTE: 0.03 E9/L (ref 0–0.2)
BASOPHILS RELATIVE PERCENT: 0.3 % (ref 0–2)
BUN BLDV-MCNC: 10 MG/DL (ref 8–23)
CALCIUM SERPL-MCNC: 8.4 MG/DL (ref 8.6–10.2)
CHLORIDE BLD-SCNC: 113 MMOL/L (ref 98–107)
CO2: 21 MMOL/L (ref 22–29)
CREAT SERPL-MCNC: 0.6 MG/DL (ref 0.5–1)
EOSINOPHILS ABSOLUTE: 0.14 E9/L (ref 0.05–0.5)
EOSINOPHILS RELATIVE PERCENT: 1.4 % (ref 0–6)
GFR AFRICAN AMERICAN: >60
GFR NON-AFRICAN AMERICAN: >60 ML/MIN/1.73
GLUCOSE BLD-MCNC: 116 MG/DL (ref 74–99)
HCT VFR BLD CALC: 34.8 % (ref 34–48)
HEMOGLOBIN: 11.5 G/DL (ref 11.5–15.5)
IMMATURE GRANULOCYTES #: 0.06 E9/L
IMMATURE GRANULOCYTES %: 0.6 % (ref 0–5)
LYMPHOCYTES ABSOLUTE: 2.56 E9/L (ref 1.5–4)
LYMPHOCYTES RELATIVE PERCENT: 25.6 % (ref 20–42)
MAGNESIUM: 1.8 MG/DL (ref 1.6–2.6)
MCH RBC QN AUTO: 30.8 PG (ref 26–35)
MCHC RBC AUTO-ENTMCNC: 33 % (ref 32–34.5)
MCV RBC AUTO: 93.3 FL (ref 80–99.9)
METER GLUCOSE: 123 MG/DL (ref 74–99)
METER GLUCOSE: 145 MG/DL (ref 74–99)
MONOCYTES ABSOLUTE: 0.83 E9/L (ref 0.1–0.95)
MONOCYTES RELATIVE PERCENT: 8.3 % (ref 2–12)
NEUTROPHILS ABSOLUTE: 6.39 E9/L (ref 1.8–7.3)
NEUTROPHILS RELATIVE PERCENT: 63.8 % (ref 43–80)
PDW BLD-RTO: 14 FL (ref 11.5–15)
PHOSPHORUS: 2.4 MG/DL (ref 2.5–4.5)
PLATELET # BLD: 113 E9/L (ref 130–450)
PMV BLD AUTO: 13.9 FL (ref 7–12)
POTASSIUM SERPL-SCNC: 3.4 MMOL/L (ref 3.5–5)
RBC # BLD: 3.73 E12/L (ref 3.5–5.5)
SODIUM BLD-SCNC: 142 MMOL/L (ref 132–146)
WBC # BLD: 10 E9/L (ref 4.5–11.5)

## 2020-10-16 PROCEDURE — 6370000000 HC RX 637 (ALT 250 FOR IP): Performed by: INTERNAL MEDICINE

## 2020-10-16 PROCEDURE — 6360000002 HC RX W HCPCS: Performed by: INTERNAL MEDICINE

## 2020-10-16 PROCEDURE — 36415 COLL VENOUS BLD VENIPUNCTURE: CPT

## 2020-10-16 PROCEDURE — 80048 BASIC METABOLIC PNL TOTAL CA: CPT

## 2020-10-16 PROCEDURE — 82962 GLUCOSE BLOOD TEST: CPT

## 2020-10-16 PROCEDURE — 2580000003 HC RX 258: Performed by: INTERNAL MEDICINE

## 2020-10-16 PROCEDURE — 94640 AIRWAY INHALATION TREATMENT: CPT

## 2020-10-16 PROCEDURE — 99239 HOSP IP/OBS DSCHRG MGMT >30: CPT | Performed by: INTERNAL MEDICINE

## 2020-10-16 PROCEDURE — 83735 ASSAY OF MAGNESIUM: CPT

## 2020-10-16 PROCEDURE — 84100 ASSAY OF PHOSPHORUS: CPT

## 2020-10-16 PROCEDURE — 85025 COMPLETE CBC W/AUTO DIFF WBC: CPT

## 2020-10-16 RX ORDER — INSULIN GLARGINE 100 [IU]/ML
20 INJECTION, SOLUTION SUBCUTANEOUS DAILY
Qty: 1 VIAL | Refills: 3 | DISCHARGE
Start: 2020-10-17 | End: 2022-01-17

## 2020-10-16 RX ADMIN — IPRATROPIUM BROMIDE AND ALBUTEROL SULFATE 1 AMPULE: .5; 3 SOLUTION RESPIRATORY (INHALATION) at 12:29

## 2020-10-16 RX ADMIN — FLECAINIDE ACETATE 100 MG: 100 TABLET ORAL at 08:46

## 2020-10-16 RX ADMIN — SODIUM CHLORIDE: 4.5 INJECTION, SOLUTION INTRAVENOUS at 02:46

## 2020-10-16 RX ADMIN — IPRATROPIUM BROMIDE AND ALBUTEROL SULFATE 1 AMPULE: .5; 3 SOLUTION RESPIRATORY (INHALATION) at 09:04

## 2020-10-16 RX ADMIN — APIXABAN 5 MG: 5 TABLET, FILM COATED ORAL at 08:48

## 2020-10-16 RX ADMIN — INSULIN LISPRO 2 UNITS: 100 INJECTION, SOLUTION INTRAVENOUS; SUBCUTANEOUS at 11:36

## 2020-10-16 RX ADMIN — ENOXAPARIN SODIUM 40 MG: 40 INJECTION SUBCUTANEOUS at 08:46

## 2020-10-16 RX ADMIN — METOPROLOL SUCCINATE 12.5 MG: 25 TABLET, EXTENDED RELEASE ORAL at 08:46

## 2020-10-16 RX ADMIN — INSULIN GLARGINE 20 UNITS: 100 INJECTION, SOLUTION SUBCUTANEOUS at 08:46

## 2020-10-16 RX ADMIN — GENTAMICIN SULFATE 0.5 INCH: 3 OINTMENT OPHTHALMIC at 08:48

## 2020-10-16 RX ADMIN — DIVALPROEX SODIUM 125 MG: 125 TABLET, DELAYED RELEASE ORAL at 08:46

## 2020-10-16 ASSESSMENT — PAIN SCALES - PAIN ASSESSMENT IN ADVANCED DEMENTIA (PAINAD)
TOTALSCORE: 0
BREATHING: 0
NEGVOCALIZATION: 0
TOTALSCORE: 0
BODYLANGUAGE: 0
CONSOLABILITY: 0
BREATHING: 0
BODYLANGUAGE: 0
FACIALEXPRESSION: 0
CONSOLABILITY: 0
NEGVOCALIZATION: 0
FACIALEXPRESSION: 0

## 2020-10-16 NOTE — CARE COORDINATION
10/16/2020  Social Work Discharge Planning:SW set up ambulance transport via [employer] for Pt to return to [facility] today. Nurse here, Jorge Taylor at [facility] and were notified. Electronically signed by ROGELIO Vega on 10/16/2020 at 9:18 AM

## 2020-10-16 NOTE — DISCHARGE SUMMARY
[facility] Physician Discharge Summary     KEY[address] [id number]  [phone]    Activity level   As per therapy    Disposition   SNF      Condition on discharge Stable    Patient ID   [address], 68 y. o.female /  1940  / MRN [id number]    Admit date   10/12/2020    Discharge date  10/16/2020  12:22 PM    Admission diagnoses Active Problems:    Severe sepsis [facility])  Resolved Problems:    * No resolved hospital problems. *    Discharge diagnoses Same    Consults   IP CONSULT TO CRITICAL CARE  IP CONSULT TO PALLIATIVE CARE  IP CONSULT TO DIETITIAN  IP CONSULT TO IV TEAM  IP CONSULT TO IV TEAM    Procedures   See hospital course    Hank Ruth is a 76F w PMH Alzheimer dementia, a fib on Eliquis, HTN, GERD, apparent hx of DM (EMR showed no insulin or meds) who was admitted on 10/12 from SNF with tachycardia, hyperglycemia, and with worsened confusion. CXR showed infiltrates, also noted to be hyperNa. Pt admitted to ICU on insulin gtt. BG improved. On Rocephin which urine cx (Klebsiella) shows sensitivity to. Now out of ICU.     Multifactorial AMS - combo of dementia / Burlene Arm / UTI / hyperglycemia - improving w treatment of underlying issues     Klebsiella UTI - on Rocephin started 10/14, no longer meets septic criteria - completed 3 days and back to baseline mental status per daughter     Uncontrolled DM - a1c 10.3, on Lantus 20 U nightly and ISS, running ~120 this morning - DC ON LANTUS AND ISS     HyperNa - currently resolved at 142, monitor on labs     A fib on Eliquis - rate-controlled on home metoprolol, continue home Eliquis     HTN - continue home metoprolol     Oropharyngeal dysphagia - noted by speech 10/14, on dysphagia diet; unsure if this is her baseline or just d/t encephalopathy; does appear to be doing better with eating    Discharge Exam  BP (!) 144/85   Pulse 105   Temp 96.7 °F (35.9 °C) (Axillary)   Resp 16   Ht 5' 4\" (1.626 m)   Wt 203 lb 6.4 oz (92.3 kg)   LMP  (LMP Unknown)   SpO2 98%   BMI 34.91 kg/m²   General: well-developed, well-nourished, no acute distress, cooperative  Skin: warm, dry, intact, normal color without cyanosis  HEENT: normocephalic, atraumatic, mucous membranes normal  Respiratory: clear to auscultation bilaterally without respiratory distress  Cardiovascular: regular rate and rhythm without murmur / rub / gallop  Abdominal: soft, nontender, nondistended, normoactive bowel sounds, Segura in place  Extremities: no mottling, pulses intact, no edema  Neurologic: awake, alert confused, no focal deficits  Psychiatric: normal affect, cooperative    I/O last 3 completed shifts: In: 61 [P.O.:60]  Out: 1000 [Urine:1000]  I/O this shift:  In: 30 [P.O.:30]  Out: 250 [Urine:250]    Labs  Recent Labs     10/14/20  0541 10/15/20  0458 10/16/20  0355    141 142   K 4.3 3.8 3.4*   * 113* 113*   CO2 19* 20* 21*   BUN 21 13 10   CREATININE 0.6 0.6 0.6   GLUCOSE 326* 137* 116*   CALCIUM 7.9* 8.4* 8.4*   WBC 9.7 8.0 10.0   RBC 3.72 3.95 3.73   HGB 11.4* 12.0 11.5   HCT 36.4 37.7 34.8   MCV 97.8 95.4 93.3   MCH 30.6 30.4 30.8   MCHC 31.3* 31.8* 33.0   RDW 13.8 13.6 14.0   * 108* 113*   MPV 13.3* 13.3* 13.9*       Imaging  Ct Head Wo Contrast  Result Date: 10/12/2020  1. No acute intracranial abnormality. Advanced senescent changes and involutional changes. Intracranial atherosclerotic disease. 2.  Sequela of a prior focal ischemic event is stable in the posterior right parietal lobe. Xr Chest Portable  Result Date: 10/13/2020  Persistent left lower lobe focal infiltrate. Xr Chest Portable  Result Date: 10/12/2020  1. Small patchy infiltrate within the left lower lobe     Patient Instructions     Medication List      START taking these medications    gentamicin 0.3 % ophthalmic ointment  Commonly known as:  GARAMYCIN  3 times daily.      insulin glargine 100 UNIT/ML injection vial  Commonly known as:  LANTUS  Inject 20 Units into the skin daily  Start taking on:  October 17, 2020     insulin lispro 100 UNIT/ML injection vial  Commonly known as:  HUMALOG  Inject 0-12 Units into the skin 3 times daily (with meals)        CHANGE how you take these medications    divalproex 125 MG DR tablet  Commonly known as:  DEPAKOTE  Take 1 tablet by mouth 3 times daily  What changed:  when to take this        CONTINUE taking these medications    acetaminophen 325 MG tablet  Commonly known as:  TYLENOL     apixaban 5 MG Tabs tablet  Commonly known as:  ELIQUIS  Take 1 tablet by mouth 2 times daily     Bilberry 1000 MG Caps     citalopram 10 MG tablet  Commonly known as:  CELEXA     Dextromethorphan-guaiFENesin  MG/5ML Syrp     docusate sodium 100 MG capsule  Commonly known as:  COLACE     Eye Vitamins Caps     flecainide 100 MG tablet  Commonly known as:  TAMBOCOR     hydrocortisone 1 % ointment     Lutein 20 MG Caps     megestrol 40 MG/ML suspension  Commonly known as:  Megace Oral  Take 5 mLs by mouth daily     memantine ER 28 MG Cp24 extended release capsule  Commonly known as:  Namenda XR  TAKE ONE CAPSULE BY MOUTH EVERY DAY     menthol-zinc oxide 0.44-20.625 % Oint ointment  Commonly known as:  CALMOSEPTINE     metoprolol succinate 25 MG extended release tablet  Commonly known as:  TOPROL XL  Take 0.5 tablets by mouth daily     vitamin D 1.25 MG (80207 UT) Caps capsule  Commonly known as:  ERGOCALCIFEROL  take one capsule by mouth once a week           Where to Get Your Medications      Information about where to get these medications is not yet available    Ask your nurse or doctor about these medications  · gentamicin 0.3 % ophthalmic ointment  · insulin glargine 100 UNIT/ML injection vial  · insulin lispro 100 UNIT/ML injection vial       Note that more than 30 minutes was spent in preparing discharge papers, discussing discharge with patient, medication review, etc.    Electronically signed by Joseph Nation DO on 10/16/2020 at 12:22 PM

## 2020-10-16 NOTE — PROGRESS NOTES
Nurse to Nurse called to [place], [place] faxed to [phone]. Daughter in room and aware of transfer.

## 2020-10-18 ENCOUNTER — HOSPITAL ENCOUNTER (OUTPATIENT)
Age: 80
Discharge: HOME | End: 2020-10-20
Payer: MEDICARE

## 2020-10-18 PROCEDURE — U0003 INFECTIOUS AGENT DETECTION BY NUCLEIC ACID (DNA OR RNA); SEVERE ACUTE RESPIRATORY SYNDROME CORONAVIRUS 2 (SARS-COV-2) (CORONAVIRUS DISEASE [COVID-19]), AMPLIFIED PROBE TECHNIQUE, MAKING USE OF HIGH THROUGHPUT TECHNOLOGIES AS DESCRIBED BY CMS-2020-01-R: HCPCS

## 2020-10-19 ENCOUNTER — HOSPITAL ENCOUNTER (OUTPATIENT)
Age: 80
Discharge: HOME | End: 2020-10-21
Payer: MEDICARE

## 2020-10-19 PROCEDURE — U0003 INFECTIOUS AGENT DETECTION BY NUCLEIC ACID (DNA OR RNA); SEVERE ACUTE RESPIRATORY SYNDROME CORONAVIRUS 2 (SARS-COV-2) (CORONAVIRUS DISEASE [COVID-19]), AMPLIFIED PROBE TECHNIQUE, MAKING USE OF HIGH THROUGHPUT TECHNOLOGIES AS DESCRIBED BY CMS-2020-01-R: HCPCS

## 2020-10-20 ENCOUNTER — HOSPITAL ENCOUNTER (OUTPATIENT)
Age: 80
Discharge: HOME | End: 2020-10-22
Payer: MEDICARE

## 2020-10-20 LAB
SARS-COV-2: NOT DETECTED
SOURCE: NORMAL
VALPROIC ACID LEVEL: 15 MCG/ML (ref 50–100)

## 2020-10-20 PROCEDURE — 36415 COLL VENOUS BLD VENIPUNCTURE: CPT

## 2020-10-20 PROCEDURE — 80164 ASSAY DIPROPYLACETIC ACD TOT: CPT

## 2020-10-21 LAB
SARS-COV-2: NOT DETECTED
SOURCE: NORMAL

## 2020-10-22 ENCOUNTER — HOSPITAL ENCOUNTER (OUTPATIENT)
Age: 80
Discharge: HOME | End: 2020-10-24
Payer: MEDICARE

## 2020-10-22 LAB
RHEUMATOID FACTOR: <10 IU/ML (ref 0–13)
SEDIMENTATION RATE, ERYTHROCYTE: 70 MM/HR (ref 0–20)
URIC ACID, SERUM: 3.2 MG/DL (ref 2.4–5.7)

## 2020-10-22 PROCEDURE — 85651 RBC SED RATE NONAUTOMATED: CPT

## 2020-10-22 PROCEDURE — 36415 COLL VENOUS BLD VENIPUNCTURE: CPT

## 2020-10-22 PROCEDURE — 86431 RHEUMATOID FACTOR QUANT: CPT

## 2020-10-22 PROCEDURE — 86038 ANTINUCLEAR ANTIBODIES: CPT

## 2020-10-22 PROCEDURE — 84550 ASSAY OF BLOOD/URIC ACID: CPT

## 2020-10-23 LAB — ANTI-NUCLEAR ANTIBODY (ANA): NEGATIVE

## 2020-10-26 ENCOUNTER — HOSPITAL ENCOUNTER (OUTPATIENT)
Age: 80
Discharge: HOME | End: 2020-10-28
Payer: MEDICARE

## 2020-10-26 PROCEDURE — U0003 INFECTIOUS AGENT DETECTION BY NUCLEIC ACID (DNA OR RNA); SEVERE ACUTE RESPIRATORY SYNDROME CORONAVIRUS 2 (SARS-COV-2) (CORONAVIRUS DISEASE [COVID-19]), AMPLIFIED PROBE TECHNIQUE, MAKING USE OF HIGH THROUGHPUT TECHNOLOGIES AS DESCRIBED BY CMS-2020-01-R: HCPCS

## 2020-10-28 LAB
SARS-COV-2: NOT DETECTED
SOURCE: NORMAL

## 2020-10-31 ENCOUNTER — HOSPITAL ENCOUNTER (OUTPATIENT)
Age: 80
Discharge: HOME | End: 2020-11-02
Payer: MEDICARE

## 2020-11-03 PROBLEM — I48.91 ATRIAL FIBRILLATION (HCC): Status: RESOLVED | Noted: 2019-09-04 | Resolved: 2020-11-03

## 2020-11-03 LAB
SARS-COV-2: NOT DETECTED
SOURCE: NORMAL

## 2021-10-08 LAB
SARS-COV-2: NOT DETECTED
SOURCE: NORMAL

## 2021-11-26 ENCOUNTER — APPOINTMENT (OUTPATIENT)
Dept: CT IMAGING | Age: 81
End: 2021-11-26
Payer: MEDICAID

## 2021-11-26 ENCOUNTER — HOSPITAL ENCOUNTER (EMERGENCY)
Age: 81
Discharge: HOME | End: 2021-11-27
Attending: STUDENT IN AN ORGANIZED HEALTH CARE EDUCATION/TRAINING PROGRAM
Payer: MEDICAID

## 2021-11-26 VITALS
TEMPERATURE: 97.9 F | SYSTOLIC BLOOD PRESSURE: 137 MMHG | DIASTOLIC BLOOD PRESSURE: 58 MMHG | OXYGEN SATURATION: 99 % | RESPIRATION RATE: 18 BRPM | HEART RATE: 77 BPM

## 2021-11-26 DIAGNOSIS — S09.90XA INJURY OF HEAD, INITIAL ENCOUNTER: ICD-10-CM
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
## 2021-11-26 DIAGNOSIS — S22.019A CLOSED FRACTURE OF FIRST THORACIC VERTEBRA, UNSPECIFIED FRACTURE MORPHOLOGY, INITIAL ENCOUNTER (HCC): ICD-10-CM

## 2021-11-26 PROCEDURE — 99283 EMERGENCY DEPT VISIT LOW MDM: CPT

## 2021-11-26 PROCEDURE — 70450 CT HEAD/BRAIN W/O DYE: CPT

## 2021-11-26 PROCEDURE — 72125 CT NECK SPINE W/O DYE: CPT

## 2021-11-26 ASSESSMENT — ENCOUNTER SYMPTOMS: COLOR CHANGE: 1

## 2021-11-27 NOTE — ED NOTES
Pt daughter called would like call from  and would not like her mother to get covid vaccine     Andrés Ramirez  11/26/21 [id number]

## 2021-11-27 NOTE — ED PROVIDER NOTES
Patient is a 71-year-old female presenting emergency department for a fall. Was triaged as unwitnessed fall, but per EMS the roommate had witnessed the fall and said she had shuffled from the end of her wheelchair and fell forward hitting her head. She is on Eliquis, alert and oriented x1 at baseline. Patient has no complaints upon exam, does have a tender bruise on the upper lateral portion of her forehead. Tenderness worsens with palpation, nothing makes it better, gradual in onset, associate with a fall, has been persistent. Review of Systems   Unable to perform ROS: Dementia   Skin: Positive for color change (Bruise of forehead). Physical Exam  Vitals and nursing note reviewed. Constitutional:       Appearance: Normal appearance. HENT:      Head: Normocephalic and atraumatic. Comments: Bruise on the upper lateral portion on the left side of her forehead. Right Ear: External ear normal.      Left Ear: External ear normal.      Nose: Nose normal.      Mouth/Throat:      Mouth: Mucous membranes are moist.   Eyes:      Extraocular Movements: Extraocular movements intact. Pupils: Pupils are equal, round, and reactive to light. Cardiovascular:      Rate and Rhythm: Normal rate and regular rhythm. Pulses: Normal pulses. Heart sounds: Normal heart sounds. Pulmonary:      Effort: Pulmonary effort is normal.      Breath sounds: Normal breath sounds. Abdominal:      General: Abdomen is flat. Bowel sounds are normal. There is no distension. Palpations: Abdomen is soft. There is no mass. Tenderness: There is no abdominal tenderness. Musculoskeletal:         General: Normal range of motion. Cervical back: Normal range of motion and neck supple. Skin:     General: Skin is warm and dry. Neurological:      Mental Status: She is alert. Mental status is at baseline. She is disoriented. Cranial Nerves: No cranial nerve deficit.       Sensory: No sensory deficit. Motor: No weakness. Procedures     MDM     ED Course as of 11/26/21 2243 Fri Nov 26, 2021 2239 Possible subacute fracture of T1 seen, patient does not have pain here, is neurologically intact, CT head and CT cervical spine negative for acute fracture or intracranial bleeding, will discharge patient. [JG]   2242 Patient presented emergency department for a fall facility, was witnessed by remake, so she rocked to the edge of her wheelchair and fell forward hitting her head, patient was on blood thinners, CT cervical spine and CT head obtained, possible subacute fracture seen at T1 but no other abnormalities noted, she was discharged back to facility, was at her baseline per EMS, daughter was called and agreed with plan. Return precautions were given. [JG]      ED Course User Index  [JG] Clare Silverman MD      Patient presented emergency department for a fall facility, was witnessed by remake, so she rocked to the edge of her wheelchair and fell forward hitting her head, patient was on blood thinners, CT cervical spine and CT head obtained, possible subacute fracture seen at T1 but no other abnormalities noted, she was discharged back to facility, was at her baseline per EMS, daughter was called and agreed with plan. Return precautions were given. ED Course as of 11/26/21 2243 Fri Nov 26, 2021 2239 Possible subacute fracture of T1 seen, patient does not have pain here, is neurologically intact, CT head and CT cervical spine negative for acute fracture or intracranial bleeding, will discharge patient.  [JG]   2242 Patient presented emergency department for a fall facility, was witnessed by remake, so she rocked to the edge of her wheelchair and fell forward hitting her head, patient was on blood thinners, CT cervical spine and CT head obtained, possible subacute fracture seen at T1 but no other abnormalities noted, she was discharged back to facility, was at her baseline per EMS, daughter was called and agreed with plan. Return precautions were given. [JG]      ED Course User Index  [JG] Clare Silverman MD       --------------------------------------------- PAST HISTORY ---------------------------------------------  Past Medical History:  has a past medical history of Alzheimer disease ([facility] [address] Atrial fibrillation ([facility] [address] Dementia ([facility] [address] [facility] GERD (gastroesophageal reflux disease), Headache, and Hypertension. Past Surgical History:  has a past surgical history that includes  section; Cholecystectomy, laparoscopic; Colonoscopy; and Upper gastrointestinal endoscopy. Social History:  reports that she has never smoked. She has never used smokeless tobacco. She reports that she does not drink alcohol and does not use drugs. Family History: family history includes Heart Disease in her mother. The patients home medications have been reviewed. Allergies: Asa [aspirin]    -------------------------------------------------- RESULTS -------------------------------------------------  Labs:  No results found for this visit on 21. Radiology:  CT Head WO Contrast   Final Result   No acute intracranial abnormality. 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 Left frontal scalp hematoma. CT Cervical Spine WO Contrast   Final Result   Exam mildly limited by patient motion. Mild superior endplate compression fracture of the T1 vertebral body favored   to be at least subacute or possibly old but new compared with the 2019 exam.   Acute fracture thought much less likely. As indicated, this could be more   definitively aged with MRI. Degenerative changes. Heterogeneous enlargement of thyroid with multiple coarsened calcifications   and areas of nodularity appearing similar to previous. This could be   correlated with a nonemergent thyroid ultrasound to further characterize. ------------------------- NURSING NOTES AND VITALS REVIEWED ---------------------------  Date / Time Roomed:  11/26/2021  8:40 PM  ED Bed Assignment:  [facility]    The nursing notes within the ED encounter and vital signs as below have been reviewed. BP (!) 137/58   Pulse 77   Temp 97.9 °F (36.6 °C)   Resp 18   LMP  (LMP Unknown)   SpO2 99%   Oxygen Saturation Interpretation: Normal      ------------------------------------------ PROGRESS NOTES ------------------------------------------  10:43 PM EST  I have spoken with the patient and discussed todays results, in addition to providing specific details for the plan of care and counseling regarding the diagnosis and prognosis. Their questions are answered at this time and they are agreeable with the plan. I discussed at length with them reasons for immediate return here for re evaluation. They will followup with their primary care physician by calling their office on Monday.      --------------------------------- ADDITIONAL PROVIDER NOTES ---------------------------------  At this time the patient is without objective evidence of an acute process requiring hospitalization or inpatient management. They have remained hemodynamically stable throughout their entire ED visit and are stable for discharge with outpatient follow-up. The plan has been discussed in detail and they are aware of the specific conditions for emergent return, as well as the importance of follow-up. New Prescriptions    No medications on file       Diagnosis:  1. Fall, initial encounter    2. Injury of head, initial encounter    3. Closed fracture of first thoracic vertebra, unspecified fracture morphology, initial encounter [facility])        Disposition:  Patient's disposition: Discharge to home  Patient's condition is stable.            Cirilo Hidalgo MD  Resident  11/26/21 [id number]       Cirilo Hidalgo MD  Resident  11/27/21 [id number]

## 2022-01-17 ENCOUNTER — APPOINTMENT (OUTPATIENT)
Dept: GENERAL RADIOLOGY | Age: 82
DRG: 871 | End: 2022-01-17
Payer: COMMERCIAL

## 2022-01-17 ENCOUNTER — APPOINTMENT (OUTPATIENT)
Dept: CT IMAGING | Age: 82
DRG: 871 | End: 2022-01-17
Payer: COMMERCIAL

## 2022-01-17 ENCOUNTER — APPOINTMENT (OUTPATIENT)
Dept: ULTRASOUND IMAGING | Age: 82
DRG: 871 | End: 2022-01-17
Payer: COMMERCIAL

## 2022-01-17 ENCOUNTER — HOSPITAL ENCOUNTER (INPATIENT)
Age: 82
LOS: 10 days | Discharge: NURSING, REHAB OR LONG-TERM CARE | DRG: 871 | End: 2022-01-27
Attending: EMERGENCY MEDICINE | Admitting: INTERNAL MEDICINE
Payer: COMMERCIAL

## 2022-01-17 DIAGNOSIS — I48.91 ATRIAL FIBRILLATION WITH RVR (HCC): Primary | ICD-10-CM

## 2022-01-17 DIAGNOSIS — U07.1 COVID-19: ICD-10-CM

## 2022-01-17 DIAGNOSIS — N39.0 ACUTE URINARY TRACT INFECTION: ICD-10-CM

## 2022-01-17 DIAGNOSIS — E87.20 LACTIC ACIDOSIS: ICD-10-CM

## 2022-01-17 DIAGNOSIS — J96.01 ACUTE RESPIRATORY FAILURE WITH HYPOXIA (HCC): ICD-10-CM

## 2022-01-17 PROBLEM — N17.9 AKI (ACUTE KIDNEY INJURY) (HCC): Status: ACTIVE | Noted: 2022-01-17

## 2022-01-17 PROBLEM — J96.00 ACUTE RESPIRATORY FAILURE DUE TO COVID-19 (HCC): Status: ACTIVE | Noted: 2022-01-17

## 2022-01-17 PROBLEM — A41.9 ACUTE SEPSIS (HCC): Status: ACTIVE | Noted: 2022-01-17

## 2022-01-17 PROBLEM — G93.41 ACUTE METABOLIC ENCEPHALOPATHY: Status: ACTIVE | Noted: 2022-01-17

## 2022-01-17 LAB
ALBUMIN SERPL-MCNC: 2.9 G/DL (ref 2.8–4.4)
ALP BLD-CCNC: 116 U/L (ref 0–249)
ALT SERPL-CCNC: 8 U/L (ref 0–32)
AMMONIA: 24.1 UMOL/L (ref 11–51)
ANION GAP SERPL CALCULATED.3IONS-SCNC: 17 MMOL/L (ref 7–16)
APTT: 50.6 SEC (ref 24.5–35.1)
AST SERPL-CCNC: 25 U/L (ref 0–31)
B.E.: -3.9 MMOL/L (ref -3–3)
BACTERIA: ABNORMAL /HPF
BASOPHILS ABSOLUTE: 0 E9/L (ref 0.1–0.4)
BASOPHILS RELATIVE PERCENT: 0 % (ref 0–2)
BILIRUB SERPL-MCNC: 0.9 MG/DL (ref 2–6)
BILIRUBIN URINE: ABNORMAL
BLOOD, URINE: ABNORMAL
BUN BLDV-MCNC: 31 MG/DL (ref 4–19)
C-REACTIVE PROTEIN: 16.6 MG/DL (ref 0–0.4)
CALCIUM SERPL-MCNC: 9 MG/DL (ref 8.6–10.2)
CHLORIDE BLD-SCNC: 108 MMOL/L (ref 98–107)
CHP ED QC CHECK: NORMAL
CLARITY: CLEAR
CO2: 18 MMOL/L (ref 22–29)
COHB: 0.6 % (ref 0–1.5)
COLOR: YELLOW
CORTISOL TOTAL: 40 MCG/DL (ref 2.68–18.4)
CREAT SERPL-MCNC: 1.1 MG/DL (ref 0.4–0.7)
CRITICAL: ABNORMAL
D DIMER: 818 NG/ML DDU
DATE ANALYZED: ABNORMAL
DATE OF COLLECTION: ABNORMAL
EOSINOPHILS ABSOLUTE: 0 E9/L (ref 0.1–0.7)
EOSINOPHILS RELATIVE PERCENT: 0 % (ref 0–12)
GFR AFRICAN AMERICAN: 58
GFR NON-AFRICAN AMERICAN: 48 ML/MIN/1.73
GLUCOSE BLD-MCNC: 195 MG/DL
GLUCOSE BLD-MCNC: 221 MG/DL (ref 70–110)
GLUCOSE URINE: NEGATIVE MG/DL
HCO3: 17.9 MMOL/L (ref 22–26)
HCT VFR BLD CALC: 42.3 % (ref 45–66)
HEMOGLOBIN: 13.4 G/DL (ref 14.5–22)
HHB: 5.5 % (ref 0–5)
INR BLD: 3.2
KETONES, URINE: ABNORMAL MG/DL
LAB: ABNORMAL
LACTIC ACID, SEPSIS: 3.5 MMOL/L (ref 0.5–1.9)
LACTIC ACID, SEPSIS: 3.8 MMOL/L (ref 0.5–1.9)
LEUKOCYTE ESTERASE, URINE: ABNORMAL
LIPASE: 7 U/L (ref 13–60)
LYMPHOCYTES ABSOLUTE: 1.37 E9/L (ref 3–15)
LYMPHOCYTES RELATIVE PERCENT: 6.9 % (ref 15–60)
Lab: ABNORMAL
MCH RBC QN AUTO: 30.1 PG (ref 30–42)
MCHC RBC AUTO-ENTMCNC: 31.7 % (ref 29–37)
MCV RBC AUTO: 95.1 FL (ref 95–121)
METER GLUCOSE: 195 MG/DL (ref 70–110)
METER GLUCOSE: 256 MG/DL (ref 70–110)
METHB: 0.3 % (ref 0–1.5)
MODE: ABNORMAL
MONOCYTES ABSOLUTE: 0.78 E9/L (ref 1–3)
MONOCYTES RELATIVE PERCENT: 3.5 % (ref 3–15)
NEUTROPHILS ABSOLUTE: 17.64 E9/L (ref 5–20)
NEUTROPHILS RELATIVE PERCENT: 89.6 % (ref 15–80)
NITRITE, URINE: NEGATIVE
NUCLEATED RED BLOOD CELLS: 0 /100 WBC
O2 CONTENT: 17.6 ML/DL
O2 SATURATION: 94.5 % (ref 92–98.5)
O2HB: 93.6 % (ref 94–97)
OPERATOR ID: 7296
PATIENT TEMP: 37 C
PCO2: 24.8 MMHG (ref 35–45)
PDW BLD-RTO: 14.1 FL (ref 11–19)
PH BLOOD GAS: 7.48 (ref 7.35–7.45)
PH UA: 5.5 (ref 5–9)
PLATELET # BLD: 261 E9/L (ref 130–480)
PMV BLD AUTO: 13 FL (ref 7–12)
PO2: 66.4 MMHG (ref 75–100)
POTASSIUM REFLEX MAGNESIUM: 4.2 MMOL/L (ref 3.4–4.5)
PRO-BNP: ABNORMAL PG/ML (ref 0–450)
PROCALCITONIN: 0.36 NG/ML (ref 0–0.08)
PROTEIN UA: 30 MG/DL
PROTHROMBIN TIME: 34.5 SEC (ref 9.3–12.4)
RBC # BLD: 4.45 E12/L (ref 3.7–5.3)
RBC UA: ABNORMAL /HPF (ref 0–2)
REASON FOR REJECTION: NORMAL
REJECTED TEST: NORMAL
SARS-COV-2, NAAT: DETECTED
SODIUM BLD-SCNC: 143 MMOL/L (ref 132–146)
SOURCE, BLOOD GAS: ABNORMAL
SPECIFIC GRAVITY UA: >=1.03 (ref 1–1.03)
THB: 13.4 G/DL (ref 11.5–16.5)
TIME ANALYZED: 1133
TOTAL CK: 66 U/L (ref 20–180)
TOTAL PROTEIN: 7.5 G/DL (ref 6.4–8.3)
TROPONIN, HIGH SENSITIVITY: 149 NG/L (ref 0–9)
TROPONIN, HIGH SENSITIVITY: 162 NG/L (ref 0–9)
UROBILINOGEN, URINE: 4 E.U./DL
VALPROIC ACID LEVEL: 10 MCG/ML (ref 50–100)
WBC # BLD: 19.6 E9/L (ref 9.4–34)
WBC UA: >20 /HPF (ref 0–5)

## 2022-01-17 PROCEDURE — 51702 INSERT TEMP BLADDER CATH: CPT

## 2022-01-17 PROCEDURE — 82140 ASSAY OF AMMONIA: CPT

## 2022-01-17 PROCEDURE — 71045 X-RAY EXAM CHEST 1 VIEW: CPT

## 2022-01-17 PROCEDURE — 96360 HYDRATION IV INFUSION INIT: CPT

## 2022-01-17 PROCEDURE — 86140 C-REACTIVE PROTEIN: CPT

## 2022-01-17 PROCEDURE — 87186 SC STD MICRODIL/AGAR DIL: CPT

## 2022-01-17 PROCEDURE — 83605 ASSAY OF LACTIC ACID: CPT

## 2022-01-17 PROCEDURE — 82805 BLOOD GASES W/O2 SATURATION: CPT

## 2022-01-17 PROCEDURE — 74177 CT ABD & PELVIS W/CONTRAST: CPT

## 2022-01-17 PROCEDURE — 84145 PROCALCITONIN (PCT): CPT

## 2022-01-17 PROCEDURE — 81001 URINALYSIS AUTO W/SCOPE: CPT

## 2022-01-17 PROCEDURE — 2700000000 HC OXYGEN THERAPY PER DAY

## 2022-01-17 PROCEDURE — 82550 ASSAY OF CK (CPK): CPT

## 2022-01-17 PROCEDURE — 93005 ELECTROCARDIOGRAM TRACING: CPT | Performed by: EMERGENCY MEDICINE

## 2022-01-17 PROCEDURE — 2060000000 HC ICU INTERMEDIATE R&B

## 2022-01-17 PROCEDURE — 87635 SARS-COV-2 COVID-19 AMP PRB: CPT

## 2022-01-17 PROCEDURE — 80053 COMPREHEN METABOLIC PANEL: CPT

## 2022-01-17 PROCEDURE — 99223 1ST HOSP IP/OBS HIGH 75: CPT | Performed by: INTERNAL MEDICINE

## 2022-01-17 PROCEDURE — XW0DXM6 INTRODUCTION OF BARICITINIB INTO MOUTH AND PHARYNX, EXTERNAL APPROACH, NEW TECHNOLOGY GROUP 6: ICD-10-PCS | Performed by: INTERNAL MEDICINE

## 2022-01-17 PROCEDURE — 87040 BLOOD CULTURE FOR BACTERIA: CPT

## 2022-01-17 PROCEDURE — 83880 ASSAY OF NATRIURETIC PEPTIDE: CPT

## 2022-01-17 PROCEDURE — 6360000004 HC RX CONTRAST MEDICATION: Performed by: RADIOLOGY

## 2022-01-17 PROCEDURE — 36415 COLL VENOUS BLD VENIPUNCTURE: CPT

## 2022-01-17 PROCEDURE — 80164 ASSAY DIPROPYLACETIC ACD TOT: CPT

## 2022-01-17 PROCEDURE — 70450 CT HEAD/BRAIN W/O DYE: CPT

## 2022-01-17 PROCEDURE — 76857 US EXAM PELVIC LIMITED: CPT

## 2022-01-17 PROCEDURE — 85610 PROTHROMBIN TIME: CPT

## 2022-01-17 PROCEDURE — 85378 FIBRIN DEGRADE SEMIQUANT: CPT

## 2022-01-17 PROCEDURE — 85025 COMPLETE CBC W/AUTO DIFF WBC: CPT

## 2022-01-17 PROCEDURE — 85730 THROMBOPLASTIN TIME PARTIAL: CPT

## 2022-01-17 PROCEDURE — 82533 TOTAL CORTISOL: CPT

## 2022-01-17 PROCEDURE — 96361 HYDRATE IV INFUSION ADD-ON: CPT

## 2022-01-17 PROCEDURE — 6370000000 HC RX 637 (ALT 250 FOR IP): Performed by: STUDENT IN AN ORGANIZED HEALTH CARE EDUCATION/TRAINING PROGRAM

## 2022-01-17 PROCEDURE — 82962 GLUCOSE BLOOD TEST: CPT

## 2022-01-17 PROCEDURE — 6360000002 HC RX W HCPCS: Performed by: STUDENT IN AN ORGANIZED HEALTH CARE EDUCATION/TRAINING PROGRAM

## 2022-01-17 PROCEDURE — APPSS45 APP SPLIT SHARED TIME 31-45 MINUTES: Performed by: NURSE PRACTITIONER

## 2022-01-17 PROCEDURE — 99283 EMERGENCY DEPT VISIT LOW MDM: CPT

## 2022-01-17 PROCEDURE — 87077 CULTURE AEROBIC IDENTIFY: CPT

## 2022-01-17 PROCEDURE — 83690 ASSAY OF LIPASE: CPT

## 2022-01-17 PROCEDURE — 6360000002 HC RX W HCPCS: Performed by: EMERGENCY MEDICINE

## 2022-01-17 PROCEDURE — 87088 URINE BACTERIA CULTURE: CPT

## 2022-01-17 PROCEDURE — 2580000003 HC RX 258: Performed by: STUDENT IN AN ORGANIZED HEALTH CARE EDUCATION/TRAINING PROGRAM

## 2022-01-17 PROCEDURE — 2580000003 HC RX 258: Performed by: INTERNAL MEDICINE

## 2022-01-17 PROCEDURE — 84484 ASSAY OF TROPONIN QUANT: CPT

## 2022-01-17 RX ORDER — POLYETHYLENE GLYCOL 3350 17 G/17G
17 POWDER, FOR SOLUTION ORAL DAILY PRN
Status: DISCONTINUED | OUTPATIENT
Start: 2022-01-17 | End: 2022-01-27 | Stop reason: HOSPADM

## 2022-01-17 RX ORDER — ACETAMINOPHEN 325 MG/1
650 TABLET ORAL EVERY 6 HOURS PRN
Status: DISCONTINUED | OUTPATIENT
Start: 2022-01-17 | End: 2022-01-17 | Stop reason: DRUGHIGH

## 2022-01-17 RX ORDER — VITS A,C,E/LUTEIN/MINERALS 300MCG-200
1 TABLET ORAL DAILY
Status: DISCONTINUED | OUTPATIENT
Start: 2022-01-18 | End: 2022-01-27 | Stop reason: HOSPADM

## 2022-01-17 RX ORDER — DIVALPROEX SODIUM 125 MG/1
125 CAPSULE, COATED PELLETS ORAL NIGHTLY
COMMUNITY

## 2022-01-17 RX ORDER — 0.9 % SODIUM CHLORIDE 0.9 %
500 INTRAVENOUS SOLUTION INTRAVENOUS ONCE
Status: COMPLETED | OUTPATIENT
Start: 2022-01-17 | End: 2022-01-17

## 2022-01-17 RX ORDER — ASCORBIC ACID 500 MG
500 TABLET ORAL DAILY
Status: CANCELLED | OUTPATIENT
Start: 2022-01-17

## 2022-01-17 RX ORDER — DEXAMETHASONE 4 MG/1
6 TABLET ORAL EVERY 12 HOURS SCHEDULED
Status: DISCONTINUED | OUTPATIENT
Start: 2022-01-17 | End: 2022-01-17

## 2022-01-17 RX ORDER — DEXTROSE MONOHYDRATE 25 G/50ML
12.5 INJECTION, SOLUTION INTRAVENOUS PRN
Status: DISCONTINUED | OUTPATIENT
Start: 2022-01-17 | End: 2022-01-27 | Stop reason: HOSPADM

## 2022-01-17 RX ORDER — ACETAMINOPHEN 325 MG/1
650 TABLET ORAL EVERY 6 HOURS PRN
Status: DISCONTINUED | OUTPATIENT
Start: 2022-01-17 | End: 2022-01-27 | Stop reason: HOSPADM

## 2022-01-17 RX ORDER — ACETAMINOPHEN 650 MG/1
650 SUPPOSITORY RECTAL EVERY 6 HOURS PRN
Status: DISCONTINUED | OUTPATIENT
Start: 2022-01-17 | End: 2022-01-27 | Stop reason: HOSPADM

## 2022-01-17 RX ORDER — ONDANSETRON 4 MG/1
4 TABLET, ORALLY DISINTEGRATING ORAL EVERY 8 HOURS PRN
Status: DISCONTINUED | OUTPATIENT
Start: 2022-01-17 | End: 2022-01-27 | Stop reason: HOSPADM

## 2022-01-17 RX ORDER — DIVALPROEX SODIUM 125 MG/1
125 TABLET, DELAYED RELEASE ORAL 3 TIMES DAILY
Status: DISCONTINUED | OUTPATIENT
Start: 2022-01-18 | End: 2022-01-26

## 2022-01-17 RX ORDER — SODIUM CHLORIDE, SODIUM LACTATE, POTASSIUM CHLORIDE, CALCIUM CHLORIDE 600; 310; 30; 20 MG/100ML; MG/100ML; MG/100ML; MG/100ML
INJECTION, SOLUTION INTRAVENOUS CONTINUOUS
Status: DISCONTINUED | OUTPATIENT
Start: 2022-01-17 | End: 2022-01-22

## 2022-01-17 RX ORDER — BISACODYL 10 MG
10 SUPPOSITORY, RECTAL RECTAL DAILY PRN
COMMUNITY

## 2022-01-17 RX ORDER — INSULIN GLARGINE 100 [IU]/ML
20 INJECTION, SOLUTION SUBCUTANEOUS DAILY
Status: DISCONTINUED | OUTPATIENT
Start: 2022-01-18 | End: 2022-01-19

## 2022-01-17 RX ORDER — SPIRONOLACTONE 25 MG
20 TABLET ORAL DAILY
Status: DISCONTINUED | OUTPATIENT
Start: 2022-01-18 | End: 2022-01-17 | Stop reason: DRUGHIGH

## 2022-01-17 RX ORDER — ACETAMINOPHEN 650 MG/1
650 SUPPOSITORY RECTAL ONCE
Status: COMPLETED | OUTPATIENT
Start: 2022-01-17 | End: 2022-01-17

## 2022-01-17 RX ORDER — FLECAINIDE ACETATE 100 MG/1
100 TABLET ORAL 2 TIMES DAILY
Status: DISCONTINUED | OUTPATIENT
Start: 2022-01-18 | End: 2022-01-24

## 2022-01-17 RX ORDER — DOCUSATE SODIUM 100 MG/1
100 CAPSULE, LIQUID FILLED ORAL 2 TIMES DAILY
Status: DISCONTINUED | OUTPATIENT
Start: 2022-01-18 | End: 2022-01-23 | Stop reason: ALTCHOICE

## 2022-01-17 RX ORDER — DEXTROSE MONOHYDRATE 50 MG/ML
100 INJECTION, SOLUTION INTRAVENOUS PRN
Status: DISCONTINUED | OUTPATIENT
Start: 2022-01-17 | End: 2022-01-27 | Stop reason: HOSPADM

## 2022-01-17 RX ORDER — CITALOPRAM 20 MG/1
10 TABLET ORAL DAILY
Status: DISCONTINUED | OUTPATIENT
Start: 2022-01-18 | End: 2022-01-27 | Stop reason: HOSPADM

## 2022-01-17 RX ORDER — MEMANTINE HYDROCHLORIDE 21 MG/1
21 CAPSULE, EXTENDED RELEASE ORAL EVERY MORNING
Status: ON HOLD | COMMUNITY
End: 2022-02-09 | Stop reason: HOSPADM

## 2022-01-17 RX ORDER — METOPROLOL SUCCINATE 25 MG/1
12.5 TABLET, EXTENDED RELEASE ORAL DAILY
Status: DISCONTINUED | OUTPATIENT
Start: 2022-01-18 | End: 2022-01-24

## 2022-01-17 RX ORDER — LANOLIN ALCOHOL/MO/W.PET/CERES
50 CREAM (GRAM) TOPICAL DAILY
Status: DISCONTINUED | OUTPATIENT
Start: 2022-01-18 | End: 2022-01-27 | Stop reason: HOSPADM

## 2022-01-17 RX ORDER — ZINC OXIDE 100 MG/G
CREAM TOPICAL 2 TIMES DAILY
Status: ON HOLD | COMMUNITY
End: 2022-02-02

## 2022-01-17 RX ORDER — SODIUM CHLORIDE 0.9 % (FLUSH) 0.9 %
5-40 SYRINGE (ML) INJECTION PRN
Status: DISCONTINUED | OUTPATIENT
Start: 2022-01-17 | End: 2022-01-27 | Stop reason: HOSPADM

## 2022-01-17 RX ORDER — SODIUM CHLORIDE 0.9 % (FLUSH) 0.9 %
5-40 SYRINGE (ML) INJECTION EVERY 12 HOURS SCHEDULED
Status: DISCONTINUED | OUTPATIENT
Start: 2022-01-18 | End: 2022-01-27 | Stop reason: HOSPADM

## 2022-01-17 RX ORDER — ASCORBIC ACID 500 MG
500 TABLET ORAL DAILY
Status: DISCONTINUED | OUTPATIENT
Start: 2022-01-17 | End: 2022-01-18

## 2022-01-17 RX ORDER — NICOTINE POLACRILEX 4 MG
15 LOZENGE BUCCAL PRN
Status: DISCONTINUED | OUTPATIENT
Start: 2022-01-17 | End: 2022-01-27 | Stop reason: HOSPADM

## 2022-01-17 RX ORDER — ERGOCALCIFEROL 1.25 MG/1
50000 CAPSULE ORAL WEEKLY
Status: ON HOLD | COMMUNITY
End: 2022-02-09 | Stop reason: HOSPADM

## 2022-01-17 RX ORDER — DEXAMETHASONE 4 MG/1
6 TABLET ORAL EVERY 12 HOURS SCHEDULED
Status: DISCONTINUED | OUTPATIENT
Start: 2022-01-18 | End: 2022-01-18

## 2022-01-17 RX ORDER — ONDANSETRON 2 MG/ML
4 INJECTION INTRAMUSCULAR; INTRAVENOUS EVERY 6 HOURS PRN
Status: DISCONTINUED | OUTPATIENT
Start: 2022-01-17 | End: 2022-01-27 | Stop reason: HOSPADM

## 2022-01-17 RX ORDER — DEXAMETHASONE SODIUM PHOSPHATE 10 MG/ML
10 INJECTION INTRAMUSCULAR; INTRAVENOUS ONCE
Status: COMPLETED | OUTPATIENT
Start: 2022-01-17 | End: 2022-01-17

## 2022-01-17 RX ORDER — SODIUM CHLORIDE 9 MG/ML
25 INJECTION, SOLUTION INTRAVENOUS PRN
Status: DISCONTINUED | OUTPATIENT
Start: 2022-01-17 | End: 2022-01-25

## 2022-01-17 RX ORDER — ZINC SULFATE 50(220)MG
50 CAPSULE ORAL DAILY
Status: DISCONTINUED | OUTPATIENT
Start: 2022-01-17 | End: 2022-01-27 | Stop reason: HOSPADM

## 2022-01-17 RX ORDER — CLOTRIMAZOLE 1 %
CREAM (GRAM) TOPICAL 2 TIMES DAILY
Status: ON HOLD | COMMUNITY
End: 2022-02-02

## 2022-01-17 RX ORDER — CHOLECALCIFEROL (VITAMIN D3) 50 MCG
2000 TABLET ORAL DAILY
Status: DISCONTINUED | OUTPATIENT
Start: 2022-01-18 | End: 2022-01-27 | Stop reason: HOSPADM

## 2022-01-17 RX ORDER — MEGESTROL ACETATE 40 MG/ML
200 SUSPENSION ORAL DAILY
Status: DISCONTINUED | OUTPATIENT
Start: 2022-01-18 | End: 2022-01-27 | Stop reason: HOSPADM

## 2022-01-17 RX ADMIN — WATER 2000 MG: 1 INJECTION INTRAMUSCULAR; INTRAVENOUS; SUBCUTANEOUS at 15:26

## 2022-01-17 RX ADMIN — SODIUM CHLORIDE, POTASSIUM CHLORIDE, SODIUM LACTATE AND CALCIUM CHLORIDE: 600; 310; 30; 20 INJECTION, SOLUTION INTRAVENOUS at 15:26

## 2022-01-17 RX ADMIN — ACETAMINOPHEN 650 MG: 650 SUPPOSITORY RECTAL at 15:26

## 2022-01-17 RX ADMIN — IOPAMIDOL 75 ML: 755 INJECTION, SOLUTION INTRAVENOUS at 12:37

## 2022-01-17 RX ADMIN — SODIUM CHLORIDE 500 ML: 9 INJECTION, SOLUTION INTRAVENOUS at 14:02

## 2022-01-17 RX ADMIN — SODIUM CHLORIDE 500 ML: 9 INJECTION, SOLUTION INTRAVENOUS at 11:48

## 2022-01-17 RX ADMIN — DEXAMETHASONE SODIUM PHOSPHATE 10 MG: 10 INJECTION INTRAMUSCULAR; INTRAVENOUS at 14:00

## 2022-01-17 ASSESSMENT — PAIN SCALES - GENERAL: PAINLEVEL_OUTOF10: 0

## 2022-01-17 NOTE — PROGRESS NOTES
Admission database completed to best of this RN's ability. Care plan and education initiated. Pt from [facility] SNF; plan is to return there. Per facility, pt is nonambulaotry. Requires a coleen lift with transfers. Pt is dependent with all ADLs. Wears prevalon boots at facility.

## 2022-01-17 NOTE — CARE COORDINATION
Social Work/Transition of Care:    Pt is from [facility], SW contacted [email or web address] for the facility for [facility]. Pt is a bedhold assigned SW/CM to follow.     Electronically signed by Luis Aguilar on 2/46/4480 at 2:03 PM

## 2022-01-17 NOTE — ED PROVIDER NOTES
HPI    74-year-old female patient presented to emergency department complaint of altered mental status. She is brought here from [address] [place] [address]. Complaint is sudden onset, not better or worse with anything, severe in severity and persistent. Patient is unvaccinated for COVID. EMS had reported that she was 88% on room air and in A. fib RVR. Patient does have a past history of atrial fibrillation. She is on flecainide as well as Eliquis. Rest of the history is limited secondary to patient's altered mental status. Review of Systems   Unable to perform ROS: Mental status change        Physical Exam  Vitals and nursing note reviewed. Constitutional:       Appearance: She is ill-appearing. HENT:      Head: Normocephalic and atraumatic. Nose: Nose normal. No congestion. Mouth/Throat:      Mouth: Mucous membranes are dry. Pharynx: Oropharynx is clear. Eyes:      Pupils: Pupils are equal, round, and reactive to light. Cardiovascular:      Rate and Rhythm: Tachycardia present. Rhythm irregular. Heart sounds: Normal heart sounds. Pulmonary:      Effort: No respiratory distress. Breath sounds: Rhonchi present. Abdominal:      Tenderness: There is no guarding or rebound. Comments: No tenderness to palpation, abdomen is soft   Musculoskeletal:      Cervical back: Neck supple. Right lower leg: Edema present. Left lower leg: Edema present. Skin:     General: Skin is warm and dry. Capillary Refill: Capillary refill takes less than 2 seconds. Neurological:      Comments: Patient is lethargic, keeps her eyes closed but is not respond to questioning. She wakes up to painful stimuli. Psychiatric:      Comments: Unable to assess secondary to altered mental status          Procedures     MDM   74-year-old female patient presented to emergency department for altered mental status. Patient unable to provide any history secondary to AMS.   Patient mildly hypoxic on arrival.  88% on room air. Patient COVID-positive placed on couple liters of nasal cannula oxygen with improvement. She was found to have urinary tract infection with elevated white count of 19. Empiric antibiotics ordered for her UTI and fluids given. Heart rate now improved from the 140s to the 90s. At this time patient to be admitted for further care. EKG: This EKG is signed and interpreted by me. Rate:151  Rhythm: Atrial fibrillation  Interpretation: Atrial fibrillation fibrillation with rapid ventricular response, no ST elevations  Comparison: Same as compared to prior from 10/12/2020    ED Course as of 01/17/22 1815 Mon Jan 17, 2022   1325 Patient is no longer tachycardic, heart rate of 94 [FG]   1336 Orpha Poot- daughter [phone]. Please call prior to initiating COVID treatment. Daughter does not want her mother intubated. Does not want mom  [FG]      ED Course User Index  [FG] Fredie Nageotte, DO        ED Course as of 01/17/22 1815 Mon Jan 17, 2022   1325 Patient is no longer tachycardic, heart rate of 94 [FG]   1336 Orpha Poot- daughter [phone]. Please call prior to initiating COVID treatment. Daughter does not want her mother intubated. Does not want mom  [FG]      ED Course User Index  [FG] Fredie Nageotte, DO       --------------------------------------------- PAST HISTORY ---------------------------------------------  Past Medical History:  has a past medical history of Alzheimer disease ([facility] Atrial fibrillation ([facility] Constipation, Dementia ([facility] Diabetes mellitus ([facility] Dysphagia, oropharyngeal phase, GERD (gastroesophageal reflux disease), Headache, Hyperlipidemia, Hypertension, MDD (major depressive disorder), Muscle wasting and atrophy, not elsewhere classified, unspecified site, Muscle weakness (generalized), PAF (paroxysmal atrial fibrillation) ([facility] and SVT (supraventricular tachycardia) ([address]     Past Surgical History:  has a past surgical history that includes  section; Cholecystectomy, laparoscopic; Colonoscopy; and Upper gastrointestinal endoscopy. Social History:  reports that she has never smoked. She has never used smokeless tobacco. She reports that she does not drink alcohol and does not use drugs. Family History: family history includes Heart Disease in her mother. The patients home medications have been reviewed.     Allergies: Asa [aspirin]    -------------------------------------------------- RESULTS -------------------------------------------------    LABS:  Results for orders placed or performed during the hospital encounter of 22   COVID-19, Rapid    Specimen: Nasopharyngeal Swab   Result Value Ref Range    SARS-CoV-2, NAAT DETECTED (A) Not Detected   CBC Auto Differential   Result Value Ref Range    WBC 19.6 (H) 4.5 - 11.5 E9/L    RBC 4.45 3.50 - 5.50 E12/L    Hemoglobin 13.4 11.5 - 15.5 g/dL    Hematocrit 42.3 34.0 - 48.0 %    MCV 95.1 80.0 - 99.9 fL    MCH 30.1 26.0 - 35.0 pg    MCHC 31.7 (L) 32.0 - 34.5 %    RDW 14.1 11.5 - 15.0 fL    Platelets 102 561 - 355 E9/L    MPV 13.0 (H) 7.0 - 12.0 fL    Neutrophils % 89.6 (H) 43.0 - 80.0 %    Lymphocytes % 6.9 (L) 20.0 - 42.0 %    Monocytes % 3.5 2.0 - 12.0 %    Eosinophils % 0.0 0.0 - 6.0 %    Basophils % 0.0 0.0 - 2.0 %    Neutrophils Absolute 17.64 (H) 1.80 - 7.30 E9/L    Lymphocytes Absolute 1.37 (L) 1.50 - 4.00 E9/L    Monocytes Absolute 0.78 0.10 - 0.95 E9/L    Eosinophils Absolute 0.00 (L) 0.05 - 0.50 E9/L    Basophils Absolute 0.00 0.00 - 0.20 E9/L    nRBC 0.0 /100 WBC   Comprehensive Metabolic Panel w/ Reflex to MG   Result Value Ref Range    Sodium 143 132 - 146 mmol/L    Potassium reflex Magnesium 4.2 3.5 - 5.0 mmol/L    Chloride 108 (H) 98 - 107 mmol/L    CO2 18 (L) 22 - 29 mmol/L    Anion Gap 17 (H) 7 - 16 mmol/L    Glucose 221 (H) 74 - 99 mg/dL    BUN 31 (H) 6 - 23 mg/dL    CREATININE 1.1 (H) 0.5 - 1.0 mg/dL    GFR Non- 48 >=60 mL/min/1.73    GFR African American 58     Calcium 9.0 8.6 - 10.2 mg/dL    Total Protein 7.5 6.4 - 8.3 g/dL    Albumin 2.9 (L) 3.5 - 5.2 g/dL    Total Bilirubin 0.9 0.0 - 1.2 mg/dL    Alkaline Phosphatase 116 (H) 35 - 104 U/L    ALT 8 0 - 32 U/L    AST 25 0 - 31 U/L   Troponin   Result Value Ref Range    Troponin, High Sensitivity 149 (H) 0 - 9 ng/L   Lipase   Result Value Ref Range    Lipase 7 (L) 13 - 60 U/L   Brain Natriuretic Peptide   Result Value Ref Range    Pro-BNP 12,523 (H) 0 - 450 pg/mL   Urinalysis, reflex to microscopic   Result Value Ref Range    Color, UA Yellow Straw/Yellow    Clarity, UA Clear Clear    Glucose, Ur Negative Negative mg/dL    Bilirubin Urine MODERATE (A) Negative    Ketones, Urine TRACE (A) Negative mg/dL    Specific Gravity, UA >=1.030 1.005 - 1.030    Blood, Urine TRACE-INTACT Negative    pH, UA 5.5 5.0 - 9.0    Protein, UA 30 (A) Negative mg/dL    Urobilinogen, Urine 4.0 (A) <2.0 E.U./dL    Nitrite, Urine Negative Negative    Leukocyte Esterase, Urine SMALL (A) Negative   Lactate, Sepsis   Result Value Ref Range    Lactic Acid, Sepsis 3.8 (HH) 0.5 - 1.9 mmol/L   Lactate, Sepsis   Result Value Ref Range    Lactic Acid, Sepsis 3.5 (HH) 0.5 - 1.9 mmol/L   Valproic acid level, total   Result Value Ref Range    Valproic Acid Lvl 10 (L) 50 - 100 mcg/mL   CORTISOL   Result Value Ref Range    Cortisol 40.00 (H) 2.68 - 18.40 mcg/dL   APTT   Result Value Ref Range    aPTT 50.6 (H) 24.5 - 35.1 sec   Protime-INR   Result Value Ref Range    Protime 34.5 (H) 9.3 - 12.4 sec    INR 3.2    Blood Gas, Arterial   Result Value Ref Range    Date Analyzed [id number]     Time Analyzed 1133     Source: Blood Arterial     pH, Blood Gas 7.477 (H) 7.350 - 7.450    PCO2 24.8 (L) 35.0 - 45.0 mmHg    PO2 66.4 (L) 75.0 - 100.0 mmHg    HCO3 17.9 (L) 22.0 - 26.0 mmol/L    B.E. -3.9 (L) -3.0 - 3.0 mmol/L    O2 Sat 94.5 92.0 - 98.5 %    O2Hb 93.6 (L) 94.0 - 97.0 %    COHb 0.6 0.0 - 1.5 %    MetHb 0.3 0.0 - 1.5 %    O2 Content 17.6 mL/dL    HHb 5.5 (H) 0.0 - 5.0 %    tHb (est) 13.4 11.5 - 16.5 g/dL    Mode RA     Date Of Collection      Time Collected      Pt Temp 37.0 C     ID [id number]     Lab [id number]     Critical(s) Notified . No Critical Values    Microscopic Urinalysis   Result Value Ref Range    WBC, UA >20 (A) 0 - 5 /HPF    RBC, UA 0-1 0 - 2 /HPF    Bacteria, UA MANY (A) None Seen /HPF   SPECIMEN REJECTION   Result Value Ref Range    Rejected Test NH3     Reason for Rejection see below    Ammonia   Result Value Ref Range    Ammonia 24.1 11.0 - 51.0 umol/L   Troponin   Result Value Ref Range    Troponin, High Sensitivity 162 (H) 0 - 9 ng/L   CK   Result Value Ref Range    Total CK 66 20 - 180 U/L   D-Dimer, Quantitative   Result Value Ref Range    D-Dimer, Quant 818 ng/mL DDU   C-Reactive Protein   Result Value Ref Range    CRP 16.6 (H) 0.0 - 0.4 mg/dL   POCT Glucose   Result Value Ref Range    Glucose 195 mg/dL    QC OK? ok    POCT Glucose   Result Value Ref Range    Meter Glucose 195 (H) 74 - 99 mg/dL   EKG 12 Lead   Result Value Ref Range    Ventricular Rate 151 BPM    Atrial Rate 159 BPM    QRS Duration 94 ms    Q-T Interval 274 ms    QTc Calculation (Bazett) 434 ms    R Axis 10 degrees    T Axis 153 degrees       RADIOLOGY:  XR CHEST PORTABLE   Final Result   1. Right perihilar and right lower lobe infiltrates         CT HEAD WO CONTRAST   Final Result   1. There is no acute intracranial abnormality. Specifically, there is no   intracranial hemorrhage. 2. Advanced atrophy and periventricular leukomalacia,   3. 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 CT ABDOMEN PELVIS W IV CONTRAST Additional Contrast? None   Final Result   Patchy infiltrates and ground-glass densities in the lung bases concerning   for pneumonia or edema. Constipation with redundant colon. Consider colonoscopy for better   assessment of the colon. No discrete mass is identified in the abdomen   pelvis.       Fluid and the air in the endometrium of the uterus. Consider  gyn assessment   and ultrasonography. RECOMMENDATIONS:   Unavailable         US NON OB TRANSVAGINAL    (Results Pending)       ------------------------- NURSING NOTES AND VITALS REVIEWED ---------------------------  Date / Time Roomed:  1/17/2022 10:52 AM  ED Bed Assignment:  10/10    The nursing notes within the ED encounter and vital signs as below have been reviewed. Patient Vitals for the past 24 hrs:   BP Temp Temp src Pulse Resp SpO2 Height Weight   01/17/22 1358 132/61 102.4 °F (39.1 °C) Oral 109 16 95 % -- --   01/17/22 1326 -- -- -- 94 -- -- -- --   01/17/22 1059 118/63 -- -- 141 18 93 % 5' 4\" (1.626 m) 200 lb (90.7 kg)       Oxygen Saturation Interpretation: Abnormal and Improved after treatment    ------------------------------------------ PROGRESS NOTES ------------------------------------------  Re-evaluation(s):  Time: 1pm  Patients symptoms are improving  Repeat physical examination is improved      --------------------------------- ADDITIONAL PROVIDER NOTES ---------------------------------  Consultations:  Time: 1:30pm. Spoke with Dr. Todd Gastelum. Discussed case. They will admit the patient. This patient's ED course included: a personal history and physicial examination, re-evaluation prior to disposition, multiple bedside re-evaluations, IV medications, cardiac monitoring, continuous pulse oximetry and complex medical decision making and emergency management    This patient has remained hemodynamically stable during their ED course. Diagnosis:  1. Atrial fibrillation with RVR ([address]    2. COVID-19    3. Acute urinary tract infection    4. Lactic acidosis    5. Acute respiratory failure with hypoxia (HCC)        Disposition:  Patient's disposition: Admit to telemetry  Patient's condition is stable.        Brooke Sommers DO  Resident  01/17/22 9522

## 2022-01-17 NOTE — PROGRESS NOTES
Pharmacy Consultation Note    Consult date: 1/17/2022  Physician/provider: Rosemary Rutherford MD    Pharmacy has been consulted to evaluate criteria for Baricitinib and Tocilizumab therapy. Based on the algorithm, the patient DOES currently meet [facility] P[employer] approved Covid-19 treatment algorithm for Baricitinib. Currently requiring 4L NC with elevated D-dimer and CRP. Based on the algorithm, this patient DOES NOT currently  Meet [facility] P[employer] approved criteria for tocilizumab. Attempted to notify provider. If oxygen requirements escalate, please re-consult for pharmacy to evaluate patient for tocilizumab therapy.      Thank you for the consult,  Sadiq Tripathi, [id number]

## 2022-01-17 NOTE — PROGRESS NOTES
Advance Care Planning     Advance Care Planning Activator (Inpatient)  Conversation Note      Date of ACP Conversation: 1/17/2022     Conversation Conducted with: Patient with [address]: Named in Advance Directive or Healthcare Power of  (name) Johnathan Kurt    ACP Activator: Fabian Walsh RN      Health Care Decision Maker:     Current Designated Health Care Decision Maker:     Primary Decision Maker: Fariba Ruffin [facility] - [phone]      Care Preferences    Ventilation: \"If you were in your present state of health and suddenly became very ill and were unable to breathe on your own, what would your preference be about the use of a ventilator (breathing machine) if it were available to you? \"      Would the patient desire the use of ventilator (breathing machine)?: no    \"If your health worsens and it becomes clear that your chance of recovery is unlikely, what would your preference be about the use of a ventilator (breathing machine) if it were available to you? \"     Would the patient desire the use of ventilator (breathing machine)?: No      Resuscitation  \"CPR works best to restart the heart when there is a sudden event, like a heart attack, in someone who is otherwise healthy. Unfortunately, CPR does not typically restart the heart for people who have serious health conditions or who are very sick. \"    \"In the event your heart stopped as a result of an underlying serious health condition, would you want attempts to be made to restart your heart (answer \"yes\" for attempt to resuscitate) or would you prefer a natural death (answer \"no\" for do not attempt to resuscitate)? \" yes       [] Yes   [] No   Educated Patient / [place] regarding differences between Advance Directives and portable DNR orders.     Length of ACP Conversation in minutes:      Conversation Outcomes:  [x] ACP discussion completed  [x] Existing advance directive reviewed with patient; no changes to patient's previously recorded wishes  [] New Advance Directive completed  [] Portable Do Not Rescitate prepared for Provider review and signature  [] POLST/POST/MOLST/MOST prepared for Provider review and signature      Follow-up plan:    [] Schedule follow-up conversation to continue planning  [x] Referred individual to Provider for additional questions/concerns   [] Advised patient/agent/surrogate to review completed ACP document and update if needed with changes in condition, patient preferences or care setting    [x] This note routed to one or more involved healthcare providers

## 2022-01-17 NOTE — H&P
[facility]   History and Physical      CHIEF COMPLAINT:   AMS    History of Present Illness:  80 y.o. female with a history of dementia, DM2, GERD, HTN, PAF, anorexia, depression, chronic constipation, HLD, Alzheimers presents from [facility] care for AMS. Patient is alert to self and only moans with verbal and tactile stimulation. HPI obtained from chart. Segura placed per ER staff. EMS had reported to ER staff patient was hypoxic at 88% on room air upon arrival . Patient now on 4 liters NC saturating 95%. ER spoke with patient's daughter who states patient is not vaccinated and she does not wish patient to be started on any COVID medications, especially Remdesivir, at this time. Patient had negative PCR covid test from [facility] 22. Informant(s) for H&P: EMR, ER staff    REVIEW OF SYSTEMS:    Unable to perform due to altered mental status. PMH:  Past Medical History:   Diagnosis Date    Alzheimer disease ([facility]     Atrial fibrillation ([facility]     Dementia ([facility]     Diabetes mellitus ([facility]     GERD (gastroesophageal reflux disease)     Headache     Hypertension        Surgical History:  Past Surgical History:   Procedure Laterality Date     SECTION      CHOLECYSTECTOMY, LAPAROSCOPIC      COLONOSCOPY      UPPER GASTROINTESTINAL ENDOSCOPY         Medications Prior to Admission:    Prior to Admission medications    Medication Sig Start Date End Date Taking?  Authorizing Provider   insulin glargine (LANTUS) 100 UNIT/ML injection vial Inject 20 Units into the skin daily 10/17/20   Khoa Ramos DO   insulin lispro (HUMALOG) 100 UNIT/ML injection vial Inject 0-12 Units into the skin 3 times daily (with meals) 10/16/20   Iraida Ramos DO   citalopram (CELEXA) 10 MG tablet 10 mg daily  20   Historical Provider, MD   docusate sodium (COLACE) 100 MG capsule Take 100 mg by mouth 2 times daily    Historical Provider, MD   flecainide (TAMBOCOR) 100 MG tablet Take 100 mg by mouth 2 times daily    Historical Provider, MD   metoprolol succinate (TOPROL XL) 25 MG extended release tablet Take 0.5 tablets by mouth daily 3/6/20   Rebecca Alba MD   megestrol (MEGACE ORAL) 40 MG/ML suspension Take 5 mLs by mouth daily 1/22/20   Lisa Perez MD   vitamin D (ERGOCALCIFEROL) 1.25 MG (08682 UT) CAPS capsule take one capsule by mouth once a week 1/11/20   Lisa Perez MD   divalproex (DEPAKOTE) 125 MG DR tablet Take 1 tablet by mouth 3 times daily  Patient taking differently: Take 125 mg by mouth 2 times daily  1/11/20   Lisa Perez MD   apixaban (ELIQUIS) 5 MG TABS tablet Take 1 tablet by mouth 2 times daily 1/11/20   Lisa Perez MD   memantine ER (NAMENDA XR) 28 MG CP24 extended release capsule TAKE ONE CAPSULE BY MOUTH EVERY DAY 1/11/20   Lisa Perez MD   Lutein 20 MG CAPS Take by mouth daily    Historical Provider, MD   acetaminophen (TYLENOL) 325 MG tablet Take 650 mg by mouth every 6 hours as needed for Pain    Historical Provider, MD   hydrocortisone 1 % ointment Apply topically 2 times daily Apply topically 2 times daily. Historical Provider, MD   menthol-zinc oxide (CALMOSEPTINE) 0.44-20.625 % OINT ointment Apply topically daily as needed Max 30 ml per day. Historical Provider, MD   Bilberry 1000 MG CAPS Take 1 capsule by mouth daily    Historical Provider, MD   Dextromethorphan-guaiFENesin  MG/5ML SYRP Take 10 mLs by mouth every 6 hours as needed for Cough    Historical Provider, MD   Multiple Vitamins-Minerals (EYE VITAMINS) CAPS Take 1 capsule by mouth daily     Historical Provider, MD       Allergies:    Asa [aspirin]    Social History:    reports that she has never smoked. She has never used smokeless tobacco. She reports that she does not drink alcohol and does not use drugs. Family History:   family history includes Heart Disease in her mother.      PHYSICAL EXAM:  Vitals:  /61 Pulse 109   Temp 102.4 °F (39.1 °C) (Oral)   Resp 16   Ht 5' 4\" (1.626 m)   Wt 200 lb (90.7 kg)   LMP  (LMP Unknown)   SpO2 95%   BMI 34.33 kg/m²     General Appearance: alert and oriented to person,  in no acute distress  Skin: warm and dry  Head: normocephalic and atraumatic  Eyes: pupils equal, round, and reactive to light, extraocular eye movements intact, conjunctivae normal  Neck: neck supple and non tender without mass   Pulmonary/Chest: clear to auscultation bilaterally- no wheezes, rales or rhonchi, normal air movement, no respiratory distress  Cardiovascular: normal rate, normal S1 and S2 and no carotid bruits  Abdomen: soft, non-tender, non-distended, normal bowel sounds, no masses or organomegaly  Extremities: no cyanosis, no clubbing and no edema  Neurologic: no cranial nerve deficit and speech normal  : canales draining dark mickey urine    LABS:  Recent Labs     01/17/22  1105 01/17/22  1147   NA  --  143   K  --  4.2   CL  --  108*   CO2  --  18*   BUN  --  31*   CREATININE  --  1.1*   GLUCOSE 195 221*   CALCIUM  --  9.0       Recent Labs     01/17/22  1147   WBC 19.6*   RBC 4.45   HGB 13.4   HCT 42.3   MCV 95.1   MCH 30.1   MCHC 31.7*   RDW 14.1      MPV 13.0*       No results for input(s): POCGLU in the last 72 hours. Radiology: CT HEAD WO CONTRAST    Result Date: 1/17/2022  EXAMINATION: CT OF THE HEAD WITHOUT CONTRAST  1/17/2022 12:38 pm TECHNIQUE: CT of the head was performed without the administration of intravenous contrast. Dose modulation, iterative reconstruction, and/or weight based adjustment of the mA/kV was utilized to reduce the radiation dose to as low as reasonably achievable. COMPARISON: 11/26/2021 HISTORY: ORDERING SYSTEM PROVIDED HISTORY: [facility] TECHNOLOGIST PROVIDED HISTORY: Reason for exam:->AMs Has a \"code stroke\" or \"stroke alert\" been called? ->No Decision Support Exception - unselect if not a suspected or confirmed emergency medical condition->Emergency Medical Condition (MA) FINDINGS: BRAIN/VENTRICLES: There is no acute intracranial hemorrhage, mass effect or midline shift. No abnormal extra-axial fluid collection. The gray-white differentiation is maintained without evidence of an acute infarct. There is no evidence of hydrocephalus. The ventricles, cisterns and sulci are prominent consistent with atrophy. There is decreased attenuation within the periventricular white matter consistent with periventricular leukomalacia. There is encephalomalacia is seen within the posterior right parietal and right occipital lobes consistent with an old infarct. ORBITS: The visualized portion of the orbits demonstrate no acute abnormality. SINUSES: The visualized paranasal sinuses and mastoid air cells demonstrate no acute abnormality. SOFT TISSUES/SKULL:  No acute abnormality of the visualized skull or soft tissues. 1. There is no acute intracranial abnormality. Specifically, there is no intracranial hemorrhage. 2. Advanced atrophy and periventricular leukomalacia, 3. 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 CT ABDOMEN PELVIS W IV CONTRAST Additional Contrast? None    Result Date: 1/17/2022  EXAMINATION: CT OF THE ABDOMEN AND PELVIS WITH CONTRAST 1/17/2022 12:38 pm TECHNIQUE: CT of the abdomen and pelvis was performed with the administration of intravenous contrast. Multiplanar reformatted images are provided for review. Dose modulation, iterative reconstruction, and/or weight based adjustment of the mA/kV was utilized to reduce the radiation dose to as low as reasonably achievable.  COMPARISON: September 24, 2014 HISTORY: ORDERING SYSTEM PROVIDED HISTORY: left lower abd mass, ams TECHNOLOGIST PROVIDED HISTORY: Reason for exam:->left lower abd mass, ams Additional Contrast?->None Decision Support Exception - unselect if not a suspected or confirmed emergency medical condition->Emergency Medical Condition (MA) FINDINGS: The lung bases demonstrate patchy and  ground-glass infiltrates concerning for pneumonia or edema. There is coronary artery calcification. Liver is of normal architecture. Gallbladder is absent. Spleen, pancreas, the adrenals and the kidneys are normal.  Degenerative changes are noted in the thoracolumbar spine. Pelvis. The bladder is contracted with the Segura catheter and wall thickening. There is small amount of air and fluid in the endometrium of the uterus. Consider ultrasonography. There is constipation with a large amount retained fecal matter throughout the colon rectum which is redundant. No discrete mass could be identified. Consider colonoscopy for better assessment of the colon. Appendix cannot be identified. Patchy infiltrates and ground-glass densities in the lung bases concerning for pneumonia or edema. Constipation with redundant colon. Consider colonoscopy for better assessment of the colon. No discrete mass is identified in the abdomen pelvis. Fluid and the air in the endometrium of the uterus. Consider  gyn assessment and ultrasonography. RECOMMENDATIONS: Unavailable       EKG:         ASSESSMENT:      Principal Problem:    Acute sepsis (HCC)  Active Problems:    Acute metabolic encephalopathy    Acute respiratory failure due to COVID-19 ([facility])    Type II diabetes mellitus ([facility])    Atrial flutter with rapid ventricular response ([facility])    Acute urinary tract infection    JOSE DE JESUS (acute kidney injury) ([facility]    Hypertension    Hyperlipidemia    GERD (gastroesophageal reflux disease)  Resolved Problems:    * No resolved hospital problems. *      PLAN:    1. Acute sepsis: fever (102.4), tachycardia (141), hypoxia (88%), check lactic acid. Urine and blood cultures pending. Started on ceftriaxone, will continue until cultures resulted. 2. Acute metabilic encephalopathy: multifactorial due to sepsis, Covid infection, UTI. 3. Acute hypoxic respiratory failure: secondary to COVID.  Daughter does not want patient started on covid meds at this time. Start decadron , add nebs, start vitamins. Check inflammatory markers. Requiring 4 liters Nc to keep sat >92%, wean as able. 4. Acute UTI: urine culture pending. Given dose in ceftriaxone in ER, will continue until cultures resulted. 5. JOSE DE JESUS: secondary to above. Cr 1.1. gentle IV hydration, repeat Bmp in am  6. afib RVR:  As per EKG above. HR now 109. Continue flecanide, eliquis, BB. Hold off on cardiology consult at this time. 7. DM2: resume home insulin, add carb control diet, low dose insulin slide scale, hypoglycemia protocol. Recent A1c 6.5  8.  HTN: resume home metoprolol     Code Status: limited-no intubation  DVT prophylaxis:  apixaban         Electronically signed by SHARI Jennings Mai, CNP on 1/17/2022 at 2:50 PM

## 2022-01-17 NOTE — ED NOTES
Bed: 10  Expected date:   Expected time:   Means of arrival:   Comments:  Karlo Geiger RN  01/17/22 1277

## 2022-01-18 LAB
ALBUMIN SERPL-MCNC: 2.4 G/DL (ref 2.8–4.4)
ALP BLD-CCNC: 122 U/L (ref 0–249)
ALT SERPL-CCNC: 10 U/L (ref 0–32)
ANION GAP SERPL CALCULATED.3IONS-SCNC: 12 MMOL/L (ref 7–16)
AST SERPL-CCNC: 20 U/L (ref 0–31)
BASOPHILS ABSOLUTE: 0.06 E9/L (ref 0.1–0.4)
BASOPHILS RELATIVE PERCENT: 0.3 % (ref 0–2)
BILIRUB SERPL-MCNC: 0.4 MG/DL (ref 2–6)
BUN BLDV-MCNC: 31 MG/DL (ref 4–19)
BURR CELLS: ABNORMAL
CALCIUM SERPL-MCNC: 8.4 MG/DL (ref 8.6–10.2)
CHLORIDE BLD-SCNC: 115 MMOL/L (ref 98–107)
CO2: 19 MMOL/L (ref 22–29)
CREAT SERPL-MCNC: 0.8 MG/DL (ref 0.4–0.7)
EKG ATRIAL RATE: 159 BPM
EKG Q-T INTERVAL: 274 MS
EKG QRS DURATION: 94 MS
EKG QTC CALCULATION (BAZETT): 434 MS
EKG R AXIS: 10 DEGREES
EKG T AXIS: 153 DEGREES
EKG VENTRICULAR RATE: 151 BPM
EOSINOPHILS ABSOLUTE: 0 E9/L (ref 0.1–0.7)
EOSINOPHILS RELATIVE PERCENT: 0 % (ref 0–12)
GFR AFRICAN AMERICAN: >60
GFR NON-AFRICAN AMERICAN: >60 ML/MIN/1.73
GLUCOSE BLD-MCNC: 297 MG/DL (ref 70–110)
HCT VFR BLD CALC: 36.7 % (ref 45–66)
HEMOGLOBIN: 11.7 G/DL (ref 14.5–22)
IMMATURE GRANULOCYTES #: 0.21 E9/L
IMMATURE GRANULOCYTES %: 1.1 % (ref 0–5)
LACTIC ACID: 2.1 MMOL/L (ref 0.5–2.2)
LYMPHOCYTES ABSOLUTE: 0.69 E9/L (ref 3–15)
LYMPHOCYTES RELATIVE PERCENT: 3.5 % (ref 15–60)
MCH RBC QN AUTO: 29.9 PG (ref 30–42)
MCHC RBC AUTO-ENTMCNC: 31.9 % (ref 29–37)
MCV RBC AUTO: 93.9 FL (ref 95–121)
METER GLUCOSE: 231 MG/DL (ref 70–110)
METER GLUCOSE: 263 MG/DL (ref 70–110)
METER GLUCOSE: 283 MG/DL (ref 70–110)
METER GLUCOSE: 291 MG/DL (ref 70–110)
METER GLUCOSE: 301 MG/DL (ref 70–110)
MONOCYTES ABSOLUTE: 0.54 E9/L (ref 1–3)
MONOCYTES RELATIVE PERCENT: 2.7 % (ref 3–15)
NEUTROPHILS ABSOLUTE: 18.45 E9/L (ref 5–20)
NEUTROPHILS RELATIVE PERCENT: 92.4 % (ref 15–80)
PDW BLD-RTO: 14.2 FL (ref 11–19)
PLATELET # BLD: 237 E9/L (ref 130–480)
PMV BLD AUTO: 12.9 FL (ref 7–12)
POIKILOCYTES: ABNORMAL
POLYCHROMASIA: ABNORMAL
POTASSIUM REFLEX MAGNESIUM: 3.7 MMOL/L (ref 3.4–4.5)
RBC # BLD: 3.91 E12/L (ref 3.7–5.3)
SODIUM BLD-SCNC: 146 MMOL/L (ref 132–146)
TOTAL PROTEIN: 6.3 G/DL (ref 6.4–8.3)
TSH SERPL DL<=0.05 MIU/L-ACNC: 0.3 UIU/ML (ref 0.76–16.11)
VITAMIN D 25-HYDROXY: 12 NG/ML (ref 30–100)
WBC # BLD: 20 E9/L (ref 9.4–34)

## 2022-01-18 PROCEDURE — 2580000003 HC RX 258: Performed by: SPECIALIST

## 2022-01-18 PROCEDURE — 6360000002 HC RX W HCPCS: Performed by: INTERNAL MEDICINE

## 2022-01-18 PROCEDURE — 80053 COMPREHEN METABOLIC PANEL: CPT

## 2022-01-18 PROCEDURE — 6360000002 HC RX W HCPCS: Performed by: SPECIALIST

## 2022-01-18 PROCEDURE — 97165 OT EVAL LOW COMPLEX 30 MIN: CPT

## 2022-01-18 PROCEDURE — 83605 ASSAY OF LACTIC ACID: CPT

## 2022-01-18 PROCEDURE — 6370000000 HC RX 637 (ALT 250 FOR IP): Performed by: NURSE PRACTITIONER

## 2022-01-18 PROCEDURE — 2700000000 HC OXYGEN THERAPY PER DAY

## 2022-01-18 PROCEDURE — 82962 GLUCOSE BLOOD TEST: CPT

## 2022-01-18 PROCEDURE — M0249 HC ADM TOCILIZU COVID-19 1ST: HCPCS

## 2022-01-18 PROCEDURE — 99233 SBSQ HOSP IP/OBS HIGH 50: CPT | Performed by: INTERNAL MEDICINE

## 2022-01-18 PROCEDURE — 6370000000 HC RX 637 (ALT 250 FOR IP): Performed by: INTERNAL MEDICINE

## 2022-01-18 PROCEDURE — 2580000003 HC RX 258: Performed by: INTERNAL MEDICINE

## 2022-01-18 PROCEDURE — 36415 COLL VENOUS BLD VENIPUNCTURE: CPT

## 2022-01-18 PROCEDURE — 93010 ELECTROCARDIOGRAM REPORT: CPT | Performed by: INTERNAL MEDICINE

## 2022-01-18 PROCEDURE — 85025 COMPLETE CBC W/AUTO DIFF WBC: CPT

## 2022-01-18 PROCEDURE — 6370000000 HC RX 637 (ALT 250 FOR IP): Performed by: SPECIALIST

## 2022-01-18 PROCEDURE — 82306 VITAMIN D 25 HYDROXY: CPT

## 2022-01-18 PROCEDURE — 97161 PT EVAL LOW COMPLEX 20 MIN: CPT

## 2022-01-18 PROCEDURE — 84443 ASSAY THYROID STIM HORMONE: CPT

## 2022-01-18 PROCEDURE — APPSS30 APP SPLIT SHARED TIME 16-30 MINUTES: Performed by: NURSE PRACTITIONER

## 2022-01-18 PROCEDURE — 2060000000 HC ICU INTERMEDIATE R&B

## 2022-01-18 RX ORDER — DEXAMETHASONE SODIUM PHOSPHATE 10 MG/ML
6 INJECTION INTRAMUSCULAR; INTRAVENOUS EVERY 24 HOURS
Status: DISCONTINUED | OUTPATIENT
Start: 2022-01-18 | End: 2022-01-18

## 2022-01-18 RX ORDER — DEXAMETHASONE SODIUM PHOSPHATE 4 MG/ML
6 INJECTION, SOLUTION INTRA-ARTICULAR; INTRALESIONAL; INTRAMUSCULAR; INTRAVENOUS; SOFT TISSUE EVERY 24 HOURS
Status: DISCONTINUED | OUTPATIENT
Start: 2022-01-19 | End: 2022-01-24

## 2022-01-18 RX ORDER — DEXAMETHASONE SODIUM PHOSPHATE 4 MG/ML
6 INJECTION, SOLUTION INTRA-ARTICULAR; INTRALESIONAL; INTRAMUSCULAR; INTRAVENOUS; SOFT TISSUE EVERY 12 HOURS
Status: DISCONTINUED | OUTPATIENT
Start: 2022-01-18 | End: 2022-01-18

## 2022-01-18 RX ORDER — ASCORBIC ACID 500 MG
500 TABLET ORAL 2 TIMES DAILY
Status: DISCONTINUED | OUTPATIENT
Start: 2022-01-18 | End: 2022-01-27 | Stop reason: HOSPADM

## 2022-01-18 RX ORDER — SODIUM CHLORIDE 9 MG/ML
INJECTION, SOLUTION INTRAVENOUS EVERY 12 HOURS
Status: DISCONTINUED | OUTPATIENT
Start: 2022-01-18 | End: 2022-01-27 | Stop reason: ALTCHOICE

## 2022-01-18 RX ADMIN — PYRIDOXINE HCL TAB 50 MG 50 MG: 50 TAB at 07:56

## 2022-01-18 RX ADMIN — FLECAINIDE ACETATE 100 MG: 100 TABLET ORAL at 07:53

## 2022-01-18 RX ADMIN — APIXABAN 5 MG: 5 TABLET, FILM COATED ORAL at 22:42

## 2022-01-18 RX ADMIN — DOCUSATE SODIUM 100 MG: 100 CAPSULE, LIQUID FILLED ORAL at 22:42

## 2022-01-18 RX ADMIN — TOCILIZUMAB 648 MG: 180 INJECTION, SOLUTION SUBCUTANEOUS at 14:01

## 2022-01-18 RX ADMIN — SODIUM CHLORIDE: 9 INJECTION, SOLUTION INTRAVENOUS at 19:40

## 2022-01-18 RX ADMIN — INSULIN LISPRO 2 UNITS: 100 INJECTION, SOLUTION INTRAVENOUS; SUBCUTANEOUS at 00:01

## 2022-01-18 RX ADMIN — APIXABAN 5 MG: 5 TABLET, FILM COATED ORAL at 07:51

## 2022-01-18 RX ADMIN — METOPROLOL SUCCINATE 12.5 MG: 25 TABLET, FILM COATED, EXTENDED RELEASE ORAL at 07:51

## 2022-01-18 RX ADMIN — INSULIN LISPRO 2 UNITS: 100 INJECTION, SOLUTION INTRAVENOUS; SUBCUTANEOUS at 23:01

## 2022-01-18 RX ADMIN — INSULIN LISPRO 3 UNITS: 100 INJECTION, SOLUTION INTRAVENOUS; SUBCUTANEOUS at 15:55

## 2022-01-18 RX ADMIN — DIVALPROEX SODIUM 125 MG: 125 TABLET, DELAYED RELEASE ORAL at 07:52

## 2022-01-18 RX ADMIN — DEXAMETHASONE 6 MG: 4 TABLET ORAL at 07:52

## 2022-01-18 RX ADMIN — SODIUM CHLORIDE, POTASSIUM CHLORIDE, SODIUM LACTATE AND CALCIUM CHLORIDE: 600; 310; 30; 20 INJECTION, SOLUTION INTRAVENOUS at 14:03

## 2022-01-18 RX ADMIN — DIVALPROEX SODIUM 125 MG: 125 TABLET, DELAYED RELEASE ORAL at 14:01

## 2022-01-18 RX ADMIN — Medication 2000 UNITS: at 07:56

## 2022-01-18 RX ADMIN — FLECAINIDE ACETATE 100 MG: 100 TABLET ORAL at 22:42

## 2022-01-18 RX ADMIN — INSULIN LISPRO 4 UNITS: 100 INJECTION, SOLUTION INTRAVENOUS; SUBCUTANEOUS at 11:41

## 2022-01-18 RX ADMIN — INSULIN LISPRO 2 UNITS: 100 INJECTION, SOLUTION INTRAVENOUS; SUBCUTANEOUS at 06:22

## 2022-01-18 RX ADMIN — MEGESTROL ACETATE 200 MG: 40 SUSPENSION ORAL at 07:58

## 2022-01-18 RX ADMIN — CEFEPIME 1000 MG: 1 INJECTION, POWDER, FOR SOLUTION INTRAMUSCULAR; INTRAVENOUS at 16:00

## 2022-01-18 RX ADMIN — DIVALPROEX SODIUM 125 MG: 125 TABLET, DELAYED RELEASE ORAL at 22:42

## 2022-01-18 RX ADMIN — CITALOPRAM HYDROBROMIDE 10 MG: 20 TABLET ORAL at 07:56

## 2022-01-18 RX ADMIN — Medication 500 MG: at 22:42

## 2022-01-18 RX ADMIN — BARICITINIB 2 MG: 2 TABLET, FILM COATED ORAL at 10:11

## 2022-01-18 RX ADMIN — ZINC SULFATE 220 MG (50 MG) CAPSULE 50 MG: CAPSULE at 10:11

## 2022-01-18 RX ADMIN — OXYCODONE HYDROCHLORIDE AND ACETAMINOPHEN 500 MG: 500 TABLET ORAL at 07:56

## 2022-01-18 RX ADMIN — INSULIN GLARGINE 20 UNITS: 100 INJECTION, SOLUTION SUBCUTANEOUS at 10:49

## 2022-01-18 ASSESSMENT — PAIN SCALES - PAIN ASSESSMENT IN ADVANCED DEMENTIA (PAINAD)
NEGVOCALIZATION: 1
BODYLANGUAGE: 0
CONSOLABILITY: 0
NEGVOCALIZATION: 1
TOTALSCORE: 1
BREATHING: 0
FACIALEXPRESSION: 0
FACIALEXPRESSION: 0
BODYLANGUAGE: 0
CONSOLABILITY: 0
TOTALSCORE: 1
BREATHING: 0

## 2022-01-18 NOTE — DISCHARGE INSTR - COC
Continuity of Care Form    Patient Name: Anyi Kang   :  1940  MRN:  [id number]    Admit date:  2022  Discharge date:  22    Code Status Order: Limited   Advance Directives:      Admitting Physician:  Melody Mota DO  PCP: Carolina Leung MD    Discharging Nurse: Electronically signed by Hoang Diamond RN on 2022 at [facility] Unit/Room#: 7932/1502-T  Discharging Unit Phone Number: [phone]    Emergency Contact:   Extended Emergency Contact Information  Primary Emergency Contact: [facility]  Address: [address] [place] [address] Phone: [phone]  Mobile Phone: [phone]  Relation: Child  Secondary Emergency Contact: Danie Marroquin Phone: [phone]  Relation: Grandchild    Past Surgical History:  Past Surgical History:   Procedure Laterality Date     SECTION      CHOLECYSTECTOMY, LAPAROSCOPIC      COLONOSCOPY      UPPER GASTROINTESTINAL ENDOSCOPY         Immunization History:   Immunization History   Administered Date(s) Administered    Influenza, High Dose (Fluzone 65 yrs and older) 2015, 10/20/2017, 10/03/2018    Pneumococcal Conjugate 13-valent Yoakum Mansi) 2016       Active Problems:  Patient Active Problem List   Diagnosis Code    Hypertension I10    Type II diabetes mellitus ([facility] E11.9    Hyperlipidemia E78.5    GERD (gastroesophageal reflux disease) K21.9    History of cholecystectomy Z90.49    Gastroparesis K31.84    Late onset Alzheimer's disease without behavioral disturbance (HCC) G30.1, F02.80    New onset atrial fibrillation (HCC) I48.91    Atrial flutter with rapid ventricular response (HCC) I48.92    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    Severe sepsis (HCC) A41.9, R65.20    Acute urinary tract infection N39.0    Acute sepsis (HCC) E54.3    Acute metabolic encephalopathy X48.36    Acute respiratory failure due to COVID-19 ([facility] U07.1, J96.00    JOSE DE JESUS (acute kidney injury) ([facility] N17.9    Atrial fibrillation with RVR ([facility] I48.91       Isolation/Infection:   Isolation            Droplet Plus          Patient Infection Status       Infection Onset Added Last Indicated Last Indicated By Review Planned Expiration Resolved Resolved By    COVID-19 01/17/22 01/17/22 01/17/22 COVID-19, Rapid 01/24/22 01/31/22      Resolved    COVID-19 (Rule Out) 01/17/22 01/17/22 01/17/22 COVID-19, Rapid (Ordered)   01/17/22 Rule-Out Test Resulted    COVID-19 (Rule Out) 10/31/20 10/31/20 10/31/20 Covid-19 Ambulatory (Ordered)   11/03/20 Rule-Out Test Resulted    COVID-19 (Rule Out) 10/26/20 10/26/20 10/26/20 Covid-19 Ambulatory (Ordered)   10/28/20 Rule-Out Test Resulted    COVID-19 (Rule Out) 10/19/20 10/20/20 10/19/20 Covid-19 Ambulatory (Ordered)   10/21/20 Rule-Out Test Resulted    COVID-19 (Rule Out) 10/17/20 10/18/20 10/17/20 Covid-19 Ambulatory (Ordered)   10/20/20 Rule-Out Test Resulted    COVID-19 (Rule Out) 10/12/20 10/12/20 10/12/20 COVID-19 (Ordered)   10/12/20 Rule-Out Test Resulted    COVID-19 (Rule Out) 09/08/20 09/09/20 09/08/20 Covid-19 Ambulatory (Ordered)   09/11/20 Rule-Out Test Resulted    COVID-19 (Rule Out) 09/01/20 09/01/20 09/01/20 Covid-19 Ambulatory (Ordered)   09/03/20 Rule-Out Test Resulted    COVID-19 (Rule Out) 05/21/20 05/21/20 05/21/20 Covid-19 Ambulatory (Ordered)   05/26/20 Rule-Out Test Resulted            Nurse Assessment:  Last Vital Signs: /84   Pulse 116   Temp 98.7 °F (37.1 °C) (Axillary)   Resp 20   Ht 5' 4\" (1.626 m)   Wt 177 lb 1.6 oz (80.3 kg)   LMP  (LMP Unknown)   SpO2 100%   BMI 30.40 kg/m²     Last documented pain score (0-10 scale): Pain Level: 0  Last Weight:   Wt Readings from Last 1 Encounters:   01/18/22 177 lb 1.6 oz (80.3 kg)     Mental Status:  disoriented, alert, and non verbal    IV Access:  - None    Nursing Mobility/ADLs:  Walking   Dependent  Transfer  Dependent  Bathing  Dependent  Dressing  Dependent  Toileting Dependent  Feeding  Dependent  Med Admin  Dependent  Med Delivery   crushed and prefers mixed with applesauce    Wound Care Documentation and Therapy:  Wound 10/13/20 Heel Left;Lateral (Active)   Number of days: 461       Wound 10/13/20 Sacrum (Active)   Number of days: 461        Elimination:  Continence: Bowel: No  Bladder: No  Urinary Catheter: None   Colostomy/Ileostomy/Ileal Conduit: No       Date of Last BM: 1/26/22    Intake/Output Summary (Last 24 hours) at 1/18/2022 1228  Last data filed at 1/18/2022 0820  Gross per 24 hour   Intake 0 ml   Output 400 ml   Net -400 ml     I/O last 3 completed shifts:  In: -   Out: 400 [Urine:400]    Safety Concerns: At Risk for Falls and Aspiration Risk    Impairments/Disabilities:      Speech and Vision    Nutrition Therapy:  Current Nutrition Therapy:   - Oral Diet:  Dysphagia 1 pureed    Routes of Feeding: Oral  Liquids: Nectar Thick Liquids  Daily Fluid Restriction: no  Last Modified Barium Swallow with Video (Video Swallowing Test): not done    Treatments at the Time of Hospital Discharge:   Respiratory Treatments: see MAR  Oxygen Therapy:  is not on home oxygen therapy.   Ventilator:    - No ventilator support    Rehab Therapies: Physical Therapy and Occupational Therapy  Weight Bearing Status/Restrictions: No weight bearing restirctions  Other Medical Equipment (for information only, NOT a DME order):  bedside commode and hospital bed  Other Treatments: ***    Patient's personal belongings (please select all that are sent with patient):  {[facility] DME Belongings:[id number]}    RN SIGNATURE:  Electronically signed by Zheng Fields RN on 1/27/22 at 2:03 PM EST    CASE MANAGEMENT/SOCIAL WORK SECTION    Inpatient Status Date: 1/17/2022    Readmission Risk Assessment Score:  Readmission Risk              Risk of Unplanned Readmission:  20           Discharging to Facility/ Agency   Name: [facility]   Address: [address], [address]  Phone: [phone]  Fax: [phone]    Dialysis Facility (if applicable)   Name:  Address:  Dialysis Schedule:  Phone:  Fax:    / signature: Electronically signed by ROGELIO Overton on 1/18/22 at 12:29 PM EST    PHYSICIAN SECTION    Prognosis: {Prognosis:8651950301}    Condition at Discharge: [address] Patient Condition:[id number]}    Rehab Potential (if transferring to Rehab): {Prognosis:4527279062}    Recommended Labs or Other Treatments After Discharge: ***    Physician Certification: I certify the above information and transfer of Byron Helms  is necessary for the continuing treatment of the diagnosis listed and that she requires Hank Mihir for greater 30 days.      Update Admission H&P: {[facility] DME Changes in NIFEF:847130690}    PHYSICIAN SIGNATURE:  Electronically signed by Doretha Barreto DO on 1/27/22 at 1:20 PM EST

## 2022-01-18 NOTE — CONSULTS
Laterality Date     SECTION      CHOLECYSTECTOMY, LAPAROSCOPIC      COLONOSCOPY      UPPER GASTROINTESTINAL ENDOSCOPY       Current Medications:   Scheduled Meds:   dexamethasone  6 mg IntraVENous Q24H    apixaban  5 mg Oral BID    citalopram  10 mg Oral Daily    divalproex  125 mg Oral TID    docusate sodium  100 mg Oral BID    flecainide  100 mg Oral BID    insulin glargine  20 Units SubCUTAneous Daily    megestrol acetate  200 mg Oral Daily    metoprolol succinate  12.5 mg Oral Daily    ocuvite-lutein  1 tablet Oral Daily    sodium chloride flush  5-40 mL IntraVENous 2 times per day    cefTRIAXone (ROCEPHIN) IV  1,000 mg IntraVENous Q24H    insulin lispro  0-6 Units SubCUTAneous TID WC    insulin lispro  0-3 Units SubCUTAneous Nightly    vitamin D  2,000 Units Oral Daily    vitamin B-6  50 mg Oral Daily    zinc sulfate  50 mg Oral Daily    ascorbic acid  500 mg Oral Daily    baricitinib  2 mg Oral Daily     Continuous Infusions:   sodium chloride      lactated ringers 100 mL/hr at 22 1526    dextrose       PRN Meds:sodium chloride flush, sodium chloride, ondansetron **OR** ondansetron, polyethylene glycol, acetaminophen **OR** acetaminophen, glucose, dextrose, glucagon (rDNA), dextrose    Allergies:  Asa [aspirin]    Social History:   Social History     Socioeconomic History    Marital status:      Spouse name: None    Number of children: None    Years of education: None    Highest education level: None   Occupational History    None   Tobacco Use    Smoking status: Never Smoker    Smokeless tobacco: Never Used   Vaping Use    Vaping Use: Never used   Substance and Sexual Activity    Alcohol use: No     Alcohol/week: 0.0 standard drinks     Comment: Rare    Drug use: No    Sexual activity: None   Other Topics Concern    None   Social History Narrative    None     Social Determinants of Health     Financial Resource Strain:     Difficulty of Paying Living Expenses: Not on file   Food Insecurity:     Worried About [address] [employer] in the Last Year: Not on file    Bryan of Food in the Last Year: Not on file   Transportation Needs:     Lack of Transportation (Medical): Not on file    Lack of Transportation (Non-Medical): Not on file   Physical Activity:     Days of Exercise per Week: Not on file    Minutes of Exercise per Session: Not on file   Stress:     Feeling of Stress : Not on file   Social Connections:     Frequency of Communication with Friends and Family: Not on file    Frequency of Social Gatherings with Friends and Family: Not on file    Attends Jainism Services: Not on file    Active Member of [address] [place] [address] or Organizations: Not on file    Attends Club or Organization Meetings: Not on file    Marital Status: Not on file   Intimate Partner Violence:     Fear of Current or Ex-Partner: Not on file    Emotionally Abused: Not on file    Physically Abused: Not on file    Sexually Abused: Not on file   Housing Stability:     Unable to Pay for Housing in the Last Year: Not on file    Number of [place] in the Last Year: Not on file    Unstable Housing in the Last Year: Not on file     Family History:       Problem Relation Age of Onset    Heart Disease Mother    . Otherwise non-pertinent to the chief complaint. REVIEW OF SYSTEMS:    CONSTITUTIONAL: Positive chills, fevers or night sweats. No loss of weight. EYES:  No double vision or drainage from eyes, ears or throat. HEENT:  No neck stiffness. No dysphagia. No drainage from eyes, ears or throat  RESPIRATORY: Positive cough, productive sputum or hemoptysis. CARDIOVASCULAR:  No chest pain, palpitations, orthopnea or dyspnea on exertion. Atrial fibrillation with rapid ventricular response  GASTROINTESTINAL:  No nausea, vomiting, diarrhea or constipation or hematochezia   GENITOURINARY:  No frequency burning dysuria or hematuria.   INTEGUMENT/BREAST:  No rash or breast masses. HEMATOLOGIC/LYMPHATIC:  No lymphadenopathy or blood dyscrasics. ALLERGIC/IMMUNOLOGIC:  No anaphylaxis. ENDOCRINE:  No polyuria or polydipsia or temperature intolerance. MUSCULOSKELETAL:  No myalgia or arthralgia. Full ROM. NEUROLOGICAL:  No focal motor sensory deficit. Demented  BEHAVIOR/PSYCH:  No psychosis. PHYSICAL EXAM:    Vitals:    /84   Pulse 116   Temp 98.7 °F (37.1 °C) (Axillary)   Resp 20   Ht 5' 4\" (1.626 m)   Wt 177 lb 1.6 oz (80.3 kg)   LMP  (LMP Unknown)   SpO2 100%   BMI 30.40 kg/m²   Constitutional: The patient is awake  Skin: Warm and dry. No rashes were noted. No jaundice. HEENT: Eyes show round, and reactive pupils. Moist mucous membranes, no ulcerations, no thrush. Neck: Supple to movements. No lymphadenopathy. Chest: No use of accessory muscles to breathe. Symmetrical expansion. Auscultation reveals no wheezing, crackles, or rhonchi. Poor air exchange  Cardiovascular: S1 and S2 are rhythmic and regular. No murmurs appreciated. Abdomen: Positive bowel sounds to auscultation. Benign to palpation. No masses felt. No hepatosplenomegaly. Genitourinary: Female  Extremities: No clubbing, no cyanosis, no edema. Musculoskeletal: Equal and symmetrical  Neurological: No focal  Lines: peripheral      CBC+dif:  Recent Labs     01/17/22  1147 01/17/22  1147 01/18/22  0945   WBC 19.6*  --  20.0*   HGB 13.4   < > 11.7   HCT 42.3   < > 36.7   MCV 95.1   < > 93.9      < > 237   NEUTROABS 17.64*  --   --     < > = values in this interval not displayed.      Lab Results   Component Value Date    CRP 16.6 (H) 01/17/2022     No results found for: CRPHS  Lab Results   Component Value Date    SEDRATE 70 (H) 10/22/2020    SEDRATE 19 04/14/2016     Lab Results   Component Value Date    ALT 10 01/18/2022    AST 20 01/18/2022    ALKPHOS 122 (H) 01/18/2022    BILITOT 0.4 01/18/2022     Lab Results   Component Value Date     01/18/2022    K 3.7 01/18/2022     01/18/2022    CO2 19 01/18/2022    BUN 31 01/18/2022    CREATININE 0.8 01/18/2022    GFRAA >60 01/18/2022    LABGLOM >60 01/18/2022    GLUCOSE 297 01/18/2022    PROT 6.3 01/18/2022    LABALBU 2.4 01/18/2022    CALCIUM 8.4 01/18/2022    BILITOT 0.4 01/18/2022    ALKPHOS 122 01/18/2022    AST 20 01/18/2022    ALT 10 01/18/2022       Lab Results   Component Value Date    PROTIME 34.5 01/17/2022    INR 3.2 01/17/2022       Lab Results   Component Value Date    TSH 0.300 01/18/2022       Lab Results   Component Value Date    COLORU Yellow 01/17/2022    PHUR 5.5 01/17/2022    LABCAST MODERATE 10/24/2014    WBCUA >20 01/17/2022    RBCUA 0-1 01/17/2022    MUCUS Present 10/24/2014    BACTERIA MANY 01/17/2022    CLARITYU Clear 01/17/2022    SPECGRAV >=1.030 01/17/2022    LEUKOCYTESUR SMALL 01/17/2022    UROBILINOGEN 4.0 01/17/2022    BILIRUBINUR MODERATE 01/17/2022    BLOODU TRACE-INTACT 01/17/2022    GLUCOSEU Negative 01/17/2022       No results found for: ELJ7RFJ, BEART, I8OQVOLK, PHART, THGBART, HDA7RJM, PO2ART, RQB8EUO  Radiology:  US PELVIS LIMITED   Final Result   1. Very limited evaluation of the uterus and endometrium. No obvious   uterine mass. Endometrium appears mildly thickened for patient age. 2.  Nonvisualization of either ovary. There are no adnexal masses. XR CHEST PORTABLE   Final Result   1. Right perihilar and right lower lobe infiltrates         CT HEAD WO CONTRAST   Final Result   1. There is no acute intracranial abnormality. Specifically, there is no   intracranial hemorrhage. 2. Advanced atrophy and periventricular leukomalacia,   3. 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 CT ABDOMEN PELVIS W IV CONTRAST Additional Contrast? None   Final Result   Patchy infiltrates and ground-glass densities in the lung bases concerning   for pneumonia or edema. Constipation with redundant colon. Consider colonoscopy for better   assessment of the colon.   No discrete mass is identified in the abdomen   pelvis. Fluid and the air in the endometrium of the uterus. Consider  gyn assessment   and ultrasonography. RECOMMENDATIONS:   Unavailable             Microbiology:  Pending  No results for input(s): BC in the last 72 hours. No results for input(s): ORG in the last 72 hours. No results for input(s): Lawrnce Diones in the last 72 hours. No results for input(s): STREPNEUMAGU in the last 72 hours. No results for input(s): LP1UAG in the last 72 hours. No results for input(s): ASO in the last 72 hours. No results for input(s): CULTRESP in the last 72 hours. Assessment:  · COVID-19 pneumonia in unvaccinated. · UTI        Plan:    · Cont dexamethasone  · Change Rocephin to cefepime  · Remdesivir was entertained but the daughter refused of this drug  · The daughter would like to have ivermectin; told her that it would be impossible  · Tocilizumab; stop baricitinib  · The daughter wanted the patient on IV vitamin C and I mentioned that this would be impossible but I be willing to give her vitamin C through a NG tube  · Check cultures        Thank you for having us see this patient in consultation. I will be discussing this case with the treating physicians.       Electronically signed by Keiry De Dios MD on 1/18/2022 at 12:12 PM

## 2022-01-18 NOTE — ED NOTES
Updated daughter re: condition,m vs, room assignment, and medications begun to treat her mother     Renetta Anderson [facility]  01/17/22 2054

## 2022-01-18 NOTE — CARE COORDINATION
Social Work/Discharge Planning:  Chart reviewed. Called patient daughter Forrest Ruiz (ph: [phone]) and completed initial assessment. Explained Social Work role and discussed transition of care/discharge planning. [facility] from [facility]. Forrest Ruiz plans for her mother to return to [facility]. Forrest Ruiz states her mother is not vaccinated and she does not want her to be vaccinated. Called liaison Isabela with [facility] and confirmed facility would like therapy to try and skill patient. Isabela states patient will need a pre-cert but no need to wait for insurance authorization. Electronic N-17 in Epic and transport form in soft chart. Will continue to follow and assist with discharge planning.   Electronically signed by ROGELIO Mcdonald on 1/18/2022 at 12:41 PM

## 2022-01-18 NOTE — PROGRESS NOTES
[facility]   Progress Note    Admitting Date and Time: 1/17/2022 10:52 AM  Admit Dx: Lactic acidosis [E87.2]  Acute urinary tract infection [N39.0]  Atrial fibrillation with RVR ([facility]) [I48.91]  Acute respiratory failure with hypoxia ([facility]) [J96.01]  COVID-19 [U07.1]    Subjective:    Patient seen and examined. Remains nonverbal. Responds only to touch, will move or resist movement. Groans out. Spoke with RN. Patient not taking po meds or eating. I spoke with daughter [facility] who states patient is on a pureed diet with HTL at the [facility]. Explained to [facility] patient not eating or drinking anything for staff, nor taking meds. Discussed NGT option as a non-surgical means to get meds and nutrition into patient. [facility] is in agreement. Also discussed possibility of PEG tube if patient does not improve in mentation to where she can take meds, food, drink. Daughter wishes to see how patient does with NGT tube first which is reasonable. 5111: call received from floor nurse. Staff were able to get patient to eat breakfast and take meds. Hold off on NGT placement for now. ROS: denies fever, chills, cp, sob, n/v, HA unless stated above.      apixaban  5 mg Oral BID    citalopram  10 mg Oral Daily    divalproex  125 mg Oral TID    docusate sodium  100 mg Oral BID    flecainide  100 mg Oral BID    insulin glargine  20 Units SubCUTAneous Daily    megestrol acetate  200 mg Oral Daily    metoprolol succinate  12.5 mg Oral Daily    ocuvite-lutein  1 tablet Oral Daily    sodium chloride flush  5-40 mL IntraVENous 2 times per day    dexamethasone  6 mg Oral 2 times per day    cefTRIAXone (ROCEPHIN) IV  1,000 mg IntraVENous Q24H    insulin lispro  0-6 Units SubCUTAneous TID WC    insulin lispro  0-3 Units SubCUTAneous Nightly    vitamin D  2,000 Units Oral Daily    vitamin B-6  50 mg Oral Daily    zinc sulfate  50 mg Oral Daily    oyster shell calcium w/D  1 tablet Oral Daily    ascorbic Nonvisualization of either ovary. There are no adnexal masses. XR CHEST PORTABLE   Final Result   1. Right perihilar and right lower lobe infiltrates         CT HEAD WO CONTRAST   Final Result   1. There is no acute intracranial abnormality. Specifically, there is no   intracranial hemorrhage. 2. Advanced atrophy and periventricular leukomalacia,   3. 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 CT ABDOMEN PELVIS W IV CONTRAST Additional Contrast? None   Final Result   Patchy infiltrates and ground-glass densities in the lung bases concerning   for pneumonia or edema. Constipation with redundant colon. Consider colonoscopy for better   assessment of the colon. No discrete mass is identified in the abdomen   pelvis. Fluid and the air in the endometrium of the uterus. Consider  gyn assessment   and ultrasonography. RECOMMENDATIONS:   Unavailable             Assessment:  Principal Problem:    Acute sepsis (HCC)  Active Problems:    Acute metabolic encephalopathy    Acute respiratory failure due to COVID-19 (HCC)    Type II diabetes mellitus (HCC)    Atrial flutter with rapid ventricular response (HCC)    Acute urinary tract infection    JOSE DE JESUS (acute kidney injury) ([facility] [address]    Hypertension    Hyperlipidemia    GERD (gastroesophageal reflux disease)  Resolved Problems:    * No resolved hospital problems. *      Plan:    1. Acute sepsis: fever (102.4)-resolved, tachycardia (141)- improving, hypoxia (88%)- resolved with O2. Lactic acid-3.4. Urine and blood cultures pending. Continue  Ceftriaxone until cultures resulted. 2. Acute metabilic encephalopathy: multifactorial due to sepsis, Covid infection, UTI. Unchanged. Daughter states patient non-verbal at baseline. 3. Acute hypoxic respiratory failure: secondary to COVID. Dr. Blaise Rich spoke with dtr  01/17/22, ok to continue baricitinib. Continue decadron, nebs, vitamins. Inflammatory markers pending.   Continues to require 4 liters Nc to keep sat >92%, wean as able. ID consulted. Procal 0.36  4. Acute UTI: urine culture pending. Continue ceftriaxone. 5. JOSE DE JESUS: secondary to above. Cr 1.1. Continue gentle IV hydration. AM labs not resulted at this time. 6. afib RVR:   due to patient did not receive medications last night. Will have NGT placed due to patient with poor po intake and staff not able to get meds into patient orally. Continue flecanide, eliquis, BB.  TSH pending  7. DM2: Continue home Lantus 20 units daily. NGT to be placed, consult dietician for TF recommendations. Continue low dose insulin slide scale, hypoglycemia protocol. Recent A1c 6. 5. Monitor blood sugars, may need to adjust insulin once patient begins with TF.   8. HTN: controlled on home metoprolol            Electronically signed by SHARI Valiente CNP on 1/18/2022 at 7:42 AM

## 2022-01-18 NOTE — PROGRESS NOTES
Pharmacy Note    Shellielalo Katie was ordered Lutein. Per the [address] [place] [address], this medication is non-formulary and not stocked by the Pharmacy. The medication can be reordered at discharge.

## 2022-01-18 NOTE — CONSULTS
1/18/2022  3:26 PM      Comprehensive Nutrition Assessment    Type and Reason for Visit:  Initial,Positive Nutrition Screen,Consult (Consult re: TF Ordering and Management)    Nutrition Recommendations/Plan: Will add ONS to current PO diet. If NGT placed and EN to be initiated, recommend continue current TF order, as tolerated:    TF ORDER: Diabetic TF (Glucerna 1.5) to goal rate 40 ml/hr and 75 ml flush Q 4 hrs  This will provide: 960 ml/d, 1440 mary ellen, 79 g pro, 1179 ml total free water  This regimen will meet: 90% calorie needs, ~100% pro needs    Nutrition Assessment:  Pt admit from NH w/Covid+, lactic acidosis. Hx. Alzheimer's, dysphagia, DM. Had been refusing PO meds/meals PTA. Pt's daughter agreeable to trial NGT for TF, but note that nursing was able to feed pt and have her take meds orally this AM. Will provide order as per consult, if pt requires TF and NGT placed. Malnutrition Assessment:  Malnutrition Status: At risk for malnutrition     Context:  Chronic Illness     Findings of the 6 clinical characteristics of malnutrition:  Energy Intake:  Mild decrease in energy intake (Comment) (refusing PO at NH PTA)  Weight Loss:  No significant weight loss     Body Fat Loss:  Unable to assess (Covid Isolation)     Muscle Mass Loss:  Unable to assess (Covid Isolation)    Fluid Accumulation:  No significant fluid accumulation     Strength:  Not Performed    Estimated Daily Nutrient Needs:  Energy (kcal):  ; Weight Used for Energy Requirements:  Current     Protein (g):  65-75 (1.2-1.4 g/kg); Weight Used for Protein Requirements:  Ideal        Fluid (ml/day):  ; Method Used for Fluid Requirements:  1 ml/kcal      Nutrition Related Findings:  nonverbal, soft abd +BS, trace edema, NC on, hyperglycemia, missing teeth, I/O WNL, dysphagia      Wounds:   (reddened buttocks, heels)       Current Nutrition Therapies:    ADULT DIET; Dysphagia - Pureed; 3 carb choices (45 gm/meal);  Moderately Thick (Honey)    Anthropometric Measures:  · Height: 5' 4\" (162.6 cm)  · Current Body Weight: 177 lb 1.6 oz (80.3 kg) (1/18 bedscale)   · Usual Body Weight:  (UTO actual wt hx)     · Ideal Body Weight: 120 lbs; % Ideal Body Weight 147.6 %   · BMI: 30.4  · BMI Categories: Obese Class 1 (BMI 30.0-34. 9)       Nutrition Diagnosis:   · Inadequate oral intake related to cognitive or neurological impairment as evidenced by intake 0-25%,poor intake prior to admission      Nutrition Interventions:   Food and/or Nutrient Delivery:  Continue Current Diet,Start Oral Nutrition Supplement,Start Tube Feeding (Will order TF to start, if NGT placed and EN to be initiated (on hold earlier today))  Nutrition Education/Counseling:  Education not indicated   Coordination of Nutrition Care:  Continue to monitor while inpatient    Goals:  Pt elva EN+PO to meet needs       Nutrition Monitoring and Evaluation:   Behavioral-Environmental Outcomes:  None Identified   Food/Nutrient Intake Outcomes:  Food and Nutrient Intake,Supplement Intake,Enteral Nutrition Intake/Tolerance  Physical Signs/Symptoms Outcomes:  Biochemical Data,GI Status,Fluid Status or Edema,Nutrition Focused Physical Findings,Skin,Weight,Other (Comment),Chewing or Swallowing     Discharge Planning:     Too soon to determine     Electronically signed by Antoni Nation RD, CNSC, LD on 1/18/22 at 3:26 PM EST    Contact: [phone]

## 2022-01-18 NOTE — PROGRESS NOTES
Occupational Therapy  OCCUPATIONAL THERAPY INITIAL EVALUATION      Date:2022  Patient Name: Reba Dudley  MRN: [id number]  : 1940  Room: [address]         [id number]                                                  Patient Name: Reba Dudley    MRN: [id number]    : 1940    Room: [address] [place] [address]      Evaluating OT: Miriam Tobias OTR/L   [id number]      Referring Lynda Parekh MD    Specific Provider Orders/Date:OT eval and treay       Diagnosis:  Lactic acidosis [E87.2]  Acute urinary tract infection [N39.0]  Atrial fibrillation with RVR ([address] [I48.91]  Acute respiratory failure with hypoxia ([address] [J96.01]  COVID-19 [U07.1]     Pertinent Medical History: alzheimer disease     Precautions:  Fall Risk, alarm, DROPLET PLUS        COMMENTS:   Eval only. Patient at baseline function.  Not appropriate for skilled OT services   Per past notes (10/2020) Assist with all ADLs, rick lift, w/c for mobility       SOCIAL:  Patient from [facility], dependent with ADLs, rick lift for xfes , w/c for mobility     Pain Level: no observed ;   Cognition: patient with eyes shot entire time, nonverbal,  Daughter reports usually how patient is    Memory:  Poor    Sequencing:  poor   Problem solving:   Poor    Judgement/safety:  Poor      Functional Assessment:  AM-PAC Daily Activity Raw Score:    Initial Eval Status  Date: 22   Feeding Dependent    Grooming Dependent    UB Dressing Dependent    LB Dressing Dependent    Bathing Dependent    Toileting Dependent    Bed Mobility  Dependent   Long sit   Rolling    Functional Transfers PTA:  Rick lift for xfes    Functional Mobility PTA:   W/c for mobility    Balance Sitting:     Static:  Dependent- long sit    Activity Tolerance No SOB noted    Visual/  Perceptual Glasses: none by bedside   Patient kept her eyes closed              AROM (PROM)   R/L UE  Unable to formally assess - decrease cognition     AAROM present throughout      Hearing: WFL   Sensation:  No c/o numbness or tingling   Tone: WFL   Edema: none observed    Comments: Upon arrival patient lying in bed . At end of session, patient remained in bed  with call light and phone within reach, all lines and tubes intact. *ALARM ON , daughter present              Eval Complexity: Low    Time In: 1314  Time Out: 1328    Min Units   OT Eval Low 97165 x  1   OT Eval Medium 80029      OT Eval High 34948      OT Re-Eval [id number]       Therapeutic Ex [id number]       Therapeutic Activities 48975       ADL/Self Care 61142       Orthotic Management 15711       Manual 75071     Neuro Re-Ed 75579       Non-Billable Time          Evaluation Time additionally includes thorough review of current medical information, gathering information on past medical history/social history and prior level of function, interpretation of standardized testing/informal observation of tasks, assessment of data and development of plan of care and goals.             [facility]  OTR/L  OT [id number]

## 2022-01-18 NOTE — PROGRESS NOTES
Physical Therapy    Facility/Department: [address] INTERMEDIATE 1  Initial Assessment    NAME: Christiano Aj  : 1940  MRN: [id number]    Date of Service: 2022      Patient Diagnosis(es): The primary encounter diagnosis was Atrial fibrillation with RVR ([facility] [address] Diagnoses of COVID-19, Acute urinary tract infection, Lactic acidosis, and Acute respiratory failure with hypoxia ([address] were also pertinent to this visit. has a past medical history of Alzheimer disease ([address] Atrial fibrillation ([address] Constipation, Dementia ([address] Diabetes mellitus ([address] Dysphagia, oropharyngeal phase, GERD (gastroesophageal reflux disease), Headache, Hyperlipidemia, Hypertension, MDD (major depressive disorder), Muscle wasting and atrophy, not elsewhere classified, unspecified site, Muscle weakness (generalized), PAF (paroxysmal atrial fibrillation) ([address] and SVT (supraventricular tachycardia) ([address] has a past surgical history that includes  section; Cholecystectomy, laparoscopic; Colonoscopy; and Upper gastrointestinal endoscopy. Evaluating Therapist: Lakhwinder Dickinson PT      Room #:  9980/7774-V  Diagnosis:  Lactic acidosis [E87.2]  Acute urinary tract infection [N39.0]  Atrial fibrillation with RVR (HCC) [I48.91]  Acute respiratory failure with hypoxia (HCC) [J96.01]  COVID-19 [U07.1]  PMHx/PSHx:  A fib, Alzheimer's  Precautions:  Falls, O2, droplet plus isolaiton      Social:  Pt admitted from [facility]. Dependent in all areas. [employer] lift for transfers. Initial Evaluation  Date: 22   Was pt agreeable to Eval/treatment? Does pt have pain? No indication of pain   Bed Mobility  Rolling: dependent  Supine to long sit dependent of 2     Transfers N/A    Ambulation    N/A   Stair Negotiation  Ascended and descended  N/A   LE strength     Does not attempt to move LEs   balance      NT   AM-PAC Raw score               6/24       Pt with eyes closed throughout session. Does not follow commands.    LE ROM: WFL  Sensation: NT  Edema: none     ASSESSMENT:    Pt displays functional ability as noted in the objective portion of this evaluation. Comments:  Pt did not open eyes during session. Unable to follow directions/commands  PHYSICAL THERAPY PLAN OF CARE:    PT POC is established based on physician order and patient diagnosis     Referring provider/PT Order: Willem Maciel MD/ PT eval and treat    Pt is at baseline level of function. Does not follow commands,minimally responsive. Pt unable to participate in skilled PT. No goals identified at this time. PT long term treatment goals are located in above grid    Time in  115  Time out  130      Evaluation Time includes thorough review of current medical information, gathering information on past medical history/social history and prior level of function, completion of standardized testing/informal observation of tasks, assessment of data and education on plan of care and goals.       CPT codes:  [x] Low Complexity PT evaluation 73786  [] Moderate Complexity PT evaluation 20257  [] High Complexity PT evaluation 32604  [] PT Re-evaluation 35984  [] Gait training 09103 minutes  [] Manual therapy 91256 minutes  [] Therapeutic activities 50391 minutes  [] Therapeutic exercises 34894 minutes  [] Neuromuscular reeducation 35021 minutes     Fabi PT [id number]

## 2022-01-19 LAB
METER GLUCOSE: 232 MG/DL (ref 70–110)
METER GLUCOSE: 241 MG/DL (ref 70–110)
METER GLUCOSE: 243 MG/DL (ref 70–110)
METER GLUCOSE: 278 MG/DL (ref 70–110)

## 2022-01-19 PROCEDURE — 82962 GLUCOSE BLOOD TEST: CPT

## 2022-01-19 PROCEDURE — 2580000003 HC RX 258: Performed by: SPECIALIST

## 2022-01-19 PROCEDURE — 2580000003 HC RX 258: Performed by: INTERNAL MEDICINE

## 2022-01-19 PROCEDURE — 2060000000 HC ICU INTERMEDIATE R&B

## 2022-01-19 PROCEDURE — 6370000000 HC RX 637 (ALT 250 FOR IP): Performed by: NURSE PRACTITIONER

## 2022-01-19 PROCEDURE — 6370000000 HC RX 637 (ALT 250 FOR IP): Performed by: SPECIALIST

## 2022-01-19 PROCEDURE — APPSS30 APP SPLIT SHARED TIME 16-30 MINUTES: Performed by: NURSE PRACTITIONER

## 2022-01-19 PROCEDURE — 99233 SBSQ HOSP IP/OBS HIGH 50: CPT | Performed by: INTERNAL MEDICINE

## 2022-01-19 PROCEDURE — 6370000000 HC RX 637 (ALT 250 FOR IP): Performed by: INTERNAL MEDICINE

## 2022-01-19 PROCEDURE — 6360000002 HC RX W HCPCS: Performed by: SPECIALIST

## 2022-01-19 RX ORDER — INSULIN GLARGINE 100 [IU]/ML
25 INJECTION, SOLUTION SUBCUTANEOUS DAILY
Status: DISCONTINUED | OUTPATIENT
Start: 2022-01-19 | End: 2022-01-21

## 2022-01-19 RX ORDER — IVERMECTIN 3 MG/1
6 TABLET ORAL 2 TIMES DAILY
Qty: 20 TABLET | Refills: 0 | Status: SHIPPED
Start: 2022-01-19 | End: 2022-01-27 | Stop reason: HOSPADM

## 2022-01-19 RX ADMIN — Medication 500 MG: at 21:12

## 2022-01-19 RX ADMIN — SODIUM CHLORIDE: 9 INJECTION, SOLUTION INTRAVENOUS at 08:00

## 2022-01-19 RX ADMIN — DOCUSATE SODIUM 100 MG: 100 CAPSULE, LIQUID FILLED ORAL at 10:09

## 2022-01-19 RX ADMIN — DIVALPROEX SODIUM 125 MG: 125 TABLET, DELAYED RELEASE ORAL at 14:36

## 2022-01-19 RX ADMIN — SODIUM CHLORIDE, POTASSIUM CHLORIDE, SODIUM LACTATE AND CALCIUM CHLORIDE: 600; 310; 30; 20 INJECTION, SOLUTION INTRAVENOUS at 13:08

## 2022-01-19 RX ADMIN — CITALOPRAM HYDROBROMIDE 10 MG: 20 TABLET ORAL at 10:09

## 2022-01-19 RX ADMIN — Medication 500 MG: at 10:09

## 2022-01-19 RX ADMIN — INSULIN LISPRO 2 UNITS: 100 INJECTION, SOLUTION INTRAVENOUS; SUBCUTANEOUS at 21:30

## 2022-01-19 RX ADMIN — INSULIN LISPRO 2 UNITS: 100 INJECTION, SOLUTION INTRAVENOUS; SUBCUTANEOUS at 06:35

## 2022-01-19 RX ADMIN — SODIUM CHLORIDE: 9 INJECTION, SOLUTION INTRAVENOUS at 19:52

## 2022-01-19 RX ADMIN — DEXAMETHASONE SODIUM PHOSPHATE 6 MG: 4 INJECTION, SOLUTION INTRA-ARTICULAR; INTRALESIONAL; INTRAMUSCULAR; INTRAVENOUS; SOFT TISSUE at 10:09

## 2022-01-19 RX ADMIN — Medication 2000 UNITS: at 10:11

## 2022-01-19 RX ADMIN — MEGESTROL ACETATE 200 MG: 40 SUSPENSION ORAL at 10:10

## 2022-01-19 RX ADMIN — INSULIN LISPRO 2 UNITS: 100 INJECTION, SOLUTION INTRAVENOUS; SUBCUTANEOUS at 17:16

## 2022-01-19 RX ADMIN — ZINC SULFATE 220 MG (50 MG) CAPSULE 50 MG: CAPSULE at 10:09

## 2022-01-19 RX ADMIN — INSULIN GLARGINE 25 UNITS: 100 INJECTION, SOLUTION SUBCUTANEOUS at 11:25

## 2022-01-19 RX ADMIN — FLECAINIDE ACETATE 100 MG: 100 TABLET ORAL at 21:13

## 2022-01-19 RX ADMIN — APIXABAN 5 MG: 5 TABLET, FILM COATED ORAL at 10:09

## 2022-01-19 RX ADMIN — CEFEPIME 1000 MG: 1 INJECTION, POWDER, FOR SOLUTION INTRAMUSCULAR; INTRAVENOUS at 04:17

## 2022-01-19 RX ADMIN — METOPROLOL SUCCINATE 12.5 MG: 25 TABLET, FILM COATED, EXTENDED RELEASE ORAL at 10:13

## 2022-01-19 RX ADMIN — DIVALPROEX SODIUM 125 MG: 125 TABLET, DELAYED RELEASE ORAL at 21:12

## 2022-01-19 RX ADMIN — PYRIDOXINE HCL TAB 50 MG 50 MG: 50 TAB at 10:09

## 2022-01-19 RX ADMIN — DIVALPROEX SODIUM 125 MG: 125 TABLET, DELAYED RELEASE ORAL at 10:09

## 2022-01-19 RX ADMIN — DOCUSATE SODIUM 100 MG: 100 CAPSULE, LIQUID FILLED ORAL at 21:11

## 2022-01-19 RX ADMIN — CEFEPIME 1000 MG: 1 INJECTION, POWDER, FOR SOLUTION INTRAMUSCULAR; INTRAVENOUS at 17:14

## 2022-01-19 RX ADMIN — INSULIN LISPRO 2 UNITS: 100 INJECTION, SOLUTION INTRAVENOUS; SUBCUTANEOUS at 11:23

## 2022-01-19 RX ADMIN — APIXABAN 5 MG: 5 TABLET, FILM COATED ORAL at 21:11

## 2022-01-19 RX ADMIN — Medication 1 TABLET: at 10:09

## 2022-01-19 RX ADMIN — FLECAINIDE ACETATE 100 MG: 100 TABLET ORAL at 10:08

## 2022-01-19 RX ADMIN — Medication 20 ML: at 10:11

## 2022-01-19 NOTE — CARE COORDINATION
Social Work/Discharge Planning:  Chart reviewed. Patient on two liters of oxygen. Patient daughter plans for patient to return to [facility] at discharge. Patient will need a pre-cert, but no need to wait for insurance authorization. Will continue to follow.   Electronically signed by ROGELIO Mace on 1/19/2022 at 10:29 AM

## 2022-01-19 NOTE — PROGRESS NOTES
[facility]   Progress Note    Admitting Date and Time: 1/17/2022 10:52 AM  Admit Dx: Lactic acidosis [E87.2]  Acute urinary tract infection [N39.0]  Atrial fibrillation with RVR (HCC) [I48.91]  Acute respiratory failure with hypoxia (HCC) [J96.01]  COVID-19 [U07.1]    Subjective:    Patient seen and examined. Answered yes when I called out her name. She also answered yes and no to a few questions but not all of them. Fed meals per staff. ROS: Denies chest pain, SOB, cough, congestion, subjective fever, chills, N/V/D, abdominal pain, HA.  All systems reviewed and negative unless otherwise neg       ascorbic acid  500 mg Oral BID    dexamethasone  6 mg IntraVENous Q24H    cefepime  1,000 mg IntraVENous Q12H    apixaban  5 mg Oral BID    citalopram  10 mg Oral Daily    divalproex  125 mg Oral TID    docusate sodium  100 mg Oral BID    flecainide  100 mg Oral BID    insulin glargine  20 Units SubCUTAneous Daily    megestrol acetate  200 mg Oral Daily    metoprolol succinate  12.5 mg Oral Daily    ocuvite-lutein  1 tablet Oral Daily    sodium chloride flush  5-40 mL IntraVENous 2 times per day    insulin lispro  0-6 Units SubCUTAneous TID WC    insulin lispro  0-3 Units SubCUTAneous Nightly    vitamin D  2,000 Units Oral Daily    vitamin B-6  50 mg Oral Daily    zinc sulfate  50 mg Oral Daily     sodium chloride flush, 5-40 mL, PRN  sodium chloride, 25 mL, PRN  ondansetron, 4 mg, Q8H PRN   Or  ondansetron, 4 mg, Q6H PRN  polyethylene glycol, 17 g, Daily PRN  acetaminophen, 650 mg, Q6H PRN   Or  acetaminophen, 650 mg, Q6H PRN  glucose, 15 g, PRN  dextrose, 12.5 g, PRN  glucagon (rDNA), 1 mg, PRN  dextrose, 100 mL/hr, PRN         Objective:    /74   Pulse 110   Temp 97.7 °F (36.5 °C) (Temporal)   Resp 20   Ht 5' 4\" (1.626 m)   Wt 175 lb 11.2 oz (79.7 kg)   LMP  (LMP Unknown)   SpO2 96%   BMI 30.16 kg/m²      General Appearance: alert and oriented to person,  in no acute distress  Skin: warm and dry  Head: normocephalic and atraumatic  Eyes: pupils equal, round, and reactive to light, extraocular eye movements intact, conjunctivae normal  Neck: neck supple and non tender without mass   Pulmonary/Chest: clear to auscultation bilaterally- no wheezes, rales or rhonchi, normal air movement, no respiratory distress  Cardiovascular: normal rate, normal S1 and S2 and no carotid bruits  Abdomen: soft, non-tender, non-distended, normal bowel sounds, no masses or organomegaly  Extremities: no cyanosis, no clubbing and no edema  Neurologic: no cranial nerve deficit and speech normal  :  Segura draining yellow urine    Recent Labs     01/17/22  1105 01/17/22  1147 01/18/22  0945   NA  --  143 146   K  --  4.2 3.7   CL  --  108* 115*   CO2  --  18* 19*   BUN  --  31* 31*   CREATININE  --  1.1* 0.8   GLUCOSE 195 221* 297*   CALCIUM  --  9.0 8.4*       Recent Labs     01/17/22  1147 01/18/22  0945   ALKPHOS 116* 122*   PROT 7.5 6.3*   LABALBU 2.9* 2.4*   BILITOT 0.9 0.4   AST 25 20   ALT 8 10       Recent Labs     01/17/22  1147 01/18/22  0945   WBC 19.6* 20.0*   RBC 4.45 3.91   HGB 13.4 11.7   HCT 42.3 36.7   MCV 95.1 93.9   MCH 30.1 29.9   MCHC 31.7* 31.9*   RDW 14.1 14.2    237   MPV 13.0* 12.9*            Radiology:   US PELVIS LIMITED   Final Result   1. Very limited evaluation of the uterus and endometrium. No obvious   uterine mass. Endometrium appears mildly thickened for patient age. 2.  Nonvisualization of either ovary. There are no adnexal masses. XR CHEST PORTABLE   Final Result   1. Right perihilar and right lower lobe infiltrates         CT HEAD WO CONTRAST   Final Result   1. There is no acute intracranial abnormality. Specifically, there is no   intracranial hemorrhage. 2. Advanced atrophy and periventricular leukomalacia,   3. 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          CT ABDOMEN PELVIS W IV CONTRAST Additional Contrast? None Final Result   Patchy infiltrates and ground-glass densities in the lung bases concerning   for pneumonia or edema. Constipation with redundant colon. Consider colonoscopy for better   assessment of the colon. No discrete mass is identified in the abdomen   pelvis. Fluid and the air in the endometrium of the uterus. Consider  gyn assessment   and ultrasonography. RECOMMENDATIONS:   Unavailable             Assessment:  Principal Problem:    Acute sepsis (HCC)  Active Problems:    Acute metabolic encephalopathy    Acute respiratory failure due to COVID-19 (HCC)    Type II diabetes mellitus (HCC)    Atrial flutter with rapid ventricular response (HCC)    Acute urinary tract infection    JOSE DE JESUS (acute kidney injury) ([facility] [address]    Hypertension    Hyperlipidemia    GERD (gastroesophageal reflux disease)  Resolved Problems:    * No resolved hospital problems. *      Plan:    1. Acute sepsis: fever (102.4)-resolved, tachycardia (141)- improving, hypoxia (88%)- resolved with O2. Lactic acid-3.4. Leukocytosis (20.0). Urine and blood cultures pending. Continue  Ceftriaxone until cultures resulted. 2. Acute metabilic encephalopathy: multifactorial due to sepsis, Covid infection, UTI. Unchanged. Daughter states patient non-verbal at baseline. 3. Acute hypoxic respiratory failure: secondary to COVID. Dr. Ashley Ruiz spoke with dtr  01/17/22, ok to continue baricitinib. Continue decadron, nebs, vitamins. Inflammatory markers pending. Continues to require 4 liters Nc to keep sat >92%, wean as able. ID consulted. Procal 0.36  4. Acute UTI: urine culture pending. Continue ceftriaxone. 5. JOSE DE JESUS: resolved. Cr 0.8. 6. afib RVR:   due to patient did not receive medications last night. Will have NGT placed due to patient with poor po intake and staff not able to get meds into patient orally. Continue flecanide, eliquis, BB.  TSH pending  7. DM2: Increase home Lantus  To 25 units daily for hyperglycemia.  Continue low dose insulin slide scale, hypoglycemia protocol. Recent A1c 6. 5. Monitor blood sugars, adjust insulin as needed to keep BS <180.    8. HTN: controlled on home metoprolol   9. Malnutrition: seen by dietician. Supplements ordered per same. Will initiate TF if NGT placed due to poor po intake.             Electronically signed by SHARI Parr CNP on 1/19/2022 at 9:14 AM

## 2022-01-19 NOTE — PROGRESS NOTES
[address] [place] [address] [facility]  ED  Progress Note    SUBJECTIVE:  Chief Complaint   Patient presents with    Atrial Fibrillation     hx of issue    Altered Mental Status     pt is in dementia unit, per ems pt is more confused than her basdeline    Shortness of Breath     per ems O2 88% RA on arrival. 93% RA at triage     Patient seems to be tolerating medications. No nausea, vomiting, diarrhea. No fevers. On room air     Review of systems:  As stated above in the chief complaint, otherwise negative. Medications:  Scheduled Meds:   insulin glargine  25 Units SubCUTAneous Daily    ascorbic acid  500 mg Oral BID    dexamethasone  6 mg IntraVENous Q24H    cefepime  1,000 mg IntraVENous Q12H    apixaban  5 mg Oral BID    citalopram  10 mg Oral Daily    divalproex  125 mg Oral TID    docusate sodium  100 mg Oral BID    flecainide  100 mg Oral BID    megestrol acetate  200 mg Oral Daily    metoprolol succinate  12.5 mg Oral Daily    ocuvite-lutein  1 tablet Oral Daily    sodium chloride flush  5-40 mL IntraVENous 2 times per day    insulin lispro  0-6 Units SubCUTAneous TID WC    insulin lispro  0-3 Units SubCUTAneous Nightly    vitamin D  2,000 Units Oral Daily    vitamin B-6  50 mg Oral Daily    zinc sulfate  50 mg Oral Daily     Continuous Infusions:   sodium chloride 12.5 mL/hr at 22 0800    sodium chloride      lactated ringers 100 mL/hr at 22 1308    dextrose       PRN Meds:sodium chloride flush, sodium chloride, ondansetron **OR** ondansetron, polyethylene glycol, acetaminophen **OR** acetaminophen, glucose, dextrose, glucagon (rDNA), dextrose    OBJECTIVE:  BP (!) 174/88   Pulse 114   Temp 98.1 °F (36.7 °C) (Axillary)   Resp 18   Ht 5' 4\" (1.626 m)   Wt 175 lb 11.2 oz (79.7 kg)   LMP  (LMP Unknown)   SpO2 93%   BMI 30.16 kg/m²   Temp  Av.2 °F (36.8 °C)  Min: 97.7 °F (36.5 °C)  Max: 98.9 °F (37.2 °C)  Constitutional: The patient is lying in bed, alert to self.   Skin: Warm and dry. No rashes were noted. HEENT: Round and reactive pupils. Moist mucous membranes. No ulcerations or thrush. Neck: Supple to movements. Chest: No respiratory distress. Symmetrical expansion. No wheezing, crackles or rhonchi. Poor aeration throughout   Cardiovascular: S1 and S2 are rhythmic and regular. No murmurs appreciated. Abdomen: Positive bowel sounds to auscultation. Benign to palpation. No masses felt. No hepatosplenomegaly. Extremities: No edema.   Lines: peripheral    Laboratory and Tests Review:  Lab Results   Component Value Date    WBC 20.0 (H) 01/18/2022    WBC 19.6 (H) 01/17/2022    WBC 8.7 10/14/2021    HGB 11.7 01/18/2022    HCT 36.7 01/18/2022    MCV 93.9 01/18/2022     01/18/2022     Lab Results   Component Value Date    NEUTROABS 18.45 (H) 01/18/2022    NEUTROABS 17.64 (H) 01/17/2022    NEUTROABS 5.17 10/14/2021     No results found for: [facility]  Lab Results   Component Value Date    ALT 10 01/18/2022    AST 20 01/18/2022    ALKPHOS 122 (H) 01/18/2022    BILITOT 0.4 01/18/2022     Lab Results   Component Value Date     01/18/2022    K 3.7 01/18/2022     01/18/2022    CO2 19 01/18/2022    BUN 31 01/18/2022    CREATININE 0.8 01/18/2022    CREATININE 1.1 01/17/2022    CREATININE 0.8 10/14/2021    GFRAA >60 01/18/2022    LABGLOM >60 01/18/2022    GLUCOSE 297 01/18/2022    PROT 6.3 01/18/2022    LABALBU 2.4 01/18/2022    CALCIUM 8.4 01/18/2022    BILITOT 0.4 01/18/2022    ALKPHOS 122 01/18/2022    AST 20 01/18/2022    ALT 10 01/18/2022     Lab Results   Component Value Date    CRP 16.6 (H) 01/17/2022     Lab Results   Component Value Date    SEDRATE 70 (H) 10/22/2020    SEDRATE 19 04/14/2016     Radiology:  Noted     Microbiology:   Blood cultures: negative so far  Urine culture: >100K GNR  Rapid COVID-19: detected     Recent Labs     01/17/22  1500   PROCAL 0.36*       ASSESSMENT:  SARS-CoV-2 infection in an unvaccinated patient   UTI   Leukocytosis associated to the above as well as reactive to steroids     PLAN:  Continue Cefepime   Steroids and other supportive care   Check final cultures - await ID & sensitivities of urine cx   Monitor labs    SHARI Sanabria - CNP  1:58 PM  1/19/2022  Pt seen and examined. Above discussed agree with advanced practice nurse. Labs, cultures, and radiographs reviewed. Face to Face encounter occurred. Changes made as necessary.      Osei Patterson MD

## 2022-01-19 NOTE — PROGRESS NOTES
RN spoke to [facility], daughter, with an update. Daughter is asking if patient can take Ivermectin that she has a prescription for. RN spoke with Dr. Johnsie Landau about this issue and he stated it was up to the Infectious Disease physician Dr. Ravi Kapoor. Per Dr. Alina Clifton note from 1/18-this is not a possibility. Informed daughter and she states she is going to reach out to Dr. Ravi Kapoor herself for reasoning as to why the medication can not be brought in from home and given to patient.

## 2022-01-20 ENCOUNTER — APPOINTMENT (OUTPATIENT)
Dept: GENERAL RADIOLOGY | Age: 82
DRG: 871 | End: 2022-01-20
Payer: COMMERCIAL

## 2022-01-20 LAB
ANION GAP SERPL CALCULATED.3IONS-SCNC: 11 MMOL/L (ref 7–16)
BASOPHILS ABSOLUTE: 0.01 E9/L (ref 0.1–0.4)
BASOPHILS RELATIVE PERCENT: 0.1 % (ref 0–2)
BUN BLDV-MCNC: 25 MG/DL (ref 4–19)
CALCIUM SERPL-MCNC: 8.5 MG/DL (ref 8.6–10.2)
CHLORIDE BLD-SCNC: 110 MMOL/L (ref 98–107)
CO2: 22 MMOL/L (ref 22–29)
CREAT SERPL-MCNC: 0.7 MG/DL (ref 0.4–0.7)
EOSINOPHILS ABSOLUTE: 0 E9/L (ref 0.1–0.7)
EOSINOPHILS RELATIVE PERCENT: 0 % (ref 0–12)
GFR AFRICAN AMERICAN: >60
GFR NON-AFRICAN AMERICAN: >60 ML/MIN/1.73
GLUCOSE BLD-MCNC: 209 MG/DL (ref 70–110)
HCT VFR BLD CALC: 42.2 % (ref 45–66)
HEMOGLOBIN: 13.5 G/DL (ref 14.5–22)
IMMATURE GRANULOCYTES #: 0.1 E9/L
IMMATURE GRANULOCYTES %: 0.9 % (ref 0–5)
LYMPHOCYTES ABSOLUTE: 0.83 E9/L (ref 3–15)
LYMPHOCYTES RELATIVE PERCENT: 7.7 % (ref 15–60)
MCH RBC QN AUTO: 29.3 PG (ref 30–42)
MCHC RBC AUTO-ENTMCNC: 32 % (ref 29–37)
MCV RBC AUTO: 91.7 FL (ref 95–121)
METER GLUCOSE: 176 MG/DL (ref 70–110)
METER GLUCOSE: 184 MG/DL (ref 70–110)
METER GLUCOSE: 191 MG/DL (ref 70–110)
METER GLUCOSE: 202 MG/DL (ref 70–110)
METER GLUCOSE: 209 MG/DL (ref 70–110)
MONOCYTES ABSOLUTE: 0.44 E9/L (ref 1–3)
MONOCYTES RELATIVE PERCENT: 4.1 % (ref 3–15)
NEUTROPHILS ABSOLUTE: 9.35 E9/L (ref 5–20)
NEUTROPHILS RELATIVE PERCENT: 87.2 % (ref 15–80)
PDW BLD-RTO: 13.5 FL (ref 11–19)
PLATELET # BLD: 298 E9/L (ref 130–480)
PMV BLD AUTO: 12.6 FL (ref 7–12)
POTASSIUM SERPL-SCNC: 4.3 MMOL/L (ref 3.4–4.5)
RBC # BLD: 4.6 E12/L (ref 3.7–5.3)
SODIUM BLD-SCNC: 143 MMOL/L (ref 132–146)
WBC # BLD: 10.7 E9/L (ref 9.4–34)

## 2022-01-20 PROCEDURE — 6360000002 HC RX W HCPCS: Performed by: SPECIALIST

## 2022-01-20 PROCEDURE — 99233 SBSQ HOSP IP/OBS HIGH 50: CPT | Performed by: INTERNAL MEDICINE

## 2022-01-20 PROCEDURE — 2580000003 HC RX 258: Performed by: INTERNAL MEDICINE

## 2022-01-20 PROCEDURE — APPSS30 APP SPLIT SHARED TIME 16-30 MINUTES: Performed by: NURSE PRACTITIONER

## 2022-01-20 PROCEDURE — 36415 COLL VENOUS BLD VENIPUNCTURE: CPT

## 2022-01-20 PROCEDURE — 71045 X-RAY EXAM CHEST 1 VIEW: CPT

## 2022-01-20 PROCEDURE — 6370000000 HC RX 637 (ALT 250 FOR IP): Performed by: INTERNAL MEDICINE

## 2022-01-20 PROCEDURE — 6370000000 HC RX 637 (ALT 250 FOR IP): Performed by: NURSE PRACTITIONER

## 2022-01-20 PROCEDURE — 2060000000 HC ICU INTERMEDIATE R&B

## 2022-01-20 PROCEDURE — 6370000000 HC RX 637 (ALT 250 FOR IP): Performed by: SPECIALIST

## 2022-01-20 PROCEDURE — 82962 GLUCOSE BLOOD TEST: CPT

## 2022-01-20 PROCEDURE — 2580000003 HC RX 258: Performed by: SPECIALIST

## 2022-01-20 PROCEDURE — 80048 BASIC METABOLIC PNL TOTAL CA: CPT

## 2022-01-20 PROCEDURE — 85025 COMPLETE CBC W/AUTO DIFF WBC: CPT

## 2022-01-20 RX ORDER — LISINOPRIL 5 MG/1
5 TABLET ORAL DAILY
Status: DISCONTINUED | OUTPATIENT
Start: 2022-01-20 | End: 2022-01-21

## 2022-01-20 RX ADMIN — Medication 1 TABLET: at 08:16

## 2022-01-20 RX ADMIN — APIXABAN 5 MG: 5 TABLET, FILM COATED ORAL at 21:33

## 2022-01-20 RX ADMIN — Medication 2000 UNITS: at 08:17

## 2022-01-20 RX ADMIN — PYRIDOXINE HCL TAB 50 MG 50 MG: 50 TAB at 08:16

## 2022-01-20 RX ADMIN — FLECAINIDE ACETATE 100 MG: 100 TABLET ORAL at 21:33

## 2022-01-20 RX ADMIN — Medication 500 MG: at 08:16

## 2022-01-20 RX ADMIN — MEGESTROL ACETATE 200 MG: 40 SUSPENSION ORAL at 08:16

## 2022-01-20 RX ADMIN — LISINOPRIL 5 MG: 5 TABLET ORAL at 10:12

## 2022-01-20 RX ADMIN — DIVALPROEX SODIUM 125 MG: 125 TABLET, DELAYED RELEASE ORAL at 08:16

## 2022-01-20 RX ADMIN — DIVALPROEX SODIUM 125 MG: 125 TABLET, DELAYED RELEASE ORAL at 21:33

## 2022-01-20 RX ADMIN — SODIUM CHLORIDE: 9 INJECTION, SOLUTION INTRAVENOUS at 10:12

## 2022-01-20 RX ADMIN — FLECAINIDE ACETATE 100 MG: 100 TABLET ORAL at 08:16

## 2022-01-20 RX ADMIN — INSULIN LISPRO 1 UNITS: 100 INJECTION, SOLUTION INTRAVENOUS; SUBCUTANEOUS at 06:30

## 2022-01-20 RX ADMIN — INSULIN LISPRO 2 UNITS: 100 INJECTION, SOLUTION INTRAVENOUS; SUBCUTANEOUS at 10:18

## 2022-01-20 RX ADMIN — DEXAMETHASONE SODIUM PHOSPHATE 6 MG: 4 INJECTION, SOLUTION INTRA-ARTICULAR; INTRALESIONAL; INTRAMUSCULAR; INTRAVENOUS; SOFT TISSUE at 08:17

## 2022-01-20 RX ADMIN — INSULIN LISPRO 1 UNITS: 100 INJECTION, SOLUTION INTRAVENOUS; SUBCUTANEOUS at 21:48

## 2022-01-20 RX ADMIN — Medication 500 MG: at 21:33

## 2022-01-20 RX ADMIN — INSULIN LISPRO 1 UNITS: 100 INJECTION, SOLUTION INTRAVENOUS; SUBCUTANEOUS at 17:27

## 2022-01-20 RX ADMIN — APIXABAN 5 MG: 5 TABLET, FILM COATED ORAL at 08:16

## 2022-01-20 RX ADMIN — INSULIN GLARGINE 25 UNITS: 100 INJECTION, SOLUTION SUBCUTANEOUS at 08:19

## 2022-01-20 RX ADMIN — ZINC SULFATE 220 MG (50 MG) CAPSULE 50 MG: CAPSULE at 08:16

## 2022-01-20 RX ADMIN — Medication 10 ML: at 08:17

## 2022-01-20 RX ADMIN — CITALOPRAM HYDROBROMIDE 10 MG: 20 TABLET ORAL at 08:17

## 2022-01-20 RX ADMIN — METOPROLOL SUCCINATE 12.5 MG: 25 TABLET, FILM COATED, EXTENDED RELEASE ORAL at 08:16

## 2022-01-20 RX ADMIN — CEFEPIME 1000 MG: 1 INJECTION, POWDER, FOR SOLUTION INTRAMUSCULAR; INTRAVENOUS at 04:45

## 2022-01-20 RX ADMIN — DOCUSATE SODIUM 100 MG: 100 CAPSULE, LIQUID FILLED ORAL at 08:16

## 2022-01-20 RX ADMIN — CEFEPIME 1000 MG: 1 INJECTION, POWDER, FOR SOLUTION INTRAMUSCULAR; INTRAVENOUS at 16:41

## 2022-01-20 RX ADMIN — SODIUM CHLORIDE: 9 INJECTION, SOLUTION INTRAVENOUS at 21:34

## 2022-01-20 RX ADMIN — DOCUSATE SODIUM 100 MG: 100 CAPSULE, LIQUID FILLED ORAL at 21:33

## 2022-01-20 ASSESSMENT — PAIN SCALES - PAIN ASSESSMENT IN ADVANCED DEMENTIA (PAINAD)
BREATHING: 0
FACIALEXPRESSION: 0
TOTALSCORE: 0
BREATHING: 0
FACIALEXPRESSION: 0
CONSOLABILITY: 0
BODYLANGUAGE: 0
NEGVOCALIZATION: 0
BODYLANGUAGE: 0
CONSOLABILITY: 0
TOTALSCORE: 0
NEGVOCALIZATION: 0

## 2022-01-20 ASSESSMENT — PAIN SCALES - GENERAL
PAINLEVEL_OUTOF10: 0
PAINLEVEL_OUTOF10: 0

## 2022-01-20 NOTE — CARE COORDINATION
CASE MANAGEMENT. ... COVID + 1/17. Now tolerating room air. Afib rvr 110's on monitor. On eliquis, toprol xl and tambocor. On iv maxipime, ivf, and iv decadron qd. PT 6, OT 6 from yesterday. Cultures pending. ID following. Patient will return to [facility]. Patient will need a pre-cert, but no need to wait for insurance authorization. N 16 in epic. Forms in chart. Will follow.

## 2022-01-20 NOTE — PROGRESS NOTES
[address] [place] [address] [facility]  ED  Progress Note    SUBJECTIVE:  Chief Complaint   Patient presents with    Atrial Fibrillation     hx of issue    Altered Mental Status     pt is in dementia unit, per ems pt is more confused than her basdeline    Shortness of Breath     per ems O2 88% RA on arrival. 93% RA at triage     Patient seems to be tolerating medications. No nausea, vomiting, diarrhea. No fevers. On room air   Keeps eye closed. Does not  Follow commands. + abdominal pain  Review of systems:  As stated above in the chief complaint, otherwise negative.     Medications:  Scheduled Meds:   lisinopril  5 mg Oral Daily    insulin glargine  25 Units SubCUTAneous Daily    ascorbic acid  500 mg Oral BID    dexamethasone  6 mg IntraVENous Q24H    cefepime  1,000 mg IntraVENous Q12H    apixaban  5 mg Oral BID    citalopram  10 mg Oral Daily    divalproex  125 mg Oral TID    docusate sodium  100 mg Oral BID    flecainide  100 mg Oral BID    megestrol acetate  200 mg Oral Daily    metoprolol succinate  12.5 mg Oral Daily    ocuvite-lutein  1 tablet Oral Daily    sodium chloride flush  5-40 mL IntraVENous 2 times per day    insulin lispro  0-6 Units SubCUTAneous TID WC    insulin lispro  0-3 Units SubCUTAneous Nightly    vitamin D  2,000 Units Oral Daily    vitamin B-6  50 mg Oral Daily    zinc sulfate  50 mg Oral Daily     Continuous Infusions:   sodium chloride 12.5 mL/hr at 22 1012    sodium chloride      lactated ringers 75 mL/hr at 22 1012    dextrose       PRN Meds:sodium chloride flush, sodium chloride, ondansetron **OR** ondansetron, polyethylene glycol, acetaminophen **OR** acetaminophen, glucose, dextrose, glucagon (rDNA), dextrose    OBJECTIVE:  BP (!) 159/90   Pulse 118   Temp 97 °F (36.1 °C) (Axillary)   Resp 16   Ht 5' 4\" (1.626 m)   Wt 175 lb 11.2 oz (79.7 kg)   LMP  (LMP Unknown)   SpO2 100%   BMI 30.16 kg/m²   Temp  Av.7 °F (36.5 °C)  Min: 97 °F (36.1 °C)  Max: 98.5 °F (36.9 °C)  Constitutional: The patient is lying in bed, alert to self. Keeps eye closed. Does not  Follow commands  Skin: Warm and dry. No rashes were noted. HEENT: Round and reactive pupils. Moist mucous membranes. No ulcerations or thrush. Neck: Supple to movements. Chest: No respiratory distress. Symmetrical expansion. No wheezing, crackles or rhonchi. Poor aeration throughout   Cardiovascular: S1 and S2 are rhythmic and regular. No murmurs appreciated. Abdomen: Positive bowel sounds to auscultation. Benign to palpation. No masses felt. No hepatosplenomegaly. Extremities: No edema.   Lines: peripheral  Segura yellow urine  Laboratory and Tests Review:  Lab Results   Component Value Date    WBC 20.0 (H) 01/18/2022    WBC 19.6 (H) 01/17/2022    WBC 8.7 10/14/2021    HGB 11.7 01/18/2022    HCT 36.7 01/18/2022    MCV 93.9 01/18/2022     01/18/2022     Lab Results   Component Value Date    NEUTROABS 18.45 (H) 01/18/2022    NEUTROABS 17.64 (H) 01/17/2022    NEUTROABS 5.17 10/14/2021     No results found for: [facility]  Lab Results   Component Value Date    ALT 10 01/18/2022    AST 20 01/18/2022    ALKPHOS 122 (H) 01/18/2022    BILITOT 0.4 01/18/2022     Lab Results   Component Value Date     01/20/2022    K 4.3 01/20/2022    K 3.7 01/18/2022     01/20/2022    CO2 22 01/20/2022    BUN 25 01/20/2022    CREATININE 0.7 01/20/2022    CREATININE 0.8 01/18/2022    CREATININE 1.1 01/17/2022    GFRAA >60 01/20/2022    LABGLOM >60 01/20/2022    GLUCOSE 209 01/20/2022    PROT 6.3 01/18/2022    LABALBU 2.4 01/18/2022    CALCIUM 8.5 01/20/2022    BILITOT 0.4 01/18/2022    ALKPHOS 122 01/18/2022    AST 20 01/18/2022    ALT 10 01/18/2022     Lab Results   Component Value Date    CRP 16.6 (H) 01/17/2022     Lab Results   Component Value Date    SEDRATE 70 (H) 10/22/2020    SEDRATE 19 04/14/2016     Radiology:  Noted     Microbiology:   Blood cultures: negative so far  Urine culture: >100K GNR  Rapid COVID-19: detected     Recent Labs     01/17/22  1500   PROCAL 0.36*       ASSESSMENT:  SARS-CoV-2 infection in an unvaccinated patient   UTI   Leukocytosis associated to the above as well as reactive to steroids     PLAN:  Continue Cefepime   Steroids and other supportive care   Check final cultures - await ID & sensitivities of urine cx   Monitor labs-check wbc today    SHARI Arreola CNP  2:18 PM  1/20/2022  Pt seen and examined. Above discussed agree with advanced practice nurse. Labs, cultures, and radiographs reviewed. Face to Face encounter occurred. Changes made as necessary.      Author MD [place]

## 2022-01-21 LAB
METER GLUCOSE: 207 MG/DL (ref 70–110)
METER GLUCOSE: 232 MG/DL (ref 70–110)
METER GLUCOSE: 237 MG/DL (ref 70–110)
METER GLUCOSE: 240 MG/DL (ref 70–110)
METER GLUCOSE: 260 MG/DL (ref 70–110)
ORGANISM: ABNORMAL
URINE CULTURE, ROUTINE: ABNORMAL

## 2022-01-21 PROCEDURE — 2580000003 HC RX 258: Performed by: INTERNAL MEDICINE

## 2022-01-21 PROCEDURE — 6360000002 HC RX W HCPCS: Performed by: INTERNAL MEDICINE

## 2022-01-21 PROCEDURE — 6360000002 HC RX W HCPCS: Performed by: SPECIALIST

## 2022-01-21 PROCEDURE — 2580000003 HC RX 258: Performed by: SPECIALIST

## 2022-01-21 PROCEDURE — APPSS30 APP SPLIT SHARED TIME 16-30 MINUTES: Performed by: NURSE PRACTITIONER

## 2022-01-21 PROCEDURE — 82962 GLUCOSE BLOOD TEST: CPT

## 2022-01-21 PROCEDURE — 6370000000 HC RX 637 (ALT 250 FOR IP): Performed by: SPECIALIST

## 2022-01-21 PROCEDURE — 6370000000 HC RX 637 (ALT 250 FOR IP): Performed by: NURSE PRACTITIONER

## 2022-01-21 PROCEDURE — 6370000000 HC RX 637 (ALT 250 FOR IP): Performed by: INTERNAL MEDICINE

## 2022-01-21 PROCEDURE — 2060000000 HC ICU INTERMEDIATE R&B

## 2022-01-21 RX ORDER — CEFDINIR 300 MG/1
300 CAPSULE ORAL EVERY 12 HOURS SCHEDULED
Qty: 10 CAPSULE | Refills: 0 | DISCHARGE
Start: 2022-01-21 | End: 2022-01-26 | Stop reason: HOSPADM

## 2022-01-21 RX ORDER — METOCLOPRAMIDE HYDROCHLORIDE 5 MG/ML
5 INJECTION INTRAMUSCULAR; INTRAVENOUS EVERY 8 HOURS
Status: DISCONTINUED | OUTPATIENT
Start: 2022-01-21 | End: 2022-01-27 | Stop reason: HOSPADM

## 2022-01-21 RX ORDER — CEFDINIR 300 MG/1
300 CAPSULE ORAL EVERY 12 HOURS SCHEDULED
Status: COMPLETED | OUTPATIENT
Start: 2022-01-21 | End: 2022-01-26

## 2022-01-21 RX ORDER — INSULIN GLARGINE 100 [IU]/ML
12 INJECTION, SOLUTION SUBCUTANEOUS 2 TIMES DAILY
Status: DISCONTINUED | OUTPATIENT
Start: 2022-01-22 | End: 2022-01-22

## 2022-01-21 RX ORDER — LISINOPRIL 10 MG/1
10 TABLET ORAL DAILY
Status: DISCONTINUED | OUTPATIENT
Start: 2022-01-21 | End: 2022-01-27 | Stop reason: HOSPADM

## 2022-01-21 RX ADMIN — Medication 500 MG: at 20:37

## 2022-01-21 RX ADMIN — DEXAMETHASONE SODIUM PHOSPHATE 6 MG: 4 INJECTION, SOLUTION INTRA-ARTICULAR; INTRALESIONAL; INTRAMUSCULAR; INTRAVENOUS; SOFT TISSUE at 08:36

## 2022-01-21 RX ADMIN — CEFEPIME 1000 MG: 1 INJECTION, POWDER, FOR SOLUTION INTRAMUSCULAR; INTRAVENOUS at 04:46

## 2022-01-21 RX ADMIN — INSULIN LISPRO 2 UNITS: 100 INJECTION, SOLUTION INTRAVENOUS; SUBCUTANEOUS at 06:46

## 2022-01-21 RX ADMIN — LISINOPRIL 10 MG: 10 TABLET ORAL at 08:35

## 2022-01-21 RX ADMIN — Medication 500 MG: at 08:36

## 2022-01-21 RX ADMIN — Medication 1 TABLET: at 08:35

## 2022-01-21 RX ADMIN — MEGESTROL ACETATE 200 MG: 40 SUSPENSION ORAL at 08:36

## 2022-01-21 RX ADMIN — INSULIN GLARGINE 25 UNITS: 100 INJECTION, SOLUTION SUBCUTANEOUS at 09:00

## 2022-01-21 RX ADMIN — DOCUSATE SODIUM 100 MG: 100 CAPSULE, LIQUID FILLED ORAL at 20:37

## 2022-01-21 RX ADMIN — METOCLOPRAMIDE HYDROCHLORIDE 5 MG: 5 INJECTION INTRAMUSCULAR; INTRAVENOUS at 20:37

## 2022-01-21 RX ADMIN — FLECAINIDE ACETATE 100 MG: 100 TABLET ORAL at 20:37

## 2022-01-21 RX ADMIN — APIXABAN 5 MG: 5 TABLET, FILM COATED ORAL at 08:36

## 2022-01-21 RX ADMIN — METOCLOPRAMIDE HYDROCHLORIDE 5 MG: 5 INJECTION INTRAMUSCULAR; INTRAVENOUS at 14:53

## 2022-01-21 RX ADMIN — FLECAINIDE ACETATE 100 MG: 100 TABLET ORAL at 08:35

## 2022-01-21 RX ADMIN — INSULIN LISPRO 3 UNITS: 100 INJECTION, SOLUTION INTRAVENOUS; SUBCUTANEOUS at 16:48

## 2022-01-21 RX ADMIN — INSULIN LISPRO 1 UNITS: 100 INJECTION, SOLUTION INTRAVENOUS; SUBCUTANEOUS at 21:50

## 2022-01-21 RX ADMIN — Medication 10 ML: at 20:50

## 2022-01-21 RX ADMIN — PYRIDOXINE HCL TAB 50 MG 50 MG: 50 TAB at 08:34

## 2022-01-21 RX ADMIN — CEFDINIR 300 MG: 300 CAPSULE ORAL at 20:37

## 2022-01-21 RX ADMIN — APIXABAN 5 MG: 5 TABLET, FILM COATED ORAL at 20:37

## 2022-01-21 RX ADMIN — SODIUM CHLORIDE: 9 INJECTION, SOLUTION INTRAVENOUS at 10:58

## 2022-01-21 RX ADMIN — INSULIN LISPRO 2 UNITS: 100 INJECTION, SOLUTION INTRAVENOUS; SUBCUTANEOUS at 12:05

## 2022-01-21 RX ADMIN — CITALOPRAM HYDROBROMIDE 10 MG: 20 TABLET ORAL at 08:35

## 2022-01-21 RX ADMIN — Medication 10 ML: at 08:38

## 2022-01-21 RX ADMIN — Medication 2000 UNITS: at 08:38

## 2022-01-21 RX ADMIN — ZINC SULFATE 220 MG (50 MG) CAPSULE 50 MG: CAPSULE at 08:37

## 2022-01-21 ASSESSMENT — PAIN SCALES - PAIN ASSESSMENT IN ADVANCED DEMENTIA (PAINAD)
TOTALSCORE: 1
BREATHING: 0
BODYLANGUAGE: 0
NEGVOCALIZATION: 1
FACIALEXPRESSION: 0
BREATHING: 0
CONSOLABILITY: 0
BODYLANGUAGE: 0
TOTALSCORE: 1
FACIALEXPRESSION: 0
CONSOLABILITY: 0
NEGVOCALIZATION: 1

## 2022-01-21 ASSESSMENT — PAIN SCALES - GENERAL: PAINLEVEL_OUTOF10: 0

## 2022-01-21 NOTE — CARE COORDINATION
Social Work/Discharge Planning:  Chart reviewed. Requested updated notes for therapy. Therapy indicated patient is baseline at [facility] and dependent in all areas. Notified Isabela with [facility] and she states patient can return to facility at discharge. No pre-cert needed. No covid test needed. Will continue to follow.   Electronically signed by ROGELIO Gutierrez on 2022 at 11:10 AM

## 2022-01-21 NOTE — PROGRESS NOTES
Comprehensive Nutrition Assessment    Type and Reason for Visit:  Reassess    Nutrition Recommendations/Plan: Current TF rec @ goal continues to meet estimated nutrient needs. Diabetic @ 40mL + 75mLq4:   960mL TV 1440 calories, 79 g PRO, 729 mL total free water, 1179 ml total free water  This regimen will meet: 96% calorie needs, ~100% pro needs     Nutrition Assessment:  Pt now s/p NGT w/ TF 2/2 poor PO/being unable to swallow/refusing food/pills. Adm w/ COVID/resp failure/UTI/JOSE DE JESUS/sepsis. Hx Alzheimer's/dysphagia/DM/Afib. Will continue to monitor. Malnutrition Assessment:  Malnutrition Status:   At risk for malnutrition (Comment)    Context:  Chronic Illness     Findings of the 6 clinical characteristics of malnutrition:  Energy Intake:  Mild decrease in energy intake (Comment) (refusing PO at NH PTA)  Weight Loss:  No significant weight loss     Body Fat Loss:  Unable to assess (Covid Isolation)     Muscle Mass Loss:  Unable to assess (Covid Isolation)    Fluid Accumulation:  No significant fluid accumulation     Strength:  Not Performed    Estimated Daily Nutrient Needs:  Energy (kcal):  4959-0428; Weight Used for Energy Requirements:  Current     Protein (g):  65-75; Weight Used for Protein Requirements:  Ideal (1.2-1.4)        Fluid (ml/day):  7640-8459; Method Used for Fluid Requirements:  1 ml/kcal      Nutrition Related Findings:  AMS, soft/round abd, active BS, trace BLE/BUE edema, NG w/ TF initiated 1/20, -1.2L I/Os      Wounds:  None       Current Nutrition Therapies:    ADULT TUBE FEEDING; Nasogastric; Diabetic; Continuous; 20; Yes; 20; Q 4 hours; 40; 50; Q 4 hours  Current Tube Feeding (TF) Orders:  · Feeding Route: Nasogastric  · Formula: Diabetic  · Schedule: Continuous (40mL= 920mL TV)  · Water Flushes: 50Q4= 300mL free water  · Current TF & Flush Orders Provides: 20mL/hr = 480mL TV, 720 calories, 40g PRO, 346mL water, 646mL total free water  · Goal TF & Flush Orders Provides: 40mL = 960mL TV, 1440 calories, 79 g PRO, 729 mL total free water, 1029mL total free water    Anthropometric Measures:  · Height: 5' 4\" (162.6 cm)  · Current Body Weight: 175 lb 11.2 oz (79.7 kg) (1/19 bed scale)   · Admission Body Weight:      · Usual Body Weight:  (UTO actual wt hx)     · Ideal Body Weight: 120 lbs; % Ideal Body Weight 146.4 %   · BMI: 30.1  · BMI Categories: Obese Class 1 (BMI 30.0-34. 9)       Nutrition Diagnosis:   · Inadequate oral intake related to cognitive or neurological impairment as evidenced by intake 0-25%,poor intake prior to admission    Nutrition Interventions:   Food and/or Nutrient Delivery:  Continue Current Tube Feeding  Nutrition Education/Counseling:  No recommendation at this time   Coordination of Nutrition Care:  Continue to monitor while inpatient    Goals:  Pt tolerates TF @ goal rate       Nutrition Monitoring and Evaluation:   Behavioral-Environmental Outcomes:  None Identified   Food/Nutrient Intake Outcomes:  Enteral Nutrition Intake/Tolerance  Physical Signs/Symptoms Outcomes:  Biochemical Data,Nutrition Focused Physical Findings,Skin,Weight,Chewing or Swallowing,GI Status,Fluid Status or Edema     Discharge Planning:     Too soon to determine     Electronically signed by Liliane George, MS, RD, LD on 1/21/22 at 1:45 PM EST    Contact: John

## 2022-01-21 NOTE — PROGRESS NOTES
[facility]   Progress Note    Admitting Date and Time: 1/17/2022 10:52 AM  Admit Dx: Lactic acidosis [E87.2]  Acute urinary tract infection [N39.0]  Atrial fibrillation with RVR (HCC) [I48.91]  Acute respiratory failure with hypoxia (HCC) [J96.01]  COVID-19 [U07.1]    Subjective:    Patient seen and examined. No audible wheezing or congestion noted this am.  Mentation about the same as it has been past 2 days. Will answer my questions. Still won't open her eyes. Per RN, patient was not able to swallow food or pills overnight. NGT placed and patient started on tube feeds. ROS: Denies chest pain, SOB, cough, congestion, subjective fever, chills, N/V/D, abdominal pain, HA.  All systems reviewed and negative unless otherwise neg       lisinopril  10 mg Oral Daily    insulin glargine  25 Units SubCUTAneous Daily    ascorbic acid  500 mg Oral BID    dexamethasone  6 mg IntraVENous Q24H    cefepime  1,000 mg IntraVENous Q12H    apixaban  5 mg Oral BID    citalopram  10 mg Oral Daily    divalproex  125 mg Oral TID    docusate sodium  100 mg Oral BID    flecainide  100 mg Oral BID    megestrol acetate  200 mg Oral Daily    metoprolol succinate  12.5 mg Oral Daily    ocuvite-lutein  1 tablet Oral Daily    sodium chloride flush  5-40 mL IntraVENous 2 times per day    insulin lispro  0-6 Units SubCUTAneous TID WC    insulin lispro  0-3 Units SubCUTAneous Nightly    vitamin D  2,000 Units Oral Daily    vitamin B-6  50 mg Oral Daily    zinc sulfate  50 mg Oral Daily     sodium chloride flush, 5-40 mL, PRN  sodium chloride, 25 mL, PRN  ondansetron, 4 mg, Q8H PRN   Or  ondansetron, 4 mg, Q6H PRN  polyethylene glycol, 17 g, Daily PRN  acetaminophen, 650 mg, Q6H PRN   Or  acetaminophen, 650 mg, Q6H PRN  glucose, 15 g, PRN  dextrose, 12.5 g, PRN  glucagon (rDNA), 1 mg, PRN  dextrose, 100 mL/hr, PRN         Objective:    BP (!) 164/74   Pulse 120   Temp 99.3 °F (37.4 °C) (Axillary) Resp 16   Ht 5' 4\" (1.626 m)   Wt 175 lb 11.2 oz (79.7 kg)   LMP  (LMP Unknown)   SpO2 94%   BMI 30.16 kg/m²      General Appearance: alert and oriented to person,  in no acute distress  Skin: warm and dry  Head: normocephalic and atraumatic. NGT left nares. Eyes: pupils equal, round, and reactive to light, extraocular eye movements intact, conjunctivae normal  Neck: neck supple and non tender without mass   Pulmonary/Chest: clear to auscultation bilaterally- no wheezes, rales or rhonchi, normal air movement, no respiratory distress  Cardiovascular: normal rate, normal S1 and S2 and no carotid bruits  Abdomen: soft, non-tender, non-distended, normal bowel sounds, no masses or organomegaly  Extremities: no cyanosis, no clubbing and no edema  Neurologic: no cranial nerve deficit and speech normal  :  Segura draining yellow urine    Recent Labs     01/18/22  0945 01/20/22  0630    143   K 3.7 4.3   * 110*   CO2 19* 22   BUN 31* 25*   CREATININE 0.8 0.7   GLUCOSE 297* 209*   CALCIUM 8.4* 8.5*       Recent Labs     01/18/22  0945   ALKPHOS 122*   PROT 6.3*   LABALBU 2.4*   BILITOT 0.4   AST 20   ALT 10       Recent Labs     01/18/22  0945 01/20/22  1506   WBC 20.0* 10.7   RBC 3.91 4.60   HGB 11.7 13.5   HCT 36.7 42.2   MCV 93.9 91.7   MCH 29.9 29.3   MCHC 31.9* 32.0   RDW 14.2 13.5    298   MPV 12.9* 12.6*            Radiology:   XR CHEST ABDOMEN NG PLACEMENT   Final Result   Satisfactory position of the enteric tube. XR CHEST PORTABLE   Final Result   Resolving right lower lobe infiltrate. US PELVIS LIMITED   Final Result   1. Very limited evaluation of the uterus and endometrium. No obvious   uterine mass. Endometrium appears mildly thickened for patient age. 2.  Nonvisualization of either ovary. There are no adnexal masses. XR CHEST PORTABLE   Final Result   1. Right perihilar and right lower lobe infiltrates         CT HEAD WO CONTRAST   Final Result   1. continue  low dose insulin slide scale, hypoglycemia protocol. Recent A1c 6.5. Monitor blood sugars, adjust insulin as needed to keep BS <180.    8. HTN: Somewhat improved with addition of lisinopril. Will increase to 10mg daily, continue home  Metoprolol. Monitor BP   9. Malnutrition: seen by dietician. Supplements ordered per same. Now on Diabetic TF at 20ml/hr. Poor po intake.             Electronically signed by SHARI Nicholson CNP on 1/21/2022 at 8:50 AM

## 2022-01-21 NOTE — PROGRESS NOTES
[address] [place] [address] [facility]  ED  Progress Note    SUBJECTIVE:  Chief Complaint   Patient presents with    Atrial Fibrillation     hx of issue    Altered Mental Status     pt is in dementia unit, per ems pt is more confused than her basdeline    Shortness of Breath     per ems O2 88% RA on arrival. 93% RA at triage     Patient seems to be tolerating medications. No nausea, vomiting, diarrhea. No fevers. On room air. 94%  More alert. No sob, + cough. No abdominal pain  Review of systems:  As stated above in the chief complaint, otherwise negative.     Medications:  Scheduled Meds:   lisinopril  10 mg Oral Daily    insulin glargine  25 Units SubCUTAneous Daily    ascorbic acid  500 mg Oral BID    dexamethasone  6 mg IntraVENous Q24H    cefepime  1,000 mg IntraVENous Q12H    apixaban  5 mg Oral BID    citalopram  10 mg Oral Daily    divalproex  125 mg Oral TID    docusate sodium  100 mg Oral BID    flecainide  100 mg Oral BID    megestrol acetate  200 mg Oral Daily    metoprolol succinate  12.5 mg Oral Daily    ocuvite-lutein  1 tablet Oral Daily    sodium chloride flush  5-40 mL IntraVENous 2 times per day    insulin lispro  0-6 Units SubCUTAneous TID WC    insulin lispro  0-3 Units SubCUTAneous Nightly    vitamin D  2,000 Units Oral Daily    vitamin B-6  50 mg Oral Daily    zinc sulfate  50 mg Oral Daily     Continuous Infusions:   sodium chloride 12.5 mL/hr at 22 2134    sodium chloride      lactated ringers 65 mL/hr at 22 0833    dextrose       PRN Meds:sodium chloride flush, sodium chloride, ondansetron **OR** ondansetron, polyethylene glycol, acetaminophen **OR** acetaminophen, glucose, dextrose, glucagon (rDNA), dextrose    OBJECTIVE:  BP (!) 164/74   Pulse 120   Temp 99.3 °F (37.4 °C) (Axillary)   Resp 16   Ht 5' 4\" (1.626 m)   Wt 175 lb 11.2 oz (79.7 kg)   LMP  (LMP Unknown)   SpO2 94%   BMI 30.16 kg/m²   Temp  Av.7 °F (37.1 °C)  Min: 98.2 ecoli-pan sensitive  Rapid COVID-19: detected     No results for input(s): PROCAL in the last 72 hours. ASSESSMENT:  · SARS-CoV-2 infection in an unvaccinated patient   · UTI   · Leukocytosis associated to the above as well as reactive to steroids down to 10    PLAN:  · Continue c  omnicef for discharge 5/10  · Steroids and other supportive care   · Check final cultures -  · Monitor labs-  · Ok to d/c from ID POV, med rec complete    Daniel Stewart, APRN - CNP  10:53 AM  1/21/2022  Pt seen and examined. Above discussed agree with advanced practice nurse. Labs, cultures, and radiographs reviewed. Face to Face encounter occurred. Changes made as necessary.      Leila Anton MD

## 2022-01-22 ENCOUNTER — APPOINTMENT (OUTPATIENT)
Dept: ULTRASOUND IMAGING | Age: 82
DRG: 871 | End: 2022-01-22
Payer: COMMERCIAL

## 2022-01-22 LAB
BLOOD CULTURE, ROUTINE: NORMAL
CULTURE, BLOOD 2: NORMAL
METER GLUCOSE: 221 MG/DL (ref 70–110)
METER GLUCOSE: 239 MG/DL (ref 70–110)
METER GLUCOSE: 309 MG/DL (ref 70–110)

## 2022-01-22 PROCEDURE — 6360000002 HC RX W HCPCS: Performed by: SPECIALIST

## 2022-01-22 PROCEDURE — 82962 GLUCOSE BLOOD TEST: CPT

## 2022-01-22 PROCEDURE — 6370000000 HC RX 637 (ALT 250 FOR IP): Performed by: INTERNAL MEDICINE

## 2022-01-22 PROCEDURE — 6370000000 HC RX 637 (ALT 250 FOR IP): Performed by: SPECIALIST

## 2022-01-22 PROCEDURE — 2060000000 HC ICU INTERMEDIATE R&B

## 2022-01-22 PROCEDURE — 6360000002 HC RX W HCPCS: Performed by: INTERNAL MEDICINE

## 2022-01-22 PROCEDURE — 6370000000 HC RX 637 (ALT 250 FOR IP): Performed by: NURSE PRACTITIONER

## 2022-01-22 PROCEDURE — 2500000003 HC RX 250 WO HCPCS: Performed by: NURSE PRACTITIONER

## 2022-01-22 PROCEDURE — APPSS30 APP SPLIT SHARED TIME 16-30 MINUTES: Performed by: NURSE PRACTITIONER

## 2022-01-22 PROCEDURE — 93971 EXTREMITY STUDY: CPT

## 2022-01-22 PROCEDURE — 2580000003 HC RX 258: Performed by: INTERNAL MEDICINE

## 2022-01-22 RX ORDER — METOPROLOL TARTRATE 5 MG/5ML
5 INJECTION INTRAVENOUS EVERY 6 HOURS
Status: DISCONTINUED | OUTPATIENT
Start: 2022-01-22 | End: 2022-01-22

## 2022-01-22 RX ORDER — METOPROLOL TARTRATE 5 MG/5ML
2.5 INJECTION INTRAVENOUS EVERY 6 HOURS
Status: DISCONTINUED | OUTPATIENT
Start: 2022-01-22 | End: 2022-01-24

## 2022-01-22 RX ORDER — INSULIN GLARGINE 100 [IU]/ML
15 INJECTION, SOLUTION SUBCUTANEOUS 2 TIMES DAILY
Status: DISCONTINUED | OUTPATIENT
Start: 2022-01-22 | End: 2022-01-27 | Stop reason: HOSPADM

## 2022-01-22 RX ADMIN — Medication 10 ML: at 10:06

## 2022-01-22 RX ADMIN — ZINC SULFATE 220 MG (50 MG) CAPSULE 50 MG: CAPSULE at 10:04

## 2022-01-22 RX ADMIN — DOCUSATE SODIUM 100 MG: 100 CAPSULE, LIQUID FILLED ORAL at 10:04

## 2022-01-22 RX ADMIN — CITALOPRAM HYDROBROMIDE 10 MG: 20 TABLET ORAL at 10:04

## 2022-01-22 RX ADMIN — INSULIN LISPRO 2 UNITS: 100 INJECTION, SOLUTION INTRAVENOUS; SUBCUTANEOUS at 06:25

## 2022-01-22 RX ADMIN — FLECAINIDE ACETATE 100 MG: 100 TABLET ORAL at 23:53

## 2022-01-22 RX ADMIN — Medication 10 ML: at 23:48

## 2022-01-22 RX ADMIN — DIVALPROEX SODIUM 125 MG: 125 TABLET, DELAYED RELEASE ORAL at 10:05

## 2022-01-22 RX ADMIN — METOPROLOL TARTRATE 2.5 MG: 5 INJECTION INTRAVENOUS at 23:46

## 2022-01-22 RX ADMIN — INSULIN GLARGINE 15 UNITS: 100 INJECTION, SOLUTION SUBCUTANEOUS at 10:36

## 2022-01-22 RX ADMIN — DIVALPROEX SODIUM 125 MG: 125 TABLET, DELAYED RELEASE ORAL at 23:53

## 2022-01-22 RX ADMIN — DIVALPROEX SODIUM 125 MG: 125 TABLET, DELAYED RELEASE ORAL at 14:54

## 2022-01-22 RX ADMIN — METOPROLOL TARTRATE 2.5 MG: 5 INJECTION INTRAVENOUS at 09:58

## 2022-01-22 RX ADMIN — INSULIN LISPRO 2 UNITS: 100 INJECTION, SOLUTION INTRAVENOUS; SUBCUTANEOUS at 23:53

## 2022-01-22 RX ADMIN — INSULIN LISPRO 2 UNITS: 100 INJECTION, SOLUTION INTRAVENOUS; SUBCUTANEOUS at 11:56

## 2022-01-22 RX ADMIN — INSULIN GLARGINE 15 UNITS: 100 INJECTION, SOLUTION SUBCUTANEOUS at 23:53

## 2022-01-22 RX ADMIN — CEFDINIR 300 MG: 300 CAPSULE ORAL at 23:53

## 2022-01-22 RX ADMIN — Medication 1 TABLET: at 10:05

## 2022-01-22 RX ADMIN — METOPROLOL TARTRATE 2.5 MG: 5 INJECTION INTRAVENOUS at 14:54

## 2022-01-22 RX ADMIN — MEGESTROL ACETATE 200 MG: 40 SUSPENSION ORAL at 10:02

## 2022-01-22 RX ADMIN — LISINOPRIL 10 MG: 10 TABLET ORAL at 10:04

## 2022-01-22 RX ADMIN — FLECAINIDE ACETATE 100 MG: 100 TABLET ORAL at 10:05

## 2022-01-22 RX ADMIN — Medication 2000 UNITS: at 10:05

## 2022-01-22 RX ADMIN — CEFDINIR 300 MG: 300 CAPSULE ORAL at 10:04

## 2022-01-22 RX ADMIN — METOCLOPRAMIDE HYDROCHLORIDE 5 MG: 5 INJECTION INTRAMUSCULAR; INTRAVENOUS at 06:24

## 2022-01-22 RX ADMIN — Medication 500 MG: at 10:05

## 2022-01-22 RX ADMIN — METOCLOPRAMIDE HYDROCHLORIDE 5 MG: 5 INJECTION INTRAMUSCULAR; INTRAVENOUS at 14:54

## 2022-01-22 RX ADMIN — Medication 500 MG: at 23:47

## 2022-01-22 RX ADMIN — PYRIDOXINE HCL TAB 50 MG 50 MG: 50 TAB at 10:06

## 2022-01-22 RX ADMIN — INSULIN LISPRO 4 UNITS: 100 INJECTION, SOLUTION INTRAVENOUS; SUBCUTANEOUS at 18:18

## 2022-01-22 RX ADMIN — METOCLOPRAMIDE HYDROCHLORIDE 5 MG: 5 INJECTION INTRAMUSCULAR; INTRAVENOUS at 23:47

## 2022-01-22 RX ADMIN — APIXABAN 5 MG: 5 TABLET, FILM COATED ORAL at 23:47

## 2022-01-22 RX ADMIN — APIXABAN 5 MG: 5 TABLET, FILM COATED ORAL at 10:05

## 2022-01-22 RX ADMIN — DEXAMETHASONE SODIUM PHOSPHATE 6 MG: 4 INJECTION, SOLUTION INTRA-ARTICULAR; INTRALESIONAL; INTRAMUSCULAR; INTRAVENOUS; SOFT TISSUE at 09:58

## 2022-01-22 ASSESSMENT — PAIN SCALES - PAIN ASSESSMENT IN ADVANCED DEMENTIA (PAINAD)
FACIALEXPRESSION: 0
TOTALSCORE: 0
BODYLANGUAGE: 0
TOTALSCORE: 1
CONSOLABILITY: 0
BREATHING: 0
BODYLANGUAGE: 0
BREATHING: 0
NEGVOCALIZATION: 1
NEGVOCALIZATION: 0
FACIALEXPRESSION: 0
CONSOLABILITY: 0

## 2022-01-22 ASSESSMENT — PAIN SCALES - GENERAL: PAINLEVEL_OUTOF10: 0

## 2022-01-22 NOTE — PROGRESS NOTES
[facility]  ED  Progress Note    Face to face encounter   SUBJECTIVE:  Chief Complaint   Patient presents with    Atrial Fibrillation     hx of issue    Altered Mental Status     pt is in dementia unit, per ems pt is more confused than her basdeline    Shortness of Breath     per ems O2 88% RA on arrival. 93% RA at triage     Patient seems to be tolerating medications. No nausea, vomiting, diarrhea. No fevers. On room air. 94%  Having ECHO done at bedside. More alert. No sob, + cough. No abdominal pain    Review of systems:  As stated above in the chief complaint, otherwise negative.     Medications:  Scheduled Meds:   insulin glargine  15 Units SubCUTAneous BID    metoprolol  2.5 mg IntraVENous Q6H    lisinopril  10 mg Oral Daily    metoclopramide  5 mg IntraVENous Q8H    cefdinir  300 mg Oral 2 times per day    ascorbic acid  500 mg Oral BID    dexamethasone  6 mg IntraVENous Q24H    apixaban  5 mg Oral BID    citalopram  10 mg Oral Daily    divalproex  125 mg Oral TID    docusate sodium  100 mg Oral BID    flecainide  100 mg Oral BID    megestrol acetate  200 mg Oral Daily    [Held by provider] metoprolol succinate  12.5 mg Oral Daily    ocuvite-lutein  1 tablet Oral Daily    sodium chloride flush  5-40 mL IntraVENous 2 times per day    insulin lispro  0-6 Units SubCUTAneous TID WC    insulin lispro  0-3 Units SubCUTAneous Nightly    vitamin D  2,000 Units Oral Daily    vitamin B-6  50 mg Oral Daily    zinc sulfate  50 mg Oral Daily     Continuous Infusions:   sodium chloride 12.5 mL/hr at 01/21/22 1058    sodium chloride      dextrose       PRN Meds:sodium chloride flush, sodium chloride, ondansetron **OR** ondansetron, polyethylene glycol, acetaminophen **OR** acetaminophen, glucose, dextrose, glucagon (rDNA), dextrose    OBJECTIVE:  /73   Pulse 56   Temp 96.4 °F (35.8 °C) (Axillary)   Resp 18   Ht 5' 4\" (1.626 m)   Wt 175 lb 11.2 oz (79.7 kg)   LMP  (LMP Unknown)   SpO2 94%   BMI 30.16 kg/m²   Temp  Av.3 °F (36.3 °C)  Min: 96.4 °F (35.8 °C)  Max: 98.1 °F (36.7 °C)  Constitutional: The patient is lying in bed, alert and oriented to self. Having echo done   Skin: Warm and dry. No rashes were noted. HEENT: Round and reactive pupils. Moist mucous membranes. No ulcerations or thrush. N/g present  Neck: Supple to movements. Chest: No respiratory distress. Symmetrical expansion. No wheezing, crackles or rhonchi. Poor aeration throughout . On room air. Cardiovascular: S1 and S2 are rhythmic and regular. No murmurs appreciated. Abdomen: Positive bowel sounds to auscultation. Benign to palpation. Extremities: No edema.   Lines: peripheral  Segura yellow urine    Laboratory and Tests Review:  Lab Results   Component Value Date    WBC 10.7 2022    WBC 20.0 (H) 2022    WBC 19.6 (H) 2022    HGB 13.5 2022    HCT 42.2 2022    MCV 91.7 2022     2022     Lab Results   Component Value Date    NEUTROABS 9.35 (H) 2022    NEUTROABS 18.45 (H) 2022    NEUTROABS 17.64 (H) 2022     No results found for: [facility]  Lab Results   Component Value Date    ALT 10 2022    AST 20 2022    ALKPHOS 122 (H) 2022    BILITOT 0.4 2022     Lab Results   Component Value Date     2022    K 4.3 2022    K 3.7 2022     2022    CO2 22 2022    BUN 25 2022    CREATININE 0.7 2022    CREATININE 0.8 2022    CREATININE 1.1 2022    GFRAA >60 2022    LABGLOM >60 2022    GLUCOSE 209 2022    PROT 6.3 2022    LABALBU 2.4 2022    CALCIUM 8.5 2022    BILITOT 0.4 2022    ALKPHOS 122 2022    AST 20 2022    ALT 10 2022     Lab Results   Component Value Date    CRP 16.6 (H) 2022     Lab Results   Component Value Date    SEDRATE 70 (H) 10/22/2020    SEDRATE 19 2016     Radiology:  Noted Microbiology:   Blood cultures: negative so far  Urine culture: >100K GNR ecoli-pan sensitive  Rapid COVID-19: detected     No results for input(s): PROCAL in the last 72 hours. ASSESSMENT:  SARS-CoV-2 infection in an unvaccinated patient   UTI   Leukocytosis associated to the above as well as reactive to steroids down to 10    PLAN:  Continue with omnicef for discharge 6/10  Steroids and other supportive care -  Monitor lab  [id number] Maryam Bryant to d/c from ID POV, med rec complete    SHARI Butler - CNS  1:44 PM  1/22/2022  Pt seen and examined. Above discussed agree with advanced practice nurse. Labs, cultures, and radiographs reviewed. Face to Face encounter occurred. Changes made as necessary.      Rajendra Amor MD

## 2022-01-22 NOTE — PROGRESS NOTES
[facility]   Progress Note    Admitting Date and Time: 1/17/2022 10:52 AM  Admit Dx: Lactic acidosis [E87.2]  Acute urinary tract infection [N39.0]  Atrial fibrillation with RVR (HCC) [I48.91]  Acute respiratory failure with hypoxia (HCC) [J96.01]  COVID-19 [U07.1]    Subjective:    Patient seen and examined. Alert with eyes open. Is carrying on a conversation with me though she is confused. Tolerating tube feeds. LUE edematous, suspect IV infiltrated. Will get US to rule out DVT. ROS: Denies chest pain, SOB, cough, congestion, subjective fever, chills, N/V/D, abdominal pain, HA.  All systems reviewed and negative unless otherwise neg       insulin glargine  15 Units SubCUTAneous BID    lisinopril  10 mg Oral Daily    metoclopramide  5 mg IntraVENous Q8H    cefdinir  300 mg Oral 2 times per day    ascorbic acid  500 mg Oral BID    dexamethasone  6 mg IntraVENous Q24H    apixaban  5 mg Oral BID    citalopram  10 mg Oral Daily    divalproex  125 mg Oral TID    docusate sodium  100 mg Oral BID    flecainide  100 mg Oral BID    megestrol acetate  200 mg Oral Daily    [Held by provider] metoprolol succinate  12.5 mg Oral Daily    ocuvite-lutein  1 tablet Oral Daily    sodium chloride flush  5-40 mL IntraVENous 2 times per day    insulin lispro  0-6 Units SubCUTAneous TID WC    insulin lispro  0-3 Units SubCUTAneous Nightly    vitamin D  2,000 Units Oral Daily    vitamin B-6  50 mg Oral Daily    zinc sulfate  50 mg Oral Daily     sodium chloride flush, 5-40 mL, PRN  sodium chloride, 25 mL, PRN  ondansetron, 4 mg, Q8H PRN   Or  ondansetron, 4 mg, Q6H PRN  polyethylene glycol, 17 g, Daily PRN  acetaminophen, 650 mg, Q6H PRN   Or  acetaminophen, 650 mg, Q6H PRN  glucose, 15 g, PRN  dextrose, 12.5 g, PRN  glucagon (rDNA), 1 mg, PRN  dextrose, 100 mL/hr, PRN         Objective:    BP (!) 163/80   Pulse 101   Temp 98.1 °F (36.7 °C) (Axillary)   Resp 18   Ht 5' 4\" (1.626 m)   Wt 175 lb 11.2 oz (79.7 kg)   LMP  (LMP Unknown)   SpO2 96%   BMI 30.16 kg/m²      General Appearance: alert and oriented to person,  in no acute distress  Skin: warm and dry  Head: normocephalic and atraumatic. NGT left nares. Eyes: pupils equal, round, and reactive to light, extraocular eye movements intact, conjunctivae normal  Neck: neck supple and non tender without mass   Pulmonary/Chest: clear to auscultation bilaterally- no wheezes, rales or rhonchi, normal air movement, no respiratory distress  Cardiovascular: normal rate, normal S1 and S2 and no carotid bruits  Abdomen: soft, non-tender, non-distended, normal bowel sounds, no masses or organomegaly  Extremities: no cyanosis, no clubbing and LUE edema  Neurologic: no cranial nerve deficit and speech normal  :  Segura draining yellow urine    Recent Labs     01/20/22  0630      K 4.3   *   CO2 22   BUN 25*   CREATININE 0.7   GLUCOSE 209*   CALCIUM 8.5*       No results for input(s): ALKPHOS, PROT, LABALBU, BILITOT, AST, ALT in the last 72 hours. Recent Labs     01/20/22  1506   WBC 10.7   RBC 4.60   HGB 13.5   HCT 42.2   MCV 91.7   MCH 29.3   MCHC 32.0   RDW 13.5      MPV 12.6*            Radiology:   XR CHEST ABDOMEN NG PLACEMENT   Final Result   Satisfactory position of the enteric tube. XR CHEST PORTABLE   Final Result   Resolving right lower lobe infiltrate. US PELVIS LIMITED   Final Result   1. Very limited evaluation of the uterus and endometrium. No obvious   uterine mass. Endometrium appears mildly thickened for patient age. 2.  Nonvisualization of either ovary. There are no adnexal masses. XR CHEST PORTABLE   Final Result   1. Right perihilar and right lower lobe infiltrates         CT HEAD WO CONTRAST   Final Result   1. There is no acute intracranial abnormality. Specifically, there is no   intracranial hemorrhage. 2. Advanced atrophy and periventricular leukomalacia,   3.  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 CT ABDOMEN PELVIS W IV CONTRAST Additional Contrast? None   Final Result   Patchy infiltrates and ground-glass densities in the lung bases concerning   for pneumonia or edema. Constipation with redundant colon. Consider colonoscopy for better   assessment of the colon. No discrete mass is identified in the abdomen   pelvis. Fluid and the air in the endometrium of the uterus. Consider  gyn assessment   and ultrasonography. RECOMMENDATIONS:   Unavailable         US DUP UPPER EXTREMITY LEFT VENOUS    (Results Pending)       Assessment:  Principal Problem:    Acute sepsis (HCC)  Active Problems:    Acute metabolic encephalopathy    Acute respiratory failure due to COVID-19 ([facility]    Type II diabetes mellitus (HCC)    Atrial flutter with rapid ventricular response (HCC)    Acute urinary tract infection    JOSE DE JESUS (acute kidney injury) ([facility]    Hypertension    Hyperlipidemia    GERD (gastroesophageal reflux disease)  Resolved Problems:    * No resolved hospital problems. *      Plan:    1. Acute sepsis: COVID/PNA. Fever (102.4)-resolved, tachycardia (141)- improving, hypoxia (88%)- resolved with O2. Lactic acid-improved 2.1. Leukocytosis- resolved. Urine with E. Coli, BC no growth. Continue cefepime  (day 5). 2. Acute metabilic encephalopathy: multifactorial due to sepsis, Covid infection, UTI. Improved. Appears to be at baseline. 3. Acute hypoxic respiratory failure: secondary to COVID. Now on room air. Given dose Tocilizumab. Continue decadron, nebs, vitamins. Procal 0.36. 01/20/22  CXR with resolving RLL infiltrate. 4. Acute UTI: urine culture with E. Coli. Continue cefepime (S). 5. JOSE DE JESUS: resolved. Stop IVF. 6. afib RVR:  Improving. Continue Flecanide, apixaban. Change metoprolol to lopressor 2.5 mg Q6H IV due to NGT. .  TSH 0.300  7. DM2: Hyperglycemia improving but persistent.  Increase Lantus t 15 units BID, continue  low dose

## 2022-01-23 ENCOUNTER — APPOINTMENT (OUTPATIENT)
Dept: GENERAL RADIOLOGY | Age: 82
DRG: 871 | End: 2022-01-23
Payer: COMMERCIAL

## 2022-01-23 LAB
METER GLUCOSE: 158 MG/DL (ref 70–110)
METER GLUCOSE: 219 MG/DL (ref 70–110)
METER GLUCOSE: 240 MG/DL (ref 70–110)
METER GLUCOSE: 250 MG/DL (ref 70–110)
METER GLUCOSE: 281 MG/DL (ref 70–110)

## 2022-01-23 PROCEDURE — 6360000002 HC RX W HCPCS: Performed by: SPECIALIST

## 2022-01-23 PROCEDURE — 2500000003 HC RX 250 WO HCPCS: Performed by: NURSE PRACTITIONER

## 2022-01-23 PROCEDURE — 6370000000 HC RX 637 (ALT 250 FOR IP): Performed by: INTERNAL MEDICINE

## 2022-01-23 PROCEDURE — 6360000002 HC RX W HCPCS: Performed by: INTERNAL MEDICINE

## 2022-01-23 PROCEDURE — 2580000003 HC RX 258: Performed by: SPECIALIST

## 2022-01-23 PROCEDURE — 6370000000 HC RX 637 (ALT 250 FOR IP): Performed by: NURSE PRACTITIONER

## 2022-01-23 PROCEDURE — 2580000003 HC RX 258: Performed by: INTERNAL MEDICINE

## 2022-01-23 PROCEDURE — 6370000000 HC RX 637 (ALT 250 FOR IP): Performed by: SPECIALIST

## 2022-01-23 PROCEDURE — 99232 SBSQ HOSP IP/OBS MODERATE 35: CPT | Performed by: INTERNAL MEDICINE

## 2022-01-23 PROCEDURE — 74018 RADEX ABDOMEN 1 VIEW: CPT

## 2022-01-23 PROCEDURE — 82962 GLUCOSE BLOOD TEST: CPT

## 2022-01-23 PROCEDURE — 2060000000 HC ICU INTERMEDIATE R&B

## 2022-01-23 RX ADMIN — INSULIN LISPRO 1 UNITS: 100 INJECTION, SOLUTION INTRAVENOUS; SUBCUTANEOUS at 22:58

## 2022-01-23 RX ADMIN — DOCUSATE SODIUM LIQUID 100 MG: 100 LIQUID ORAL at 22:54

## 2022-01-23 RX ADMIN — METOCLOPRAMIDE HYDROCHLORIDE 5 MG: 5 INJECTION INTRAMUSCULAR; INTRAVENOUS at 05:43

## 2022-01-23 RX ADMIN — INSULIN LISPRO 3 UNITS: 100 INJECTION, SOLUTION INTRAVENOUS; SUBCUTANEOUS at 18:31

## 2022-01-23 RX ADMIN — DEXAMETHASONE SODIUM PHOSPHATE 6 MG: 4 INJECTION, SOLUTION INTRA-ARTICULAR; INTRALESIONAL; INTRAMUSCULAR; INTRAVENOUS; SOFT TISSUE at 10:45

## 2022-01-23 RX ADMIN — Medication 2000 UNITS: at 10:41

## 2022-01-23 RX ADMIN — METOPROLOL TARTRATE 2.5 MG: 5 INJECTION INTRAVENOUS at 22:54

## 2022-01-23 RX ADMIN — LISINOPRIL 10 MG: 10 TABLET ORAL at 10:46

## 2022-01-23 RX ADMIN — DIVALPROEX SODIUM 125 MG: 125 TABLET, DELAYED RELEASE ORAL at 22:53

## 2022-01-23 RX ADMIN — PYRIDOXINE HCL TAB 50 MG 50 MG: 50 TAB at 10:39

## 2022-01-23 RX ADMIN — Medication 500 MG: at 22:54

## 2022-01-23 RX ADMIN — METOCLOPRAMIDE HYDROCHLORIDE 5 MG: 5 INJECTION INTRAMUSCULAR; INTRAVENOUS at 22:54

## 2022-01-23 RX ADMIN — FLECAINIDE ACETATE 100 MG: 100 TABLET ORAL at 10:41

## 2022-01-23 RX ADMIN — METOCLOPRAMIDE HYDROCHLORIDE 5 MG: 5 INJECTION INTRAMUSCULAR; INTRAVENOUS at 17:01

## 2022-01-23 RX ADMIN — ZINC SULFATE 220 MG (50 MG) CAPSULE 50 MG: CAPSULE at 10:41

## 2022-01-23 RX ADMIN — APIXABAN 5 MG: 5 TABLET, FILM COATED ORAL at 10:41

## 2022-01-23 RX ADMIN — Medication 1 TABLET: at 10:41

## 2022-01-23 RX ADMIN — INSULIN GLARGINE 15 UNITS: 100 INJECTION, SOLUTION SUBCUTANEOUS at 22:58

## 2022-01-23 RX ADMIN — INSULIN GLARGINE 15 UNITS: 100 INJECTION, SOLUTION SUBCUTANEOUS at 11:39

## 2022-01-23 RX ADMIN — METOPROLOL TARTRATE 2.5 MG: 5 INJECTION INTRAVENOUS at 10:46

## 2022-01-23 RX ADMIN — CITALOPRAM HYDROBROMIDE 10 MG: 20 TABLET ORAL at 10:41

## 2022-01-23 RX ADMIN — MEGESTROL ACETATE 200 MG: 40 SUSPENSION ORAL at 10:46

## 2022-01-23 RX ADMIN — METOPROLOL TARTRATE 2.5 MG: 5 INJECTION INTRAVENOUS at 05:42

## 2022-01-23 RX ADMIN — SODIUM CHLORIDE: 9 INJECTION, SOLUTION INTRAVENOUS at 10:59

## 2022-01-23 RX ADMIN — SODIUM CHLORIDE, PRESERVATIVE FREE 10 ML: 5 INJECTION INTRAVENOUS at 05:43

## 2022-01-23 RX ADMIN — Medication 10 ML: at 22:54

## 2022-01-23 RX ADMIN — DOCUSATE SODIUM 100 MG: 100 CAPSULE, LIQUID FILLED ORAL at 10:41

## 2022-01-23 RX ADMIN — FLECAINIDE ACETATE 100 MG: 100 TABLET ORAL at 22:53

## 2022-01-23 RX ADMIN — CEFDINIR 300 MG: 300 CAPSULE ORAL at 22:53

## 2022-01-23 RX ADMIN — METOPROLOL TARTRATE 2.5 MG: 5 INJECTION INTRAVENOUS at 17:01

## 2022-01-23 RX ADMIN — DIVALPROEX SODIUM 125 MG: 125 TABLET, DELAYED RELEASE ORAL at 10:39

## 2022-01-23 RX ADMIN — CEFDINIR 300 MG: 300 CAPSULE ORAL at 10:41

## 2022-01-23 RX ADMIN — APIXABAN 5 MG: 5 TABLET, FILM COATED ORAL at 22:53

## 2022-01-23 RX ADMIN — DIVALPROEX SODIUM 125 MG: 125 TABLET, DELAYED RELEASE ORAL at 17:01

## 2022-01-23 RX ADMIN — Medication 10 ML: at 10:53

## 2022-01-23 RX ADMIN — INSULIN LISPRO 1 UNITS: 100 INJECTION, SOLUTION INTRAVENOUS; SUBCUTANEOUS at 11:38

## 2022-01-23 RX ADMIN — Medication 500 MG: at 10:44

## 2022-01-23 ASSESSMENT — PAIN SCALES - PAIN ASSESSMENT IN ADVANCED DEMENTIA (PAINAD)
BREATHING: 0
CONSOLABILITY: 0
FACIALEXPRESSION: 0
NEGVOCALIZATION: 0
BODYLANGUAGE: 0
FACIALEXPRESSION: 0
NEGVOCALIZATION: 0
TOTALSCORE: 0
CONSOLABILITY: 0
CONSOLABILITY: 0
BREATHING: 0
BODYLANGUAGE: 0
NEGVOCALIZATION: 0
BODYLANGUAGE: 0
FACIALEXPRESSION: 0
BREATHING: 0

## 2022-01-23 ASSESSMENT — PAIN SCALES - GENERAL: PAINLEVEL_OUTOF10: 0

## 2022-01-23 NOTE — PROGRESS NOTES
Av.5 °F (36.9 °C)  Min: 98.2 °F (36.8 °C)  Max: 98.7 °F (37.1 °C)  Constitutional: The patient is lying in bed, has Ng - not as awake today   Skin: Warm and dry. No rashes were noted. HEENT: Round and reactive pupils. Moist mucous membranes. No ulcerations or thrush. N/g present  Neck: Supple to movements. Chest: No respiratory distress. Symmetrical expansion. No wheezing, crackles or rhonchi. Poor aeration throughout . On room air. Cardiovascular: S1 and S2 are rhythmic and regular. No murmurs appreciated. Abdomen: Positive bowel sounds to auscultation. Benign to palpation. Extremities: No edema.   Lines: peripheral  Segura yellow urine    Laboratory and Tests Review:  Lab Results   Component Value Date    WBC 10.7 2022    WBC 20.0 (H) 2022    WBC 19.6 (H) 2022    HGB 13.5 2022    HCT 42.2 2022    MCV 91.7 2022     2022     Lab Results   Component Value Date    NEUTROABS 9.35 (H) 2022    NEUTROABS 18.45 (H) 2022    NEUTROABS 17.64 (H) 2022     No results found for: [facility]  Lab Results   Component Value Date    ALT 10 2022    AST 20 2022    ALKPHOS 122 (H) 2022    BILITOT 0.4 2022     Lab Results   Component Value Date     2022    K 4.3 2022    K 3.7 2022     2022    CO2 22 2022    BUN 25 2022    CREATININE 0.7 2022    CREATININE 0.8 2022    CREATININE 1.1 2022    GFRAA >60 2022    LABGLOM >60 2022    GLUCOSE 209 2022    PROT 6.3 2022    LABALBU 2.4 2022    CALCIUM 8.5 2022    BILITOT 0.4 2022    ALKPHOS 122 2022    AST 20 2022    ALT 10 2022     Lab Results   Component Value Date    CRP 16.6 (H) 2022     Lab Results   Component Value Date    SEDRATE 70 (H) 10/22/2020    SEDRATE 19 2016     Radiology:  Noted     Microbiology:   Blood cultures: negative so far  Urine culture:

## 2022-01-23 NOTE — PROGRESS NOTES
[facility] Progress Note    Admitting Date and Time: 1/17/2022 10:52 AM    Subjective  Patient seen and examined. Alert with eyes open. Is carrying on a conversation with me though she is confused. Tolerating Ng tube however it needed to be repositioned today  Informed her the US of upper extremity was negative and likely from iV line. ROS: denies fever, chills, cp, sob, n/v, HA unless stated above.  insulin glargine  15 Units SubCUTAneous BID    metoprolol  2.5 mg IntraVENous Q6H    lisinopril  10 mg Oral Daily    metoclopramide  5 mg IntraVENous Q8H    cefdinir  300 mg Oral 2 times per day    ascorbic acid  500 mg Oral BID    dexamethasone  6 mg IntraVENous Q24H    apixaban  5 mg Oral BID    citalopram  10 mg Oral Daily    divalproex  125 mg Oral TID    docusate sodium  100 mg Oral BID    flecainide  100 mg Oral BID    megestrol acetate  200 mg Oral Daily    [Held by provider] metoprolol succinate  12.5 mg Oral Daily    ocuvite-lutein  1 tablet Oral Daily    sodium chloride flush  5-40 mL IntraVENous 2 times per day    insulin lispro  0-6 Units SubCUTAneous TID WC    insulin lispro  0-3 Units SubCUTAneous Nightly    vitamin D  2,000 Units Oral Daily    vitamin B-6  50 mg Oral Daily    zinc sulfate  50 mg Oral Daily     sodium chloride flush, 5-40 mL, PRN  sodium chloride, 25 mL, PRN  ondansetron, 4 mg, Q8H PRN   Or  ondansetron, 4 mg, Q6H PRN  polyethylene glycol, 17 g, Daily PRN  acetaminophen, 650 mg, Q6H PRN   Or  acetaminophen, 650 mg, Q6H PRN  glucose, 15 g, PRN  dextrose, 12.5 g, PRN  glucagon (rDNA), 1 mg, PRN  dextrose, 100 mL/hr, PRN         Objective:    /71   Pulse 83   Temp 98.5 °F (36.9 °C) (Oral)   Resp 21   Ht 5' 4\" (1.626 m)   Wt 189 lb 9.6 oz (86 kg)   LMP  (LMP Unknown)   SpO2 93%   BMI 32.54 kg/m²     General Appearance: alert and oriented to person,  in no acute distress  Skin: warm and dry  Head: normocephalic and atraumatic.  NGT left nares. Eyes: pupils equal, round, and reactive to light, extraocular eye movements intact, conjunctivae normal  Neck: neck supple and non tender without mass   Pulmonary/Chest: clear to auscultation bilaterally- no wheezes, rales or rhonchi, normal air movement, no respiratory distress  Cardiovascular: normal rate, normal S1 and S2 and no carotid bruits  Abdomen: soft, non-tender, non-distended, normal bowel sounds, no masses or organomegaly  Extremities: no cyanosis, no clubbing and LUE edema  Neurologic: no cranial nerve deficit and speech normal  :  Segura draining yellow urine    No results for input(s): NA, K, CL, CO2, BUN, CREATININE, GLUCOSE, CALCIUM in the last 72 hours. Recent Labs     01/20/22  1506   WBC 10.7   RBC 4.60   HGB 13.5   HCT 42.2   MCV 91.7   MCH 29.3   MCHC 32.0   RDW 13.5      MPV 12.6*         Plan:     1. Acute Sepsis (no longer septic) COVID/PNA. Fever (102.4)-resolved, tachycardia (141)- improving, hypoxia (90%)- resolved with O2. Lactic acid-improved 2.1. Leukocytosis- resolved. Urine with E. Coli, BC no growth. Continue cefepime  (day 6). 2. Acute metabilic encephalopathy: multifactorial due to sepsis, Covid infection, UTI. Improved. Appears to be at baseline. 3. Acute hypoxic respiratory failure: secondary to COVID. Now on room air. Given dose Tocilizumab. Continue decadron, nebs, vitamins. Procal 0.36. 01/20/22  CXR with resolving RLL infiltrate. 4. Acute UTI: urine culture with E. Coli. Continue cefepime (S). ID following       5. JOSE DE JESUS: resolved. Stop IVF. 6. afib RVR:  Improving. Continue Flecanide, apixaban. Change metoprolol to lopressor 2.5 mg Q6H IV due to NGT. .  TSH 0.300  7. DM2: Hyperglycemia improving but persistent. Increase Lantus t 15 units BID, continue  low dose insulin slide scale, hypoglycemia protocol. Recent A1c 6.5. Monitor blood sugars, adjust insulin as needed to keep BS <180.    8. HTN: Lisinopril increased to 10 mg daily. Lopressor IV due to NGT and unable to crush po metoprolol. Monitor BP   9. Malnutrition: seen by dietician. Supplements ordered per same. Now on Diabetic TF at 20ml/hr. Poor po intake.        DISPOSITION PLANNING: Family will have to make a decision about Peg tube placement. Will need to have family meeting tomorrow about goals of care. NOTE: This report was transcribed using voice recognition software. Every effort was made to ensure accuracy; however, inadvertent computerized transcription errors may be present.   Electronically signed by Jesús Rankin MD on 1/23/2022 at 1:25 PM

## 2022-01-24 LAB
ANION GAP SERPL CALCULATED.3IONS-SCNC: 10 MMOL/L (ref 7–16)
BASOPHILS ABSOLUTE: 0.05 E9/L (ref 0.1–0.4)
BASOPHILS RELATIVE PERCENT: 0.4 % (ref 0–2)
BUN BLDV-MCNC: 22 MG/DL (ref 4–19)
CALCIUM SERPL-MCNC: 8.1 MG/DL (ref 8.6–10.2)
CHLORIDE BLD-SCNC: 96 MMOL/L (ref 98–107)
CO2: 24 MMOL/L (ref 22–29)
CREAT SERPL-MCNC: 0.6 MG/DL (ref 0.4–0.7)
EOSINOPHILS ABSOLUTE: 0.22 E9/L (ref 0.1–0.7)
EOSINOPHILS RELATIVE PERCENT: 1.8 % (ref 0–12)
GFR AFRICAN AMERICAN: >60
GFR NON-AFRICAN AMERICAN: >60 ML/MIN/1.73
GLUCOSE BLD-MCNC: 220 MG/DL (ref 70–110)
HCT VFR BLD CALC: 43.1 % (ref 45–66)
HEMOGLOBIN: 13.8 G/DL (ref 14.5–22)
IMMATURE GRANULOCYTES #: 0.57 E9/L
IMMATURE GRANULOCYTES %: 4.7 % (ref 0–5)
LYMPHOCYTES ABSOLUTE: 1.64 E9/L (ref 3–15)
LYMPHOCYTES RELATIVE PERCENT: 13.4 % (ref 15–60)
MCH RBC QN AUTO: 28.9 PG (ref 30–42)
MCHC RBC AUTO-ENTMCNC: 32 % (ref 29–37)
MCV RBC AUTO: 90.2 FL (ref 95–121)
METER GLUCOSE: 178 MG/DL (ref 70–110)
METER GLUCOSE: 210 MG/DL (ref 70–110)
METER GLUCOSE: 240 MG/DL (ref 70–110)
METER GLUCOSE: 258 MG/DL (ref 70–110)
MONOCYTES ABSOLUTE: 0.68 E9/L (ref 1–3)
MONOCYTES RELATIVE PERCENT: 5.6 % (ref 3–15)
NEUTROPHILS ABSOLUTE: 9.08 E9/L (ref 5–20)
NEUTROPHILS RELATIVE PERCENT: 74.1 % (ref 15–80)
PDW BLD-RTO: 13.8 FL (ref 11–19)
PLATELET # BLD: 278 E9/L (ref 130–480)
PMV BLD AUTO: 13 FL (ref 7–12)
POTASSIUM SERPL-SCNC: 4.8 MMOL/L (ref 3.4–4.5)
RBC # BLD: 4.78 E12/L (ref 3.7–5.3)
SODIUM BLD-SCNC: 130 MMOL/L (ref 132–146)
WBC # BLD: 12.2 E9/L (ref 9.4–34)

## 2022-01-24 PROCEDURE — 2060000000 HC ICU INTERMEDIATE R&B

## 2022-01-24 PROCEDURE — 36415 COLL VENOUS BLD VENIPUNCTURE: CPT

## 2022-01-24 PROCEDURE — 2580000003 HC RX 258: Performed by: INTERNAL MEDICINE

## 2022-01-24 PROCEDURE — 82962 GLUCOSE BLOOD TEST: CPT

## 2022-01-24 PROCEDURE — 6370000000 HC RX 637 (ALT 250 FOR IP): Performed by: INTERNAL MEDICINE

## 2022-01-24 PROCEDURE — 92526 ORAL FUNCTION THERAPY: CPT

## 2022-01-24 PROCEDURE — 6370000000 HC RX 637 (ALT 250 FOR IP): Performed by: PHYSICIAN ASSISTANT

## 2022-01-24 PROCEDURE — 6370000000 HC RX 637 (ALT 250 FOR IP): Performed by: NURSE PRACTITIONER

## 2022-01-24 PROCEDURE — 2500000003 HC RX 250 WO HCPCS: Performed by: NURSE PRACTITIONER

## 2022-01-24 PROCEDURE — 99232 SBSQ HOSP IP/OBS MODERATE 35: CPT | Performed by: INTERNAL MEDICINE

## 2022-01-24 PROCEDURE — APPSS60 APP SPLIT SHARED TIME 46-60 MINUTES: Performed by: PHYSICIAN ASSISTANT

## 2022-01-24 PROCEDURE — 92610 EVALUATE SWALLOWING FUNCTION: CPT

## 2022-01-24 PROCEDURE — 6360000002 HC RX W HCPCS: Performed by: INTERNAL MEDICINE

## 2022-01-24 PROCEDURE — 99222 1ST HOSP IP/OBS MODERATE 55: CPT | Performed by: INTERNAL MEDICINE

## 2022-01-24 PROCEDURE — 6370000000 HC RX 637 (ALT 250 FOR IP): Performed by: SPECIALIST

## 2022-01-24 PROCEDURE — 85025 COMPLETE CBC W/AUTO DIFF WBC: CPT

## 2022-01-24 PROCEDURE — 2500000003 HC RX 250 WO HCPCS: Performed by: INTERNAL MEDICINE

## 2022-01-24 PROCEDURE — 80048 BASIC METABOLIC PNL TOTAL CA: CPT

## 2022-01-24 RX ORDER — DEXAMETHASONE SODIUM PHOSPHATE 4 MG/ML
3 INJECTION, SOLUTION INTRA-ARTICULAR; INTRALESIONAL; INTRAMUSCULAR; INTRAVENOUS; SOFT TISSUE EVERY 24 HOURS
Status: DISCONTINUED | OUTPATIENT
Start: 2022-01-24 | End: 2022-01-27 | Stop reason: HOSPADM

## 2022-01-24 RX ORDER — LABETALOL HYDROCHLORIDE 5 MG/ML
2.5 INJECTION, SOLUTION INTRAVENOUS ONCE
Status: COMPLETED | OUTPATIENT
Start: 2022-01-24 | End: 2022-01-24

## 2022-01-24 RX ORDER — DIGOXIN 125 MCG
125 TABLET ORAL DAILY
Status: DISCONTINUED | OUTPATIENT
Start: 2022-01-24 | End: 2022-01-27 | Stop reason: HOSPADM

## 2022-01-24 RX ADMIN — DIVALPROEX SODIUM 125 MG: 125 TABLET, DELAYED RELEASE ORAL at 20:15

## 2022-01-24 RX ADMIN — CEFDINIR 300 MG: 300 CAPSULE ORAL at 20:15

## 2022-01-24 RX ADMIN — INSULIN LISPRO 1 UNITS: 100 INJECTION, SOLUTION INTRAVENOUS; SUBCUTANEOUS at 06:00

## 2022-01-24 RX ADMIN — Medication 2000 UNITS: at 08:59

## 2022-01-24 RX ADMIN — INSULIN LISPRO 2 UNITS: 100 INJECTION, SOLUTION INTRAVENOUS; SUBCUTANEOUS at 21:30

## 2022-01-24 RX ADMIN — METOCLOPRAMIDE HYDROCHLORIDE 5 MG: 5 INJECTION INTRAMUSCULAR; INTRAVENOUS at 05:26

## 2022-01-24 RX ADMIN — APIXABAN 5 MG: 5 TABLET, FILM COATED ORAL at 09:00

## 2022-01-24 RX ADMIN — MEGESTROL ACETATE 200 MG: 40 SUSPENSION ORAL at 08:59

## 2022-01-24 RX ADMIN — DIVALPROEX SODIUM 125 MG: 125 TABLET, DELAYED RELEASE ORAL at 13:19

## 2022-01-24 RX ADMIN — DOCUSATE SODIUM LIQUID 100 MG: 100 LIQUID ORAL at 20:15

## 2022-01-24 RX ADMIN — DOCUSATE SODIUM LIQUID 100 MG: 100 LIQUID ORAL at 08:59

## 2022-01-24 RX ADMIN — ZINC SULFATE 220 MG (50 MG) CAPSULE 50 MG: CAPSULE at 08:59

## 2022-01-24 RX ADMIN — INSULIN GLARGINE 15 UNITS: 100 INJECTION, SOLUTION SUBCUTANEOUS at 09:00

## 2022-01-24 RX ADMIN — METOPROLOL TARTRATE 2.5 MG: 5 INJECTION INTRAVENOUS at 05:26

## 2022-01-24 RX ADMIN — DIGOXIN 125 MCG: 125 TABLET ORAL at 14:41

## 2022-01-24 RX ADMIN — DEXAMETHASONE SODIUM PHOSPHATE 3 MG: 4 INJECTION, SOLUTION INTRAMUSCULAR; INTRAVENOUS at 08:59

## 2022-01-24 RX ADMIN — LISINOPRIL 10 MG: 10 TABLET ORAL at 08:59

## 2022-01-24 RX ADMIN — INSULIN GLARGINE 15 UNITS: 100 INJECTION, SOLUTION SUBCUTANEOUS at 21:30

## 2022-01-24 RX ADMIN — CEFDINIR 300 MG: 300 CAPSULE ORAL at 08:59

## 2022-01-24 RX ADMIN — LABETALOL HYDROCHLORIDE 2.5 MG: 5 INJECTION INTRAVENOUS at 12:08

## 2022-01-24 RX ADMIN — CITALOPRAM HYDROBROMIDE 10 MG: 20 TABLET ORAL at 09:00

## 2022-01-24 RX ADMIN — Medication 500 MG: at 09:00

## 2022-01-24 RX ADMIN — FLECAINIDE ACETATE 150 MG: 100 TABLET ORAL at 20:16

## 2022-01-24 RX ADMIN — METOPROLOL TARTRATE 25 MG: 25 TABLET, FILM COATED ORAL at 20:16

## 2022-01-24 RX ADMIN — Medication 10 ML: at 20:16

## 2022-01-24 RX ADMIN — INSULIN LISPRO 2 UNITS: 100 INJECTION, SOLUTION INTRAVENOUS; SUBCUTANEOUS at 12:32

## 2022-01-24 RX ADMIN — SODIUM CHLORIDE, PRESERVATIVE FREE 10 ML: 5 INJECTION INTRAVENOUS at 05:26

## 2022-01-24 RX ADMIN — FLECAINIDE ACETATE 100 MG: 100 TABLET ORAL at 09:00

## 2022-01-24 RX ADMIN — Medication 1 TABLET: at 08:59

## 2022-01-24 RX ADMIN — DIVALPROEX SODIUM 125 MG: 125 TABLET, DELAYED RELEASE ORAL at 09:00

## 2022-01-24 RX ADMIN — INSULIN LISPRO 2 UNITS: 100 INJECTION, SOLUTION INTRAVENOUS; SUBCUTANEOUS at 16:15

## 2022-01-24 RX ADMIN — PYRIDOXINE HCL TAB 50 MG 50 MG: 50 TAB at 09:00

## 2022-01-24 RX ADMIN — APIXABAN 5 MG: 5 TABLET, FILM COATED ORAL at 20:16

## 2022-01-24 RX ADMIN — METOCLOPRAMIDE HYDROCHLORIDE 5 MG: 5 INJECTION INTRAMUSCULAR; INTRAVENOUS at 13:19

## 2022-01-24 RX ADMIN — Medication 10 ML: at 09:01

## 2022-01-24 RX ADMIN — METOCLOPRAMIDE HYDROCHLORIDE 5 MG: 5 INJECTION INTRAMUSCULAR; INTRAVENOUS at 22:36

## 2022-01-24 RX ADMIN — METOPROLOL TARTRATE 25 MG: 25 TABLET, FILM COATED ORAL at 14:41

## 2022-01-24 RX ADMIN — Medication 500 MG: at 20:16

## 2022-01-24 ASSESSMENT — PAIN SCALES - PAIN ASSESSMENT IN ADVANCED DEMENTIA (PAINAD)
BODYLANGUAGE: 0
NEGVOCALIZATION: 0
BREATHING: 0
TOTALSCORE: 0
BREATHING: 0
BREATHING: 0
CONSOLABILITY: 0
BODYLANGUAGE: 0
BODYLANGUAGE: 0
FACIALEXPRESSION: 0
NEGVOCALIZATION: 0
TOTALSCORE: 0
FACIALEXPRESSION: 0
CONSOLABILITY: 0
BREATHING: 0
FACIALEXPRESSION: 0
CONSOLABILITY: 0
FACIALEXPRESSION: 0
CONSOLABILITY: 0
BODYLANGUAGE: 0
NEGVOCALIZATION: 0
NEGVOCALIZATION: 0

## 2022-01-24 ASSESSMENT — PAIN SCALES - GENERAL
PAINLEVEL_OUTOF10: 0

## 2022-01-24 NOTE — PROGRESS NOTES
[facility] Progress Note    Admitting Date and Time: 1/17/2022 10:52 AM    Subjective: patient overall similar to previously. More awake today. Denies any new or worsening symptoms from baseline. ROS: denies fever, chills, cp, sob, n/v, HA unless stated above.      docusate  100 mg Oral BID    insulin glargine  15 Units SubCUTAneous BID    metoprolol  2.5 mg IntraVENous Q6H    lisinopril  10 mg Oral Daily    metoclopramide  5 mg IntraVENous Q8H    cefdinir  300 mg Oral 2 times per day    ascorbic acid  500 mg Oral BID    dexamethasone  6 mg IntraVENous Q24H    apixaban  5 mg Oral BID    citalopram  10 mg Oral Daily    divalproex  125 mg Oral TID    flecainide  100 mg Oral BID    megestrol acetate  200 mg Oral Daily    [Held by provider] metoprolol succinate  12.5 mg Oral Daily    ocuvite-lutein  1 tablet Oral Daily    sodium chloride flush  5-40 mL IntraVENous 2 times per day    insulin lispro  0-6 Units SubCUTAneous TID WC    insulin lispro  0-3 Units SubCUTAneous Nightly    vitamin D  2,000 Units Oral Daily    vitamin B-6  50 mg Oral Daily    zinc sulfate  50 mg Oral Daily     sodium chloride flush, 5-40 mL, PRN  sodium chloride, 25 mL, PRN  ondansetron, 4 mg, Q8H PRN   Or  ondansetron, 4 mg, Q6H PRN  polyethylene glycol, 17 g, Daily PRN  acetaminophen, 650 mg, Q6H PRN   Or  acetaminophen, 650 mg, Q6H PRN  glucose, 15 g, PRN  dextrose, 12.5 g, PRN  glucagon (rDNA), 1 mg, PRN  dextrose, 100 mL/hr, PRN         Objective:    /65   Pulse 92   Temp 99.2 °F (37.3 °C) (Axillary)   Resp 20   Ht 5' 4\" (1.626 m)   Wt 190 lb 1.6 oz (86.2 kg)   LMP  (LMP Unknown)   SpO2 95%   BMI 32.63 kg/m²     General Appearance: alert and oriented to person, place and time and in no acute distress  Skin: warm and dry  Head: normocephalic and atraumatic  Eyes: pupils equal, round, and reactive to light, extraocular eye movements intact, conjunctivae normal  Neck: neck supple and non tender without mass   Pulmonary/Chest: clear to auscultation bilaterally- no wheezes, rales or rhonchi, normal air movement, no respiratory distress  Cardiovascular: normal rate, normal S1 and S2 and no carotid bruits  Abdomen: soft, non-tender, non-distended, normal bowel sounds, no masses or organomegaly  Extremities: no cyanosis, no clubbing and no edema  Neurologic: no cranial nerve deficit and speech normal        No results for input(s): NA, K, CL, CO2, BUN, CREATININE, GLUCOSE, CALCIUM in the last 72 hours. No results for input(s): WBC, RBC, HGB, HCT, MCV, MCH, MCHC, RDW, PLT, MPV in the last 72 hours. Assessment and PLAN     #Dysphagia  -Assessed by speech. Confirmed dysphagia  -Peg vs No peg. Contact family. Contact NH. Case mgt appreciated   -Awaiting call back from family       #Complicated UTI  -UA+,UC growing E coli. Blood cultures negaive to date. Improving, Omnicef for 10 days total (on day 7). ID signed off    #Pneumonia  -Improved  -No longer on 02. Multifactorial; aspiration likley. COVID+  -Imaging on admission:Patchy infiltrates and ground-glass densities in the lung bases  -Procal 0.36. 01/20/22  CXR with resolving RLL infiltrate.    -Decadron reduced from 6mg to 3mg. Tocilizumab discontinued.  -On cefdenir      #AMS/Acute Encephelpathy  -Baseline dementia. Hx of CVA  -Worsening mentation likely from UTI, steroid induced psychosis, hospital acquired delirium   -Component of hospital acquired delirium complicated course  -CT head on admission: No acute pathology. Advanced atrophy and periventricular leukomalacia. 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  -On depekote 125mg TID at home, cannot be crushed. hasn't been receiving. #Malnutrition/Failure to thrive  -seen by dietician. Supplements ordered per same  -on Diabetic TF at 20ml/hr. -NG tube in place, nutritional feeds at goal  -On medestrol  -DISCUSS possible peg tube?     #Atrial fibrillation w/ RVR  -Now controlled. Known Dx of chronic A fib. Infection likley culprit that caused RVR and refusal to take PO.   -Stable. Rate controlled. Resume home Flecanide and apixaban. -Change PO metoprolol to lopressor 2.5 mg Q6H IV due to NGT. .  -TSH 0.300  -Tele    #HTN: Lisinopril increased to 10 mg daily. Lopressor IV due to NGT and unable to crush po metoprolol. Monitor BP       #DM2  -Stable, Glucose wnml. Insulin sliding scale  -During stay her Lantus was increased to 15 units BID      DISPOSITION PLANNING: From NH. Has NG tube, need to consider peg or removal of NG before discharge. NOTE: This report was transcribed using voice recognition software. Every effort was made to ensure accuracy; however, inadvertent computerized transcription errors may be present.   Electronically signed by Zeinab Barclay MD on 1/24/2022 at 7:48 AM

## 2022-01-24 NOTE — PROGRESS NOTES
SPEECH/LANGUAGE PATHOLOGY  CLINICAL ASSESSMENT OF SWALLOWING FUNCTION   and PLAN OF CARE    PATIENT NAME:  Gómez Ryan  (female)     MRN:  [id number]    :  1940  (80 y.o.)  STATUS:  Inpatient: Room 0425/0425-A    TODAY'S DATE:  2022  REFERRING PROVIDER:   Leonie Carson MD  SPECIFIC PROVIDER ORDER: SLP swallowing-dysphagia evaluation and treatment Date of order:  2022  REASON FOR REFERRAL: Assess swallowing function   EVALUATING THERAPIST: ELIZABETH Munoz                 RESULTS:    DYSPHAGIA DIAGNOSIS:   Clinical indicators of mild-moderate oropharyngeal phase dysphagia       DIET RECOMMENDATIONS:  Pureed consistency solids (IDDSI level 4) with  nectar consistency (mildly thick - IDDSI level 2) liquids, MEDICATION ADMINISTRATION and Administer medication crushed, as able, with pudding/applesauce    Patient may benefit from non-oral feedings secondary to mental status and decreased alertness. FEEDING RECOMMENDATIONS:     Assistance level:  Full assistance is needed during all oral intake      Compensatory strategies recommended: Small bites/sips      Discussed recommendations with nursing and/or faxed report to referring provider: Yes    SPEECH THERAPY  PLAN OF CARE   The dysphagia POC is established based on physician order, dysphagia diagnosis and results of clinical assessment     Meal time assessment for 1-2 sessions to provide diet modification and compensatory strategy implementation due to need to ensure proper implementation of compensatory strategies during PO intake     Conditions Requiring Skilled Therapeutic Intervention for dysphagia:    Patient is performing below functional baseline d/t  current acute condition, Multiple diagnoses, multiple medications, and increased dependency upon caregivers.     Specific dysphagia interventions to include:     Meal time assessment for 1-2 sessions to provide diet modification and compensatory strategy implementation due to need to ensure proper implementation of compensatory strategies during PO intake     Specific instructions for next treatment:  ongoing PO analysis to upgrade diet and evaluate tolerance of current PO recommendation  Patient Treatment Goals:    Short Term Goals:  Pt will participate in ongoing mealtime assessment to provide diet modification and compensatory strategy implementation to minimize risk of aspiration associated with PO intake  Pt will participate in meal time assessment for 1-2 sessions to provide diet modification and compensatory strategy implementation due to need to ensure proper implementation of compensatory strategies during PO intake     Long Term Goals:   Pt will maintain adequate nutrition/hydration via PO intake of the least restrictive oral diet with implementation of safe swallow/ compensatory strategies and decrease signs/symptoms of aspiration to less than 1 x/day. Patient/family Goal:    Did not state. Will further assess during treatment. Plan of care discussed with Patient   The Patient did not demonstrate complete understanding of the diagnosis, prognosis and plan of care     Rehabilitation Potential/Prognosis: fair                    ADMITTING DIAGNOSIS: Lactic acidosis [E87.2]  Acute urinary tract infection [N39.0]  Atrial fibrillation with RVR (HCC) [I48.91]  Acute respiratory failure with hypoxia ([address] [J96.01]  COVID-19 [U07.1]    VISIT DIAGNOSIS:   Visit Diagnoses       Codes    COVID-19     U07.1    Lactic acidosis     E87.2    Acute respiratory failure with hypoxia ([address]     J96.01           PATIENT REPORT/COMPLAINT: patient not able to accurately report  RN cleared patient for participation in assessment     Yes     PRIOR LEVEL OF SWALLOW FUNCTION:    PAST HISTORY OF DYSPHAGIA?: yes.  Prior to admission patient was on altered diet at [facility]. Patient was on Pureed consistency solids (IDDSI level 4) with  nectar consistency (mildly thick - IDDSI level 2) liquids      Current Diet Order:  ADULT TUBE FEEDING; Nasogastric; Diabetic; Continuous; 20; Yes; 20; Q 4 hours; 40; 50; Q 4 hours    PROCEDURE:  Consistencies Administered During the Evaluation   Liquids: thin liquid and nectar thick liquid   Solids:  pureed foods    Method of Intake:   cup, straw, spoon  Fed by clinician      Position:   Seated, upright    CLINICAL ASSESSMENT:  Oral Stage:       Delayed A-P transit due to: reduced lingual strength  and cognitive function       Pharyngeal Stage:    Latent wet cough was noted after presentation of thin liquid    Cognition:   Follows 1 - step directions appropriate for this assessment   Confusion noted    Oral Peripheral Examination   Generalized oral weakness    Current Respiratory Status    room air     Parameters of Speech Production  Respiration:  Shortness of Breath  Quality:   Breathy  Intensity: Quiet    Volitional Swallow: absent     Volitional Cough:   absent     Pain: No pain reported. EDUCATION:   The Speech Language Pathologist (SLP) completed education regarding results of evaluation and that intervention is warranted at this time. Learner: Patient  Education: Reviewed results and recommendations of this evaluation  Evaluation of Education:  Needs further instruction    This plan may be re-evaluated and revised as warranted. Evaluation Time includes thorough review of current medical information, gathering information on past medical history/social history and prior level of function, completion of standardized testing/informal observation of tasks, assessment of data and education on plan of care and goals. INTERVENTION     Speech Pathologist (SLP) completed education with the patient and staff regarding type of swallowing impairment. Reviewed current solid/liquid consistency diet recommendations and discussed compensatory strategies to ensure safe PO intake. Reviewed aspiration precautions.  Encouraged patient and staff to engage SLP in unstructured Q&A session relative to identified deficit areas; indicated understanding of all information provided via satisfactory verbal response. Patient will need further instruction on swallowing impairment and education. [x]The admitting diagnosis and active problem list, have been reviewed prior to initiation of this evaluation. ACTIVE PROBLEM LIST:   Patient Active Problem List   Diagnosis    Hypertension    Type II diabetes mellitus [facility]    Hyperlipidemia    GERD (gastroesophageal reflux disease)    History of cholecystectomy    Gastroparesis    Late onset Alzheimer's disease without behavioral disturbance ([facility]    New onset atrial fibrillation (HCC)    Atrial flutter with rapid ventricular response (HCC)    Tachy-randal syndrome (HCC)    Severe sepsis (HCC)    Acute urinary tract infection    Acute sepsis (HCC)    Acute metabolic encephalopathy    Acute respiratory failure due to COVID-19 ([facility]    JO[facility] DE [facility]ESUS [facility]) [facility]    Atrial fibrillation with RVR ([facility]         CPT code:  03194  bedside swallow eval /  26277 dysphagia therapy    Khushi Paul.  Rashmi Rueda M.A. [place]. [id number]

## 2022-01-24 NOTE — PROGRESS NOTES
Dr. Magaly Merida notified of episode of afib RVR vs. SVT HR sustaining 180s for about 2 minutes, no new orders at this time.

## 2022-01-24 NOTE — CONSULTS
Inpatient Cardiology Consultation      Reason for Consult:  Multiple episodes of SVT    Consulting Physician: Dr Barnes Found     Requesting Physician:  Dr Teresa Adames     Date of Consultation: 2022    HISTORY OF PRESENT ILLNESS:   Ms Bianca Rivera is a 81 yo female who follows with Dr Zabrina Pineda, last seen 2020 via virtual visit. Past medical history includes atrial fibrillation (dx 2019) on [facility] [address] with Eliquis, SVT s/p AVNRT ablation 2020 ([facility]) on Flecainide,  Alzheimer's dementia, hypertension, hyperlipidemia, type 2 diabetes, obesity, CVA (R parietal and occipital lobes), limited code     Presented to [facility] 2022 with AMS from [place] [id number]. VS: 99.3-120-16-94%RA-164/74  Labs: WBC 19.6, H&H 13.4/42.3, Plt 261. K+ 4.2, Bun/sCr 31/1.1, glucose 221. Troponin 149 --162   proBNP 70408 . ddiemr 883    ES positive   CT head no acute intracranial process. Old right parietal and occipital lobe infarcts   CT ab/pelvis: patch infiltrates bilaterally with consitpation. Per EMS she was hypoxic upon arrival with O2 sats in the 80s in Afib RVR. She was admitted to a telemetry monitoring floor in COVID 19 isolation with sepsis with UTI and metabolic encephalopathy. ID is following. Cardiology was asked to see the patient for SVT/Afib RVR. Please note: past medical records were reviewed per electronic medical record (EMR) - see detailed reports under Past Medical/ Surgical History. Past Medical/Surgical History:    1. HTN  2. HLD T Chol 204 Triglycerides 213 HDL 36  on 3/6/2019  3. GERD  4. T2DM HgbA1c 6.3 on 3/6/2019  5. CKD  6. Alzheimer's Dementia   7. , colonoscopy s/p polypectomy, cholecystectomy  8. Chronic back pain s/p injections 10/2017  9. PCP office visit 3/6/2019 EKG showing AF CVR (not placed on [address] at that time) ? Cardiology outpatient follow-up  10. 2020 TSH 0.907  11. 2020 Bun/Cr 24/1.2  12.  Recurrent AF CVR 2019 seen by Dr Zabrina Pineda in hospital placed on Eliquis 5 mg Yes Historical Provider, MD   insulin lispro (HUMALOG) 100 UNIT/ML injection vial Inject 0-12 Units into the skin 3 times daily (before meals) PER SLIDING SCALE: 0-139=0U, 140-199=2U, 200-249=4U, 250-299-6U, 300-349=8U, 350-400=10U, >400=12U & CALL MD   Yes Historical Provider, MD   citalopram (CELEXA) 10 MG tablet 10 mg daily  4/22/20  Yes Historical Provider, MD   flecainide (TAMBOCOR) 100 MG tablet Take 100 mg by mouth 2 times daily   Yes Historical Provider, MD   metoprolol succinate (TOPROL XL) 25 MG extended release tablet Take 0.5 tablets by mouth daily 3/6/20  Yes Martin Hancock MD   megestrol (MEGACE ORAL) 40 MG/ML suspension Take 5 mLs by mouth daily 1/22/20  Yes Lissett Reddy MD   apixaban (ELIQUIS) 5 MG TABS tablet Take 1 tablet by mouth 2 times daily 1/11/20  Yes Lissett Reddy MD   acetaminophen (TYLENOL) 325 MG tablet Take 650 mg by mouth every 4 hours as needed for Pain    Yes Historical Provider, MD   Multiple Vitamins-Minerals (EYE VITAMINS) CAPS Take 1 capsule by mouth daily    Yes Historical Provider, MD   ivermectin 3 MG tablet Take 2 tablets by mouth 2 times daily 1/19/22   Lissett Reddy MD       Current Medications:    Current Facility-Administered Medications: dexamethasone (DECADRON) injection 3 mg, 3 mg, IntraVENous, Q24H  docusate (COLACE) 50 MG/5ML liquid 100 mg, 100 mg, Oral, BID  insulin glargine (LANTUS) injection vial 15 Units, 15 Units, SubCUTAneous, BID  metoprolol (LOPRESSOR) injection 2.5 mg, 2.5 mg, IntraVENous, Q6H  lisinopril (PRINIVIL;ZESTRIL) tablet 10 mg, 10 mg, Oral, Daily  metoclopramide (REGLAN) injection 5 mg, 5 mg, IntraVENous, Q8H  cefdinir (OMNICEF) capsule 300 mg, 300 mg, Oral, 2 times per day  ascorbic acid (VITAMIN C) tablet 500 mg, 500 mg, Oral, BID  0.9 % sodium chloride infusion, , IntraVENous, Q12H  apixaban (ELIQUIS) tablet 5 mg, 5 mg, Oral, BID  citalopram (CELEXA) tablet 10 mg, 10 mg, Oral, Daily  divalproex (DEPAKOTE) DR tablet 125 mg, 125 mg, Oral, TID  flecainide (TAMBOCOR) tablet 100 mg, 100 mg, Oral, BID  megestrol acetate (MEGACE) 400 MG/10ML suspension 200 mg, 200 mg, Oral, Daily  [Held by provider] metoprolol succinate (TOPROL XL) extended release tablet 12.5 mg, 12.5 mg, Oral, Daily  antioxidant multivitamin (OCUVITE) tablet, 1 tablet, Oral, Daily  sodium chloride flush 0.9 % injection 5-40 mL, 5-40 mL, IntraVENous, 2 times per day  sodium chloride flush 0.9 % injection 5-40 mL, 5-40 mL, IntraVENous, PRN  0.9 % sodium chloride infusion, 25 mL, IntraVENous, PRN  ondansetron (ZOFRAN-ODT) disintegrating tablet 4 mg, 4 mg, Oral, Q8H PRN **OR** ondansetron (ZOFRAN) injection 4 mg, 4 mg, IntraVENous, Q6H PRN  polyethylene glycol (GLYCOLAX) packet 17 g, 17 g, Oral, Daily PRN  acetaminophen (TYLENOL) tablet 650 mg, 650 mg, Oral, Q6H PRN **OR** acetaminophen (TYLENOL) suppository 650 mg, 650 mg, Rectal, Q6H PRN  insulin lispro (HUMALOG) injection vial 0-6 Units, 0-6 Units, SubCUTAneous, TID WC  insulin lispro (HUMALOG) injection vial 0-3 Units, 0-3 Units, SubCUTAneous, Nightly  glucose (GLUTOSE) 40 % oral gel 15 g, 15 g, Oral, PRN  dextrose 50 % IV solution, 12.5 g, IntraVENous, PRN  glucagon (rDNA) injection 1 mg, 1 mg, IntraMUSCular, PRN  dextrose 5 % solution, 100 mL/hr, IntraVENous, PRN  vitamin D (CHOLECALCIFEROL) tablet 2,000 Units, 2,000 Units, Oral, Daily  vitamin B-6 (PYRIDOXINE) tablet 50 mg, 50 mg, Oral, Daily  zinc sulfate (ZINCATE) capsule 50 mg, 50 mg, Oral, Daily    Allergies:  Asa [aspirin]    Social History:    Resident at [facility]. Unable to obtain at full secondary to AMS. Family History: This information was not obtained at this time as it is found noncontributory secondary to the patients advanced age.          REVIEW OF SYSTEMS:     Unable to obtain at this time secondary to AMS     PHYSICAL EXAM:   /73   Pulse 93   Temp 97.3 °F (36.3 °C) (Temporal)   Resp 20   Ht 5' 4\" (1.626 m)   Wt 190 lb 1.6 oz (86.2 kg) LMP  (LMP Unknown)   SpO2 97%   BMI 32.63 kg/m²   Not performed at this time in an attempt to reduce potential exposure and conserve PPE as the patient is in COVID-19 isolation. DATA:    ECG: Atrial fibrillation  non specific ST wave chages   Tele strips:  Atrial fibrillation HR 70, runs of RVR and SVT? Possible atrial flutter     Diagnostic:      Intake/Output Summary (Last 24 hours) at 1/24/2022 1219  Last data filed at 1/24/2022 0536  Gross per 24 hour   Intake 1026 ml   Output 1000 ml   Net 26 ml       Labs:   CBC:   Recent Labs     01/24/22  1043   WBC 12.2*   HGB 13.8   HCT 43.1        BMP:   Recent Labs     01/24/22  1043   *   K 4.8   CO2 24   BUN 22   CREATININE 0.6   LABGLOM >60   CALCIUM 8.1*     HgA1c:   Lab Results   Component Value Date    LABA1C 6.5 (H) 12/06/2021     FASTING LIPID PANEL:  Lab Results   Component Value Date    CHOL 204 03/06/2019    HDL 36 03/06/2019    LDLCALC 125 03/06/2019    TRIG 213 03/06/2019     Assessment:   1. Persistent Atrial fibrillation, with runs of RVR. Currently CVR 70s. H/o DCCV 2/2020 ([facility])   2. SVT, possibly atrial flutter with RVR   3. Chronic anticoagulation with Eliqius  4. H/o AVRNT s/p ablation 2/7/2020 ([facility])   5. Sepsis secondary to COVID19 pneumonia / UTI. Leukocytosis   6. AMS / encephalopathy   7. Hyponatremia   8. Hypertension   9. Type 2 diabetes mellitus   10. H/o CVA (R parietal and occipital lobes)  11. Limited code       Plan:   · Change Toprol XL to Lopressor  25 mg BID (through NG tube).  Up titrate as needed  · Add Digoxin 125 mcg daily   · Increase Flecainide 150mg BID   · Consider IV Cardizem +/- drip if RVR sustained   · Consider DCCV (+/- VINAY pending compliance with Eliquis) to restore sinus rhythm once clinically improved   · Continue Eliquis   · Will follow     Electronically signed by Obdulia Reyes on 1/24/2022 at 12:19 PM

## 2022-01-24 NOTE — PROGRESS NOTES
[address] [place] [address] [facility]  ED  Progress Note    Face to face encounter   SUBJECTIVE:  Chief Complaint   Patient presents with    Atrial Fibrillation     hx of issue    Altered Mental Status     pt is in dementia unit, per ems pt is more confused than her basdeline    Shortness of Breath     per ems O2 88% RA on arrival. 93% RA at triage     Patient seems to be tolerating medications. Lying in bed, not very interactive. NG tube with feedings. No fevers, remains on room air. Review of systems:  As stated above in the chief complaint, otherwise negative.     Medications:  Scheduled Meds:   dexamethasone  3 mg IntraVENous Q24H    digoxin  125 mcg Oral Daily    metoprolol tartrate  25 mg Oral BID    flecainide  150 mg Oral BID    docusate  100 mg Oral BID    insulin glargine  15 Units SubCUTAneous BID    lisinopril  10 mg Oral Daily    metoclopramide  5 mg IntraVENous Q8H    cefdinir  300 mg Oral 2 times per day    ascorbic acid  500 mg Oral BID    apixaban  5 mg Oral BID    citalopram  10 mg Oral Daily    divalproex  125 mg Oral TID    megestrol acetate  200 mg Oral Daily    ocuvite-lutein  1 tablet Oral Daily    sodium chloride flush  5-40 mL IntraVENous 2 times per day    insulin lispro  0-6 Units SubCUTAneous TID WC    insulin lispro  0-3 Units SubCUTAneous Nightly    vitamin D  2,000 Units Oral Daily    vitamin B-6  50 mg Oral Daily    zinc sulfate  50 mg Oral Daily     Continuous Infusions:   sodium chloride 12.5 mL/hr at 22 1059    sodium chloride      dextrose       PRN Meds:sodium chloride flush, sodium chloride, ondansetron **OR** ondansetron, polyethylene glycol, acetaminophen **OR** acetaminophen, glucose, dextrose, glucagon (rDNA), dextrose    OBJECTIVE:  BP (!) 100/55   Pulse 87   Temp 98.4 °F (36.9 °C) (Axillary)   Resp 20   Ht 5' 4\" (1.626 m)   Wt 190 lb 1.6 oz (86.2 kg)   LMP  (LMP Unknown)   SpO2 95%   BMI 32.63 kg/m²   Temp  Av.3 °F (36.8 °C)  Min: 97.3 °F (36.3 °C)  Max: 99.2 °F (37.3 °C)  Constitutional: The patient is lying in bed, in no distress, not interactive  Skin: Warm and dry. No rashes were noted. HEENT: Round and reactive pupils. Moist mucous membranes. No ulcerations or thrush. N/g present with feeding  Neck: Supple to movements. Chest: No respiratory distress. Symmetrical expansion. Poor aeration throughout . On room air. Cardiovascular: S1 and S2 are rhythmic and regular. No murmurs appreciated. Abdomen: Positive bowel sounds to auscultation. Benign to palpation. Extremities: No edema.   Lines: peripheral  Segura yellow urine    Laboratory and Tests Review:  Lab Results   Component Value Date    WBC 12.2 (H) 01/24/2022    WBC 10.7 01/20/2022    WBC 20.0 (H) 01/18/2022    HGB 13.8 01/24/2022    HCT 43.1 01/24/2022    MCV 90.2 01/24/2022     01/24/2022     Lab Results   Component Value Date    NEUTROABS 9.08 (H) 01/24/2022    NEUTROABS 9.35 (H) 01/20/2022    NEUTROABS 18.45 (H) 01/18/2022     No results found for: [facility]  Lab Results   Component Value Date    ALT 10 01/18/2022    AST 20 01/18/2022    ALKPHOS 122 (H) 01/18/2022    BILITOT 0.4 01/18/2022     Lab Results   Component Value Date     01/24/2022    K 4.8 01/24/2022    K 3.7 01/18/2022    CL 96 01/24/2022    CO2 24 01/24/2022    BUN 22 01/24/2022    CREATININE 0.6 01/24/2022    CREATININE 0.7 01/20/2022    CREATININE 0.8 01/18/2022    GFRAA >60 01/24/2022    LABGLOM >60 01/24/2022    GLUCOSE 220 01/24/2022    PROT 6.3 01/18/2022    LABALBU 2.4 01/18/2022    CALCIUM 8.1 01/24/2022    BILITOT 0.4 01/18/2022    ALKPHOS 122 01/18/2022    AST 20 01/18/2022    ALT 10 01/18/2022     Lab Results   Component Value Date    CRP 16.6 (H) 01/17/2022     Lab Results   Component Value Date    SEDRATE 70 (H) 10/22/2020    SEDRATE 19 04/14/2016     Radiology:  Noted     Microbiology:   Blood cultures: negative so far  Urine culture: >100K GNR ecoli-pan sensitive  Rapid COVID-19: detected No results for input(s): PROCAL in the last 72 hours. ASSESSMENT:  SARS-CoV-2 infection in an unvaccinated patient   UTI   Leukocytosis associated to the above as well as reactive to steroids    PLAN:  Continue with omnicef for discharge day 8/10  Steroids and other supportive care  Poor oral intake- now has an NG  Ok to d/c from ID POV, med rec complete    Kim Feliz, SHARI - CNP  2:20 PM  1/24/2022  Pt seen and examined. Above discussed agree with advanced practice nurse. Labs, cultures, and radiographs reviewed. Face to Face encounter occurred. Changes made as necessary.      Brenden Sweeney MD

## 2022-01-24 NOTE — PROGRESS NOTES
Spoke with Dr. Tal Vergara and updated him on speech recommendations, states he will call the family to discuss possible need for PEG tube.

## 2022-01-24 NOTE — CARE COORDINATION
CASE MANAGEMENT. ... COVID + 1/17. Patient still with NGT in place with tube feeds. Per conversation with Dr Meghna Lau, today, family to decide on peg tube placement and goals of care. Currently, patient continues on iv decadron qd, iv lopressor, iv reglan and po omnicef. Patient will return to [facility]. No precert required. N 16 in epic. Forms in chart. Will follow.

## 2022-01-25 LAB
METER GLUCOSE: 152 MG/DL (ref 70–110)
METER GLUCOSE: 170 MG/DL (ref 70–110)
METER GLUCOSE: 176 MG/DL (ref 70–110)
METER GLUCOSE: 201 MG/DL (ref 70–110)
METER GLUCOSE: 210 MG/DL (ref 70–110)

## 2022-01-25 PROCEDURE — 6370000000 HC RX 637 (ALT 250 FOR IP): Performed by: INTERNAL MEDICINE

## 2022-01-25 PROCEDURE — 6370000000 HC RX 637 (ALT 250 FOR IP): Performed by: SPECIALIST

## 2022-01-25 PROCEDURE — 99233 SBSQ HOSP IP/OBS HIGH 50: CPT | Performed by: INTERNAL MEDICINE

## 2022-01-25 PROCEDURE — 99232 SBSQ HOSP IP/OBS MODERATE 35: CPT | Performed by: INTERNAL MEDICINE

## 2022-01-25 PROCEDURE — 6370000000 HC RX 637 (ALT 250 FOR IP): Performed by: NURSE PRACTITIONER

## 2022-01-25 PROCEDURE — 6370000000 HC RX 637 (ALT 250 FOR IP): Performed by: PHYSICIAN ASSISTANT

## 2022-01-25 PROCEDURE — 2060000000 HC ICU INTERMEDIATE R&B

## 2022-01-25 PROCEDURE — 2580000003 HC RX 258: Performed by: INTERNAL MEDICINE

## 2022-01-25 PROCEDURE — 6360000002 HC RX W HCPCS: Performed by: INTERNAL MEDICINE

## 2022-01-25 PROCEDURE — 82962 GLUCOSE BLOOD TEST: CPT

## 2022-01-25 RX ADMIN — METOCLOPRAMIDE HYDROCHLORIDE 5 MG: 5 INJECTION INTRAMUSCULAR; INTRAVENOUS at 15:31

## 2022-01-25 RX ADMIN — METOPROLOL TARTRATE 25 MG: 25 TABLET, FILM COATED ORAL at 20:02

## 2022-01-25 RX ADMIN — METOCLOPRAMIDE HYDROCHLORIDE 5 MG: 5 INJECTION INTRAMUSCULAR; INTRAVENOUS at 05:59

## 2022-01-25 RX ADMIN — INSULIN GLARGINE 15 UNITS: 100 INJECTION, SOLUTION SUBCUTANEOUS at 22:24

## 2022-01-25 RX ADMIN — DIVALPROEX SODIUM 125 MG: 125 TABLET, DELAYED RELEASE ORAL at 08:08

## 2022-01-25 RX ADMIN — INSULIN LISPRO 1 UNITS: 100 INJECTION, SOLUTION INTRAVENOUS; SUBCUTANEOUS at 22:24

## 2022-01-25 RX ADMIN — FLECAINIDE ACETATE 150 MG: 100 TABLET ORAL at 08:07

## 2022-01-25 RX ADMIN — CEFDINIR 300 MG: 300 CAPSULE ORAL at 08:07

## 2022-01-25 RX ADMIN — Medication 500 MG: at 08:07

## 2022-01-25 RX ADMIN — METOCLOPRAMIDE HYDROCHLORIDE 5 MG: 5 INJECTION INTRAMUSCULAR; INTRAVENOUS at 22:31

## 2022-01-25 RX ADMIN — DEXAMETHASONE SODIUM PHOSPHATE 3 MG: 4 INJECTION, SOLUTION INTRAMUSCULAR; INTRAVENOUS at 08:07

## 2022-01-25 RX ADMIN — INSULIN LISPRO 1 UNITS: 100 INJECTION, SOLUTION INTRAVENOUS; SUBCUTANEOUS at 06:07

## 2022-01-25 RX ADMIN — INSULIN GLARGINE 15 UNITS: 100 INJECTION, SOLUTION SUBCUTANEOUS at 09:46

## 2022-01-25 RX ADMIN — PYRIDOXINE HCL TAB 50 MG 50 MG: 50 TAB at 08:07

## 2022-01-25 RX ADMIN — CEFDINIR 300 MG: 300 CAPSULE ORAL at 20:02

## 2022-01-25 RX ADMIN — Medication 10 ML: at 20:03

## 2022-01-25 RX ADMIN — INSULIN LISPRO 2 UNITS: 100 INJECTION, SOLUTION INTRAVENOUS; SUBCUTANEOUS at 15:43

## 2022-01-25 RX ADMIN — DOCUSATE SODIUM LIQUID 100 MG: 100 LIQUID ORAL at 08:15

## 2022-01-25 RX ADMIN — FLECAINIDE ACETATE 150 MG: 100 TABLET ORAL at 20:01

## 2022-01-25 RX ADMIN — METOPROLOL TARTRATE 25 MG: 25 TABLET, FILM COATED ORAL at 08:08

## 2022-01-25 RX ADMIN — INSULIN LISPRO 1 UNITS: 100 INJECTION, SOLUTION INTRAVENOUS; SUBCUTANEOUS at 10:11

## 2022-01-25 RX ADMIN — Medication 10 ML: at 08:09

## 2022-01-25 RX ADMIN — MEGESTROL ACETATE 200 MG: 40 SUSPENSION ORAL at 08:09

## 2022-01-25 RX ADMIN — DIVALPROEX SODIUM 125 MG: 125 TABLET, DELAYED RELEASE ORAL at 15:31

## 2022-01-25 RX ADMIN — DIGOXIN 125 MCG: 125 TABLET ORAL at 08:08

## 2022-01-25 RX ADMIN — Medication 500 MG: at 20:02

## 2022-01-25 RX ADMIN — Medication 1 TABLET: at 08:07

## 2022-01-25 RX ADMIN — Medication 2000 UNITS: at 08:09

## 2022-01-25 RX ADMIN — APIXABAN 5 MG: 5 TABLET, FILM COATED ORAL at 20:02

## 2022-01-25 RX ADMIN — ZINC SULFATE 220 MG (50 MG) CAPSULE 50 MG: CAPSULE at 08:07

## 2022-01-25 RX ADMIN — CITALOPRAM HYDROBROMIDE 10 MG: 20 TABLET ORAL at 08:07

## 2022-01-25 RX ADMIN — LISINOPRIL 10 MG: 10 TABLET ORAL at 10:02

## 2022-01-25 RX ADMIN — APIXABAN 5 MG: 5 TABLET, FILM COATED ORAL at 08:08

## 2022-01-25 RX ADMIN — DIVALPROEX SODIUM 125 MG: 125 TABLET, DELAYED RELEASE ORAL at 20:02

## 2022-01-25 ASSESSMENT — PAIN SCALES - PAIN ASSESSMENT IN ADVANCED DEMENTIA (PAINAD)
FACIALEXPRESSION: 0
TOTALSCORE: 0
BREATHING: 0
BODYLANGUAGE: 0
CONSOLABILITY: 0
NEGVOCALIZATION: 0
FACIALEXPRESSION: 0
FACIALEXPRESSION: 0
NEGVOCALIZATION: 0
BODYLANGUAGE: 0
TOTALSCORE: 0
NEGVOCALIZATION: 0
TOTALSCORE: 0
BODYLANGUAGE: 0
BREATHING: 0
CONSOLABILITY: 0
CONSOLABILITY: 0
BREATHING: 0

## 2022-01-25 ASSESSMENT — PAIN SCALES - GENERAL
PAINLEVEL_OUTOF10: 0
PAINLEVEL_OUTOF10: 0

## 2022-01-25 NOTE — CARE COORDINATION
CASE MANAGEMENT. .... COVID + 1/17. NGT still in place. Dr Meghna Lau to discuss poss peg tube placement with family. Having arrhythmias. Cardio consulted and meds adjusted. [facility] at discharge. No precert needed. N 17 in epic and forms in chart. Will follow.

## 2022-01-25 NOTE — PROGRESS NOTES
Spoke w/ patient's daughter Yuliana Andino and updated her on patient's status and plan of care. All questions and concerns addressed and answered at this time.

## 2022-01-25 NOTE — PROGRESS NOTES
[facility] Progress Note    Admitting Date and Time: 1/17/2022 10:52 AM    Subjective: Spoke with [place] Ibanez, patients daughter. See below for update. Patient appears similar to previous days. Laying, arousable with vocal and tactile stimuli. Weak, speaking softly. Limited info given status quo. ROS: denies fever, chills, cp, sob, n/v, HA unless stated above.       dexamethasone  3 mg IntraVENous Q24H    digoxin  125 mcg Oral Daily    metoprolol tartrate  25 mg Oral BID    flecainide  150 mg Oral BID    docusate  100 mg Oral BID    insulin glargine  15 Units SubCUTAneous BID    lisinopril  10 mg Oral Daily    metoclopramide  5 mg IntraVENous Q8H    cefdinir  300 mg Oral 2 times per day    ascorbic acid  500 mg Oral BID    apixaban  5 mg Oral BID    citalopram  10 mg Oral Daily    divalproex  125 mg Oral TID    megestrol acetate  200 mg Oral Daily    ocuvite-lutein  1 tablet Oral Daily    sodium chloride flush  5-40 mL IntraVENous 2 times per day    insulin lispro  0-6 Units SubCUTAneous TID WC    insulin lispro  0-3 Units SubCUTAneous Nightly    vitamin D  2,000 Units Oral Daily    vitamin B-6  50 mg Oral Daily    zinc sulfate  50 mg Oral Daily     sodium chloride flush, 5-40 mL, PRN  sodium chloride, 25 mL, PRN  ondansetron, 4 mg, Q8H PRN   Or  ondansetron, 4 mg, Q6H PRN  polyethylene glycol, 17 g, Daily PRN  acetaminophen, 650 mg, Q6H PRN   Or  acetaminophen, 650 mg, Q6H PRN  glucose, 15 g, PRN  dextrose, 12.5 g, PRN  glucagon (rDNA), 1 mg, PRN  dextrose, 100 mL/hr, PRN         Objective:    /67   Pulse 89   Temp 97.9 °F (36.6 °C) (Axillary)   Resp 20   Ht 5' 4\" (1.626 m)   Wt 191 lb 8 oz (86.9 kg)   LMP  (LMP Unknown)   SpO2 97%   BMI 32.87 kg/m²           Recent Labs     01/24/22  1043   *   K 4.8   CL 96*   CO2 24   BUN 22   CREATININE 0.6   GLUCOSE 220*   CALCIUM 8.1*       Recent Labs     01/24/22  1043   WBC 12.2*   RBC 4.78   HGB 13.8   HCT 43.1 MCV 90.2   MCH 28.9   MCHC 32.0   RDW 13.8      MPV 13.0*       Assessment and PLAN     #Dysphagia  -Assessed by speech. Confirmed dysphagia  -Peg vs No peg. Contact family. Family is contacting NH about tube feeding at that facility but they are more inclined to avoid peg tube at this time. I discussed Risks, benefits and alternatives to management in detail.   -The plan is to pull the NG tube after night meds are given. Allow patient to sleep in comfort overnight, hoping that her fluctuation in mentation is secondary to hospital acquired hypoactive delirium. Daughter reports she is off her baseline but has hx of delirium. Daughter will be here for breakfast and will try to feed and work with her. She says patient sometimes will cooperate with her and not with individuals she doesn't recognize. If she is still altered at breakfast tomorrow then NG tube will be placed back in and plans for PEG tube in near future will take place.         #Complicated UTI  -Stable, near resolution  -UA+,UC growing E coli. Blood cultures negaive to date. Improving, Omnicef for 10 days total (on day 7). ID signed off  -confirm canales removed []     #Pneumonia  -Improved, stable. No longer on 02.   -Etiology is Multifactorial; aspiration > COVID+  -Imaging on admission:Patchy infiltrates and ground-glass densities in the lung bases  -Procal 0.36. 01/20/22  CXR with resolving RLL infiltrate.    -Decadron reduced from 6mg to 3mg. Tocilizumab discontinued.  -On cefdenir for UTI and PNA        #AMS/Acute Encephelpathy  -Baseline dementia. Hx of CVA  -Worsening mentation likely from UTI, steroid induced psychosis, hospital acquired delirium   -CT head on admission: No acute pathology. Advanced atrophy and periventricular leukomalacia. 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  -On depekote 125mg TID at home, cannot be crushed. hasn't been receiving.         #Malnutrition/Failure to thrive  -seen by dietician.  Supplements ordered per same  -NG tube in place, nutritional feeds at goal, on Diabetic TF at 20ml/hr.   -On medestrol       #Atrial fibrillation w/ RVR  -Controlled. Known Dx of chronic A fib. Infection likley culprit that caused RVR and refusal to take PO.   -Stable. Rate controlled. Resume home Flecanide and apixaban. -Change PO metoprolol to lopressor 2.5 mg Q6H IV due to NGT/cant crush.  .  -TSH 0.300  -Tele monitoring      #HTN: Lisinopril increased to 10 mg daily. Lopressor IV due to NGT and unable to crush po metoprolol.  Monitor BP         #DM2  -Stable, Glucose wnml. Insulin sliding scale  -During stay her Lantus was increased to 15 units BID        DISPOSITION PLANNING: From NH. Has NG tube, need to consider peg or removal of NG before discharge. Family updates regularly. NOTE: This report was transcribed using voice recognition software. Every effort was made to ensure accuracy; however, inadvertent computerized transcription errors may be present.   Electronically signed by Bernadette Galvan MD on 1/25/2022 at 4:17 PM

## 2022-01-25 NOTE — PROGRESS NOTES
1/25/2022  4:15 PM      Comprehensive Nutrition Assessment    Type and Reason for Visit:  Reassess    Nutrition Recommendations/Plan: Continue current TF, while EN nutrition support in POC    Nutrition Assessment:  Pt remains on TF via NGT with poss plan for PEG. SLP notes mild to mod dysphagia and rec Pureed diet with mildly thick liquids. Recommend PO as elva for pleasure feedings and TF for nutrition support, if PEG pursued. Note adv Alzheimer's dementia and pleasure feeds/comfort care may be GOC    Malnutrition Assessment:  Malnutrition Status:   At risk for malnutrition    Context:  Chronic Illness     Findings of the 6 clinical characteristics of malnutrition:  Energy Intake:  Mild decrease in energy intake (Comment) (refusing PO at NH PTA)  Weight Loss:  No significant weight loss     Body Fat Loss:  Unable to assess (Covid Isolation)     Muscle Mass Loss:  Unable to assess (Covid Isolation)    Fluid Accumulation:  No significant fluid accumulation     Strength:  Not Performed    Estimated Daily Nutrient Needs:  Energy (kcal):  ; Weight Used for Energy Requirements:  Admission     Protein (g):  65-75; Weight Used for Protein Requirements:  Ideal (1.2-1.4)        Fluid (ml/day):  0345-7957; Method Used for Fluid Requirements:  1 ml/kcal      Nutrition Related Findings:  A&Ox1, +1 edema, -I/O 3.4L, round abd +BS, NGT to TF      Wounds:  Wound Consult Pending (consult re: coccyx wound)       Current Nutrition Therapies:    Current Tube Feeding (TF) Orders:  · Feeding Route: Nasogastric  · Formula: Diabetic  · Schedule: Continuous (Goal=40 ml/hr = 960 ml/d)  · Additives/Modulars:    · Water Flushes: 50 ml Q4 hr = 300 ml/d  · Current TF & Flush Orders Provides: at goal rate  · Goal TF & Flush Orders Provides: 960 ml/d, 1440 mary ellen, 79 g pro, 1029 ml total free water      Anthropometric Measures:  · Height: 5' 4\" (162.6 cm)  · Current Body Weight: 191 lb 8 oz (86.9 kg) (1/25 bedscale - large wt gain -likely edema/fluids)   · Admission Body Weight: 177 lb 1.6 oz (80.3 kg) (1/18 first measured wt)    · Usual Body Weight:  (UTO actual wt hx)     · Ideal Body Weight: 120 lbs; % Ideal Body Weight 159.6 %   · BMI: 32.9  · BMI Categories: Obese Class 1 (BMI 30.0-34. 9)       Nutrition Diagnosis:   · Inadequate oral intake related to cognitive or neurological impairment as evidenced by intake 0-25%,poor intake prior to admission      Nutrition Interventions:   Food and/or Nutrient Delivery:  Continue Current Tube Feeding  Nutrition Education/Counseling:  No recommendation at this time   Coordination of Nutrition Care:  Continue to monitor while inpatient    Goals:  Pt elva EN at goal rate       Nutrition Monitoring and Evaluation:   Behavioral-Environmental Outcomes:  None Identified   Food/Nutrient Intake Outcomes:  Enteral Nutrition Intake/Tolerance  Physical Signs/Symptoms Outcomes:  GI Status,Biochemical Data,Fluid Status or Edema,Nutrition Focused Physical Findings,Skin,Weight     Discharge Planning:     Too soon to determine     Electronically signed by Timothy Berman RD, CNSC, LD on 1/25/22 at 4:15 PM EST    Contact: [phone]

## 2022-01-25 NOTE — MANAGEMENT PLAN
Attempted to call daughter Negra Ring again. Left voicemail. Will await call back and I will call back in 1 hour again. If anyone gets a call from her, please try to contact me or transfer her to my [facility] phone.  Extension [phone]

## 2022-01-25 NOTE — PROGRESS NOTES
Min: 97.7 °F (36.5 °C)  Max: 97.8 °F (36.6 °C)  Constitutional: The patient is lying in bed, in no distress, not interactive, not following commands  Skin: Warm and dry. No rashes were noted. HEENT: Round and reactive pupils. Dry mucous membranes. No ulcerations or thrush. N/g present with feeding  Neck: Supple to movements. Chest: No respiratory distress. Symmetrical expansion. Poor aeration throughout . On room air. Cardiovascular: S1 and S2 are rhythmic and regular. No murmurs appreciated. Abdomen: Positive bowel sounds to auscultation. Benign to palpation. Extremities: No edema.   Lines: peripheral  Segura yellow urine    Laboratory and Tests Review:  Lab Results   Component Value Date    WBC 12.2 (H) 01/24/2022    WBC 10.7 01/20/2022    WBC 20.0 (H) 01/18/2022    HGB 13.8 01/24/2022    HCT 43.1 01/24/2022    MCV 90.2 01/24/2022     01/24/2022     Lab Results   Component Value Date    NEUTROABS 9.08 (H) 01/24/2022    NEUTROABS 9.35 (H) 01/20/2022    NEUTROABS 18.45 (H) 01/18/2022     No results found for: [facility]  Lab Results   Component Value Date    ALT 10 01/18/2022    AST 20 01/18/2022    ALKPHOS 122 (H) 01/18/2022    BILITOT 0.4 01/18/2022     Lab Results   Component Value Date     01/24/2022    K 4.8 01/24/2022    K 3.7 01/18/2022    CL 96 01/24/2022    CO2 24 01/24/2022    BUN 22 01/24/2022    CREATININE 0.6 01/24/2022    CREATININE 0.7 01/20/2022    CREATININE 0.8 01/18/2022    GFRAA >60 01/24/2022    LABGLOM >60 01/24/2022    GLUCOSE 220 01/24/2022    PROT 6.3 01/18/2022    LABALBU 2.4 01/18/2022    CALCIUM 8.1 01/24/2022    BILITOT 0.4 01/18/2022    ALKPHOS 122 01/18/2022    AST 20 01/18/2022    ALT 10 01/18/2022     Lab Results   Component Value Date    CRP 16.6 (H) 01/17/2022     Lab Results   Component Value Date    SEDRATE 70 (H) 10/22/2020    SEDRATE 19 04/14/2016     Radiology:  Noted     Microbiology:   Blood cultures: negative so far  Urine culture: >100K GNR ecoli-pan sensitive  Rapid COVID-19: detected     No results for input(s): PROCAL in the last 72 hours. ASSESSMENT:  SARS-CoV-2 infection in an unvaccinated patient   UTI   Leukocytosis associated to the above as well as reactive to steroids    PLAN:  Continue with omnicef for discharge day 9/10  Steroids and other supportive care  Poor oral intake- now has an NG, with possible plans for PEG   Ok to d/c from ID POV, med rec complete    Marek Delgado, APRN - CNP  1:35 PM  1/25/2022  Pt seen and examined. Above discussed agree with advanced practice nurse. Labs, cultures, and radiographs reviewed. Face to Face encounter occurred. Changes made as necessary.      Elinor Montano MD

## 2022-01-26 LAB
METER GLUCOSE: 128 MG/DL (ref 74–99)
METER GLUCOSE: 128 MG/DL (ref 74–99)
METER GLUCOSE: 140 MG/DL (ref 74–99)
METER GLUCOSE: 141 MG/DL (ref 74–99)

## 2022-01-26 PROCEDURE — 2580000003 HC RX 258: Performed by: INTERNAL MEDICINE

## 2022-01-26 PROCEDURE — 6370000000 HC RX 637 (ALT 250 FOR IP): Performed by: PHYSICIAN ASSISTANT

## 2022-01-26 PROCEDURE — 6370000000 HC RX 637 (ALT 250 FOR IP): Performed by: NURSE PRACTITIONER

## 2022-01-26 PROCEDURE — 6370000000 HC RX 637 (ALT 250 FOR IP): Performed by: INTERNAL MEDICINE

## 2022-01-26 PROCEDURE — 99232 SBSQ HOSP IP/OBS MODERATE 35: CPT | Performed by: INTERNAL MEDICINE

## 2022-01-26 PROCEDURE — 2580000003 HC RX 258: Performed by: SPECIALIST

## 2022-01-26 PROCEDURE — 92526 ORAL FUNCTION THERAPY: CPT

## 2022-01-26 PROCEDURE — 6370000000 HC RX 637 (ALT 250 FOR IP): Performed by: FAMILY MEDICINE

## 2022-01-26 PROCEDURE — 6370000000 HC RX 637 (ALT 250 FOR IP): Performed by: SPECIALIST

## 2022-01-26 PROCEDURE — 82962 GLUCOSE BLOOD TEST: CPT

## 2022-01-26 PROCEDURE — 99232 SBSQ HOSP IP/OBS MODERATE 35: CPT | Performed by: FAMILY MEDICINE

## 2022-01-26 PROCEDURE — 6360000002 HC RX W HCPCS: Performed by: INTERNAL MEDICINE

## 2022-01-26 PROCEDURE — 6370000000 HC RX 637 (ALT 250 FOR IP): Performed by: REGISTERED NURSE

## 2022-01-26 PROCEDURE — 2060000000 HC ICU INTERMEDIATE R&B

## 2022-01-26 RX ORDER — DIVALPROEX SODIUM 125 MG/1
125 TABLET, DELAYED RELEASE ORAL NIGHTLY
Status: DISCONTINUED | OUTPATIENT
Start: 2022-01-27 | End: 2022-01-27 | Stop reason: HOSPADM

## 2022-01-26 RX ADMIN — METOCLOPRAMIDE HYDROCHLORIDE 5 MG: 5 INJECTION INTRAMUSCULAR; INTRAVENOUS at 21:01

## 2022-01-26 RX ADMIN — FLECAINIDE ACETATE 150 MG: 100 TABLET ORAL at 09:14

## 2022-01-26 RX ADMIN — CEFDINIR 300 MG: 300 CAPSULE ORAL at 09:14

## 2022-01-26 RX ADMIN — ZINC SULFATE 220 MG (50 MG) CAPSULE 50 MG: CAPSULE at 09:14

## 2022-01-26 RX ADMIN — METOCLOPRAMIDE HYDROCHLORIDE 5 MG: 5 INJECTION INTRAMUSCULAR; INTRAVENOUS at 15:30

## 2022-01-26 RX ADMIN — PYRIDOXINE HCL TAB 50 MG 50 MG: 50 TAB at 09:16

## 2022-01-26 RX ADMIN — Medication 10 ML: at 20:42

## 2022-01-26 RX ADMIN — Medication 10 ML: at 09:16

## 2022-01-26 RX ADMIN — METOCLOPRAMIDE HYDROCHLORIDE 5 MG: 5 INJECTION INTRAMUSCULAR; INTRAVENOUS at 05:51

## 2022-01-26 RX ADMIN — ANORECTAL OINTMENT: 15.7; .44; 24; 20.6 OINTMENT TOPICAL at 20:53

## 2022-01-26 RX ADMIN — Medication 1 TABLET: at 09:15

## 2022-01-26 RX ADMIN — MEGESTROL ACETATE 200 MG: 40 SUSPENSION ORAL at 09:16

## 2022-01-26 RX ADMIN — CITALOPRAM HYDROBROMIDE 10 MG: 20 TABLET ORAL at 09:15

## 2022-01-26 RX ADMIN — SODIUM CHLORIDE: 9 INJECTION, SOLUTION INTRAVENOUS at 20:38

## 2022-01-26 RX ADMIN — INSULIN GLARGINE 15 UNITS: 100 INJECTION, SOLUTION SUBCUTANEOUS at 09:15

## 2022-01-26 RX ADMIN — APIXABAN 5 MG: 5 TABLET, FILM COATED ORAL at 09:15

## 2022-01-26 RX ADMIN — Medication 500 MG: at 09:14

## 2022-01-26 RX ADMIN — DIVALPROEX SODIUM 125 MG: 125 TABLET, DELAYED RELEASE ORAL at 09:15

## 2022-01-26 RX ADMIN — Medication 2000 UNITS: at 09:16

## 2022-01-26 RX ADMIN — DEXAMETHASONE SODIUM PHOSPHATE 3 MG: 4 INJECTION, SOLUTION INTRAMUSCULAR; INTRAVENOUS at 09:14

## 2022-01-26 RX ADMIN — METOPROLOL TARTRATE 25 MG: 25 TABLET, FILM COATED ORAL at 09:14

## 2022-01-26 RX ADMIN — APIXABAN 5 MG: 5 TABLET, FILM COATED ORAL at 20:42

## 2022-01-26 RX ADMIN — Medication 500 MG: at 20:42

## 2022-01-26 RX ADMIN — LISINOPRIL 10 MG: 10 TABLET ORAL at 09:14

## 2022-01-26 RX ADMIN — CEFDINIR 300 MG: 300 CAPSULE ORAL at 20:53

## 2022-01-26 RX ADMIN — DIGOXIN 125 MCG: 125 TABLET ORAL at 09:15

## 2022-01-26 ASSESSMENT — PAIN SCALES - PAIN ASSESSMENT IN ADVANCED DEMENTIA (PAINAD)
TOTALSCORE: 0
TOTALSCORE: 0
BODYLANGUAGE: 0
BREATHING: 0
FACIALEXPRESSION: 0
NEGVOCALIZATION: 0
BREATHING: 0
NEGVOCALIZATION: 0
TOTALSCORE: 0
CONSOLABILITY: 0
FACIALEXPRESSION: 0
CONSOLABILITY: 0
BODYLANGUAGE: 0
BREATHING: 0
NEGVOCALIZATION: 0
FACIALEXPRESSION: 0
CONSOLABILITY: 0
BODYLANGUAGE: 0

## 2022-01-26 ASSESSMENT — PAIN SCALES - GENERAL
PAINLEVEL_OUTOF10: 0
PAINLEVEL_OUTOF10: 0

## 2022-01-26 NOTE — PROGRESS NOTES
[address] [place] [address] [facility]  ED  Progress Note    Face to face encounter   SUBJECTIVE:  Chief Complaint   Patient presents with    Atrial Fibrillation     hx of issue    Altered Mental Status     pt is in dementia unit, per ems pt is more confused than her basdeline    Shortness of Breath     per ems O2 88% RA on arrival. 93% RA at triage     Patient seems to be tolerating medications. Not interactive, does open eyes to voice. No fevers. No diarrhea. Review of systems:  As stated above in the chief complaint, otherwise negative.     Medications:  Scheduled Meds:   dexamethasone  3 mg IntraVENous Q24H    digoxin  125 mcg Oral Daily    metoprolol tartrate  25 mg Oral BID    flecainide  150 mg Oral BID    docusate  100 mg Oral BID    insulin glargine  15 Units SubCUTAneous BID    lisinopril  10 mg Oral Daily    metoclopramide  5 mg IntraVENous Q8H    cefdinir  300 mg Oral 2 times per day    ascorbic acid  500 mg Oral BID    apixaban  5 mg Oral BID    citalopram  10 mg Oral Daily    divalproex  125 mg Oral TID    megestrol acetate  200 mg Oral Daily    ocuvite-lutein  1 tablet Oral Daily    sodium chloride flush  5-40 mL IntraVENous 2 times per day    insulin lispro  0-6 Units SubCUTAneous TID WC    insulin lispro  0-3 Units SubCUTAneous Nightly    vitamin D  2,000 Units Oral Daily    vitamin B-6  50 mg Oral Daily    zinc sulfate  50 mg Oral Daily     Continuous Infusions:   sodium chloride 12.5 mL/hr at 22 1059    dextrose       PRN Meds:sodium chloride flush, ondansetron **OR** ondansetron, polyethylene glycol, acetaminophen **OR** acetaminophen, glucose, dextrose, glucagon (rDNA), dextrose    OBJECTIVE:  /81   Pulse 71   Temp 97 °F (36.1 °C) (Temporal)   Resp 18   Ht 5' 4\" (1.626 m)   Wt 188 lb 14.4 oz (85.7 kg)   LMP  (LMP Unknown)   SpO2 97%   BMI 32.42 kg/m²   Temp  Av.5 °F (36.4 °C)  Min: 97 °F (36.1 °C)  Max: 97.9 °F (36.6 °C)  Constitutional: The patient is lying in bed, in no distress, not interactive, opens eyes to voice  Skin: Warm and dry. No rashes were noted. HEENT: Round and reactive pupils. moist mucous membranes. No ulcerations or thrush. Neck: Supple to movements. Chest: No respiratory distress. Symmetrical expansion. Poor aeration throughout . On room air. Cardiovascular: S1 and S2 are rhythmic and regular. No murmurs appreciated. Abdomen: Positive bowel sounds to auscultation. Benign to palpation. Extremities: No edema.   Lines: peripheral  Segura yellow urine    Laboratory and Tests Review:  Lab Results   Component Value Date    WBC 12.2 (H) 01/24/2022    WBC 10.7 01/20/2022    WBC 20.0 (H) 01/18/2022    HGB 13.8 01/24/2022    HCT 43.1 01/24/2022    MCV 90.2 01/24/2022     01/24/2022     Lab Results   Component Value Date    NEUTROABS 9.08 (H) 01/24/2022    NEUTROABS 9.35 (H) 01/20/2022    NEUTROABS 18.45 (H) 01/18/2022     No results found for: [facility]  Lab Results   Component Value Date    ALT 10 01/18/2022    AST 20 01/18/2022    ALKPHOS 122 (H) 01/18/2022    BILITOT 0.4 01/18/2022     Lab Results   Component Value Date     01/24/2022    K 4.8 01/24/2022    K 3.7 01/18/2022    CL 96 01/24/2022    CO2 24 01/24/2022    BUN 22 01/24/2022    CREATININE 0.6 01/24/2022    CREATININE 0.7 01/20/2022    CREATININE 0.8 01/18/2022    GFRAA >60 01/24/2022    LABGLOM >60 01/24/2022    GLUCOSE 220 01/24/2022    PROT 6.3 01/18/2022    LABALBU 2.4 01/18/2022    CALCIUM 8.1 01/24/2022    BILITOT 0.4 01/18/2022    ALKPHOS 122 01/18/2022    AST 20 01/18/2022    ALT 10 01/18/2022     Lab Results   Component Value Date    CRP 16.6 (H) 01/17/2022     Lab Results   Component Value Date    SEDRATE 70 (H) 10/22/2020    SEDRATE 19 04/14/2016     Radiology:  Noted     Microbiology:   Blood cultures: negative so far  Urine culture: >100K GNR ecoli-pan sensitive  Rapid COVID-19: detected     No results for input(s): PROCAL in the last 72 hours.    ASSESSMENT:  SARS-CoV-2 infection in an unvaccinated patient   UTI   Leukocytosis associated to the above as well as reactive to steroids    PLAN:  Complete Omnicef today  Steroids and other supportive care  Poor oral intake- family is to make decision on peg  ID will sign off     SHARI Tejeda CNP  1:16 PM  1/26/2022  Pt seen and examined. Above discussed agree with advanced practice nurse. Labs, cultures, and radiographs reviewed. Face to Face encounter occurred. Changes made as necessary.      Octavia Simpson MD

## 2022-01-26 NOTE — PROGRESS NOTES
[facility] Progress Note    Admitting Date and Time: 1/17/2022 10:52 AM  Admit Dx: Lactic acidosis [E87.2]  Acute urinary tract infection [N39.0]  Atrial fibrillation with RVR (HCC) [I48.91]  Acute respiratory failure with hypoxia (HCC) [J96.01]  COVID-19 [U07.1]    Subjective:  Patient is being followed for Lactic acidosis [E87.2]  Acute urinary tract infection [N39.0]  Atrial fibrillation with RVR ([address] [I48.91]  Acute respiratory failure with hypoxia ([address] [J96.01]  COVID-19 [U07.1]     Pt was sleepy all day. No fevers. No events overnight. Per RN: daughter was here to visit, stated pt sleepy, she is normally only on Depakote at night    ROS: denies fever, chills, cp, sob, n/v, HA unless stated above.       [START ON 1/27/2022] divalproex  125 mg Oral Nightly    menthol-zinc oxide   Topical BID    dexamethasone  3 mg IntraVENous Q24H    digoxin  125 mcg Oral Daily    metoprolol tartrate  25 mg Oral BID    docusate  100 mg Oral BID    insulin glargine  15 Units SubCUTAneous BID    lisinopril  10 mg Oral Daily    metoclopramide  5 mg IntraVENous Q8H    cefdinir  300 mg Oral 2 times per day    ascorbic acid  500 mg Oral BID    apixaban  5 mg Oral BID    citalopram  10 mg Oral Daily    megestrol acetate  200 mg Oral Daily    ocuvite-lutein  1 tablet Oral Daily    sodium chloride flush  5-40 mL IntraVENous 2 times per day    insulin lispro  0-6 Units SubCUTAneous TID     insulin lispro  0-3 Units SubCUTAneous Nightly    vitamin D  2,000 Units Oral Daily    vitamin B-6  50 mg Oral Daily    zinc sulfate  50 mg Oral Daily     sodium chloride flush, 5-40 mL, PRN  ondansetron, 4 mg, Q8H PRN   Or  ondansetron, 4 mg, Q6H PRN  polyethylene glycol, 17 g, Daily PRN  acetaminophen, 650 mg, Q6H PRN   Or  acetaminophen, 650 mg, Q6H PRN  glucose, 15 g, PRN  dextrose, 12.5 g, PRN  glucagon (rDNA), 1 mg, PRN  dextrose, 100 mL/hr, PRN         Objective:    /63   Pulse 69   Temp 97.5 °F (36.4 °C) (Axillary)   Resp 20   Ht 5' 4\" (1.626 m)   Wt 188 lb 14.4 oz (85.7 kg)   LMP  (LMP Unknown)   SpO2 96%   BMI 32.42 kg/m²     General Appearance: somnolent when I went to see her, doesn't wake up to touch/name, not oriented to person, place or time and in no acute distress, overweight  Skin: warm and dry, good turgor, no rashes, no jaundice  Head: normocephalic and atraumatic  Eyes: pupils equal, round, and reactive to light, extraocular eye movements intact, conjunctivae normal  Neck: neck supple and non tender without mass   Pulmonary/Chest: generally clear to auscultation bilaterally- no wheezes, rales or rhonchi, air movement diminished in the bases, no respiratory distress on RA  Cardiovascular: normal rate, normal S1 and S2 and no carotid bruits  Abdomen: soft, non-tender, non-distended, normal bowel sounds, no masses or organomegaly  Extremities: no cyanosis, no clubbing and no edema  Neurologic: as above        Recent Labs     01/24/22  1043   *   K 4.8   CL 96*   CO2 24   BUN 22   CREATININE 0.6   GLUCOSE 220*   CALCIUM 8.1*       Recent Labs     01/24/22  1043   WBC 12.2*   RBC 4.78   HGB 13.8   HCT 43.1   MCV 90.2   MCH 28.9   MCHC 32.0   RDW 13.8      MPV 13.0*       Radiology:   None    Assessment:    Principal Problem:    Acute sepsis (HCC)  Active Problems:    Hypertension    Type II diabetes mellitus (HCC)    Hyperlipidemia    GERD (gastroesophageal reflux disease)    Atrial flutter with rapid ventricular response (HCC)    Acute urinary tract infection    Acute metabolic encephalopathy    COVID-19    JOSE DE JESUS [facility]acute kidney injury) [facility]  Resolved Problems:    * No resolved hospital problems. *    Was sleepy all day today. Plan:  #Dysphagia  -Assessed by speech. Confirmed dysphagia  -Peg vs No peg. Contact family. Family is contacting NH about tube feeding at that facility but they are more inclined to avoid peg tube at this time.  I discussed Risks, benefits and BID  -201/128/128/141   -Watch intensively 4 times/day     DISPOSITION PLANNING: From NH. Has NG tube, need to consider peg or removal of NG before discharge. Family updates regularly. Discharge in about 2 to 3 days.         NOTE: This report was transcribed using voice recognition software. Every effort was made to ensure accuracy; however, inadvertent computerized transcription errors may be present.   Electronically signed by Sina Cohen DO on 1/26/2022 at 6:09 PM

## 2022-01-26 NOTE — CARE COORDINATION
CASE MANAGEMENT. ... COVID + 1/17. Decision was made to remove NGT last pm after midnight meds were given. Daughter was to come in and assist patient with meals. Per conversation with nursing, daughter was here, but patient still with AMS and unable to take po. Nursing to address with physician. In the meantime, patient is tolerating room air, continues on iv decadron and iv reglan. Cardio and ID signed off. Plan is [employer] at discharge. N 16 in epic. Forms in chart. Will follow.

## 2022-01-26 NOTE — PROGRESS NOTES
INPATIENT CARDIOLOGY FOLLOW-UP    Name: [place]    Age: 80 y.o. Date of Admission: 1/17/2022 10:52 AM    Date of Service: 1/25/2022    Primary Cardiologist: Dr. Hawa Pereira    Chief Complaint: Follow-up for current atrial fibrillation/flutter    Interim History:  No new overnight cardiac complaints. Continues to have significant confusion. Rates appear to be better controlled. She is on room air. Telemetry shows atrial fibrillation.     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    Atrial fibrillation with RVR ([facility])       Current Medications:    Current Facility-Administered Medications:     dexamethasone (DECADRON) injection 3 mg, 3 mg, IntraVENous, Q24H, Dorian Zamora MD, 3 mg at 01/25/22 0106    digoxin (LANOXIN) tablet 125 mcg, 125 mcg, Oral, Daily, [place], [place], 125 mcg at 01/25/22 0808    metoprolol tartrate (LOPRESSOR) tablet 25 mg, 25 mg, Oral, BID, [place], [place], 25 mg at 01/25/22 2002    flecainide (TAMBOCOR) tablet 150 mg, 150 mg, Oral, BID, [place], [place], 150 mg at 01/25/22 2001    docusate (COLACE) 50 MG/5ML liquid 100 mg, 100 mg, Oral, BID, Dorian Zamora MD, 100 mg at 01/25/22 0815    insulin glargine (LANTUS) injection vial 15 Units, 15 Units, SubCUTAneous, BID, Christiano Leghorn, APRN - CNP, 15 Units at 01/25/22 0946    lisinopril (PRINIVIL;ZESTRIL) tablet 10 mg, 10 mg, Oral, Daily, Shukri Price, SHARI - CNP, 10 mg at 01/25/22 1002    metoclopramide (REGLAN) injection 5 mg, 5 mg, IntraVENous, Q8H, Cayla Syed MD, 5 mg at 01/25/22 1531    cefdinir (OMNICEF) capsule 300 mg, 300 mg, Oral, 2 times per day, Keyla PhanNICOLLE strongN - CNP, 300 mg at 01/25/22 2002    ascorbic acid (VITAMIN C) tablet 500 mg, 500 mg, Oral, BID, Santo Cordon MD, 500 mg at 01/25/22 2002    0.9 % sodium chloride infusion, , IntraVENous, Q12H, Santo Cordon MD, Last Rate: 12.5 mL/hr at 01/23/22 1059, New Bag at 01/23/22 1059    apixaban (ELIQUIS) tablet 5 mg, 5 mg, Oral, BID, Li Ta MD, 5 mg at 01/25/22 2002    citalopram (CELEXA) tablet 10 mg, 10 mg, Oral, Daily, Li Ta MD, 10 mg at 01/25/22 0807    divalproex (DEPAKOTE) DR tablet 125 mg, 125 mg, Oral, TID, Li Ta MD, 125 mg at 01/25/22 2002    megestrol acetate (MEGACE) 400 MG/10ML suspension 200 mg, 200 mg, Oral, Daily, Li Ta MD, 200 mg at 01/25/22 0809    antioxidant multivitamin (OCUVITE) tablet, 1 tablet, Oral, Daily, Li Ta MD, 1 tablet at 01/25/22 0807    sodium chloride flush 0.9 % injection 5-40 mL, 5-40 mL, IntraVENous, 2 times per day, Beverly Michadu MD, 10 mL at 01/25/22 2003    sodium chloride flush 0.9 % injection 5-40 mL, 5-40 mL, IntraVENous, PRN, Li Ta MD, 10 mL at 01/24/22 0526    ondansetron (ZOFRAN-ODT) disintegrating tablet 4 mg, 4 mg, Oral, Q8H PRN **OR** ondansetron (ZOFRAN) injection 4 mg, 4 mg, IntraVENous, Q6H PRN, Li Ta MD    polyethylene glycol (GLYCOLAX) packet 17 g, 17 g, Oral, Daily PRN, Li Ta MD    acetaminophen (TYLENOL) tablet 650 mg, 650 mg, Oral, Q6H PRN **OR** acetaminophen (TYLENOL) suppository 650 mg, 650 mg, Rectal, Q6H PRN, Li Ta MD    insulin lispro (HUMALOG) injection vial 0-6 Units, 0-6 Units, SubCUTAneous, TID CE, Shukri Price, APRN - CNP, 2 Units at 01/25/22 1543    insulin lispro (HUMALOG) injection vial 0-3 Units, 0-3 Units, SubCUTAneous, Nightly, SHARI Reyes - CNP, 2 Units at 01/24/22 2130    glucose (GLUTOSE) 40 % oral gel 15 g, 15 g, Oral, PRN, SHARI Reyes CNP    dextrose 50 % IV solution, 12.5 g, IntraVENous, PRN, SHARI Reyes CNP    glucagon (rDNA) injection 1 mg, 1 mg, IntraMUSCular, PRN, SHARI Reyes CNP    dextrose 5 % solution, 100 mL/hr, IntraVENous, PRN, SHARI Reyes CNP    vitamin D (CHOLECALCIFEROL) tablet 2,000 Units, 2,000 Units, Oral, Daily, SHARI Reyes CNP, 2,000 Units at 01/25/22 0809    vitamin B-6 (PYRIDOXINE) tablet 50 mg, 50 mg, Oral, Daily, SHARI Reyes CNP, 50 mg at 01/25/22 0807    zinc sulfate (ZINCATE) capsule 50 mg, 50 mg, Oral, Daily, Li Ta MD, 50 mg at 01/25/22 0807    Physical Exam:  BP (!) 104/59   Pulse 71   Temp 97.9 °F (36.6 °C) (Axillary)   Resp 20   Ht 5' 4\" (1.626 m)   Wt 191 lb 8 oz (86.9 kg)   LMP  (LMP Unknown)   SpO2 97%   BMI 32.87 kg/m²   Wt Readings from Last 3 Encounters:   01/25/22 191 lb 8 oz (86.9 kg)   10/16/20 203 lb 6.4 oz (92.3 kg)   04/28/20 178 lb (80.7 kg)     Patient not examined on account of being COVID-19 positive and in order to preserve PPE. Intake/Output:    Intake/Output Summary (Last 24 hours) at 1/25/2022 2023  Last data filed at 1/25/2022 0815  Gross per 24 hour   Intake 1202 ml   Output 300 ml   Net 902 ml     No intake/output data recorded. Laboratory Tests:  Recent Labs     01/24/22  1043   *   K 4.8   CL 96*   CO2 24   BUN 22   CREATININE 0.6   GLUCOSE 220*   CALCIUM 8.1*     Lab Results   Component Value Date    MG 1.8 10/16/2020     No results for input(s): ALKPHOS, ALT, AST, PROT, BILITOT, BILIDIR, LABALBU in the last 72 hours.   Recent Labs     01/24/22  1043   WBC 12.2*   RBC 4.78   HGB 13.8   HCT 43.1   MCV 90.2   MCH 28.9   MCHC 32.0   RDW 13.8      MPV 13.0*     Lab Results   Component Value Date    CKTOTAL 66 01/17/2022    TROPONINI <0.01 02/02/2020    TROPONINI <0.01 02/02/2020    TROPONINI <0.01 02/02/2020     Lab Results   Component Value Date    INR 3.2 01/17/2022    PROTIME 34.5 (H) 01/17/2022     Lab Results   Component Value Date    TSH 0.300 01/18/2022     Lab Results   Component Value Date    LABA1C 6.5 (H) 12/06/2021     No results found for: EAG  Lab Results   Component Value Date    CHOL 204 (H) 03/06/2019    CHOL 224 (H) 10/03/2018    CHOL 231 (H) 10/20/2017     Lab Results   Component Value Date    TRIG 213 (H) 03/06/2019    TRIG 168 (H) 10/03/2018    TRIG 268 (H) 10/20/2017     Lab Results   Component Value Date    HDL 36 03/06/2019    HDL 44 10/03/2018    HDL 45 10/20/2017     Lab Results   Component Value Date    LDLCALC 125 (H) 03/06/2019    LDLCALC 146 (H) 10/03/2018    LDLCALC 132 (H) 10/20/2017     Lab Results   Component Value Date    LABVLDL 43 03/06/2019    LABVLDL 34 10/03/2018    LABVLDL 54 10/20/2017     No results found for: CHOLHDLRATIO  No results for input(s): PROBNP in the last 72 hours. Cardiac Tests:    1. AVNRT ablation 2/7/2020  2. DCCV ([facility]) 2/11/2020   3. TTE 9/6/2020 Dr Shane Esquivel left ventricle size and systolic function. Ejection fraction is visually estimated at >75%. Overall ejection fraction increased (hyperdynamic). No regional wall motion abnormalities seen. There is concentric remodelling. Indeterminate diastolic function. The left atrium is mildly dilated. Normal right ventricular size and function. TAPSE 20 mm. No hemodynamically significant aortic stenosis is present. Unable to estimate PA systolic pressure. No evidence for hemodynamically significant pericardial effusion. No previous echo for comparison. Assessment:   · Persistent Atrial fibrillation, with runs of RVR. Currently CVR 70s. 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 Leukocytosis   · AMS / encephalopathy   · Hyponatremia   · Hypertension   · Type 2 diabetes mellitus   · H/o CVA (R parietal and occipital lobes)  · Limited code         Plan:   · Toprol-XL has been changed to Lopressor 25 twice daily. · Digoxin 125 mcg daily has been initiated. Check levels in 2 days. · Flecainide increased to 150 mg twice daily. · Consider IV Cardizem +/- drip if RVR is sustained   · Consider DCCV (+/- VINAY pending compliance with Eliquis) to restore sinus rhythm once clinically improved including improvement of mental status. · Continue Eliquis   · Will follow   · She has had breakthrough atrial fibrillation on flecainide 100 milligrams twice daily. We will try eventual rhythm control strategy with a higher dose of flecainide. Ever, depending on goals of care, if heart rates are better controlled may switch to rate control strategy and discontinue flecainide altogether. We will continue to follow. Bev Sanders MD, [facility]    NOTE: This report was transcribed using voice recognition software. Every effort was made to ensure accuracy; however, inadvertent computerized transcription errors may be present.

## 2022-01-26 NOTE — PROGRESS NOTES
SPEECH LANGUAGE PATHOLOGY  DAILY PROGRESS NOTE        PATIENT NAME:  [place]      :  1940          TODAY'S DATE:  2022 ROOM:  [facility]/8049-    SWALLOWING:    Patient seen as follow up for dysphagia management. Patient continues to have significant confusion and decreased alertness. She is arousable with vocal and tactile stimuli. Patient's daughter, Paris Lagunas,  at bedside. Spoke with Paris Lagunas regarding patients oral intake and swallowing function. Patient's daughter reporting poor oral intake. Initially, patient produced reduced oral acceptance of coated spoon; however, patient accepted two small bites of pudding with no overt s/s of aspiration. After the second bite patient refused oral intake. Family is to make decision on PEG. Will cont. POC. DYSPHAGIA DIAGNOSIS:   Clinical indicators of mild-moderate oropharyngeal phase dysphagia                             DIET RECOMMENDATIONS:  Pureed consistency solids (IDDSI level 4) with  nectar consistency (mildly thick - IDDSI level 2) liquids, MEDICATION ADMINISTRATION and Administer medication crushed, as able, with pudding/applesauce     Patient may benefit from non-oral feedings secondary to mental status and decreased alertness.                  FEEDING RECOMMENDATIONS:                           Assistance level:  Full assistance is needed during all oral intake                             Compensatory strategies recommended: Small bites/sips      Paulina Staple.  Marybeth MAGDALENO, CCC-SLP  SP. [id number]        CPT code(s) 99229  dysphagia tx  Total minutes :  30 minutes

## 2022-01-26 NOTE — PROGRESS NOTES
INPATIENT CARDIOLOGY FOLLOW-UP    Name: [place]    Age: 80 y.o. Date of Admission: 1/17/2022 10:52 AM    Date of Service: 1/26/2022    Primary Cardiologist: Dr. Velasco Poster    Chief Complaint: Follow-up for current atrial fibrillation/flutter    Interim History:  No new overnight cardiac complaints. Continues to have significant confusion. Rates appear to be better controlled. She is on room air. Telemetry shows atrial fibrillation.     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[facility]    [facility]    Acute urinary tract infection    Acute sepsis ([facility]    Acute metabolic encephalopathy    COVID-19    JOSE DE JESUS (acute kidney injury) ([facility]    Atrial fibrillation with RVR (HCC)       Current Medications:    Current Facility-Administered Medications:     dexamethasone (DECADRON) injection 3 mg, 3 mg, IntraVENous, Q24H, Gilson Leung MD, 3 mg at 01/26/22 0914    digoxin (LANOXIN) tablet 125 mcg, 125 mcg, Oral, Daily, [place], [place], 125 mcg at 01/26/22 0915    metoprolol tartrate (LOPRESSOR) tablet 25 mg, 25 mg, Oral, BID, [place], [place], 25 mg at 01/26/22 0914    flecainide (TAMBOCOR) tablet 150 mg, 150 mg, Oral, BID, [place], [place], 150 mg at 01/26/22 0914    docusate (COLACE) 50 MG/5ML liquid 100 mg, 100 mg, Oral, BID, Gilson Leung MD, 100 mg at 01/25/22 0815    insulin glargine (LANTUS) injection vial 15 Units, 15 Units, SubCUTAneous, BID, SHARI Ramos CNP, 15 Units at 01/26/22 0915    lisinopril (PRINIVIL;ZESTRIL) tablet 10 mg, 10 mg, Oral, Daily, Shine Ruth, SHARI - CNP, 10 mg at 01/26/22 0914    metoclopramide (REGLAN) injection 5 mg, 5 mg, IntraVENous, Q8H, Lisa Thacker MD, 5 mg at 01/26/22 0551    cefdinir (OMNICEF) capsule 300 mg, 300 mg, Oral, 2 times per day, Ash Herrera, SHARI - CNP, 300 mg at 01/26/22 0914    ascorbic acid (VITAMIN C) tablet 500 mg, 500 mg, Oral, BID, Nino Newsome MD, 500 mg at 01/26/22 0914    0.9 % sodium chloride infusion, , IntraVENous, Q12H, Nino Newsome MD, Last Rate: 12.5 mL/hr at 01/23/22 1059, New Bag at 01/23/22 1059    apixaban (ELIQUIS) tablet 5 mg, 5 mg, Oral, BID, Li Ta MD, 5 mg at 01/26/22 0915    citalopram (CELEXA) tablet 10 mg, 10 mg, Oral, Daily, Li Ta MD, 10 mg at 01/26/22 0915    divalproex (DEPAKOTE) DR tablet 125 mg, 125 mg, Oral, TID, Li Ta MD, 125 mg at 01/26/22 0915    megestrol acetate (MEGACE) 400 MG/10ML suspension 200 mg, 200 mg, Oral, Daily, Li Ta MD, 200 mg at 01/26/22 0916    antioxidant multivitamin (OCUVITE) tablet, 1 tablet, Oral, Daily, Li Ta MD, 1 tablet at 01/26/22 0915    sodium chloride flush 0.9 % injection 5-40 mL, 5-40 mL, IntraVENous, 2 times per day, Jairo Freedman MD, 10 mL at 01/26/22 0916    sodium chloride flush 0.9 % injection 5-40 mL, 5-40 mL, IntraVENous, PRN, Li Ta MD, 10 mL at 01/24/22 0526    ondansetron (ZOFRAN-ODT) disintegrating tablet 4 mg, 4 mg, Oral, Q8H PRN **OR** ondansetron (ZOFRAN) injection 4 mg, 4 mg, IntraVENous, Q6H PRN, Li Ta MD    polyethylene glycol (GLYCOLAX) packet 17 g, 17 g, Oral, Daily PRN, Li Ta MD    acetaminophen (TYLENOL) tablet 650 mg, 650 mg, Oral, Q6H PRN **OR** acetaminophen (TYLENOL) suppository 650 mg, 650 mg, Rectal, Q6H PRN, Li Ta MD    insulin lispro (HUMALOG) injection vial 0-6 Units, 0-6 Units, SubCUTAneous, TID CE, Shine Ruth, APRN - CNP, 2 Units at 01/25/22 1543    insulin lispro (HUMALOG) injection vial 0-3 Units, 0-3 Units, SubCUTAneous, Nightly, Nidhi Bisiennau, APRN - CNP, 1 Units at 01/25/22 2224    glucose (GLUTOSE) 40 % oral gel 15 g, 15 g, Oral, PRN, Nidhi Bijou, APRN - CNP    dextrose 50 % IV solution, 12.5 g, IntraVENous, PRN, Nidhi Bijou, APRN - CNP    glucagon (rDNA) injection 1 mg, 1 mg, IntraMUSCular, PRN, Nidhi Bijou, APRN - CNP    dextrose 5 % solution, 100 mL/hr, IntraVENous, PRN, Nidhi Bijou, APRN - CNP    vitamin D (CHOLECALCIFEROL) tablet 2,000 Units, 2,000 Units, Oral, Daily, Nidhi Bijou, APRN - CNP, 2,000 Units at 01/26/22 8042    vitamin B-6 (PYRIDOXINE) tablet 50 mg, 50 mg, Oral, Daily, Nidhi Balwinderjonoah, APRN - CNP, 50 mg at 01/26/22 0916    zinc sulfate (ZINCATE) capsule 50 mg, 50 mg, Oral, Daily, Li Ta MD, 50 mg at 01/26/22 0914    Physical Exam:  /81   Pulse 71   Temp 97 °F (36.1 °C) (Temporal)   Resp 18   Ht 5' 4\" (1.626 m)   Wt 188 lb 14.4 oz (85.7 kg)   LMP  (LMP Unknown)   SpO2 97%   BMI 32.42 kg/m²   Wt Readings from Last 3 Encounters:   01/26/22 188 lb 14.4 oz (85.7 kg)   10/16/20 203 lb 6.4 oz (92.3 kg)   04/28/20 178 lb (80.7 kg)     Patient not examined on account of being COVID-19 positive and in order to preserve PPE. Intake/Output:    Intake/Output Summary (Last 24 hours) at 1/26/2022 1341  Last data filed at 1/26/2022 1028  Gross per 24 hour   Intake --   Output 1450 ml   Net -1450 ml     I/O this shift:  In: -   Out: 750 [Urine:750]    Laboratory Tests:  Recent Labs     01/24/22  1043   *   K 4.8   CL 96*   CO2 24   BUN 22   CREATININE 0.6   GLUCOSE 220*   CALCIUM 8.1*     Lab Results   Component Value Date    MG 1.8 10/16/2020     No results for input(s): ALKPHOS, ALT, AST, PROT, BILITOT, BILIDIR, LABALBU in the last 72 hours.   Recent Labs     01/24/22  1043   WBC 12.2*   RBC 4.78   HGB 13.8   HCT 43.1   MCV 90.2   MCH 28.9   MCHC 32.0   RDW 13.8      MPV 13.0*     Lab Results   Component Value Date    CKTOTAL 66 01/17/2022 TROPONINI <0.01 02/02/2020    TROPONINI <0.01 02/02/2020    TROPONINI <0.01 02/02/2020     Lab Results   Component Value Date    INR 3.2 01/17/2022    PROTIME 34.5 (H) 01/17/2022     Lab Results   Component Value Date    TSH 0.300 01/18/2022     Lab Results   Component Value Date    LABA1C 6.5 (H) 12/06/2021     No results found for: EAG  Lab Results   Component Value Date    CHOL 204 (H) 03/06/2019    CHOL 224 (H) 10/03/2018    CHOL 231 (H) 10/20/2017     Lab Results   Component Value Date    TRIG 213 (H) 03/06/2019    TRIG 168 (H) 10/03/2018    TRIG 268 (H) 10/20/2017     Lab Results   Component Value Date    HDL 36 03/06/2019    HDL 44 10/03/2018    HDL 45 10/20/2017     Lab Results   Component Value Date    LDLCALC 125 (H) 03/06/2019    LDLCALC 146 (H) 10/03/2018    LDLCALC 132 (H) 10/20/2017     Lab Results   Component Value Date    LABVLDL 43 03/06/2019    LABVLDL 34 10/03/2018    LABVLDL 54 10/20/2017     No results found for: CHOLHDLRATIO  No results for input(s): PROBNP in the last 72 hours. Cardiac Tests:    1. AVNRT ablation 2/7/2020  2. DCCV ([facility]) 2/11/2020   3. TTE 9/6/2020 Dr Eb Sousa left ventricle size and systolic function. Ejection fraction is visually estimated at >75%. Overall ejection fraction increased (hyperdynamic). No regional wall motion abnormalities seen. There is concentric remodelling. Indeterminate diastolic function. The left atrium is mildly dilated. Normal right ventricular size and function. TAPSE 20 mm. No hemodynamically significant aortic stenosis is present. Unable to estimate PA systolic pressure. No evidence for hemodynamically significant pericardial effusion. No previous echo for comparison. Assessment:   · Persistent Atrial fibrillation, with runs of RVR. Currently CVR 70s.  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 Leukocytosis   · AMS / encephalopathy   · Hyponatremia   · Hypertension   · Type 2 diabetes mellitus   · H/o CVA (R parietal and occipital lobes)  · Limited code         Plan:   · Toprol-XL has been changed to Lopressor 25 twice daily. · Digoxin 125 mcg daily has been initiated. Check levels in 2 days. · Flecainide increased to 150 mg twice daily. I will discontinue the flecainide today. Can be reloaded in the future if VINAY/cardioversion is considered as an outpatient  · Consider IV Cardizem +/- drip if RVR is sustained   · Continue Eliquis     I will sign off today. Please not hesitate to call me with any questions or concerns    Holly Garcia MD, [facility] Cardiology    NOTE: This report was transcribed using voice recognition software. Every effort was made to ensure accuracy; however, inadvertent computerized transcription errors may be present.

## 2022-01-26 NOTE — FLOWSHEET NOTE
Inpatient Wound Care    Admit Date: 1/17/2022 10:52 AM    Reason for consult:  Coccyx    Significant history:  Admitted with Acute sepsis. , COVID 19. History of A-fib, Alzheimer's disease, HTN, HLD. Wound history:  HAPI    Findings:      01/26/22 1614   Wound 01/24/22 Coccyx   Date First Assessed/Time First Assessed: 01/24/22 1230   Present on Hospital Admission: No  Location: Coccyx   Wound Image    Wound Etiology Pressure Stage  3   Wound Length (cm) 2 cm   Wound Width (cm) 0.5 cm   Wound Depth (cm) 0.3 cm   Wound Surface Area (cm^2) 1 cm^2   Change in Wound Size % (l*w) -2400   Wound Volume (cm^3) 0.3 cm^3   Wound Healing % -3650   Wound Assessment Pink/red  (denuded)   Drainage Amount None   Christie-wound Assessment Denuded       Impression:  Stage 3 pressure injury on coccyx. Appears as a previous closed wound opened. Interventions in place:  Pt is incontinent of bowel and bladder. Area is moist. Recommend Calmoseptine topically. Heels are intact. Low air loss bed was ordered Mon 1/24 but did not come. Bed re-ordered. Plan: Calmoseptine, SOS precautions, low air loss bed. **Informed Consent**    The patient has given verbal consent to have photos taken of  Coccyx and inserted into their chart as part of their permanent medical record for purposes of documentation, treatment management and/or medical review. All Images taken on 1/26/22 of patient name: [place] were transmitted and stored on [email or web address] located within [facility] by a registered [employer] Mobile Application Device.      Zac Carrillo RN 1/26/2022 4:16 PM

## 2022-01-27 VITALS
OXYGEN SATURATION: 98 % | HEART RATE: 93 BPM | DIASTOLIC BLOOD PRESSURE: 53 MMHG | RESPIRATION RATE: 18 BRPM | SYSTOLIC BLOOD PRESSURE: 97 MMHG | HEIGHT: 64 IN | BODY MASS INDEX: 31.18 KG/M2 | WEIGHT: 182.6 LBS | TEMPERATURE: 98.4 F

## 2022-01-27 LAB
METER GLUCOSE: 115 MG/DL (ref 74–99)
METER GLUCOSE: 141 MG/DL (ref 74–99)
METER GLUCOSE: 164 MG/DL (ref 70–110)

## 2022-01-27 PROCEDURE — 6370000000 HC RX 637 (ALT 250 FOR IP): Performed by: INTERNAL MEDICINE

## 2022-01-27 PROCEDURE — 6370000000 HC RX 637 (ALT 250 FOR IP): Performed by: NURSE PRACTITIONER

## 2022-01-27 PROCEDURE — 6370000000 HC RX 637 (ALT 250 FOR IP): Performed by: PHYSICIAN ASSISTANT

## 2022-01-27 PROCEDURE — 2580000003 HC RX 258: Performed by: INTERNAL MEDICINE

## 2022-01-27 PROCEDURE — 99239 HOSP IP/OBS DSCHRG MGMT >30: CPT | Performed by: INTERNAL MEDICINE

## 2022-01-27 PROCEDURE — 82962 GLUCOSE BLOOD TEST: CPT

## 2022-01-27 PROCEDURE — 6360000002 HC RX W HCPCS: Performed by: INTERNAL MEDICINE

## 2022-01-27 PROCEDURE — 6370000000 HC RX 637 (ALT 250 FOR IP): Performed by: SPECIALIST

## 2022-01-27 RX ORDER — DIGOXIN 125 MCG
125 TABLET ORAL DAILY
Qty: 30 TABLET | Refills: 3 | DISCHARGE
Start: 2022-01-28

## 2022-01-27 RX ORDER — DEXAMETHASONE 4 MG/1
4 TABLET ORAL
Qty: 20 TABLET | Refills: 0 | DISCHARGE
Start: 2022-01-27 | End: 2022-01-28

## 2022-01-27 RX ORDER — INSULIN GLARGINE 100 [IU]/ML
15 INJECTION, SOLUTION SUBCUTANEOUS 2 TIMES DAILY
Qty: 10 ML | Refills: 3 | Status: ON HOLD | DISCHARGE
Start: 2022-01-27 | End: 2022-02-09 | Stop reason: HOSPADM

## 2022-01-27 RX ORDER — LISINOPRIL 10 MG/1
10 TABLET ORAL DAILY
Qty: 30 TABLET | Refills: 3 | Status: ON HOLD | DISCHARGE
Start: 2022-01-28 | End: 2022-02-09 | Stop reason: HOSPADM

## 2022-01-27 RX ADMIN — Medication 2000 UNITS: at 08:21

## 2022-01-27 RX ADMIN — Medication 500 MG: at 08:20

## 2022-01-27 RX ADMIN — INSULIN GLARGINE 15 UNITS: 100 INJECTION, SOLUTION SUBCUTANEOUS at 09:02

## 2022-01-27 RX ADMIN — INSULIN LISPRO 1 UNITS: 100 INJECTION, SOLUTION INTRAVENOUS; SUBCUTANEOUS at 16:10

## 2022-01-27 RX ADMIN — CITALOPRAM HYDROBROMIDE 10 MG: 20 TABLET ORAL at 08:20

## 2022-01-27 RX ADMIN — METOCLOPRAMIDE HYDROCHLORIDE 5 MG: 5 INJECTION INTRAMUSCULAR; INTRAVENOUS at 06:03

## 2022-01-27 RX ADMIN — DEXAMETHASONE SODIUM PHOSPHATE 3 MG: 4 INJECTION, SOLUTION INTRAMUSCULAR; INTRAVENOUS at 08:20

## 2022-01-27 RX ADMIN — PYRIDOXINE HCL TAB 50 MG 50 MG: 50 TAB at 08:20

## 2022-01-27 RX ADMIN — MEGESTROL ACETATE 200 MG: 40 SUSPENSION ORAL at 08:19

## 2022-01-27 RX ADMIN — Medication 10 ML: at 08:19

## 2022-01-27 RX ADMIN — ANORECTAL OINTMENT: 15.7; .44; 24; 20.6 OINTMENT TOPICAL at 08:19

## 2022-01-27 RX ADMIN — DOCUSATE SODIUM LIQUID 100 MG: 100 LIQUID ORAL at 08:19

## 2022-01-27 RX ADMIN — DIGOXIN 125 MCG: 125 TABLET ORAL at 08:20

## 2022-01-27 RX ADMIN — METOPROLOL TARTRATE 25 MG: 25 TABLET, FILM COATED ORAL at 08:22

## 2022-01-27 RX ADMIN — ZINC SULFATE 220 MG (50 MG) CAPSULE 50 MG: CAPSULE at 08:20

## 2022-01-27 RX ADMIN — INSULIN LISPRO 1 UNITS: 100 INJECTION, SOLUTION INTRAVENOUS; SUBCUTANEOUS at 11:07

## 2022-01-27 RX ADMIN — METOCLOPRAMIDE HYDROCHLORIDE 5 MG: 5 INJECTION INTRAMUSCULAR; INTRAVENOUS at 14:20

## 2022-01-27 RX ADMIN — APIXABAN 5 MG: 5 TABLET, FILM COATED ORAL at 08:20

## 2022-01-27 RX ADMIN — Medication 1 TABLET: at 08:20

## 2022-01-27 ASSESSMENT — PAIN SCALES - PAIN ASSESSMENT IN ADVANCED DEMENTIA (PAINAD)
NEGVOCALIZATION: 0
CONSOLABILITY: 0
BREATHING: 0
TOTALSCORE: 0
FACIALEXPRESSION: 0
BODYLANGUAGE: 0

## 2022-01-27 ASSESSMENT — PAIN SCALES - GENERAL: PAINLEVEL_OUTOF10: 0

## 2022-01-27 NOTE — PLAN OF CARE
Problem: Falls - Risk of:  Goal: Will remain free from falls  Description: Will remain free from falls  1/27/2022 1338 by Dayami Bassett RN  Outcome: Completed  Goal: Absence of physical injury  Description: Absence of physical injury  1/27/2022 1338 by Dayami Bassett RN  Outcome: Completed     Problem: Skin Integrity:  Goal: Will show no infection signs and symptoms  Description: Will show no infection signs and symptoms  1/27/2022 1338 by Dayami Bassett RN  Outcome: Completed  Goal: Absence of new skin breakdown  Description: Absence of new skin breakdown  1/27/2022 1338 by Dayami Bassett RN  Outcome: Completed     Problem: Airway Clearance - Ineffective  Goal: Achieve or maintain patent airway  1/27/2022 1338 by Dayami Bassett RN  Outcome: Completed     Problem: Gas Exchange - Impaired  Goal: Absence of hypoxia  1/27/2022 1338 by Dayami Bassett RN  Outcome: Completed  Goal: Promote optimal lung function  1/27/2022 1338 by Dayami Bassett RN  Outcome: Completed     Problem: Breathing Pattern - Ineffective  Goal: Ability to achieve and maintain a regular respiratory rate  1/27/2022 1338 by Dayami Bassett RN  Outcome: Completed     Problem:  Body Temperature -  Risk of, Imbalanced  Goal: Ability to maintain a body temperature within defined limits  1/27/2022 1338 by Dayami Bassett RN  Outcome: Completed  Goal: Will regain or maintain usual level of consciousness  1/27/2022 1338 by Dayami Bassett RN  Outcome: Completed  Goal: Complications related to the disease process, condition or treatment will be avoided or minimized  1/27/2022 1338 by Dayami Bassett RN  Outcome: Completed     Problem: Isolation Precautions - Risk of Spread of Infection  Goal: Prevent transmission of infection  1/27/2022 1338 by Dayami Bassett RN  Outcome: Completed     Problem: Nutrition Deficits  Goal: Optimize nutritional status  1/27/2022 1338 by Dayami Bassett RN  Outcome: Completed     Problem: Risk for Fluid Volume Deficit  Goal: Maintain normal heart rhythm  1/27/2022 1338 by Mini García RN  Outcome: Completed  Goal: Maintain absence of muscle cramping  1/27/2022 1338 by Mini García RN  Outcome: Completed  Goal: Maintain normal serum potassium, sodium, calcium, phosphorus, and pH  1/27/2022 1338 by Mini García RN  Outcome: Completed     Problem: Loneliness or Risk for Loneliness  Goal: Demonstrate positive use of time alone when socialization is not possible  1/27/2022 1338 by Mini García RN  Outcome: Completed     Problem: Fatigue  Goal: Verbalize increase energy and improved vitality  1/27/2022 1338 by Mini García RN  Outcome: Completed     Problem: Patient Education: Go to Patient Education Activity  Goal: Patient/Family Education  1/27/2022 1338 by Mini García RN  Outcome: Completed

## 2022-01-27 NOTE — CARE COORDINATION
CASE MANAGEMENT. ... Nahomy De Oliveira COVID + 1/17. Frequency of Depakote decreased yesterday. Per nursing, patient is more alert. Daughter here to assist with breakfast. Patient ate 100%. In anticipation for discharge today or tomorrow, requested therapy to see. 19 Teagan Benoit updated. N 16 in epic. Forms in chart. Will need to complete HENS. Will follow. UPDATE. ... Per therapy, patient is at her baseline. [address] notified and can accept without updated eval. Per conversation with Dr Edita Stratton, anticipate discharge today. [facility] will transport patient at [place] [id number] and daughter, Jena, updated. N 16 in epic. Forms in chart.

## 2022-01-28 NOTE — DISCHARGE SUMMARY
[id number] [facility] EMERGENCY SERVICE Physician Discharge Summary       [id number] [phone]          Activity level: as elva    Diet: No diet orders on file    Dispo:snf    Condition at discharge: fair          Patient ID:  Charity Oar  [id number]  80 y.o.  1940    Admit date: 1/17/2022    Discharge date and time:  1/27/2022  10:21 PM    Admission Diagnoses: Principal Problem:    Acute sepsis [facility])  Active Problems:    Hypertension    Type II diabetes mellitus ([address]    Hyperlipidemia    GERD (gastroesophageal reflux disease)    Atrial flutter with rapid ventricular response ([address]    Acute urinary tract infection    Acute metabolic encephalopathy    COVID-19    JOSE DE JESUS (acute kidney injury) ([address]  Resolved Problems:    * No resolved hospital problems. *      Discharge Diagnoses: Principal Problem:    Acute sepsis ([address]  Active Problems:    Hypertension    Type II diabetes mellitus ([address]    Hyperlipidemia    GERD (gastroesophageal reflux disease)    Atrial flutter with rapid ventricular response (HCC)    Acute urinary tract infection    Acute metabolic encephalopathy    COVID-19    JOSE DE JESUS (acute kidney injury) ([address]  Resolved Problems:    * No resolved hospital problems.  *    Sepsis(fever, tachycardia, leukocytosis, infection)POA  Acute respiratory failure with hypoxia(t1fsd27%)POA  Pneumonia due to covid 19   uti  Atrial fib  SVT, possibly atrial flutter with RVR  Dysphagia  encephalopathy likely metabolic  htn  dementia  Dm type 2 uncontrolled  Lactic acidosis    Consults:  IP CONSULT TO IV TEAM  IP CONSULT TO INFECTIOUS DISEASES  IP CONSULT TO PHARMACY  IP CONSULT TO PHARMACY  IP CONSULT TO DIETITIAN  IP CONSULT TO PHARMACY  IP CONSULT TO CARDIOLOGY    Procedures: none    Hospital Course: Patient was admitted with Lactic acidosis [E87.2]  Acute urinary tract infection [N39.0]  Atrial fibrillation with RVR ([address] CREATININE, GLUCOSE, CALCIUM in the last 72 hours. No results for input(s): WBC, RBC, HGB, HCT, MCV, MCH, MCHC, RDW, PLT, MPV in the last 72 hours. No results for input(s): POCGLU in the last 72 hours. Imaging:   XR ABDOMEN FOR NG/OG/NE TUBE PLACEMENT   Final Result      US DUP UPPER EXTREMITY LEFT VENOUS   Final Result   No evidence of DVT in the left upper extremity. XR CHEST ABDOMEN NG PLACEMENT   Final Result   Satisfactory position of the enteric tube. XR CHEST PORTABLE   Final Result   Resolving right lower lobe infiltrate. US PELVIS LIMITED   Final Result   1. Very limited evaluation of the uterus and endometrium. No obvious   uterine mass. Endometrium appears mildly thickened for patient age. 2.  Nonvisualization of either ovary. There are no adnexal masses. XR CHEST PORTABLE   Final Result   1. Right perihilar and right lower lobe infiltrates         CT HEAD WO CONTRAST   Final Result   1. There is no acute intracranial abnormality. Specifically, there is no   intracranial hemorrhage. 2. Advanced atrophy and periventricular leukomalacia,   3. 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 CT ABDOMEN PELVIS W IV CONTRAST Additional Contrast? None   Final Result   Patchy infiltrates and ground-glass densities in the lung bases concerning   for pneumonia or edema. Constipation with redundant colon. Consider colonoscopy for better   assessment of the colon. No discrete mass is identified in the abdomen   pelvis. Fluid and the air in the endometrium of the uterus. Consider  gyn assessment   and ultrasonography.       RECOMMENDATIONS:   Unavailable             Patient Instructions:      Medication List      START taking these medications    dexamethasone 4 MG tablet  Commonly known as: Decadron  Take 1 tablet by mouth daily (with breakfast) for 1 dose     digoxin 125 MCG tablet  Commonly known as: LANOXIN  Take 1 tablet by mouth daily  Start taking on: January 28, 2022     docusate 50 MG/5ML liquid  Commonly known as: COLACE  Take 10 mLs by mouth 2 times daily     lisinopril 10 MG tablet  Commonly known as: PRINIVIL;ZESTRIL  Take 1 tablet by mouth daily  Start taking on: January 28, 2022     metoprolol tartrate 25 MG tablet  Commonly known as: LOPRESSOR  Take 1 tablet by mouth 2 times daily        CHANGE how you take these medications    insulin glargine 100 UNIT/ML injection vial  Commonly known as: LANTUS  Inject 15 Units into the skin 2 times daily  What changed:   · how much to take  · when to take this        CONTINUE taking these medications    acetaminophen 325 MG tablet  Commonly known as: TYLENOL     apixaban 5 MG Tabs tablet  Commonly known as: ELIQUIS  Take 1 tablet by mouth 2 times daily     bisacodyl 10 MG suppository  Commonly known as: DULCOLAX     citalopram 10 MG tablet  Commonly known as: CELEXA     clotrimazole 1 % cream  Commonly known as: LOTRIMIN     divalproex 125 MG capsule  Commonly known as: DEPAKOTE SPRINKLE     Eye Vitamins Caps     insulin lispro 100 UNIT/ML injection vial  Commonly known as: HUMALOG     megestrol 40 MG/ML suspension  Commonly known as: Megace Oral  Take 5 mLs by mouth daily     Namenda XR 21 MG Cp24 extended release capsule  Generic drug: memantine ER     Secura Protective 10 % Crea  Generic drug: ZINC OXIDE (TOPICAL)     vitamin D 1.25 MG (48229 UT) Caps capsule  Commonly known as: ERGOCALCIFEROL        STOP taking these medications    flecainide 100 MG tablet  Commonly known as: TAMBOCOR     insulin detemir 100 UNIT/ML injection pen  Commonly known as: LEVEMIR     magnesium hydroxide 400 MG/5ML suspension  Commonly known as: MILK OF MAGNESIA     metoprolol succinate 25 MG extended release tablet  Commonly known as: TOPROL XL           Where to Get Your Medications      Information about where to get these medications is not yet available    Ask your nurse or doctor about these medications  · dexamethasone 4 MG tablet  · digoxin 125 MCG tablet  · docusate 50 MG/5ML liquid  · insulin glargine 100 UNIT/ML injection vial  · lisinopril 10 MG tablet  · metoprolol tartrate 25 MG tablet           Total time for discharge is 37 min    Signed:  Electronically signed by Marcelina Limon DO on 1/27/2022 at 10:21 PM

## 2022-02-01 ENCOUNTER — HOSPITAL ENCOUNTER (INPATIENT)
Age: 82
LOS: 8 days | Discharge: ANOTHER HOSPITAL | DRG: 871 | End: 2022-02-09
Attending: EMERGENCY MEDICINE | Admitting: INTERNAL MEDICINE
Payer: COMMERCIAL

## 2022-02-01 ENCOUNTER — TELEPHONE (OUTPATIENT)
Dept: CARDIOLOGY CLINIC | Age: 82
End: 2022-02-01

## 2022-02-01 ENCOUNTER — APPOINTMENT (OUTPATIENT)
Dept: CT IMAGING | Age: 82
DRG: 871 | End: 2022-02-01
Payer: COMMERCIAL

## 2022-02-01 ENCOUNTER — APPOINTMENT (OUTPATIENT)
Dept: GENERAL RADIOLOGY | Age: 82
DRG: 871 | End: 2022-02-01
Payer: COMMERCIAL

## 2022-02-01 DIAGNOSIS — K92.2 GASTROINTESTINAL HEMORRHAGE, UNSPECIFIED GASTROINTESTINAL HEMORRHAGE TYPE: ICD-10-CM

## 2022-02-01 DIAGNOSIS — E87.20 LACTIC ACIDOSIS: ICD-10-CM

## 2022-02-01 DIAGNOSIS — G93.41 METABOLIC ENCEPHALOPATHY: ICD-10-CM

## 2022-02-01 DIAGNOSIS — R65.21 SEPTIC SHOCK (HCC): Primary | ICD-10-CM

## 2022-02-01 DIAGNOSIS — A41.9 SEPTIC SHOCK (HCC): Primary | ICD-10-CM

## 2022-02-01 DIAGNOSIS — G93.41 ACUTE METABOLIC ENCEPHALOPATHY: ICD-10-CM

## 2022-02-01 DIAGNOSIS — N30.01 ACUTE CYSTITIS WITH HEMATURIA: ICD-10-CM

## 2022-02-01 LAB
ABO/RH: NORMAL
ALBUMIN SERPL-MCNC: 3 G/DL (ref 2.8–4.4)
ALP BLD-CCNC: 161 U/L (ref 0–249)
ALT SERPL-CCNC: 18 U/L (ref 0–32)
ANION GAP SERPL CALCULATED.3IONS-SCNC: 19 MMOL/L (ref 7–16)
ANTIBODY SCREEN: NORMAL
APTT: 30.7 SEC (ref 24.5–35.1)
AST SERPL-CCNC: 21 U/L (ref 0–31)
B.E.: -9.8 MMOL/L (ref -3–3)
BACTERIA: ABNORMAL /HPF
BASOPHILS ABSOLUTE: 0 E9/L (ref 0.1–0.4)
BASOPHILS RELATIVE PERCENT: 0 % (ref 0–2)
BILIRUB SERPL-MCNC: 0.9 MG/DL (ref 2–6)
BILIRUBIN DIRECT: 0.4 MG/DL (ref 0–0.3)
BILIRUBIN URINE: ABNORMAL
BILIRUBIN, INDIRECT: 0.5 MG/DL (ref 0.6–10.5)
BLOOD, URINE: ABNORMAL
BUN BLDV-MCNC: 30 MG/DL (ref 4–19)
BURR CELLS: ABNORMAL
C-REACTIVE PROTEIN: 0.3 MG/DL (ref 0–0.4)
CALCIUM SERPL-MCNC: 8.5 MG/DL (ref 8.6–10.2)
CHLORIDE BLD-SCNC: 101 MMOL/L (ref 98–107)
CLARITY: ABNORMAL
CO2: 14 MMOL/L (ref 22–29)
COHB: 0.9 % (ref 0–1.5)
COLOR: ABNORMAL
CORTISOL TOTAL: 21.4 MCG/DL (ref 2.68–18.4)
CREAT SERPL-MCNC: 1 MG/DL (ref 0.4–0.7)
CRITICAL: ABNORMAL
DATE ANALYZED: ABNORMAL
DATE OF COLLECTION: ABNORMAL
DIGOXIN LEVEL: 0.5 NG/ML (ref 0.8–2)
EOSINOPHILS ABSOLUTE: 0 E9/L (ref 0.1–0.7)
EOSINOPHILS RELATIVE PERCENT: 0 % (ref 0–12)
EPITHELIAL CELLS, UA: ABNORMAL /HPF
GFR AFRICAN AMERICAN: >60
GFR NON-AFRICAN AMERICAN: 53 ML/MIN/1.73
GLUCOSE BLD-MCNC: 255 MG/DL (ref 70–110)
GLUCOSE URINE: 100 MG/DL
HCO3: 11.7 MMOL/L (ref 22–26)
HCT VFR BLD CALC: 42.8 % (ref 45–66)
HCT VFR BLD CALC: 43.2 % (ref 45–66)
HEMOGLOBIN: 13.7 G/DL (ref 14.5–22)
HEMOGLOBIN: 13.9 G/DL (ref 14.5–22)
HHB: 2.8 % (ref 0–5)
INR BLD: 1.6
KETONES, URINE: 15 MG/DL
LAB: ABNORMAL
LACTIC ACID: 6.8 MMOL/L (ref 0.5–2.2)
LACTIC ACID: 9 MMOL/L (ref 0.5–2.2)
LEUKOCYTE ESTERASE, URINE: ABNORMAL
LIPASE: 30 U/L (ref 13–60)
LYMPHOCYTES ABSOLUTE: 0.33 E9/L (ref 3–15)
LYMPHOCYTES RELATIVE PERCENT: 0.9 % (ref 15–60)
Lab: ABNORMAL
MAGNESIUM: 2 MG/DL (ref 1.6–2.6)
MCH RBC QN AUTO: 29.7 PG (ref 30–42)
MCHC RBC AUTO-ENTMCNC: 31.7 % (ref 29–37)
MCV RBC AUTO: 93.5 FL (ref 95–121)
METHB: 0.3 % (ref 0–1.5)
MODE: ABNORMAL
MONOCYTES ABSOLUTE: 0.65 E9/L (ref 1–3)
MONOCYTES RELATIVE PERCENT: 1.7 % (ref 3–15)
MYELOCYTE PERCENT: 0.9 % (ref 0–0)
NEUTROPHILS ABSOLUTE: 31.72 E9/L (ref 5–20)
NEUTROPHILS RELATIVE PERCENT: 96.5 % (ref 15–80)
NITRITE, URINE: POSITIVE
NUCLEATED RED BLOOD CELLS: 0 /100 WBC
O2 CONTENT: 20.2 ML/DL
O2 SATURATION: 97.2 % (ref 92–98.5)
O2HB: 96 % (ref 94–97)
OPERATOR ID: 420
PATIENT TEMP: 37 C
PCO2: 18.2 MMHG (ref 35–45)
PDW BLD-RTO: 15.2 FL (ref 11–19)
PH BLOOD GAS: 7.43 (ref 7.35–7.45)
PH UA: 6.5 (ref 5–9)
PLATELET # BLD: 346 E9/L (ref 130–480)
PMV BLD AUTO: 12 FL (ref 7–12)
PO2: 89.6 MMHG (ref 75–100)
POIKILOCYTES: ABNORMAL
POTASSIUM SERPL-SCNC: 4.3 MMOL/L (ref 3.4–4.5)
PRO-BNP: 3700 PG/ML (ref 0–450)
PROCALCITONIN: 0.09 NG/ML (ref 0–0.08)
PROTEIN UA: 30 MG/DL
PROTHROMBIN TIME: 17.8 SEC (ref 9.3–12.4)
RBC # BLD: 4.62 E12/L (ref 3.7–5.3)
RBC UA: ABNORMAL /HPF (ref 0–2)
SARS-COV-2, NAAT: DETECTED
SODIUM BLD-SCNC: 134 MMOL/L (ref 132–146)
SOURCE, BLOOD GAS: ABNORMAL
SPECIFIC GRAVITY UA: 1.01 (ref 1–1.03)
THB: 14.9 G/DL (ref 11.5–16.5)
TIME ANALYZED: 1904
TOTAL PROTEIN: 5.2 G/DL (ref 6.4–8.3)
TROPONIN, HIGH SENSITIVITY: 101 NG/L (ref 0–9)
TSH SERPL DL<=0.05 MIU/L-ACNC: 6.68 UIU/ML (ref 0.76–16.11)
UROBILINOGEN, URINE: >=8 E.U./DL
WBC # BLD: 32.7 E9/L (ref 9.4–34)
WBC UA: ABNORMAL /HPF (ref 0–5)

## 2022-02-01 PROCEDURE — 87077 CULTURE AEROBIC IDENTIFY: CPT

## 2022-02-01 PROCEDURE — 71275 CT ANGIOGRAPHY CHEST: CPT

## 2022-02-01 PROCEDURE — 96368 THER/DIAG CONCURRENT INF: CPT

## 2022-02-01 PROCEDURE — 96366 THER/PROPH/DIAG IV INF ADDON: CPT

## 2022-02-01 PROCEDURE — 96367 TX/PROPH/DG ADDL SEQ IV INF: CPT

## 2022-02-01 PROCEDURE — 2580000003 HC RX 258

## 2022-02-01 PROCEDURE — 80162 ASSAY OF DIGOXIN TOTAL: CPT

## 2022-02-01 PROCEDURE — 36415 COLL VENOUS BLD VENIPUNCTURE: CPT

## 2022-02-01 PROCEDURE — 85610 PROTHROMBIN TIME: CPT

## 2022-02-01 PROCEDURE — 2580000003 HC RX 258: Performed by: EMERGENCY MEDICINE

## 2022-02-01 PROCEDURE — 86901 BLOOD TYPING SEROLOGIC RH(D): CPT

## 2022-02-01 PROCEDURE — 87147 CULTURE TYPE IMMUNOLOGIC: CPT

## 2022-02-01 PROCEDURE — 74177 CT ABD & PELVIS W/CONTRAST: CPT

## 2022-02-01 PROCEDURE — 85018 HEMOGLOBIN: CPT

## 2022-02-01 PROCEDURE — 99285 EMERGENCY DEPT VISIT HI MDM: CPT

## 2022-02-01 PROCEDURE — 85025 COMPLETE CBC W/AUTO DIFF WBC: CPT

## 2022-02-01 PROCEDURE — 6360000002 HC RX W HCPCS: Performed by: STUDENT IN AN ORGANIZED HEALTH CARE EDUCATION/TRAINING PROGRAM

## 2022-02-01 PROCEDURE — 83605 ASSAY OF LACTIC ACID: CPT

## 2022-02-01 PROCEDURE — 87088 URINE BACTERIA CULTURE: CPT

## 2022-02-01 PROCEDURE — 82533 TOTAL CORTISOL: CPT

## 2022-02-01 PROCEDURE — 86140 C-REACTIVE PROTEIN: CPT

## 2022-02-01 PROCEDURE — 84145 PROCALCITONIN (PCT): CPT

## 2022-02-01 PROCEDURE — 85730 THROMBOPLASTIN TIME PARTIAL: CPT

## 2022-02-01 PROCEDURE — 6360000004 HC RX CONTRAST MEDICATION: Performed by: PHYSICIAN ASSISTANT

## 2022-02-01 PROCEDURE — P9016 RBC LEUKOCYTES REDUCED: HCPCS

## 2022-02-01 PROCEDURE — 2580000003 HC RX 258: Performed by: STUDENT IN AN ORGANIZED HEALTH CARE EDUCATION/TRAINING PROGRAM

## 2022-02-01 PROCEDURE — 36430 TRANSFUSION BLD/BLD COMPNT: CPT

## 2022-02-01 PROCEDURE — 86923 COMPATIBILITY TEST ELECTRIC: CPT

## 2022-02-01 PROCEDURE — 87635 SARS-COV-2 COVID-19 AMP PRB: CPT

## 2022-02-01 PROCEDURE — 83690 ASSAY OF LIPASE: CPT

## 2022-02-01 PROCEDURE — 84443 ASSAY THYROID STIM HORMONE: CPT

## 2022-02-01 PROCEDURE — 86850 RBC ANTIBODY SCREEN: CPT

## 2022-02-01 PROCEDURE — 87186 SC STD MICRODIL/AGAR DIL: CPT

## 2022-02-01 PROCEDURE — C9113 INJ PANTOPRAZOLE SODIUM, VIA: HCPCS | Performed by: EMERGENCY MEDICINE

## 2022-02-01 PROCEDURE — 80048 BASIC METABOLIC PNL TOTAL CA: CPT

## 2022-02-01 PROCEDURE — 83880 ASSAY OF NATRIURETIC PEPTIDE: CPT

## 2022-02-01 PROCEDURE — 2000000000 HC ICU R&B

## 2022-02-01 PROCEDURE — 96375 TX/PRO/DX INJ NEW DRUG ADDON: CPT

## 2022-02-01 PROCEDURE — 82805 BLOOD GASES W/O2 SATURATION: CPT

## 2022-02-01 PROCEDURE — 85014 HEMATOCRIT: CPT

## 2022-02-01 PROCEDURE — 70450 CT HEAD/BRAIN W/O DYE: CPT

## 2022-02-01 PROCEDURE — 96365 THER/PROPH/DIAG IV INF INIT: CPT

## 2022-02-01 PROCEDURE — 81001 URINALYSIS AUTO W/SCOPE: CPT

## 2022-02-01 PROCEDURE — 2500000003 HC RX 250 WO HCPCS: Performed by: EMERGENCY MEDICINE

## 2022-02-01 PROCEDURE — 83735 ASSAY OF MAGNESIUM: CPT

## 2022-02-01 PROCEDURE — 84439 ASSAY OF FREE THYROXINE: CPT

## 2022-02-01 PROCEDURE — 84484 ASSAY OF TROPONIN QUANT: CPT

## 2022-02-01 PROCEDURE — 86900 BLOOD TYPING SEROLOGIC ABO: CPT

## 2022-02-01 PROCEDURE — 99223 1ST HOSP IP/OBS HIGH 75: CPT | Performed by: INTERNAL MEDICINE

## 2022-02-01 PROCEDURE — 71045 X-RAY EXAM CHEST 1 VIEW: CPT

## 2022-02-01 PROCEDURE — 80076 HEPATIC FUNCTION PANEL: CPT

## 2022-02-01 PROCEDURE — 6360000002 HC RX W HCPCS: Performed by: EMERGENCY MEDICINE

## 2022-02-01 PROCEDURE — 93005 ELECTROCARDIOGRAM TRACING: CPT | Performed by: EMERGENCY MEDICINE

## 2022-02-01 RX ORDER — ROCURONIUM BROMIDE 10 MG/ML
INJECTION, SOLUTION INTRAVENOUS
Status: DISPENSED
Start: 2022-02-01 | End: 2022-02-02

## 2022-02-01 RX ORDER — ETOMIDATE 2 MG/ML
INJECTION INTRAVENOUS
Status: DISCONTINUED
Start: 2022-02-01 | End: 2022-02-02 | Stop reason: WASHOUT

## 2022-02-01 RX ORDER — KETAMINE HYDROCHLORIDE 10 MG/ML
INJECTION, SOLUTION INTRAMUSCULAR; INTRAVENOUS
Status: DISCONTINUED
Start: 2022-02-01 | End: 2022-02-02 | Stop reason: WASHOUT

## 2022-02-01 RX ORDER — ONDANSETRON 2 MG/ML
4 INJECTION INTRAMUSCULAR; INTRAVENOUS ONCE
Status: COMPLETED | OUTPATIENT
Start: 2022-02-01 | End: 2022-02-01

## 2022-02-01 RX ORDER — PANTOPRAZOLE SODIUM 40 MG/10ML
80 INJECTION, POWDER, LYOPHILIZED, FOR SOLUTION INTRAVENOUS ONCE
Status: COMPLETED | OUTPATIENT
Start: 2022-02-01 | End: 2022-02-01

## 2022-02-01 RX ORDER — 0.9 % SODIUM CHLORIDE 0.9 %
1000 INTRAVENOUS SOLUTION INTRAVENOUS ONCE
Status: COMPLETED | OUTPATIENT
Start: 2022-02-01 | End: 2022-02-01

## 2022-02-01 RX ORDER — SODIUM CHLORIDE 9 MG/ML
INJECTION, SOLUTION INTRAVENOUS ONCE
Status: COMPLETED | OUTPATIENT
Start: 2022-02-01 | End: 2022-02-01

## 2022-02-01 RX ORDER — SODIUM CHLORIDE 0.9 % (FLUSH) 0.9 %
SYRINGE (ML) INJECTION
Status: COMPLETED
Start: 2022-02-01 | End: 2022-02-01

## 2022-02-01 RX ORDER — SODIUM CHLORIDE 9 MG/ML
20 INJECTION INTRAVENOUS ONCE
Status: COMPLETED | OUTPATIENT
Start: 2022-02-01 | End: 2022-02-01

## 2022-02-01 RX ORDER — NOREPINEPHRINE BITARTRATE 1 MG/ML
INJECTION, SOLUTION INTRAVENOUS
Status: DISPENSED
Start: 2022-02-01 | End: 2022-02-02

## 2022-02-01 RX ORDER — SODIUM CHLORIDE 9 MG/ML
INJECTION, SOLUTION INTRAVENOUS PRN
Status: DISCONTINUED | OUTPATIENT
Start: 2022-02-01 | End: 2022-02-09 | Stop reason: HOSPADM

## 2022-02-01 RX ORDER — ROCURONIUM BROMIDE 10 MG/ML
INJECTION, SOLUTION INTRAVENOUS
Status: DISCONTINUED
Start: 2022-02-01 | End: 2022-02-02 | Stop reason: WASHOUT

## 2022-02-01 RX ADMIN — SODIUM CHLORIDE: 9 INJECTION, SOLUTION INTRAVENOUS at 20:58

## 2022-02-01 RX ADMIN — IOPAMIDOL 80 ML: 755 INJECTION, SOLUTION INTRAVENOUS at 22:05

## 2022-02-01 RX ADMIN — SODIUM CHLORIDE, PRESERVATIVE FREE 20 ML: 5 INJECTION INTRAVENOUS at 19:30

## 2022-02-01 RX ADMIN — PANTOPRAZOLE SODIUM 80 MG: 40 INJECTION, POWDER, FOR SOLUTION INTRAVENOUS at 19:29

## 2022-02-01 RX ADMIN — VANCOMYCIN HYDROCHLORIDE 1750 MG: 5 INJECTION, POWDER, LYOPHILIZED, FOR SOLUTION INTRAVENOUS at 20:09

## 2022-02-01 RX ADMIN — SODIUM CHLORIDE 20 ML: 9 INJECTION INTRAVENOUS at 19:30

## 2022-02-01 RX ADMIN — SODIUM CHLORIDE 1000 ML: 9 INJECTION, SOLUTION INTRAVENOUS at 19:14

## 2022-02-01 RX ADMIN — SODIUM BICARBONATE 100 MEQ: 84 INJECTION, SOLUTION INTRAVENOUS at 19:20

## 2022-02-01 RX ADMIN — ONDANSETRON 4 MG: 2 INJECTION INTRAMUSCULAR; INTRAVENOUS at 20:55

## 2022-02-01 RX ADMIN — DEXTROSE MONOHYDRATE 5 MCG/MIN: 50 INJECTION, SOLUTION INTRAVENOUS at 19:16

## 2022-02-01 RX ADMIN — SODIUM BICARBONATE: 84 INJECTION, SOLUTION INTRAVENOUS at 19:25

## 2022-02-01 NOTE — ED NOTES
Bed: 17  Expected date:   Expected time:   Means of arrival:   Comments:  ems     Elaine Morrison RN  02/01/22 [id number]

## 2022-02-02 ENCOUNTER — APPOINTMENT (OUTPATIENT)
Dept: GENERAL RADIOLOGY | Age: 82
DRG: 871 | End: 2022-02-02
Payer: COMMERCIAL

## 2022-02-02 LAB
ALBUMIN SERPL-MCNC: 2.8 G/DL (ref 2.8–4.4)
ALP BLD-CCNC: 209 U/L (ref 0–249)
ALT SERPL-CCNC: 19 U/L (ref 0–32)
ANION GAP SERPL CALCULATED.3IONS-SCNC: 16 MMOL/L (ref 7–16)
ANION GAP SERPL CALCULATED.3IONS-SCNC: 21 MMOL/L (ref 7–16)
AST SERPL-CCNC: 33 U/L (ref 0–31)
BASOPHILS ABSOLUTE: 0 E9/L (ref 0.1–0.4)
BASOPHILS RELATIVE PERCENT: 0 % (ref 0–2)
BETA-HYDROXYBUTYRATE: 0.34 MMOL/L (ref 0.02–0.27)
BILIRUB SERPL-MCNC: 0.8 MG/DL (ref 2–6)
BUN BLDV-MCNC: 26 MG/DL (ref 4–19)
BUN BLDV-MCNC: 28 MG/DL (ref 4–19)
BURR CELLS: ABNORMAL
CALCIUM SERPL-MCNC: 7.7 MG/DL (ref 8.6–10.2)
CALCIUM SERPL-MCNC: 7.7 MG/DL (ref 8.6–10.2)
CHLORIDE BLD-SCNC: 100 MMOL/L (ref 98–107)
CHLORIDE BLD-SCNC: 101 MMOL/L (ref 98–107)
CK MB: 8.5 NG/ML (ref 0–4.3)
CO2: 18 MMOL/L (ref 22–29)
CO2: 22 MMOL/L (ref 22–29)
CORTISOL TOTAL: 19.61 MCG/DL (ref 2.68–18.4)
CREAT SERPL-MCNC: 0.8 MG/DL (ref 0.4–0.7)
CREAT SERPL-MCNC: 0.9 MG/DL (ref 0.4–0.7)
EKG ATRIAL RATE: 122 BPM
EKG Q-T INTERVAL: 278 MS
EKG QRS DURATION: 78 MS
EKG QTC CALCULATION (BAZETT): 383 MS
EKG R AXIS: 44 DEGREES
EKG T AXIS: -126 DEGREES
EKG VENTRICULAR RATE: 114 BPM
EOSINOPHILS ABSOLUTE: 0 E9/L (ref 0.1–0.7)
EOSINOPHILS RELATIVE PERCENT: 0 % (ref 0–12)
GFR AFRICAN AMERICAN: >60
GFR AFRICAN AMERICAN: >60
GFR NON-AFRICAN AMERICAN: >60 ML/MIN/1.73
GFR NON-AFRICAN AMERICAN: >60 ML/MIN/1.73
GLUCOSE BLD-MCNC: 278 MG/DL (ref 70–110)
GLUCOSE BLD-MCNC: 353 MG/DL (ref 70–110)
HCT VFR BLD CALC: 41.6 % (ref 45–66)
HCT VFR BLD CALC: 41.6 % (ref 45–66)
HCT VFR BLD CALC: 42.4 % (ref 45–66)
HCT VFR BLD CALC: 43.3 % (ref 45–66)
HEMOGLOBIN: 13.6 G/DL (ref 14.5–22)
HEMOGLOBIN: 13.8 G/DL (ref 14.5–22)
HEMOGLOBIN: 13.9 G/DL (ref 14.5–22)
HEMOGLOBIN: 14.1 G/DL (ref 14.5–22)
LACTIC ACID: 10.3 MMOL/L (ref 0.5–2.2)
LACTIC ACID: 6.9 MMOL/L (ref 0.5–2.2)
LACTIC ACID: 9.2 MMOL/L (ref 0.5–2.2)
LYMPHOCYTES ABSOLUTE: 0.49 E9/L (ref 3–15)
LYMPHOCYTES RELATIVE PERCENT: 0.8 % (ref 15–60)
MAGNESIUM: 1.9 MG/DL (ref 1.6–2.6)
MAGNESIUM: 2.4 MG/DL (ref 1.6–2.6)
MCH RBC QN AUTO: 29.4 PG (ref 30–42)
MCHC RBC AUTO-ENTMCNC: 32.1 % (ref 29–37)
MCV RBC AUTO: 91.5 FL (ref 95–121)
METER GLUCOSE: 121 MG/DL (ref 74–99)
METER GLUCOSE: 146 MG/DL (ref 74–99)
METER GLUCOSE: 256 MG/DL (ref 70–110)
METER GLUCOSE: 277 MG/DL (ref 70–110)
METER GLUCOSE: 308 MG/DL (ref 70–110)
MONOCYTES ABSOLUTE: 1.47 E9/L (ref 1–3)
MONOCYTES RELATIVE PERCENT: 3.2 % (ref 3–15)
NEUTROPHILS ABSOLUTE: 47.14 E9/L (ref 5–20)
NEUTROPHILS RELATIVE PERCENT: 96 % (ref 15–80)
NUCLEATED RED BLOOD CELLS: 0 /100 WBC
OVALOCYTES: ABNORMAL
PDW BLD-RTO: 16.2 FL (ref 11–19)
PHOSPHORUS: 3.1 MG/DL (ref 2.5–4.5)
PLATELET # BLD: 296 E9/L (ref 130–480)
PMV BLD AUTO: 12.4 FL (ref 7–12)
POIKILOCYTES: ABNORMAL
POLYCHROMASIA: ABNORMAL
POTASSIUM REFLEX MAGNESIUM: 3.9 MMOL/L (ref 3.4–4.5)
POTASSIUM SERPL-SCNC: 3.7 MMOL/L (ref 3.4–4.5)
RBC # BLD: 4.73 E12/L (ref 3.7–5.3)
SODIUM BLD-SCNC: 139 MMOL/L (ref 132–146)
SODIUM BLD-SCNC: 139 MMOL/L (ref 132–146)
T4 FREE: 1.49 NG/DL (ref 0.93–1.7)
TOTAL CK: 98 U/L (ref 20–180)
TOTAL PROTEIN: 4.8 G/DL (ref 6.4–8.3)
TROPONIN, HIGH SENSITIVITY: 119 NG/L (ref 0–9)
WBC # BLD: 49.1 E9/L (ref 9.4–34)

## 2022-02-02 PROCEDURE — 6370000000 HC RX 637 (ALT 250 FOR IP): Performed by: INTERNAL MEDICINE

## 2022-02-02 PROCEDURE — 85025 COMPLETE CBC W/AUTO DIFF WBC: CPT

## 2022-02-02 PROCEDURE — 85018 HEMOGLOBIN: CPT

## 2022-02-02 PROCEDURE — 2580000003 HC RX 258: Performed by: INTERNAL MEDICINE

## 2022-02-02 PROCEDURE — 82728 ASSAY OF FERRITIN: CPT

## 2022-02-02 PROCEDURE — 83605 ASSAY OF LACTIC ACID: CPT

## 2022-02-02 PROCEDURE — 2500000003 HC RX 250 WO HCPCS: Performed by: INTERNAL MEDICINE

## 2022-02-02 PROCEDURE — 36415 COLL VENOUS BLD VENIPUNCTURE: CPT

## 2022-02-02 PROCEDURE — 82607 VITAMIN B-12: CPT

## 2022-02-02 PROCEDURE — 36592 COLLECT BLOOD FROM PICC: CPT

## 2022-02-02 PROCEDURE — C9113 INJ PANTOPRAZOLE SODIUM, VIA: HCPCS | Performed by: INTERNAL MEDICINE

## 2022-02-02 PROCEDURE — 2580000003 HC RX 258: Performed by: EMERGENCY MEDICINE

## 2022-02-02 PROCEDURE — 80048 BASIC METABOLIC PNL TOTAL CA: CPT

## 2022-02-02 PROCEDURE — 87081 CULTURE SCREEN ONLY: CPT

## 2022-02-02 PROCEDURE — 83550 IRON BINDING TEST: CPT

## 2022-02-02 PROCEDURE — 6360000002 HC RX W HCPCS: Performed by: STUDENT IN AN ORGANIZED HEALTH CARE EDUCATION/TRAINING PROGRAM

## 2022-02-02 PROCEDURE — 6360000002 HC RX W HCPCS: Performed by: INTERNAL MEDICINE

## 2022-02-02 PROCEDURE — 6360000002 HC RX W HCPCS: Performed by: SPECIALIST

## 2022-02-02 PROCEDURE — 82746 ASSAY OF FOLIC ACID SERUM: CPT

## 2022-02-02 PROCEDURE — 84484 ASSAY OF TROPONIN QUANT: CPT

## 2022-02-02 PROCEDURE — 87040 BLOOD CULTURE FOR BACTERIA: CPT

## 2022-02-02 PROCEDURE — 82533 TOTAL CORTISOL: CPT

## 2022-02-02 PROCEDURE — 83735 ASSAY OF MAGNESIUM: CPT

## 2022-02-02 PROCEDURE — 84100 ASSAY OF PHOSPHORUS: CPT

## 2022-02-02 PROCEDURE — 2500000003 HC RX 250 WO HCPCS: Performed by: EMERGENCY MEDICINE

## 2022-02-02 PROCEDURE — 71045 X-RAY EXAM CHEST 1 VIEW: CPT

## 2022-02-02 PROCEDURE — 82550 ASSAY OF CK (CPK): CPT

## 2022-02-02 PROCEDURE — 80053 COMPREHEN METABOLIC PANEL: CPT

## 2022-02-02 PROCEDURE — 6360000002 HC RX W HCPCS: Performed by: EMERGENCY MEDICINE

## 2022-02-02 PROCEDURE — 85014 HEMATOCRIT: CPT

## 2022-02-02 PROCEDURE — 82962 GLUCOSE BLOOD TEST: CPT

## 2022-02-02 PROCEDURE — 82553 CREATINE MB FRACTION: CPT

## 2022-02-02 PROCEDURE — 83540 ASSAY OF IRON: CPT

## 2022-02-02 PROCEDURE — 82010 KETONE BODYS QUAN: CPT

## 2022-02-02 PROCEDURE — 87088 URINE BACTERIA CULTURE: CPT

## 2022-02-02 PROCEDURE — 99233 SBSQ HOSP IP/OBS HIGH 50: CPT | Performed by: INTERNAL MEDICINE

## 2022-02-02 PROCEDURE — 6360000002 HC RX W HCPCS

## 2022-02-02 PROCEDURE — 2000000000 HC ICU R&B

## 2022-02-02 RX ORDER — 0.9 % SODIUM CHLORIDE 0.9 %
1000 INTRAVENOUS SOLUTION INTRAVENOUS ONCE
Status: COMPLETED | OUTPATIENT
Start: 2022-02-02 | End: 2022-02-02

## 2022-02-02 RX ORDER — SODIUM CHLORIDE 9 MG/ML
50 INJECTION, SOLUTION INTRAVENOUS ONCE
Status: COMPLETED | OUTPATIENT
Start: 2022-02-02 | End: 2022-02-02

## 2022-02-02 RX ORDER — SODIUM CHLORIDE 0.9 % (FLUSH) 0.9 %
5-40 SYRINGE (ML) INJECTION PRN
Status: DISCONTINUED | OUTPATIENT
Start: 2022-02-02 | End: 2022-02-09 | Stop reason: HOSPADM

## 2022-02-02 RX ORDER — PANTOPRAZOLE SODIUM 40 MG/10ML
40 INJECTION, POWDER, LYOPHILIZED, FOR SOLUTION INTRAVENOUS 2 TIMES DAILY
Status: DISCONTINUED | OUTPATIENT
Start: 2022-02-02 | End: 2022-02-04

## 2022-02-02 RX ORDER — POTASSIUM CHLORIDE 29.8 MG/ML
20 INJECTION INTRAVENOUS ONCE
Status: COMPLETED | OUTPATIENT
Start: 2022-02-02 | End: 2022-02-02

## 2022-02-02 RX ORDER — DIGOXIN 125 MCG
125 TABLET ORAL DAILY
Status: DISCONTINUED | OUTPATIENT
Start: 2022-02-02 | End: 2022-02-09 | Stop reason: HOSPADM

## 2022-02-02 RX ORDER — 0.9 % SODIUM CHLORIDE 0.9 %
250 INTRAVENOUS SOLUTION INTRAVENOUS ONCE
Status: COMPLETED | OUTPATIENT
Start: 2022-02-02 | End: 2022-02-02

## 2022-02-02 RX ORDER — ONDANSETRON 4 MG/1
4 TABLET, ORALLY DISINTEGRATING ORAL EVERY 8 HOURS PRN
Status: DISCONTINUED | OUTPATIENT
Start: 2022-02-02 | End: 2022-02-09 | Stop reason: HOSPADM

## 2022-02-02 RX ORDER — SODIUM CHLORIDE 9 MG/ML
25 INJECTION, SOLUTION INTRAVENOUS PRN
Status: DISCONTINUED | OUTPATIENT
Start: 2022-02-02 | End: 2022-02-09 | Stop reason: HOSPADM

## 2022-02-02 RX ORDER — ACETAMINOPHEN 325 MG/1
650 TABLET ORAL EVERY 4 HOURS PRN
Status: DISCONTINUED | OUTPATIENT
Start: 2022-02-02 | End: 2022-02-02 | Stop reason: SDUPTHER

## 2022-02-02 RX ORDER — DIVALPROEX SODIUM 125 MG/1
125 CAPSULE, COATED PELLETS ORAL NIGHTLY
Status: DISCONTINUED | OUTPATIENT
Start: 2022-02-02 | End: 2022-02-09 | Stop reason: HOSPADM

## 2022-02-02 RX ORDER — SODIUM CHLORIDE 0.9 % (FLUSH) 0.9 %
5-40 SYRINGE (ML) INJECTION EVERY 12 HOURS SCHEDULED
Status: DISCONTINUED | OUTPATIENT
Start: 2022-02-02 | End: 2022-02-09 | Stop reason: HOSPADM

## 2022-02-02 RX ORDER — FLUCONAZOLE 100 MG/1
200 TABLET ORAL DAILY
Status: DISCONTINUED | OUTPATIENT
Start: 2022-02-02 | End: 2022-02-02

## 2022-02-02 RX ORDER — MEMANTINE HYDROCHLORIDE 5 MG/1
5 TABLET ORAL DAILY
Status: DISCONTINUED | OUTPATIENT
Start: 2022-02-02 | End: 2022-02-09 | Stop reason: HOSPADM

## 2022-02-02 RX ORDER — CITALOPRAM 20 MG/1
10 TABLET ORAL DAILY
Status: DISCONTINUED | OUTPATIENT
Start: 2022-02-02 | End: 2022-02-09 | Stop reason: HOSPADM

## 2022-02-02 RX ORDER — ONDANSETRON 2 MG/ML
4 INJECTION INTRAMUSCULAR; INTRAVENOUS EVERY 6 HOURS PRN
Status: DISCONTINUED | OUTPATIENT
Start: 2022-02-02 | End: 2022-02-09 | Stop reason: HOSPADM

## 2022-02-02 RX ORDER — FLUCONAZOLE 2 MG/ML
200 INJECTION, SOLUTION INTRAVENOUS DAILY
Status: DISCONTINUED | OUTPATIENT
Start: 2022-02-02 | End: 2022-02-03

## 2022-02-02 RX ORDER — INSULIN GLARGINE 100 [IU]/ML
15 INJECTION, SOLUTION SUBCUTANEOUS ONCE
Status: COMPLETED | OUTPATIENT
Start: 2022-02-02 | End: 2022-02-02

## 2022-02-02 RX ORDER — MAGNESIUM SULFATE IN WATER 40 MG/ML
2000 INJECTION, SOLUTION INTRAVENOUS PRN
Status: DISCONTINUED | OUTPATIENT
Start: 2022-02-02 | End: 2022-02-09 | Stop reason: HOSPADM

## 2022-02-02 RX ORDER — SODIUM CHLORIDE 9 MG/ML
INJECTION, SOLUTION INTRAVENOUS CONTINUOUS
Status: DISCONTINUED | OUTPATIENT
Start: 2022-02-02 | End: 2022-02-03

## 2022-02-02 RX ORDER — MAGNESIUM SULFATE IN WATER 40 MG/ML
2000 INJECTION, SOLUTION INTRAVENOUS ONCE
Status: COMPLETED | OUTPATIENT
Start: 2022-02-02 | End: 2022-02-02

## 2022-02-02 RX ORDER — ACETAMINOPHEN 650 MG/1
650 SUPPOSITORY RECTAL EVERY 6 HOURS PRN
Status: DISCONTINUED | OUTPATIENT
Start: 2022-02-02 | End: 2022-02-09 | Stop reason: HOSPADM

## 2022-02-02 RX ORDER — POLYETHYLENE GLYCOL 3350 17 G/17G
17 POWDER, FOR SOLUTION ORAL DAILY PRN
Status: DISCONTINUED | OUTPATIENT
Start: 2022-02-02 | End: 2022-02-05

## 2022-02-02 RX ORDER — ACETAMINOPHEN 325 MG/1
650 TABLET ORAL EVERY 6 HOURS PRN
Status: DISCONTINUED | OUTPATIENT
Start: 2022-02-02 | End: 2022-02-09 | Stop reason: HOSPADM

## 2022-02-02 RX ORDER — SODIUM CHLORIDE 9 MG/ML
10 INJECTION INTRAVENOUS 2 TIMES DAILY
Status: DISCONTINUED | OUTPATIENT
Start: 2022-02-02 | End: 2022-02-04

## 2022-02-02 RX ORDER — POTASSIUM CHLORIDE 7.45 MG/ML
10 INJECTION INTRAVENOUS PRN
Status: DISCONTINUED | OUTPATIENT
Start: 2022-02-02 | End: 2022-02-03

## 2022-02-02 RX ADMIN — SODIUM CHLORIDE 50 ML: 9 INJECTION, SOLUTION INTRAVENOUS at 02:10

## 2022-02-02 RX ADMIN — SODIUM CHLORIDE 1000 ML: 9 INJECTION, SOLUTION INTRAVENOUS at 12:38

## 2022-02-02 RX ADMIN — SODIUM CHLORIDE 250 ML: 9 INJECTION, SOLUTION INTRAVENOUS at 09:28

## 2022-02-02 RX ADMIN — DEXTROSE MONOHYDRATE 30 MCG/MIN: 50 INJECTION, SOLUTION INTRAVENOUS at 11:25

## 2022-02-02 RX ADMIN — SODIUM BICARBONATE: 84 INJECTION, SOLUTION INTRAVENOUS at 07:51

## 2022-02-02 RX ADMIN — POTASSIUM CHLORIDE 20 MEQ: 29.8 INJECTION INTRAVENOUS at 08:35

## 2022-02-02 RX ADMIN — Medication: at 12:38

## 2022-02-02 RX ADMIN — HYDROCORTISONE SODIUM SUCCINATE 50 MG: 100 INJECTION, POWDER, FOR SOLUTION INTRAMUSCULAR; INTRAVENOUS at 20:20

## 2022-02-02 RX ADMIN — Medication 10 ML: at 20:19

## 2022-02-02 RX ADMIN — INSULIN LISPRO 2 UNITS: 100 INJECTION, SOLUTION INTRAVENOUS; SUBCUTANEOUS at 16:09

## 2022-02-02 RX ADMIN — INSULIN LISPRO 6 UNITS: 100 INJECTION, SOLUTION INTRAVENOUS; SUBCUTANEOUS at 12:21

## 2022-02-02 RX ADMIN — PANTOPRAZOLE SODIUM 40 MG: 40 INJECTION, POWDER, FOR SOLUTION INTRAVENOUS at 08:36

## 2022-02-02 RX ADMIN — PANTOPRAZOLE SODIUM 40 MG: 40 INJECTION, POWDER, FOR SOLUTION INTRAVENOUS at 20:19

## 2022-02-02 RX ADMIN — INSULIN GLARGINE 15 UNITS: 100 INJECTION, SOLUTION SUBCUTANEOUS at 13:46

## 2022-02-02 RX ADMIN — Medication 10 ML: at 09:29

## 2022-02-02 RX ADMIN — PIPERACILLIN AND TAZOBACTAM 3375 MG: 3; .375 INJECTION, POWDER, LYOPHILIZED, FOR SOLUTION INTRAVENOUS at 08:34

## 2022-02-02 RX ADMIN — ANTI-INHIBITOR COAGULANT COMPLEX 4000 UNITS: KIT at 01:50

## 2022-02-02 RX ADMIN — FLUCONAZOLE 200 MG: 200 INJECTION, SOLUTION INTRAVENOUS at 21:11

## 2022-02-02 RX ADMIN — SODIUM CHLORIDE, PRESERVATIVE FREE 10 ML: 5 INJECTION INTRAVENOUS at 08:36

## 2022-02-02 RX ADMIN — SODIUM CHLORIDE, PRESERVATIVE FREE 10 ML: 5 INJECTION INTRAVENOUS at 20:20

## 2022-02-02 RX ADMIN — MAGNESIUM SULFATE HEPTAHYDRATE 2000 MG: 40 INJECTION, SOLUTION INTRAVENOUS at 08:36

## 2022-02-02 RX ADMIN — PIPERACILLIN SODIUM AND TAZOBACTAM SODIUM 4500 MG: 4; .5 INJECTION, POWDER, LYOPHILIZED, FOR SOLUTION INTRAVENOUS at 00:16

## 2022-02-02 RX ADMIN — INSULIN LISPRO 4 UNITS: 100 INJECTION, SOLUTION INTRAVENOUS; SUBCUTANEOUS at 02:06

## 2022-02-02 RX ADMIN — INSULIN LISPRO 6 UNITS: 100 INJECTION, SOLUTION INTRAVENOUS; SUBCUTANEOUS at 08:38

## 2022-02-02 RX ADMIN — HYDROCORTISONE SODIUM SUCCINATE 50 MG: 100 INJECTION, POWDER, FOR SOLUTION INTRAMUSCULAR; INTRAVENOUS at 13:47

## 2022-02-02 RX ADMIN — PIPERACILLIN AND TAZOBACTAM 3375 MG: 3; .375 INJECTION, POWDER, LYOPHILIZED, FOR SOLUTION INTRAVENOUS at 16:00

## 2022-02-02 ASSESSMENT — PAIN SCALES - PAIN ASSESSMENT IN ADVANCED DEMENTIA (PAINAD)
NEGVOCALIZATION: 0
BODYLANGUAGE: 0
BODYLANGUAGE: 0
TOTALSCORE: 0
TOTALSCORE: 0
CONSOLABILITY: 0
CONSOLABILITY: 0
BODYLANGUAGE: 0
CONSOLABILITY: 0
BREATHING: 0
TOTALSCORE: 0
BREATHING: 0
FACIALEXPRESSION: 0
BREATHING: 0
FACIALEXPRESSION: 0
FACIALEXPRESSION: 0
NEGVOCALIZATION: 0
BREATHING: 0
BODYLANGUAGE: 0
BODYLANGUAGE: 0
NEGVOCALIZATION: 0
TOTALSCORE: 0
BREATHING: 0
CONSOLABILITY: 0
TOTALSCORE: 0
TOTALSCORE: 0
NEGVOCALIZATION: 0
CONSOLABILITY: 0
FACIALEXPRESSION: 0
BODYLANGUAGE: 0
CONSOLABILITY: 0
TOTALSCORE: 0
BODYLANGUAGE: 0
CONSOLABILITY: 0
NEGVOCALIZATION: 0
FACIALEXPRESSION: 0
NEGVOCALIZATION: 0
NEGVOCALIZATION: 0
FACIALEXPRESSION: 0
NEGVOCALIZATION: 0
BODYLANGUAGE: 0
CONSOLABILITY: 0
BREATHING: 0
TOTALSCORE: 0

## 2022-02-02 ASSESSMENT — PAIN SCALES - GENERAL
PAINLEVEL_OUTOF10: 0

## 2022-02-02 NOTE — PATIENT CARE CONFERENCE
.  Intensive Care Daily Quality Rounding Checklist      ICU Team Members: n/a    ICU Day #: NUMBER: 2    Intubation Date:  n/a    Ventilator Day #: n/a    Central Line Insertion Date: February 1        Day #: NUMBER: 2     Arterial Line Insertion Date:  n/a      Day #: n/a    Temporary Hemodialysis Catheter Insertion Date: n/a       Day # n/a    DVT Prophylaxis: was on eliquis/gi bleeding now    GI Prophylaxis: protonix iv was given times 1 in er    Segura Catheter Insertion Date: February 1       Day #: 2      Continued need (if yes, reason documented and discussed with physician): yes, strict I/o/reddened buttocks    Skin Issues/ Wounds and ordered treatment discussed on rounds:  y wound  cleft    Goals/ Plans for the Day: control g.i. bleeding/monitor and replace elytes/monitort and stablize vitals/kep family updated.

## 2022-02-02 NOTE — ED PROVIDER NOTES
Chief Complaint   Patient presents with    Altered Mental Status     found by staff at SNF in bed unresponsive with pulse ox 64%, placed on 2L NC, 6L NC via EMS- pt more responsive. also ? [facility] staff reported blood clots in stool. Patient is a 73-year-old female with a history of dementia presents today for altered mental status and decreased responsiveness from EMS from nursing home. Initially reported that she was hypoxic on arrival and she was more somnolent. EMS also reports that they were told of dark tarry stools have been ongoing for unknown time. On arrival patient is disoriented, she is not verbal.  She is stable on 6 L nasal cannula. Noted to be hypotensive and tachycardic. No recent fall, trauma or injury per EMS. History limited due to patient's mental status. I did speak with patient's daughter who did come to the department, she was recently hospitalized was discharged back to facility facility recently. Daughter states she would not want intubation at this time. The history is provided by the EMS personnel and a relative. No  was used. Review of Systems   Unable to perform ROS: Acuity of condition        Physical Exam  Vitals and nursing note reviewed. Constitutional:       General: She is in acute distress. Appearance: She is well-developed. She is ill-appearing. HENT:      Head: Normocephalic and atraumatic. Mouth/Throat:      Mouth: Mucous membranes are dry. Eyes:      Pupils: Pupils are equal, round, and reactive to light. Cardiovascular:      Rate and Rhythm: Tachycardia present. Rhythm irregular. Pulses: Normal pulses. Heart sounds: Normal heart sounds. Pulmonary:      Effort: Respiratory distress present. Breath sounds: No wheezing or rales. Abdominal:      General: Bowel sounds are normal.      Palpations: Abdomen is soft. Tenderness: There is no abdominal tenderness. There is no guarding or rebound. Musculoskeletal:      Cervical back: Normal range of motion and neck supple. No rigidity or tenderness. Right lower leg: No edema. Left lower leg: No edema. Skin:     General: Skin is warm and dry. Capillary Refill: Capillary refill takes less than 2 seconds. Findings: No erythema. Neurological:      Mental Status: She is disoriented. Comments: Response to painful stimuli  Disoriented          Procedures     Central Line Placement Procedure Note    Indication: poor peripheral access    Consent: Unable to be obtained due to the emergent nature of this procedure. Procedure: The patient was positioned appropriately and the skin over the right femoral vein was prepped with betadine and draped in a sterile fashion. Local anesthesia was not performed due to the emergent nature of this procedure. A large bore needle was used to identify the vein. A guide wire was then inserted into the vein through the needle. A triple lumen catheter was then inserted into the vessel over the guide wire using the Seldinger technique. All ports showed good, free flowing blood return and were flushed with saline solution. The catheter was then securely fastened to the skin with suture at 18 cm. Two sutures were placed into the kit included tube clamp, proximal eyelets and a suture end from each of the securing sutures was extended around the catheter and tied to the proximal eyelets as an added measure to prevent dislodgement. An antibiotic disk was placed and the site was then covered with a sterile dressing. A post procedure X-ray was not indicated. The patient tolerated the procedure well.     Complications: None          Labs Reviewed   COVID-19, RAPID - Abnormal; Notable for the following components:       Result Value    SARS-CoV-2, NAAT DETECTED (*)     All other components within normal limits   CBC WITH AUTO DIFFERENTIAL - Abnormal; Notable for the following components:    WBC 32.7 (*) MCHC 31.7 (*)     RDW 15.2 (*)     Neutrophils % 96.5 (*)     Lymphocytes % 0.9 (*)     Monocytes % 1.7 (*)     Neutrophils Absolute 31.72 (*)     Lymphocytes Absolute 0.33 (*)     Eosinophils Absolute 0.00 (*)     All other components within normal limits   BASIC METABOLIC PANEL - Abnormal; Notable for the following components:    CO2 14 (*)     Anion Gap 19 (*)     Glucose 255 (*)     BUN 30 (*)     Calcium 8.5 (*)     All other components within normal limits   HEPATIC FUNCTION PANEL - Abnormal; Notable for the following components:     Total Protein 5.2 (*)     Albumin 3.0 (*)     Alkaline Phosphatase 161 (*)     Bilirubin, Direct 0.4 (*)     All other components within normal limits   LACTIC ACID, PLASMA - Abnormal; Notable for the following components:    Lactic Acid 6.8 (*)     All other components within normal limits    Narrative:     CALL  [facility] tel. [phone],  Chemistry results called to and read back by elma conner rn, 02/01/2022  20:34, by Irma Galvan - Abnormal; Notable for the following components:    Protime 17.8 (*)     All other components within normal limits   BRAIN NATRIURETIC PEPTIDE - Abnormal; Notable for the following components:    Pro-BNP 3,700 (*)     All other components within normal limits   TROPONIN - Abnormal; Notable for the following components:    Troponin, High Sensitivity 101 (*)     All other components within normal limits   URINALYSIS - Abnormal; Notable for the following components:    Color, UA DARK YELLOW (*)     Clarity, UA CLOUDY (*)     Glucose, Ur 100 (*)     Bilirubin Urine MODERATE (*)     Ketones, Urine 15 (*)     Blood, Urine LARGE (*)     Protein, UA 30 (*)     Urobilinogen, Urine >=8.0 (*)     Nitrite, Urine POSITIVE (*)     Leukocyte Esterase, Urine LARGE (*)     All other components within normal limits   DIGOXIN LEVEL - Abnormal; Notable for the following components:    Digoxin Lvl 0.5 (*)     All other components within normal limits   TSH WITHOUT REFLEX - Abnormal; Notable for the following components:    TSH 6.680 (*)     All other components within normal limits   CORTISOL TOTAL - Abnormal; Notable for the following components:    Cortisol 21.40 (*)     All other components within normal limits   PROCALCITONIN - Abnormal; Notable for the following components:    Procalcitonin 0.09 (*)     All other components within normal limits   BLOOD GAS, ARTERIAL - Abnormal; Notable for the following components:    PCO2 18.2 (*)     HCO3 11.7 (*)     B.E. -9.8 (*)     All other components within normal limits   MICROSCOPIC URINALYSIS - Abnormal; Notable for the following components:    WBC, UA 10-20 (*)     RBC, UA 5-10 (*)     Bacteria, UA MANY (*)     All other components within normal limits   CULTURE, URINE   MAGNESIUM   LIPASE   APTT   C-REACTIVE PROTEIN   HEMOGLOBIN AND HEMATOCRIT, BLOOD   ARTERIAL BLOOD GAS, RESPIRATORY ONLY   T4, FREE   LACTIC ACID, PLASMA    Narrative:     Draw gray top tube-place on ice.~Separate plasma-keep cold. TYPE AND SCREEN   PREPARE RBC (CROSSMATCH)     CT ABDOMEN PELVIS W IV CONTRAST Additional Contrast? None   Final Result   CTA OF THE CHEST:      1. No pulmonary embolism detected. Suboptimal visualization of the right   lower lobe pulmonary arteries due to respiratory motion artifact. 2.  No acute cardiopulmonary abnormality. 3. Small hernia. CT OF THE ABDOMEN AND PELVIS:      1. Small amount of GAS IN THE ENDOMETRIAL CAVITY of unclear etiology. Clinical correlation for infection is recommended. A rectouterine fistula is   not detected but cannot be excluded. If indicated, very minimally may be   obtained for further evaluation. 2. QUESTIONABLE MURAL THICKENING in the distal descending and proximal   sigmoid colon, which may signify colitis or simply represent an artifact of   under distension. 3. The small amount of free fluid seen in the left paracolic gutter.    4. Moderate fecal retention in the colon, placed, patient was started on IV fluids and Levophed. Labs and imaging obtained. Lab work pertinent for white count of 32,000, UA shows evidence of urinary infection. Covid test is positive, however she has been positive for Covid for the past 2 weeks. Initial lactic of 6.8. Troponin of 101, EKG shows ischemic changes throughout, likely demand ischemia. Electrolytes are within normal limits. Patient is stable on 6 L nasal cannula. Did have discussion with patient's daughter, daughter states she is limited code, would not want chest compressions or intubation. She would want IV medications. CT head shows no acute abnormalities, CT of the chest shows no evidence of pulmonary embolism or infectious etiology. CT of the abdomen shows small amount of free fluid in the paracolic gutter with questionable bowel wall thickening in the descending and proximal sigmoid colon concerning for colitis. They do also note small amount of gas in the endometrial cavity of unclear etiology. Patient was given 1 unit PRBC, Protonix and broad-spectrum antibiotics. General surgery was consulted. Patient is accepted to the ICU. Patient will be admitted for further evaluation and treatment of septic shock and concern for GI bleed.        Amount and/or Complexity of Data Reviewed  Clinical lab tests: reviewed  Tests in the radiology section of CPT®: reviewed  Tests in the medicine section of CPT®: reviewed                  --------------------------------------------- PAST HISTORY ---------------------------------------------  Past Medical History:  has a past medical history of Alzheimer disease ([facility] [address] Atrial fibrillation ([facility] [address] Constipation, Dementia ([facility] [address] Diabetes mellitus ([facility] [address] Dysphagia, oropharyngeal phase, GERD (gastroesophageal reflux disease), Headache, Hyperlipidemia, Hypertension, MDD (major depressive disorder), Muscle wasting and atrophy, not elsewhere classified, unspecified site, Muscle weakness (generalized), PAF (paroxysmal atrial fibrillation) ([facility] [address] and SVT (supraventricular tachycardia) ([facility] Past Surgical History:  has a past surgical history that includes  section; Cholecystectomy, laparoscopic; Colonoscopy; and Upper gastrointestinal endoscopy. Social History:  reports that she has never smoked. She has never used smokeless tobacco. She reports that she does not drink alcohol and does not use drugs. Family History: family history includes Heart Disease in her mother. The patients home medications have been reviewed.     Allergies: Asa [aspirin]    -------------------------------------------------- RESULTS -------------------------------------------------    LABS:  Results for orders placed or performed during the hospital encounter of 22   COVID-19, Rapid    Specimen: Nasopharyngeal Swab   Result Value Ref Range    SARS-CoV-2, NAAT DETECTED (A) Not Detected   CBC Auto Differential   Result Value Ref Range    WBC 32.7 (H) 4.5 - 11.5 E9/L    RBC 4.62 3.50 - 5.50 E12/L    Hemoglobin 13.7 11.5 - 15.5 g/dL    Hematocrit 43.2 34.0 - 48.0 %    MCV 93.5 80.0 - 99.9 fL    MCH 29.7 26.0 - 35.0 pg    MCHC 31.7 (L) 32.0 - 34.5 %    RDW 15.2 (H) 11.5 - 15.0 fL    Platelets 497 115 - 428 E9/L    MPV 12.0 7.0 - 12.0 fL    Neutrophils % 96.5 (H) 43.0 - 80.0 %    Lymphocytes % 0.9 (L) 20.0 - 42.0 %    Monocytes % 1.7 (L) 2.0 - 12.0 %    Eosinophils % 0.0 0.0 - 6.0 %    Basophils % 0.0 0.0 - 2.0 %    Neutrophils Absolute 31.72 (H) 1.80 - 7.30 E9/L    Lymphocytes Absolute 0.33 (L) 1.50 - 4.00 E9/L    Monocytes Absolute 0.65 0.10 - 0.95 E9/L    Eosinophils Absolute 0.00 (L) 0.05 - 0.50 E9/L    Basophils Absolute 0.00 0.00 - 0.20 E9/L    Myelocyte Percent 0.9 0 - 0 %    nRBC 0.0 /100 WBC    Poikilocytes 1+     [place] Cells 1+    Basic Metabolic Panel   Result Value Ref Range    Sodium 134 132 - 146 mmol/L    Potassium 4.3 3.5 - 5.0 mmol/L    Chloride 101 98 - 107 mmol/L    CO2 14 (L) 22 - 29 mmol/L    Anion Procalcitonin   Result Value Ref Range    Procalcitonin 0.09 (H) 0.00 - 0.08 ng/mL   Blood Gas, Arterial   Result Value Ref Range    Date Analyzed 20220201     Time Analyzed 1904     Source: Blood Arterial     pH, Blood Gas 7.426 7.350 - 7.450    PCO2 18.2 (LL) 35.0 - 45.0 mmHg    PO2 89.6 75.0 - 100.0 mmHg    HCO3 11.7 (L) 22.0 - 26.0 mmol/L    B.E. -9.8 (L) -3.0 - 3.0 mmol/L    O2 Sat 97.2 92.0 - 98.5 %    O2Hb 96.0 94.0 - 97.0 %    COHb 0.9 0.0 - 1.5 %    MetHb 0.3 0.0 - 1.5 %    O2 Content 20.2 mL/dL    HHb 2.8 0.0 - 5.0 %    tHb (est) 14.9 11.5 - 16.5 g/dL    Mode NC-6  L     Date Of Collection      Time Collected      Pt Temp 37.0 C     ID [id number]     Lab 12712     Critical(s) Notified Handed report to Dr/RN    Microscopic Urinalysis   Result Value Ref Range    WBC, UA 10-20 (A) 0 - 5 /HPF    RBC, UA 5-10 (A) 0 - 2 /HPF    Epithelial Cells, UA RARE /HPF    Bacteria, UA MANY (A) None Seen /HPF   Hemoglobin and hematocrit, blood   Result Value Ref Range    Hemoglobin 13.9 11.5 - 15.5 g/dL    Hematocrit 42.8 34.0 - 48.0 %   EKG 12 Lead   Result Value Ref Range    Ventricular Rate 114 BPM    Atrial Rate 122 BPM    QRS Duration 78 ms    Q-T Interval 278 ms    QTc Calculation (Bazett) 383 ms    R Axis 44 degrees    T Axis -126 degrees   TYPE AND SCREEN   Result Value Ref Range    ABO/Rh A POS     Antibody Screen NEG    PREPARE RBC (CROSSMATCH), 2 Units   Result Value Ref Range    Product Code Blood Bank D0191N37     Description Blood Bank Red Blood Cells, Leuko-reduced     Unit Number [id number]     Dispense Status Blood Bank issued     Product Code Blood Bank [id number]     Description Blood Bank Red Blood Cells, Leuko-reduced     Unit Number [id number]     Dispense Status Blood Bank selected        RADIOLOGY:  CT ABDOMEN PELVIS W IV CONTRAST Additional Contrast? None   Final Result   CTA OF THE CHEST:      1. No pulmonary embolism detected.   Suboptimal visualization of the right   lower lobe pulmonary arteries due to respiratory motion artifact. 2.  No acute cardiopulmonary abnormality. 3. Small hernia. CT OF THE ABDOMEN AND PELVIS:      1. Small amount of GAS IN THE ENDOMETRIAL CAVITY of unclear etiology. Clinical correlation for infection is recommended. A rectouterine fistula is   not detected but cannot be excluded. If indicated, very minimally may be   obtained for further evaluation. 2. QUESTIONABLE MURAL THICKENING in the distal descending and proximal   sigmoid colon, which may signify colitis or simply represent an artifact of   under distension. 3. The small amount of free fluid seen in the left paracolic gutter. 4. Moderate fecal retention in the colon, some liquid. RECOMMENDATIONS:   Unavailable         CT Head WO Contrast   Final Result   No acute intracranial abnormality. Changes of encephalomalacia right parietooccipital region. Periventricular white matter changes consistent chronic microvascular   disease. Diffuse volume loss. CTA PULMONARY W CONTRAST   Final Result   CTA OF THE CHEST:      1. No pulmonary embolism detected. Suboptimal visualization of the right   lower lobe pulmonary arteries due to respiratory motion artifact. 2.  No acute cardiopulmonary abnormality. 3. Small hernia. CT OF THE ABDOMEN AND PELVIS:      1. Small amount of GAS IN THE ENDOMETRIAL CAVITY of unclear etiology. Clinical correlation for infection is recommended. A rectouterine fistula is   not detected but cannot be excluded. If indicated, very minimally may be   obtained for further evaluation. 2. QUESTIONABLE MURAL THICKENING in the distal descending and proximal   sigmoid colon, which may signify colitis or simply represent an artifact of   under distension. 3. The small amount of free fluid seen in the left paracolic gutter. 4. Moderate fecal retention in the colon, some liquid.       RECOMMENDATIONS:   Unavailable         XR CHEST PORTABLE   Final Result   No acute process. ------------------------- NURSING NOTES AND VITALS REVIEWED ---------------------------  Date / Time Roomed:  2/1/2022  6:51 PM  ED Bed Assignment:  17/17    The nursing notes within the ED encounter and vital signs as below have been reviewed. Patient Vitals for the past 24 hrs:   BP Temp Temp src Pulse Resp SpO2   02/01/22 2320 (!) 109/54 -- -- 99 19 95 %   02/01/22 2220 (!) 114/49 -- -- 97 24 99 %   02/01/22 2140 (!) 108/42 -- -- 95 23 96 %   02/01/22 2133 (!) 104/45 -- -- 102 21 98 %   02/01/22 2130 106/72 -- -- 108 20 98 %   02/01/22 2117 (!) 91/56 -- -- 93 24 97 %   02/01/22 2058 87/65 96.9 °F (36.1 °C) Axillary 111 23 96 %   02/01/22 2042 (!) 112/40 96.4 °F (35.8 °C) Axillary 115 28 100 %   02/01/22 2037 (!) 104/52 97.1 °F (36.2 °C) Axillary 93 22 100 %   02/01/22 2031 (!) 109/54 96.4 °F (35.8 °C) Axillary 95 22 100 %   02/01/22 2023 (!) 109/59 -- -- 93 26 100 %   02/01/22 2020 -- -- -- -- -- 100 %   02/01/22 2018 117/61 -- -- 91 23 100 %   02/01/22 2016 105/69 -- -- 89 20 100 %   02/01/22 2006 (!) 111/51 -- -- 102 20 92 %   02/01/22 1952 (!) 102/58 -- -- 105 24 100 %   02/01/22 1945 (!) 130/50 -- -- 104 23 100 %   02/01/22 1929 (!) 76/60 -- -- 87 22 --   02/01/22 1926 (!) 130/53 -- -- 120 24 100 %   02/01/22 1923 (!) 59/46 -- -- 113 25 100 %   02/01/22 1918 -- 96.4 °F (35.8 °C) Axillary -- -- --   02/01/22 1915 (!) 65/48 -- -- -- -- 100 %   02/01/22 1854 (!) 62/51 -- -- 114 18 100 %       Oxygen Saturation Interpretation: Abnormal    ------------------------------------------ PROGRESS NOTES ------------------------------------------    Counseling:  I have spoken with the patient and discussed todays results, in addition to providing specific details for the plan of care and counseling regarding the diagnosis and prognosis.   Their questions are answered at this time and they are agreeable with the plan of admission.    --------------------------------- ADDITIONAL PROVIDER NOTES ---------------------------------  Consultations:  Spoke with [facility].  Discussed case. They will admit the patient. This patient's ED course included: a personal history and physicial examination    This patient has remained hemodynamically stable during their ED course. Medications   0.9 % sodium chloride infusion (has no administration in time range)   piperacillin-tazobactam (ZOSYN) 4,500 mg in dextrose 5 % 100 mL IVPB (mini-bag) (has no administration in time range)   norepinephrine (LEVOPHED) 16 mg in dextrose 5 % 250 mL infusion (32 mcg/min IntraVENous Rate/Dose Change 2/1/22 2250)   sodium bicarbonate 150 mEq in dextrose 5 % 1,000 mL infusion ( IntraVENous New Bag 2/1/22 1925)   0.9 % sodium chloride bolus (0 mLs IntraVENous Stopped 2/1/22 2014)   sodium bicarbonate 8.4 % injection 100 mEq (100 mEq IntraVENous Given 2/1/22 1920)   pantoprazole (PROTONIX) injection 80 mg (80 mg IntraVENous Given 2/1/22 1929)     And   sodium chloride (PF) 0.9 % injection 20 mL (20 mLs IntraVENous Given 2/1/22 1930)   vancomycin (VANCOCIN) 1,750 mg in dextrose 5 % 500 mL IVPB (0 mg IntraVENous Stopped 2/1/22 2258)   0.9 % sodium chloride infusion ( IntraVENous New Bag 2/1/22 2058)   ondansetron (ZOFRAN) injection 4 mg (4 mg IntraVENous Given 2/1/22 2055)   iopamidol (ISOVUE-370) 76 % injection 80 mL (80 mLs IntraVENous Given 2/1/22 2205)         Diagnosis:  1. Septic shock ([address]    2. Acute cystitis with hematuria    3. Gastrointestinal hemorrhage, unspecified gastrointestinal hemorrhage type    4. Metabolic encephalopathy    5. Lactic acidosis      CRITICAL CARE:   40 MINUTES. Please note that the withdrawal or failure to initiate urgent interventions for this patient would likely result in a life threatening deterioration or permanent disability. Accordingly this patient received the above mentioned time, excluding separately billable procedures.          Disposition:  Patient's disposition: Admit to CCU/ICU  Patient's condition is stable.              Gabby Silva DO  Resident  02/01/22 7922

## 2022-02-02 NOTE — ED NOTES
Per Dr. Elvis Katz, pt is not to receive second unit of blood. Report called to Michaela Davidson RN. Pt ready for transport to [facility].      Joseph Sanchez RN  02/02/22 0001

## 2022-02-02 NOTE — DISCHARGE INSTR - COC
Continuity of Care Form    Patient Name: Maira Lopez   :  1940  MRN:  [id number]    Admit date:  2022  Discharge date:  2022    Code Status Order: Limited   Advance Directives:      Admitting Physician:  Juanita Beckett DO  PCP: Charles Mccarthy DO    Discharging Nurse: Quin Garcia [id number] Unit/Room#: 0206/0206-A  Discharging Unit Phone Number: [phone]    Emergency Contact:   Extended Emergency Contact Information  Primary Emergency Contact: [facility]  Address: [address] [place] [address] Phone: [phone]  Mobile Phone: [phone]  Relation: Child  Secondary Emergency Contact: Danie Marroquin Phone: [phone]  Mobile Phone: [phone]  Relation: Grandchild    Past Surgical History:  Past Surgical History:   Procedure Laterality Date     SECTION      CHOLECYSTECTOMY, LAPAROSCOPIC      COLONOSCOPY      UPPER GASTROINTESTINAL ENDOSCOPY         Immunization History:   Immunization History   Administered Date(s) Administered    Influenza, High Dose (Fluzone 65 yrs and older) 2015, 10/20/2017, 10/03/2018    Pneumococcal Conjugate 13-valent Jesus Ly) 2016       Active Problems:  Patient Active Problem List   Diagnosis Code    Hypertension I10    Type II diabetes mellitus ([facility] E11.9    Hyperlipidemia E78.5    GERD (gastroesophageal reflux disease) K21.9    History of cholecystectomy Z90.49    Gastroparesis K31.84    Late onset Alzheimer's disease without behavioral disturbance (HCC) G30.1, F02.80    New onset atrial fibrillation (HCC) I48.91    Atrial flutter with rapid ventricular response (HCC) I48.92    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    Severe sepsis (HCC) A41.9, R65.20    Acute urinary tract infection N39.0    Acute sepsis (HCC) N61.9    Acute metabolic encephalopathy N41.78    COVID-19 U07.1    JOSE DE JESUS (acute kidney injury) ([facility] N17.9    Atrial fibrillation with RVR ([facility] I48.91 Pneumonia due to COVID-19 virus U07.1, J12.82    Atrial fibrillation ([facility]) I48.91    Uncontrolled type 2 diabetes mellitus with hyperglycemia ([facility]) E11.65    Septic shock ([facility]) A41.9, R65.21       Isolation/Infection:   Isolation            Droplet Plus          Patient Infection Status       Infection Onset Added Last Indicated Last Indicated By Review Planned Expiration Resolved Resolved By    C-diff Rule Out 22 C. difficile toxin Molecular (Ordered)        COVID-19 22 COVID-19, Rapid 02/08/22 02/15/22      Resolved    COVID-19 (Rule Out) 22 COVID-19, Rapid (Ordered)   22 Rule-Out Test Resulted    COVID-19 22 COVID-19, Rapid   22     COVID-19 (Rule Out) 22 COVID-19, Rapid (Ordered)   22 Rule-Out Test Resulted    COVID-19 (Rule Out) 10/31/20 10/31/20 10/31/20 Covid-19 Ambulatory (Ordered)   20 Rule-Out Test Resulted    COVID-19 (Rule Out) 10/26/20 10/26/20 10/26/20 Covid-19 Ambulatory (Ordered)   10/28/20 Rule-Out Test Resulted    COVID-19 (Rule Out) 10/19/20 10/20/20 10/19/20 Covid-19 Ambulatory (Ordered)   10/21/20 Rule-Out Test Resulted    COVID-19 (Rule Out) 10/17/20 10/18/20 10/17/20 Covid-19 Ambulatory (Ordered)   10/20/20 Rule-Out Test Resulted    COVID-19 (Rule Out) 10/12/20 10/12/20 10/12/20 COVID-19 (Ordered)   10/12/20 Rule-Out Test Resulted    COVID-19 (Rule Out) 20 Covid-19 Ambulatory (Ordered)   20 Rule-Out Test Resulted    COVID-19 (Rule Out) 20 Covid-19 Ambulatory (Ordered)   20 Rule-Out Test Resulted    COVID-19 (Rule Out) 20 Covid-19 Ambulatory (Ordered)   20 Rule-Out Test Resulted            Nurse Assessment:  Last Vital Signs: BP (!) 118/58   Pulse 81   Temp 97.4 °F (36.3 °C) (Axillary)   Resp 20   Ht 5' 4\" (1.626 m)   Wt 189 lb 6 oz (85.9 kg)   LMP (LMP Unknown)   SpO2 99%   BMI 32.51 kg/m²     Last documented pain score (0-10 scale): Pain Level: 0  Last Weight:   Wt Readings from Last 1 Encounters:   02/02/22 189 lb 6 oz (85.9 kg)     Mental Status:  Responds to painful stimulus     IV Access:  None     Nursing Mobility/ADLs:  Walking     Transfer    Bathing    Dressing    Feeding    Med Admin    Med Delivery   Crush in pudding if patient is alert    Wound Care Documentation and Therapy:  Wound 10/13/20 Heel Left;Lateral (Active)   Number of days: 477       Wound 01/24/22 Coccyx (Active)   Wound Cleansed Cleansed with saline 02/02/22 1200   Dressing/Treatment Open to air 02/02/22 1200   Wound Length (cm) 2 cm 02/02/22 0043   Wound Width (cm) 0.5 cm 02/02/22 0043   Wound Depth (cm) 0.2 cm 02/02/22 0043   Wound Surface Area (cm^2) 1 cm^2 02/02/22 0043   Change in Wound Size % (l*w) -2400 02/02/22 0043   Wound Volume (cm^3) 0.2 cm^3 02/02/22 0043   Wound Healing % -2400 02/02/22 0043   Wound Assessment Pink/red 02/02/22 1200   Drainage Amount None 02/02/22 1200   Odor None 02/02/22 1200   Christie-wound Assessment Excoriated 02/02/22 1200   Number of days: 9        Elimination:  Continence: Bowel: No  Bladder: No  Urinary Catheter: No   Colostomy/Ileostomy/Ileal Conduit: No        Date of Last BM: 2/8/2022    Intake/Output Summary (Last 24 hours) at 2/2/2022 1514  Last data filed at 2/2/2022 1200  Gross per 24 hour   Intake 2740 ml   Output 1225 ml   Net 1515 ml     I/O last 3 completed shifts:   In: 1000 [Blood:1000]  Out: 925 [Urine:575; Emesis/NG output:150; Stool:200]    Safety Concerns:         Impairments/Disabilities:          Nutrition Therapy:  Current Nutrition Therapy:   Pureed pleasure feedings if alert     Routes of Feeding: Liquids:   Daily Fluid Restriction:   Last Modified Barium Swallow with Video (Video Swallowing Test):     Treatments at the Time of Hospital Discharge:   Respiratory Treatments: ***  Oxygen Therapy:  Room air   Ventilator: Rehab Therapies:  Weight Bearing Status/Restrictions: Other Medical Equipment (for information only, NOT a DME order):      Patient's personal belongings (please select all that are sent with patient):      RN SIGNATURERayna Hopkins RN     CASE MANAGEMENT/SOCIAL WORK SECTION    Inpatient Status Date: 2/1/2022    Readmission Risk Assessment Score:  Readmission Risk              Risk of Unplanned Readmission:  26           Discharging to Facility/ Agency   Name: [facility]  Address: [address], [address] [place] [address]  Phone: [phone]  Fax: [phone]    Dialysis Facility (if applicable)   Name:  Address:  Dialysis Schedule:  Phone:  Fax:    / signature: Electronically signed by ROGELIO Sandoval on 2/2/22 at 3:15 PM EST    PHYSICIAN SECTION    Prognosis: {Prognosis:5295281983}    Condition at Discharge: [address] Michele Patient Condition:[id number]}    Rehab Potential (if transferring to Rehab): {Prognosis:5251563346}    Recommended Labs or Other Treatments After Discharge: ***    Physician Certification: I certify the above information and transfer of [place]  is necessary for the continuing treatment of the diagnosis listed and that she requires Intermediate Nursing Care for greater 30 days.      Update Admission H&P: {CHP DME Changes in XTLK:996304178}    PHYSICIAN SIGNATURE:  {Esignature:[id number]}

## 2022-02-02 NOTE — PROGRESS NOTES
[facility] ER Progress Note    Admitting Date and Time: 2/1/2022  6:51 PM  Admit Dx: Metabolic encephalopathy [Y98.18]  Lactic acidosis [E87.2]  Acute cystitis with hematuria [N30.01]  Septic shock (HCC) [A41.9, R65.21]  Gastrointestinal hemorrhage, unspecified gastrointestinal hemorrhage type [K92.2]    Subjective:  Patient is being followed for Metabolic encephalopathy [Y41.13]  Lactic acidosis [E87.2]  Acute cystitis with hematuria [N30.01]  Septic shock (HCC) [A41.9, R65.21]  Gastrointestinal hemorrhage, unspecified gastrointestinal hemorrhage type [K92.2]   She is on vasopressor, NG suction tune attached due to active GI bleeding. ROS: denies fever, chills, cp, sob, n/v, HA unless stated above.       citalopram  10 mg Oral Daily    digoxin  125 mcg Oral Daily    divalproex  125 mg Oral Nightly    memantine  5 mg Oral Daily    sodium chloride flush  5-40 mL IntraVENous 2 times per day    piperacillin-tazobactam  3,375 mg IntraVENous Q8H    pantoprazole  40 mg IntraVENous BID    And    sodium chloride (PF)  10 mL IntraVENous BID    hydrocortisone sodium succinate PF  50 mg IntraVENous Q8H    insulin lispro  0-12 Units SubCUTAneous Q4H     sodium chloride flush, 5-40 mL, PRN  sodium chloride, 25 mL, PRN  ondansetron, 4 mg, Q8H PRN   Or  ondansetron, 4 mg, Q6H PRN  polyethylene glycol, 17 g, Daily PRN  acetaminophen, 650 mg, Q6H PRN   Or  acetaminophen, 650 mg, Q6H PRN  potassium chloride, 10 mEq, PRN  magnesium sulfate, 2,000 mg, PRN  sodium chloride, , PRN         Objective:    BP (!) 120/94   Pulse 108   Temp 97.4 °F (36.3 °C) (Axillary)   Resp (!) 7   Ht 5' 4\" (1.626 m)   Wt 189 lb 6 oz (85.9 kg)   LMP  (LMP Unknown)   SpO2 96%   BMI 32.51 kg/m²     General Appearance: in no acute distress  Skin: warm and dry  Head: normocephalic and atraumatic  Eyes: pupils equal, round, and reactive to light, extraocular eye movements intact, conjunctivae normal  Neck: neck supple and non tender without mass   Pulmonary/Chest: clear to auscultation bilaterally- no wheezes, rales or rhonchi, normal air movement, no respiratory distress  Cardiovascular: normal rate, normal S1 and S2 and no carotid bruits  Abdomen: soft, non-tender, non-distended, normal bowel sounds, no masses or organomegaly  Extremities: no cyanosis, no clubbing and no edema  Neurologic: no cranial nerve deficit and speech normal        Recent Labs     02/01/22 1907 02/02/22  0624 02/02/22  1217    139 139   K 4.3 3.9 3.7    100 101   CO2 14* 18* 22   BUN 30* 28* 26*   CREATININE 1.0 0.9 0.8   GLUCOSE 255* 353* 278*   CALCIUM 8.5* 7.7* 7.7*       Recent Labs     02/01/22  0434 02/01/22  0434 02/01/22 1907 02/01/22  2258 02/02/22  0123 02/02/22  0624 02/02/22  1217   WBC 8.7  --  32.7*  --   --  49.1*  --    RBC 4.39  --  4.62  --   --  4.73  --    HGB 13.0   < > 13.7   < > 14.1 13.9 13.6   HCT 40.0   < > 43.2   < > 42.4 43.3 41.6   MCV 91.1  --  93.5  --   --  91.5  --    MCH 29.6  --  29.7  --   --  29.4  --    MCHC 32.5  --  31.7*  --   --  32.1  --    RDW 15.2*  --  15.2*  --   --  16.2*  --      --  346  --   --  296  --    MPV 12.9*  --  12.0  --   --  12.4*  --     < > = values in this interval not displayed. Assessment:    Active Problems:    Septic shock (HCC)  Resolved Problems:    * No resolved hospital problems. *      Plan:  1. Sepsis with septic shock: Patient recently diagnosed Covid pneumonia and discharged from hospital after treatment to rehab. She discharged from rehab to home came yesterday due to right red blood per rectum. She was found to be hypotensive. Patient is on Levophed drip and NG tube suction due to active bleeding. She is admitted to ICU. General surgery consulted for her rectal bleeding recommended for follow-up hemoglobin now plan to do scope now. Her blood count going up 32.7-49.,  no recorded temperature, urine examination shows WBC most likely due to UTI. Blood culture sent did not show any growth. But will follow blood culture continue vancomycin and Zosyn. Stool for C. difficile toxin. ID consult for sepsis with septic shock  2. Atrial fibrillation: Hold anticoagulation due to active bleeding. Continue digoix. 3. Active GI bleeding: Follow-up hemoglobin every 8 hours transfuse as necessary, so far stable. 4. DM type 2: continue insulin coverage and continue lantus in a lower dosage. NOTE: This report was transcribed using voice recognition software. Every effort was made to ensure accuracy; however, inadvertent computerized transcription errors may be present.   Electronically signed by Ge Dupree MD on 2/2/2022 at 1:52 PM

## 2022-02-02 NOTE — PROGRESS NOTES
CI HR MAP TPRI SVI TFC   Baseline 2.9 99 85 2349 29 46.2   Test 3.0 99 82 2177 32 47.8   % Change 4.1% -0.5% -3.5% -7.3% 10.9% 3.4%   noninvasive -  start      Refugjemma Ellison RN BSN

## 2022-02-02 NOTE — H&P
Hospital Medicine  History and Physical    Patient:  Julissa Choe  MRN: [id number]    CHIEF COMPLAINT:    Chief Complaint   Patient presents with    Altered Mental Status     found by staff at SNF in bed unresponsive with pulse ox 64%, placed on 2L NC, 6L NC via EMS- pt more responsive. also ? [facility] staff reported blood clots in stool. Primary Care Physician: Gisselle Wright DO    HISTORY OF PRESENT ILLNESS:   The patient is a 80 y.o. female with multiple medical problems including A. fib/type 2 diabetes mellitus/dementia and recent diagnosis of Covid who is a nursing home resident was discharged from our facility about 2 weeks ago after she was initially admitted to the hospital secondary to Covid pneumonia was brought back in today secondary to bright red blood per rectum/acute metabolic encephalopathy as well as hypotension.   Here in the hospital the patient was found to be in confused/tachycardic/home hemoglobin was within normal limits however her vital signs were consistent with tachycardia and severe hypertension with elevated lactic acid, the patient was given a unit of blood/IV fluids and started on Levophed, CT scan of the abdomen was consistent with colitis, CT of the chest was negative for PE or any acute normalities/CT of the head was negative for any acute findings, on my encounter the patient was in bed seems to be in severe distress heart rate is ranging between 90s to 150s, blood pressure is in the 100 range over 60s with Levophed and IV fluids and the daughter grandson was at bedside    Past Medical History:      Diagnosis Date    Alzheimer disease ([address]     Atrial fibrillation ([address]     Constipation     Dementia ([address]     Diabetes mellitus ([address]     Dysphagia, oropharyngeal phase     GERD (gastroesophageal reflux disease)     Headache     Hyperlipidemia     Hypertension     MDD (major depressive disorder)     Muscle wasting and atrophy, not elsewhere classified, unspecified site     Muscle weakness (generalized)     PAF (paroxysmal atrial fibrillation) (HCC)     SVT [facility]supra[facility]ricular tachycardia) ([facility])        Past Surgical History:      Procedure Laterality Date     SECTION      CHOLECYSTECTOMY, LAPAROSCOPIC      COLONOSCOPY      UPPER GASTROINTESTINAL ENDOSCOPY         Medications Prior to Admission:    Prior to Admission medications    Medication Sig Start Date End Date Taking?  Authorizing Provider   docusate (COLACE) 50 MG/5ML liquid Take 10 mLs by mouth 2 times daily 22   Khoa Hric, DO   metoprolol tartrate (LOPRESSOR) 25 MG tablet Take 1 tablet by mouth 2 times daily 22   Khoa Hric, DO   insulin glargine (LANTUS) 100 UNIT/ML injection vial Inject 15 Units into the skin 2 times daily 22   Khoa Hric, DO   lisinopril (PRINIVIL;ZESTRIL) 10 MG tablet Take 1 tablet by mouth daily 22   Khoa Hric, DO   digoxin (LANOXIN) 125 MCG tablet Take 1 tablet by mouth daily 22   Khoa Hric, DO   clotrimazole (LOTRIMIN) 1 % cream Apply topically 2 times daily Apply to nails    Historical Provider, MD   bisacodyl (DULCOLAX) 10 MG suppository Place 10 mg rectally daily as needed for Constipation    Historical Provider, MD   ZINC OXIDE, TOPICAL, (SECURA PROTECTIVE) 10 % CREA Apply topically 2 times daily Apply to buttocks and periarea    Historical Provider, MD   divalproex (DEPAKOTE SPRINKLE) 125 MG capsule Take 125 mg by mouth nightly    Historical Provider, MD   vitamin D (ERGOCALCIFEROL) 1.25 MG (45685 UT) CAPS capsule Take 50,000 Units by mouth once a week *SUNDAYS*    Historical Provider, MD   memantine ER (NAMENDA XR) 21 MG CP24 extended release capsule Take 21 mg by mouth every morning    Historical Provider, MD   insulin lispro (HUMALOG) 100 UNIT/ML injection vial Inject 0-12 Units into the skin 3 times daily (before meals) PER SLIDING SCALE: 0-139=0U, 140-199=2U, 200-249=4U, 250-299-6U, 300-349=8U, 350-400=10U, >400=12U & CALL MD    Historical Provider, MD   citalopram (CELEXA) 10 MG tablet 10 mg daily  4/22/20   Historical Provider, MD   megestrol (MEGACE ORAL) 40 MG/ML suspension Take 5 mLs by mouth daily 1/22/20   Carolina Leung MD   apixaban [place]) 5 MG TABS tablet Take 1 tablet by mouth 2 times daily 1/11/20   Carolina Leung MD   acetaminophen (TYLENOL) 325 MG tablet Take 650 mg by mouth every 4 hours as needed for Pain     Historical Provider, MD   Multiple Vitamins-Minerals (EYE VITAMINS) CAPS Take 1 capsule by mouth daily     Historical Provider, MD       Allergies:  Asa [aspirin]    Social History:   TOBACCO:   reports that she has never smoked. She has never used smokeless tobacco.  ETOH:   reports no history of alcohol use. Family History:       Problem Relation Age of Onset    Heart Disease Mother        REVIEW OF SYSTEMS:  Unable to provide secondary to acute metabolic encephalopathy and dementia       Physical Exam:      Vitals: BP (!) 119/48   Pulse 99   Temp 97.1 °F (36.2 °C) (Axillary)   Resp 22   LMP  (LMP Unknown)   SpO2 95%     Physical Exam     General appearance: Patient is somnolent and not responding to questions. Obese  Mental Status: oriented to person, place and time and normal affect  Lungs: clear to auscultation bilaterally, normal effort  Heart: Irregular rhythm/rate is ranging between 90s to 171T with mild systolic murmur  Abdomen: soft, slight tenderness to palpation, nondistended, bowel sounds present, no masses  Extremities: no edema or redness  Skin: no gross lesions, rashes    Recent Labs     02/01/22  0434 02/01/22  1907 02/01/22  2258   WBC 8.7 32.7*  --    HGB 13.0 13.7 13.9    346  --      Recent Labs     02/01/22  1907      K 4.3      CO2 14*   BUN 30*   CREATININE 1.0   GLUCOSE 255*   AST 21   ALT 18   BILITOT 0.9   ALKPHOS 161*     Troponin T: No results for input(s): TROPONINI in the last 72 hours.   ABGs: No results found for: PHART, PO2ART, KLH4CFA  INR: Recent Labs     02/01/22 1907   INR 1.6     URINALYSIS:  Recent Labs     02/01/22 1907   COLORU DARK YELLOW*   PHUR 6.5   WBCUA 10-20*   RBCUA 5-10*   BACTERIA MANY*   CLARITYU CLOUDY*   SPECGRAV 1.010   LEUKOCYTESUR LARGE*   UROBILINOGEN >=8.0*   BILIRUBINUR MODERATE*   BLOODU LARGE*   GLUCOSEU 100*     -----------------------------------------------------------------   CT Head WO Contrast    Result Date: 2/1/2022  EXAMINATION: CT OF THE HEAD WITHOUT CONTRAST  2/1/2022 9:44 pm TECHNIQUE: CT of the head was performed without the administration of intravenous contrast. Dose modulation, iterative reconstruction, and/or weight based adjustment of the mA/kV was utilized to reduce the radiation dose to as low as reasonably achievable. COMPARISON: None. HISTORY: ORDERING SYSTEM PROVIDED HISTORY: AMS TECHNOLOGIST PROVIDED HISTORY: Reason for exam:->AMS Has a \"code stroke\" or \"stroke alert\" been called? ->No Decision Support Exception - unselect if not a suspected or confirmed emergency medical condition->Emergency Medical Condition (MA) FINDINGS: BRAIN/VENTRICLES: There is no acute intracranial hemorrhage, mass effect or midline shift. No abnormal extra-axial fluid collection. The gray-white differentiation is maintained without evidence of an acute infarct. There is no evidence of hydrocephalus. Periventricular white matter changes consistent chronic microvascular disease. Diffuse volume loss. Changes of encephalomalacia right parietooccipital region. ORBITS: The visualized portion of the orbits demonstrate no acute abnormality. SINUSES: The visualized paranasal sinuses and mastoid air cells demonstrate no acute abnormality. SOFT TISSUES/SKULL:  No acute abnormality of the visualized skull or soft tissues. No acute intracranial abnormality. Changes of encephalomalacia right parietooccipital region. Periventricular white matter changes consistent chronic microvascular disease. Diffuse volume loss.      CT ABDOMEN PELVIS W IV CONTRAST Additional Contrast? None    Result Date: 2/1/2022  EXAMINATION: CT OF THE ABDOMEN AND PELVIS WITH CONTRAST; CTA OF THE CHEST 2/1/2022 9:44 pm TECHNIQUE: CT of the abdomen and pelvis was performed with the administration of intravenous contrast. Multiplanar reformatted images are provided for review. Dose modulation, iterative reconstruction, and/or weight based adjustment of the mA/kV was utilized to reduce the radiation dose to as low as reasonably achievable.; CTA of the chest was performed after the administration of intravenous contrast.  Multiplanar reformatted images are provided for review. MIP images are provided for review. Dose modulation, iterative reconstruction, and/or weight based adjustment of the mA/kV was utilized to reduce the radiation dose to as low as reasonably achievable. COMPARISON: None. HISTORY: ORDERING SYSTEM PROVIDED HISTORY: gi bleed, hypotension TECHNOLOGIST PROVIDED HISTORY: Additional Contrast?->None Reason for exam:->gi bleed, hypotension Decision Support Exception - unselect if not a suspected or confirmed emergency medical condition->Emergency Medical Condition (MA) FINDINGS: CT ANGIOGRAPHY OF THE CHEST: Pulmonary Arteries: Due to prominent respiratory motion artifact, the right lower lobe pulmonary arteries are not well evaluated. Within this limitation, no pulmonary embolism is seen. The main pulmonary artery is normal in caliber. Mediastinum: The heart is grossly normal in size. Mild calcified atherosclerosis is seen in the aorta. No aneurysm. No lymphadenopathy seen in the chest mildly enlarged thyroid gland with multiple small nodules. Small hiatal hernia. Lungs/pleura: Pulmonary scarring with mild traction bronchiectasis in the medial basal segment of the right lower lobe. The lungs are otherwise clear. The central airway is clear. No pneumothorax or pleural effusion is seen.  Soft Tissues/Bones: No acute fracture or bony destructive lesion. Multiple old healed left rib fractures. CT OF THE ABDOMEN AND PELVIS: ORGANS: Liver: Unremarkable. Gallbladder: Surgically absent Pancreas: Moderately atrophied. Otherwise, unremarkable. Spleen:  Multiple calcified granuloma. Otherwise, unremarkable. Adrenals: Unremarkable. Kidneys: Unremarkable. GI/BOWEL: Prominent fecal retention is seen in the colon. Some of the stool is liquid. Questionable bowel wall thickening in the descending colon and the proximal sigmoid colon. Small amount of free fluid is seen in the left paracolic gutter. PERITONEUM/EXTRA PERITONEUM: No lymphadenopathy. Mild calcified atherosclerosis is seen in the aorta. No aneurysm. No free air. PELVIS: A Segura catheter is seen decompressing the urinary bladder. The uterus is normal in size and contour. Small amount of gas in the endometrial cavity. BONES/SOFT TISSUES: The visualized bones are intact without fracture or focal lesion. CTA OF THE CHEST: 1. No pulmonary embolism detected. Suboptimal visualization of the right lower lobe pulmonary arteries due to respiratory motion artifact. 2.  No acute cardiopulmonary abnormality. 3. Small hernia. CT OF THE ABDOMEN AND PELVIS: 1. Small amount of GAS IN THE ENDOMETRIAL CAVITY of unclear etiology. Clinical correlation for infection is recommended. A rectouterine fistula is not detected but cannot be excluded. If indicated, very minimally may be obtained for further evaluation. 2. QUESTIONABLE MURAL THICKENING in the distal descending and proximal sigmoid colon, which may signify colitis or simply represent an artifact of under distension. 3. The small amount of free fluid seen in the left paracolic gutter. 4. Moderate fecal retention in the colon, some liquid.  RECOMMENDATIONS: Unavailable     XR CHEST PORTABLE    Result Date: 2/1/2022  EXAMINATION: ONE XRAY VIEW OF THE CHEST 2/1/2022 7:07 pm COMPARISON: 01/20/2022 HISTORY: ORDERING SYSTEM PROVIDED HISTORY: sepsis, hypotension TECHNOLOGIST PROVIDED HISTORY: Reason for exam:->sepsis, hypotension FINDINGS: The lungs are without acute focal process. There is no effusion or pneumothorax. The cardiomediastinal silhouette is without acute process. The osseous structures are without acute process. No acute process. CTA PULMONARY W CONTRAST    Result Date: 2/1/2022  EXAMINATION: CT OF THE ABDOMEN AND PELVIS WITH CONTRAST; CTA OF THE CHEST 2/1/2022 9:44 pm TECHNIQUE: CT of the abdomen and pelvis was performed with the administration of intravenous contrast. Multiplanar reformatted images are provided for review. Dose modulation, iterative reconstruction, and/or weight based adjustment of the mA/kV was utilized to reduce the radiation dose to as low as reasonably achievable.; CTA of the chest was performed after the administration of intravenous contrast.  Multiplanar reformatted images are provided for review. MIP images are provided for review. Dose modulation, iterative reconstruction, and/or weight based adjustment of the mA/kV was utilized to reduce the radiation dose to as low as reasonably achievable. COMPARISON: None. HISTORY: ORDERING SYSTEM PROVIDED HISTORY: gi bleed, hypotension TECHNOLOGIST PROVIDED HISTORY: Additional Contrast?->None Reason for exam:->gi bleed, hypotension Decision Support Exception - unselect if not a suspected or confirmed emergency medical condition->Emergency Medical Condition (MA) FINDINGS: CT ANGIOGRAPHY OF THE CHEST: Pulmonary Arteries: Due to prominent respiratory motion artifact, the right lower lobe pulmonary arteries are not well evaluated. Within this limitation, no pulmonary embolism is seen. The main pulmonary artery is normal in caliber. Mediastinum: The heart is grossly normal in size. Mild calcified atherosclerosis is seen in the aorta. No aneurysm. No lymphadenopathy seen in the chest mildly enlarged thyroid gland with multiple small nodules. Small hiatal hernia.  Lungs/pleura: Pulmonary scarring with mild traction bronchiectasis in the medial basal segment of the right lower lobe. The lungs are otherwise clear. The central airway is clear. No pneumothorax or pleural effusion is seen. Soft Tissues/Bones: No acute fracture or bony destructive lesion. Multiple old healed left rib fractures. CT OF THE ABDOMEN AND PELVIS: ORGANS: Liver: Unremarkable. Gallbladder: Surgically absent Pancreas: Moderately atrophied. Otherwise, unremarkable. Spleen:  Multiple calcified granuloma. Otherwise, unremarkable. Adrenals: Unremarkable. Kidneys: Unremarkable. GI/BOWEL: Prominent fecal retention is seen in the colon. Some of the stool is liquid. Questionable bowel wall thickening in the descending colon and the proximal sigmoid colon. Small amount of free fluid is seen in the left paracolic gutter. PERITONEUM/EXTRA PERITONEUM: No lymphadenopathy. Mild calcified atherosclerosis is seen in the aorta. No aneurysm. No free air. PELVIS: A Segura catheter is seen decompressing the urinary bladder. The uterus is normal in size and contour. Small amount of gas in the endometrial cavity. BONES/SOFT TISSUES: The visualized bones are intact without fracture or focal lesion. CTA OF THE CHEST: 1. No pulmonary embolism detected. Suboptimal visualization of the right lower lobe pulmonary arteries due to respiratory motion artifact. 2.  No acute cardiopulmonary abnormality. 3. Small hernia. CT OF THE ABDOMEN AND PELVIS: 1. Small amount of GAS IN THE ENDOMETRIAL CAVITY of unclear etiology. Clinical correlation for infection is recommended. A rectouterine fistula is not detected but cannot be excluded. If indicated, very minimally may be obtained for further evaluation. 2. QUESTIONABLE MURAL THICKENING in the distal descending and proximal sigmoid colon, which may signify colitis or simply represent an artifact of under distension. 3. The small amount of free fluid seen in the left paracolic gutter.  4. Moderate fecal retention in the colon, some liquid. RECOMMENDATIONS: Unavailable           Assessment and Plan     *Sepsis present on admission with septic shock  *Bright red blood per rectum due to most likely colitis  *High anion gap metabolic acidosis secondary to lactic acidosis  *Acute metabolic encephalopathy on top of dementia due to the above  *A.  fib with RVR  *Recent diagnosis of Covid  *History of dementia  *History of hypertension  *History of type 2 diabetes mellitus    -Admit to the ICU  -Continue with IV fluids and Levophed  -Hemoglobin is within normal limits and stable  -Patient received a note done in the ER of red blood cells  -Close observation of vital signs and hemoglobin (H/H q8)  -Continue Zosyn  -Blood cultures and urine cultures are pending  -Hold all blood thinners including home dose Eliquis  -SCDs for DVT prophylaxis  -Hold blood pressure medications including her home dose metoprolol  -Continue digoxin  -GI consult  -Sliding scale insulin for diabetes    Addendum:   After the pt was brought up to the ICU, she had 3 bloody BMs, will get a stat H/H, start pt   On Protonix 40 mg IV BID, will give K centra    Cristian Ruiz MD, MD  Admitting Hospitalist

## 2022-02-02 NOTE — CONSULTS
Palomo Marshall M.D. [facility]  Dr. Silvia Shanks M.D.,  Dr. Daniel Mir M.D.,  Dr. Jefe Capellan D.O.,  Dr. Daniel Brambila D.O. ,  Dr. rBet Perez M.D.,  Dr. Carlos Maritnez, [facility]  80 y.o.  female      Re: Lower GI bleed  Requesting physician: Dr. Praneeth Burkett  Date:6:05 PM 2/2/2022          HPI: 68-year-old female patient seen in the intensive care unit for above-described issue. Patient is nonverbal and noncooperative with exam. Baseline mental status is unknown however according to nursing patient has been nonverbal/not cooperative at all day. According to notes patient presents from a nursing home because of altered mental status and unresponsive with pulse ox down to 64% patient placed on 4 L nasal cannula and subsequently 6 L nasal cannula at EMS. Currently patient is without supplemental oxygen saturating 99% on room air. Patient was noted to be experiencing rectal bleed since admission with hemoglobin on presentation of 13 compared to hemoglobin of 13.8 on 24 January. According to nursing bloody bowel movements all day with hemoglobin however remaining stable at 13.8 today. Patient has history of atrial fibrillation with oral anticoagulation with subtherapeutic INR on this presentation at 1.6. Patient white blood cell count is elevated 49.1 with platelet count of 491. Noted is significant left shift on presentation. Infectious disease evaluation has been obtained. Patient CT scan of the abdomen and pelvis on presentation reported questionable mural thickening in the distal descending and proximal sigmoid colon with fecal retention in the colon and some liquid. . Patient tested positive for COVID-19 on presentation. No stool studies has been reported patient has been placed on oral and IV vancomycin per infectious disease service. .  Patient has NG tube also placed which is draining no blood or coffee-ground appearing material.    Information sources:   -medical record  -health care team    PMHx:  Past Medical History:   Diagnosis Date    Alzheimer disease ([facility] [address]     Atrial fibrillation ([facility] [address]     Constipation     Dementia ([facility] [address]     Diabetes mellitus ([facility] [address]     Dysphagia, oropharyngeal phase     GERD (gastroesophageal reflux disease)     Headache     Hyperlipidemia     Hypertension     MDD (major depressive disorder)     Muscle wasting and atrophy, not elsewhere classified, unspecified site     Muscle weakness (generalized)     PAF (paroxysmal atrial fibrillation) (HCC)     SVT (supraventricular tachycardia) (HCC)        PSHx:  Past Surgical History:   Procedure Laterality Date     SECTION      CHOLECYSTECTOMY, LAPAROSCOPIC      COLONOSCOPY      UPPER GASTROINTESTINAL ENDOSCOPY         Meds:  Current Facility-Administered Medications   Medication Dose Route Frequency Provider Last Rate Last Admin    citalopram (CELEXA) tablet 10 mg  10 mg Oral Daily Kory Batista MD        digoxin (LANOXIN) tablet 125 mcg  125 mcg Oral Daily Kory Grider MD        divalproex (DEPAKOTE SPRINKLE) capsule 125 mg  125 mg Oral Nightly Kory Grider MD        [facility]) tablet 5 mg  5 mg Oral Daily Kory Grider MD        sodium chloride flush 0.9 % injection 5-40 mL  5-40 mL IntraVENous 2 times per day Becky Pedro MD   10 mL at 22 0929    sodium chloride flush 0.9 % injection 5-40 mL  5-40 mL IntraVENous PRN Kory Grider MD        0.9 % sodium chloride infusion  25 mL IntraVENous PRN Kory Grider MD        ondansetron (ZOFRAN-ODT) disintegrating tablet 4 mg  4 mg Oral Q8H PRN Kory Grider MD        Or    ondansetron [facility]) injection 4 mg  4 mg IntraVENous Q6H PRN Kory Grider MD        polyethylene glycol (GLYCOLAX) packet 17 g  17 g Oral Daily PRN Kory Grider MD        acetaminophen (TYLENOL) tablet 650 mg  650 mg Oral Q6H PRN Becky Pedro MD        Or acetaminophen (TYLENOL) suppository 650 mg  650 mg Rectal Q6H PRN Kory Grider MD        0.9 % sodium chloride infusion   IntraVENous Continuous Taiwo Hermosillo MD        potassium chloride 10 mEq/100 mL IVPB (Peripheral Line)  10 mEq IntraVENous PRN Kory Cordova MD        magnesium sulfate 2000 mg in 50 mL IVPB premix  2,000 mg IntraVENous PRN Kory Cordova MD        piperacillin-tazobactam (ZOSYN) 3,375 mg in dextrose 5 % 50 mL IVPB extended infusion (mini-bag)  3,375 mg IntraVENous Q8H Kory Grider MD 12.5 mL/hr at 02/02/22 1600 3,375 mg at 02/02/22 1600    pantoprazole (PROTONIX) injection 40 mg  40 mg IntraVENous BID Kory Cordova MD   40 mg at 02/02/22 2766    And    sodium chloride (PF) 0.9 % injection 10 mL  10 mL IntraVENous BID Kory Cordova MD   10 mL at 02/02/22 0836    sodium bicarbonate 150 mEq in sterile water 1,000 mL infusion   IntraVENous Continuous Thom Barron  mL/hr at 02/02/22 1238 New Bag at 02/02/22 1238    hydrocortisone sodium succinate PF (SOLU-CORTEF) injection 50 mg  50 mg IntraVENous Q8H Moreno Gandhi, DO   50 mg at 02/02/22 1347    insulin lispro (HUMALOG) injection vial 0-12 Units  0-12 Units SubCUTAneous Q4H Thom Barron MD   2 Units at 02/02/22 1609    fluconazole (DIFLUCAN) tablet 200 mg  200 mg Oral Daily Meghan Orlando MD        vancomycin [facility]) oral solution 250 mg  250 mg Oral 4 times per day Meghan Orlando MD        0.9 % sodium chloride infusion   IntraVENous PRN Genevieve Mueller DO        norepinephrine (LEVOPHED) 16 mg in dextrose 5 % 250 mL infusion  2-100 mcg/min IntraVENous Continuous Nile Mueller DO 28.1 mL/hr at 02/02/22 1330 30 mcg/min at 02/02/22 1330       SocHx:  Social History     Socioeconomic History    Marital status:      Spouse name: Not on file    Number of children: Not on file    Years of education: Not on file    Highest education level: Not on file   Occupational History    Not on file   Tobacco Use    Smoking status: Never Smoker    Smokeless tobacco: Never Used   Vaping Use    Vaping Use: Never used   Substance and Sexual Activity    Alcohol use: No     Alcohol/week: 0.0 standard drinks     Comment: Rare    Drug use: No    Sexual activity: Not on file   Other Topics Concern    Not on file   Social History Narrative    Not on file     Social Determinants of Health     Financial Resource Strain:     Difficulty of Paying Living Expenses: Not on file   Food Insecurity:     Worried About [address] in the Last Year: Not on file    Ran Out of Food in the Last Year: Not on file   Transportation Needs:     Lack of Transportation (Medical): Not on file    Lack of Transportation (Non-Medical): Not on file   Physical Activity:     Days of Exercise per Week: Not on file    Minutes of Exercise per Session: Not on file   Stress:     Feeling of Stress : Not on file   Social Connections:     Frequency of Communication with Friends and Family: Not on file    Frequency of Social Gatherings with Friends and Family: Not on file    Attends Congregational Services: Not on file    Active Member of Clubs or Organizations: Not on file    Attends Club or Organization Meetings: Not on file    Marital Status: Not on file   Intimate Partner Violence:     Fear of Current or Ex-Partner: Not on file    Emotionally Abused: Not on file    Physically Abused: Not on file    Sexually Abused: Not on file   Housing Stability:     Unable to Pay for Housing in the Last Year: Not on file    Number of [place] in the Last Year: Not on file    Unstable Housing in the Last Year: Not on file       FamHx:  Family History   Problem Relation Age of Onset    Heart Disease Mother        Allergy:  Allergies   Allergen Reactions    Asa [Aspirin] Other (See Comments)     Patient used to be on Coumadin and had a reaction.  Patient is currently on Eliquis          ROS: As described in the HPI and in addition is negative upon detailed review of systems or unobtainable unless otherwise stated in this dictation. PE:  BP (!) 118/58   Pulse 81   Temp 97.4 °F (36.3 °C) (Axillary)   Resp 20   Ht 5' 4\" (1.626 m)   Wt 189 lb 6 oz (85.9 kg)   LMP  (LMP Unknown)   SpO2 99%   BMI 32.51 kg/m²     Gen.: NAD/ female. Lethargic/somnolent. Unresponsive. Head:  Atraumatic/normocephalic  Eyes: No scleral icterus or conjunctival erythema  ENT: Patient noncooperative with examination of oral cavity/oropharynx. NG tube is noted in place  Neck: Trachea midline/no JVD  Chest: CTA B/symmetrical excursions  Cor: Regular/S1-S2  Abd.: Soft and obese. Appears nontender. BS +  Extr.: Mild lower extremity edema bilaterally. Muscles: Decreased muscle tone and bulk throughout  Skin: Warm and dry/anicteric  Other: Segura catheter      DATA:  Stool (measured) : 200 mL  Lab Results   Component Value Date    WBC 49.1 02/02/2022    RBC 4.73 02/02/2022    HGB 13.8 02/02/2022    HCT 41.6 02/02/2022    MCV 91.5 02/02/2022    MCH 29.4 02/02/2022    MCHC 32.1 02/02/2022    RDW 16.2 02/02/2022     02/02/2022    MPV 12.4 02/02/2022     Lab Results   Component Value Date     02/02/2022    K 3.7 02/02/2022    K 3.9 02/02/2022     02/02/2022    CO2 22 02/02/2022    BUN 26 02/02/2022    CREATININE 0.8 02/02/2022    CALCIUM 7.7 02/02/2022    PROT 4.8 02/02/2022    LABALBU 2.8 02/02/2022    BILITOT 0.8 02/02/2022    ALKPHOS 209 02/02/2022    AST 33 02/02/2022    ALT 19 02/02/2022     Lab Results   Component Value Date    LIPASE 30 02/01/2022     Lab Results   Component Value Date    AMYLASE 49 10/29/2014         ASSESSMENT/PLAN:    1.  Rectal bleed  -Stable hemoglobin without evident anemia  -Possibly related to infection  -Cannot exclude stercoral ulceration  -Continue n.p.o. and monitor H&H  -Significant concern for C. difficile given leukocytosis on presentation  -Antibiotics per ID service  -Obtain iron studies  -Monitor hemoglobin  -Hold off endoscopy at this time    2. Sepsis   -improved hypotension and tachycardia  -Supportive/symptomatic treatment per CCM  -Antibiotics per admitting/ID service    3. Lactic acidosis  -Likely related to generalized hypoperfusion  -Contrasted CT on presentation does not report bowel gas  -Just reported team endometrial cavity      Thank you for the opportunity to see this patient in consultation    Brannon Booth MD  2/2/2022  6:05 PM    NOTE:  This report was transcribed using voice recognition software. Every effort was made to ensure accuracy; however, inadvertent computerized transcription errors may be present. Patient seen and examined independently. Discussed with Dr. Abhilash Castrejon and agree with the plan of care stated above.     Alexia Mckeon DO  2/3/2022  9:23 AM

## 2022-02-02 NOTE — CARE COORDINATION
Social Work/Discharge Planning:  Chart reviewed. COVID positive 2/1. Patient admitted from [facility] at [facility] LOC. She is a readmit, discharged hospital 1/27. Isabela with [facility] states patient is long term care and can return covid positive at discharge. Called patient milan Shrestha (ph: [phone]) and left a message in regards to discharge planning. Patient on two liters of oxygen. Patient has an ng tube to suction and to be determined if needed at discharge. [facility] can not accept patient with an ng tube. Will continue to follow and assist with discharge planning. Electronically signed by ROGELIO Anderson on 2/2/2022 at 3:11 PM    Addendum:  Patient milan Shrestha returned call and confirmed plan for her mother to return to [facility] at discharge. Will continue to follow.   Electronically signed by ROGELIO Anderson on 2/2/2022 at 3:48 PM

## 2022-02-02 NOTE — PLAN OF CARE
Problem: Airway Clearance - Ineffective  Goal: Achieve or maintain patent airway  Outcome: Met This Shift     Problem: Gas Exchange - Impaired  Goal: Absence of hypoxia  Outcome: Met This Shift  Goal: Promote optimal lung function  Outcome: Met This Shift     Problem: Breathing Pattern - Ineffective  Goal: Ability to achieve and maintain a regular respiratory rate  Outcome: Met This Shift     Problem:  Body Temperature -  Risk of, Imbalanced  Goal: Ability to maintain a body temperature within defined limits  Outcome: Met This Shift     Problem: Isolation Precautions - Risk of Spread of Infection  Goal: Prevent transmission of infection  Outcome: Met This Shift     Problem: Risk for Fluid Volume Deficit  Goal: Maintain absence of muscle cramping  Outcome: Met This Shift  Goal: Maintain normal serum potassium, sodium, calcium, phosphorus, and pH  Outcome: Met This Shift     Problem: Loneliness or Risk for Loneliness  Goal: Demonstrate positive use of time alone when socialization is not possible  Outcome: Met This Shift     Problem: Fatigue  Goal: Verbalize increase energy and improved vitality  Outcome: Met This Shift     Problem: Skin Integrity:  Goal: Will show no infection signs and symptoms  Description: Will show no infection signs and symptoms  Outcome: Met This Shift  Goal: Absence of new skin breakdown  Description: Absence of new skin breakdown  Outcome: Met This Shift     Problem: Falls - Risk of:  Goal: Will remain free from falls  Description: Will remain free from falls  Outcome: Met This Shift  Goal: Absence of physical injury  Description: Absence of physical injury  Outcome: Met This Shift

## 2022-02-02 NOTE — CONSULTS
[facility]  [facility]    Consultation Note     Admit Date: 2/1/2022  6:51 PM    Reason for Consult:   Sepsis    Attending Physician:  Luiza Coon MD     Chief Complaint: Change in mental status    HISTORY OF PRESENT ILLNESS:   The patient is a 80 y.o.: Woman known to the Infectious Diseases service. The patient is readmitted  From [facility] becoming more somnolent than she normally is. From her last admission she was only moaning and and opened her eyes. EMS reported she had a dark tarry stool when they brought her in. Currently the patient is on room air and afebrile. Discussion with critical care indicated that she had a significant hard stool and work-up indicated that she has major fecal impaction. CT of the abdomen pelvis was reviewed and CT of the lung showed some old fibrosis and bronchiectasis but no consolidating infiltrate. The CT of the abdomen confirmed the fecal impaction. BUN and creatinine was 31.0 LFTs were normal and white count was 8.7 on admission but jumped to 32 and subsequently 49,000 during admission. Patient is now in intensive care. Her urine Segura is concentrated 10-20 white cells per high-power field. Covid swabbing was positive blood cultures are pending and C. difficile is pending as well. She was started on Zosyn and ID has been asked to evaluate. Patient is requiring pressors and is on Levophed                January 2022: Admitted to [facility] hypoxic and was diagnosed Covid positive. She is unvaccinated. At that time the daughter wanted the patient to have ivermectin and refused remdesivir. She did get tocilizumab and steroids. Subsequently had a CorPak placement. After treatment for Covid and stabilized she was transferred back to mask.   During that period of time her urine grew E. coli and that was treated as well                Past Medical History:        Diagnosis Date    Alzheimer disease ([facility] [address]     Atrial fibrillation ([facility]     Constipation     Dementia ([facility]     Diabetes mellitus ([facility]     Dysphagia, oropharyngeal phase     GERD (gastroesophageal reflux disease)     Headache     Hyperlipidemia     Hypertension     MDD (major depressive disorder)     Muscle wasting and atrophy, not elsewhere classified, unspecified site     Muscle weakness (generalized)     PAF (paroxysmal atrial fibrillation) (HCC)     SVT (supraventricular tachycardia) (HCC)      Past Surgical History:        Procedure Laterality Date     SECTION      CHOLECYSTECTOMY, LAPAROSCOPIC      COLONOSCOPY      UPPER GASTROINTESTINAL ENDOSCOPY       Current Medications:   Scheduled Meds:   citalopram  10 mg Oral Daily    digoxin  125 mcg Oral Daily    divalproex  125 mg Oral Nightly    memantine  5 mg Oral Daily    sodium chloride flush  5-40 mL IntraVENous 2 times per day    piperacillin-tazobactam  3,375 mg IntraVENous Q8H    pantoprazole  40 mg IntraVENous BID    And    sodium chloride (PF)  10 mL IntraVENous BID    hydrocortisone sodium succinate PF  50 mg IntraVENous Q8H    insulin lispro  0-12 Units SubCUTAneous Q4H     Continuous Infusions:   sodium chloride      sodium chloride      IV infusion builder 150 mL/hr at 22 1238    sodium chloride      norepinephrine 30 mcg/min (22 1330)     PRN Meds:sodium chloride flush, sodium chloride, ondansetron **OR** ondansetron, polyethylene glycol, acetaminophen **OR** acetaminophen, potassium chloride, magnesium sulfate, sodium chloride    Allergies:  Asa [aspirin]    Social History:   Social History     Socioeconomic History    Marital status:      Spouse name: None    Number of children: None    Years of education: None    Highest education level: None   Occupational History    None   Tobacco Use    Smoking status: Never Smoker    Smokeless tobacco: Never Used   Vaping Use    Vaping Use: Never used   Substance and Sexual Activity    Alcohol use:  No Alcohol/week: 0.0 standard drinks     Comment: Rare    Drug use: No    Sexual activity: None   Other Topics Concern    None   Social History Narrative    None     Social Determinants of Health     Financial Resource Strain:     Difficulty of Paying Living Expenses: Not on file   Food Insecurity:     Worried About Running Out of Food in the Last Year: Not on file    Bryan of Food in the Last Year: Not on file   Transportation Needs:     Lack of Transportation (Medical): Not on file    Lack of Transportation (Non-Medical): Not on file   Physical Activity:     Days of Exercise per Week: Not on file    Minutes of Exercise per Session: Not on file   Stress:     Feeling of Stress : Not on file   Social Connections:     Frequency of Communication with Friends and Family: Not on file    Frequency of Social Gatherings with Friends and Family: Not on file    Attends Protestant Services: Not on file    Active Member of [address] [place] [address] [employer] or Organizations: Not on file    Attends Club or Organization Meetings: Not on file    Marital Status: Not on file   Intimate Partner Violence:     Fear of Current or Ex-Partner: Not on file    Emotionally Abused: Not on file    Physically Abused: Not on file    Sexually Abused: Not on file   Housing Stability:     Unable to Pay for Housing in the Last Year: Not on file    Number of [place] in the Last Year: Not on file    Unstable Housing in the Last Year: Not on file     Family History:       Problem Relation Age of Onset    Heart Disease Mother    . Otherwise non-pertinent to the chief complaint. REVIEW OF SYSTEMS: Unable to obtain from the patient but reviewing the chart some information that is available  will be noted  CONSTITUTIONAL:  No chills, fevers or night sweats. No loss of weight. EYES:  No double vision or drainage from eyes, ears or throat. HEENT:  No neck stiffness. No dysphagia.  No drainage from eyes, ears or throat  RESPIRATORY:  No cough, productive sputum or hemoptysis. CARDIOVASCULAR:  No chest pain, palpitations, orthopnea or dyspnea on exertion. GASTROINTESTINAL:  No nausea, vomiting,constipation with loose stool around the fecal impactions  GENITOURINARY: Segura concentrated urine  INTEGUMENT/BREAST:  No rash or breast masses. HEMATOLOGIC/LYMPHATIC:  No lymphadenopathy or blood dyscrasics. ALLERGIC/IMMUNOLOGIC:  No anaphylaxis. ENDOCRINE:  No polyuria or polydipsia or temperature intolerance. MUSCULOSKELETAL:  No myalgia or arthralgia. Full ROM. NEUROLOGICAL: Dementia  BEHAVIOR/PSYCH:  No psychosis. PHYSICAL EXAM:    Vitals:    BP (!) 118/58   Pulse 81   Temp 97.4 °F (36.3 °C) (Axillary)   Resp 20   Ht 5' 4\" (1.626 m)   Wt 189 lb 6 oz (85.9 kg)   LMP  (LMP Unknown)   SpO2 99%   BMI 32.51 kg/m²   Constitutional: The patient is nonverbal  Skin: Warm and dry. No rashes were noted. No jaundice. HEENT: Eyes show round, and reactive pupils. Moist mucous membranes, no ulcerations, no thrush. Neck: Supple to movements. No lymphadenopathy. Chest: No use of accessory muscles to breathe. Symmetrical expansion. Auscultation reveals no wheezing, crackles, or rhonchi. Cardiovascular: S1 and S2 are rhythmic and regular. No murmurs appreciated. Abdomen: Positive bowel sounds to auscultation. Benign to palpation. No masses felt. No hepatosplenomegaly. Genitourinary: Segura  Extremities: No clubbing, no cyanosis, no edema.   Musculoskeletal: Equal and symmetrical  Neurological:   Demented  Lines: peripheral  Triple-lumen catheter 2/1/2022    CBC+dif:  Recent Labs     02/01/22  0434 02/01/22  0434 02/01/22  1907 02/01/22  2258 02/02/22  0624 02/02/22  0624 02/02/22  1217   WBC 8.7  --  32.7*  --  49.1*  --   --    HGB 13.0   < > 13.7   < > 13.9   < > 13.6   HCT 40.0   < > 43.2   < > 43.3   < > 41.6   MCV 91.1   < > 93.5   < > 91.5  --   --       < > 346   < > 296  --   --    NEUTROABS 4.86   < > 31.72*   < > 47.14*  --   --     < > = values in this interval not displayed. Lab Results   Component Value Date    CRP 0.3 02/01/2022    CRP 16.6 (H) 01/17/2022     No results found for: CRPHS  Lab Results   Component Value Date    SEDRATE 70 (H) 10/22/2020    SEDRATE 19 04/14/2016     Lab Results   Component Value Date    ALT 19 02/02/2022    AST 33 (H) 02/02/2022    ALKPHOS 209 (H) 02/02/2022    BILITOT 0.8 02/02/2022     Lab Results   Component Value Date     02/02/2022    K 3.7 02/02/2022    K 3.9 02/02/2022     02/02/2022    CO2 22 02/02/2022    BUN 26 02/02/2022    CREATININE 0.8 02/02/2022    GFRAA >60 02/02/2022    LABGLOM >60 02/02/2022    GLUCOSE 278 02/02/2022    PROT 4.8 02/02/2022    LABALBU 2.8 02/02/2022    CALCIUM 7.7 02/02/2022    BILITOT 0.8 02/02/2022    ALKPHOS 209 02/02/2022    AST 33 02/02/2022    ALT 19 02/02/2022       Lab Results   Component Value Date    PROTIME 17.8 02/01/2022    INR 1.6 02/01/2022       Lab Results   Component Value Date    TSH 6.680 02/01/2022       Lab Results   Component Value Date    COLORU DARK YELLOW 02/01/2022    PHUR 6.5 02/01/2022    LABCAST MODERATE 10/24/2014    WBCUA 10-20 02/01/2022    RBCUA 5-10 02/01/2022    MUCUS Present 10/24/2014    BACTERIA MANY 02/01/2022    CLARITYU CLOUDY 02/01/2022    SPECGRAV 1.010 02/01/2022    LEUKOCYTESUR LARGE 02/01/2022    UROBILINOGEN >=8.0 02/01/2022    BILIRUBINUR MODERATE 02/01/2022    BLOODU LARGE 02/01/2022    GLUCOSEU 100 02/01/2022       No results found for: XQC9GQM, BEART, J8EGKONB, PHART, THGBART, XQJ9ZUP, PO2ART, LSO9HOK  Radiology:  XR CHEST PORTABLE   Final Result   Interval placement of nasogastric tube. No other significant change. XR CHEST ABDOMEN NG PLACEMENT   Final Result   Nasogastric tube terminates in the gastric fundus. CT ABDOMEN PELVIS W IV CONTRAST Additional Contrast? None   Final Result   CTA OF THE CHEST:      1. No pulmonary embolism detected.   Suboptimal visualization of the right   lower lobe pulmonary arteries due to respiratory motion artifact. 2.  No acute cardiopulmonary abnormality. 3. Small hernia. CT OF THE ABDOMEN AND PELVIS:      1. Small amount of GAS IN THE ENDOMETRIAL CAVITY of unclear etiology. Clinical correlation for infection is recommended. A rectouterine fistula is   not detected but cannot be excluded. If indicated, very minimally may be   obtained for further evaluation. 2. QUESTIONABLE MURAL THICKENING in the distal descending and proximal   sigmoid colon, which may signify colitis or simply represent an artifact of   under distension. 3. The small amount of free fluid seen in the left paracolic gutter. 4. Moderate fecal retention in the colon, some liquid. RECOMMENDATIONS:   Unavailable         CT Head WO Contrast   Final Result   No acute intracranial abnormality. Changes of encephalomalacia right parietooccipital region. Periventricular white matter changes consistent chronic microvascular   disease. Diffuse volume loss. CTA PULMONARY W CONTRAST   Final Result   CTA OF THE CHEST:      1. No pulmonary embolism detected. Suboptimal visualization of the right   lower lobe pulmonary arteries due to respiratory motion artifact. 2.  No acute cardiopulmonary abnormality. 3. Small hernia. CT OF THE ABDOMEN AND PELVIS:      1. Small amount of GAS IN THE ENDOMETRIAL CAVITY of unclear etiology. Clinical correlation for infection is recommended. A rectouterine fistula is   not detected but cannot be excluded. If indicated, very minimally may be   obtained for further evaluation. 2. QUESTIONABLE MURAL THICKENING in the distal descending and proximal   sigmoid colon, which may signify colitis or simply represent an artifact of   under distension. 3. The small amount of free fluid seen in the left paracolic gutter. 4. Moderate fecal retention in the colon, some liquid.       RECOMMENDATIONS:   Unavailable         XR CHEST PORTABLE   Final Result   No acute process. Microbiology:  Pending  No results for input(s): BC in the last 72 hours. No results for input(s): ORG in the last 72 hours. No results for input(s): Lawrnce Diones in the last 72 hours. No results for input(s): STREPNEUMAGU in the last 72 hours. No results for input(s): LP1UAG in the last 72 hours. No results for input(s): ASO in the last 72 hours. No results for input(s): CULTRESP in the last 72 hours. Assessment:  · Sepsis most likely related to GI tract-fecal impaction with possible microperforation  · Summary of descending colon showed bowel wall thickening C. difficile some currently  · Rule out recurrent UTI indwelling Segura  · Severe volume contraction intravascularly  · Leukocytosis related to the above  · COVID-19 at this point is colonization-at any rate the respiratory function is very good considering hemorrhage    Plan:    · Cont Zosyn add Diflucan and p.o. Vanco pending stool for C. Difficile  · Discussed with critical care  · Check cultures  · Baseline ESR, CRP  · Monitor labs  · Will follow with you    Thank you for having us see this patient in consultation. I will be discussing this case with the treating physicians.       Electronically signed by Keiry De Dios MD on 2/2/2022 at 5:20 PM

## 2022-02-02 NOTE — H&P
Critical Care Admit/Consult Note         Patient - Shiva Salmon   MRN -  [id number]   Jeronimo # - [de-identified]   - 1940      Date of Admission -  2022  6:51 PM  Date of evaluation -  2022  0206/0206-A   Hospital Day - 1            ADMIT/CONSULT DETAILS     Reason for Admit/Consult   Shock, GI Bleed, Acute Respiratory Failure with Hypoxia    Consulting Marcelina Wong MD  Primary Care Physician - Shine Adams,          HPI   Patient is a 68-year-old female with a history of dementia presents today for altered mental status and decreased responsiveness from EMS from nursing home. Initially reported that she was hypoxic on arrival and she was more somnolent. EMS also reports that they were told of dark tarry stools have been ongoing for unknown time. On arrival patient is disoriented, she is not verbal.  She is stable on 6 L nasal cannula. Noted to be hypotensive and tachycardic. No recent fall, trauma or injury per EMS. History limited due to patient's mental status. Patient is a limited code. Past Medical History         Diagnosis Date    Alzheimer disease ([address]     Atrial fibrillation ([address]     Constipation     Dementia ([address]     Diabetes mellitus ([address]     Dysphagia, oropharyngeal phase     GERD (gastroesophageal reflux disease)     Headache     Hyperlipidemia     Hypertension     MDD (major depressive disorder)     Muscle wasting and atrophy, not elsewhere classified, unspecified site     Muscle weakness (generalized)     PAF (paroxysmal atrial fibrillation) (HCC)     SVT (supraventricular tachycardia) ([address]         Past Surgical History           Procedure Laterality Date     SECTION      CHOLECYSTECTOMY, LAPAROSCOPIC      COLONOSCOPY      UPPER GASTROINTESTINAL ENDOSCOPY         SOCIAL AND OCCUPATIONAL HEALTH:  The patient is a Never smoker.       Current Medications   Current Medications    citalopram  10 mg Oral Daily  digoxin  125 mcg Oral Daily    divalproex  125 mg Oral Nightly    memantine  5 mg Oral Daily    sodium chloride flush  5-40 mL IntraVENous 2 times per day    piperacillin-tazobactam  3,375 mg IntraVENous Q8H    pantoprazole  40 mg IntraVENous BID    And    sodium chloride (PF)  10 mL IntraVENous BID    hydrocortisone sodium succinate PF  50 mg IntraVENous Q8H    insulin lispro  0-12 Units SubCUTAneous Q4H     sodium chloride flush, sodium chloride, ondansetron **OR** ondansetron, polyethylene glycol, acetaminophen **OR** acetaminophen, potassium chloride, magnesium sulfate, sodium chloride  IV Drips/Infusions   sodium chloride      sodium chloride      IV infusion builder 150 mL/hr at 02/02/22 1238    sodium chloride      norepinephrine 30 mcg/min (02/02/22 1330)     Home Medications  Medications Prior to Admission: docusate (COLACE) 50 MG/5ML liquid, Take 10 mLs by mouth 2 times daily  metoprolol tartrate (LOPRESSOR) 25 MG tablet, Take 1 tablet by mouth 2 times daily  insulin glargine (LANTUS) 100 UNIT/ML injection vial, Inject 15 Units into the skin 2 times daily  lisinopril (PRINIVIL;ZESTRIL) 10 MG tablet, Take 1 tablet by mouth daily  digoxin (LANOXIN) 125 MCG tablet, Take 1 tablet by mouth daily  divalproex (DEPAKOTE SPRINKLE) 125 MG capsule, Take 125 mg by mouth nightly  vitamin D (ERGOCALCIFEROL) 1.25 MG (32240 UT) CAPS capsule, Take 50,000 Units by mouth once a week *SUNDAYS*  memantine ER (NAMENDA XR) 21 MG CP24 extended release capsule, Take 21 mg by mouth every morning  insulin lispro (HUMALOG) 100 UNIT/ML injection vial, Inject 0-12 Units into the skin 3 times daily (before meals) PER SLIDING SCALE: 0-139=0U, 140-199=2U, 200-249=4U, 250-299-6U, 300-349=8U, 350-400=10U, >400=12U & CALL MD  citalopram (CELEXA) 10 MG tablet, 10 mg daily   megestrol (MEGACE ORAL) 40 MG/ML suspension, Take 5 mLs by mouth daily  apixaban (ELIQUIS) 5 MG TABS tablet, Take 1 tablet by mouth 2 times daily  Multiple Vitamins-Minerals (EYE VITAMINS) CAPS, Take 1 capsule by mouth daily   bisacodyl (DULCOLAX) 10 MG suppository, Place 10 mg rectally daily as needed for Constipation  [DISCONTINUED] clotrimazole (LOTRIMIN) 1 % cream, Apply topically 2 times daily Apply to nails  [DISCONTINUED] ZINC OXIDE, TOPICAL, (SECURA PROTECTIVE) 10 % CREA, Apply topically 2 times daily Apply to buttocks and periarea  acetaminophen (TYLENOL) 325 MG tablet, Take 650 mg by mouth every 4 hours as needed for Pain     Diet/Nutrition   Diet NPO    Allergies   Asa [aspirin]    Social History   Tobacco   reports that she has never smoked. She has never used smokeless tobacco.    Alcohol     reports no history of alcohol use. Occupational history :    Family History         Problem Relation Age of Onset    Heart Disease Mother        ROS     REVIEW OF SYSTEMS:  Review of systems not obtained due to patient factors - mental status    Lines and Devices    CVC R Fem 22  PIV L Forearm    Mechanical Ventilation Data   VENT SETTINGS (Comprehensive)  Vent Information  SpO2: 99 %  Additional Respiratory  Assessments  Pulse: 81  Resp: 20  SpO2: 99 %  Oral Care: Mouth swabbed,Mouth moisturizer,Mouth suctioned    ABG  Lab Results   Component Value Date    PH 7.426 2022    PCO2 18.2 2022    PO2 89.6 2022    HCO3 11.7 2022    O2SAT 97.2 2022     Lab Results   Component Value Date    MODE NC-6  L 2022           Vitals    height is 5' 4\" (1.626 m) and weight is 189 lb 6 oz (85.9 kg). Her axillary temperature is 97.4 °F (36.3 °C). Her blood pressure is 118/58 (abnormal) and her pulse is 81. Her respiration is 20 and oxygen saturation is 99%.        Temperature Range: Temp: 97.4 °F (36.3 °C) Temp  Av.3 °F (36.3 °C)  Min: 96.4 °F (35.8 °C)  Max: 98.7 °F (37.1 °C)  BP Range:  Systolic (96SNU), IIT:379 , Min:59 , MOV:791     Diastolic (26JNS), JDD:91, Min:40, Max:94    Pulse Range: Pulse  Av.8  Min: 81 Max: 153  Respiration Range: Resp  Av.6  Min: 7  Max: 28  Current Pulse Ox[de-identified]  SpO2: 99 %  24HR Pulse Ox Range:  SpO2  Av.3 %  Min: 87 %  Max: 100 %  Oxygen Amount and Delivery: O2 Flow Rate (L/min): 2 L/min      I/O (24 Hours)    Patient Vitals for the past 8 hrs:   BP Temp Temp src Pulse Resp SpO2   22 1458 (!) 118/58 -- -- 81 -- --   22 1400 126/71 -- -- 111 20 99 %   22 1300 (!) 104/43 -- -- 93 17 98 %   22 1235 (!) 120/94 -- -- 108 (!) 7 96 %   22 1200 (!) 106/51 97.4 °F (36.3 °C) Axillary 107 19 100 %   22 1100 (!) 110/53 -- -- 100 16 100 %   22 1000 121/66 -- -- 107 21 100 %       Intake/Output Summary (Last 24 hours) at 2022 1712  Last data filed at 2022 1200  Gross per 24 hour   Intake 2740 ml   Output 1225 ml   Net 1515 ml     I/O last 3 completed shifts: In: 1000 [Blood:1000]  Out: 925 [Urine:575; Emesis/NG output:150; Stool:200]   Date 22 0000 - 22 235   Shift 1747-5846 3114-7010 6830-5957 24 Hour Total   INTAKE   I.V.(mL/kg) 1110(12.9)   1110(12.9)   IV Piggyback(mL/kg) 630(7.3)   630(7.3)   Shift Total(mL/kg) 1740(20.3)   1740(20.3)   OUTPUT   Urine(mL/kg/hr) 575(0.8) 300(0.4)  875   Emesis/NG output(mL/kg) 150(1.7) 0(0)  150(1.7)   Stool(mL/kg) 200(2.3)   200(2.3)   Shift Total(mL/kg) 925(10.8) 300(3.5)  1225(14.3)   Weight (kg) 85.9 85.9 85.9 85.9     Patient Vitals for the past 96 hrs (Last 3 readings):   Weight   22 0200 189 lb 6 oz (85.9 kg)         Drains/Tubes Outputs  Segura Catheter  Exam         PHYSICAL EXAM:  CONSTITUTIONAL:somnolent, uncooperative and no apparent distress  EYES:  Lids and lashes normal, pupils equal, round and reactive to light, extra ocular muscles intact, sclera clear, conjunctiva normal  ENT:  Normocephalic, without obvious abnormality, atraumatic,  oral pharynx with moist mucus membranes, tonsils without erythema or exudates, gums normal and good dentition.   LUNGS:  No increased work of breathing, good air exchange, clear to auscultation bilaterally, no crackles or wheezing  CARDIOVASCULAR:  Normal apical impulse, regular rate and rhythm, and no murmur noted  ABDOMEN: soft, non-distended, non-tender, no masses palpated, no hepatosplenomegally  MUSCULOSKELETAL:  There is no redness, warmth, or swelling of the joints. Tone is normal.  NEUROLOGIC:  Mental Status Exam:  Level of Alertness:   somnolent  Orientation:   Disoriented  SKIN:  no rashes    Data   Old records and images have been reviewed    Lab Results   CBC     Lab Results   Component Value Date    WBC 49.1 02/02/2022    RBC 4.73 02/02/2022    HGB 13.6 02/02/2022    HCT 41.6 02/02/2022     02/02/2022    MCV 91.5 02/02/2022    MCH 29.4 02/02/2022    MCHC 32.1 02/02/2022    RDW 16.2 02/02/2022    NRBC 0.0 02/02/2022    LYMPHOPCT 0.8 02/02/2022    MONOPCT 3.2 02/02/2022    MYELOPCT 0.9 02/01/2022    BASOPCT 0.0 02/02/2022    MONOSABS 1.47 02/02/2022    LYMPHSABS 0.49 02/02/2022    EOSABS 0.00 02/02/2022    BASOSABS 0.00 02/02/2022       BMP   Lab Results   Component Value Date     02/02/2022    K 3.7 02/02/2022    K 3.9 02/02/2022     02/02/2022    CO2 22 02/02/2022    BUN 26 02/02/2022    CREATININE 0.8 02/02/2022    GLUCOSE 278 02/02/2022    CALCIUM 7.7 02/02/2022       LFTS  Lab Results   Component Value Date    ALKPHOS 209 02/02/2022    ALT 19 02/02/2022    AST 33 02/02/2022    PROT 4.8 02/02/2022    BILITOT 0.8 02/02/2022    BILIDIR 0.4 02/01/2022    IBILI 0.5 02/01/2022    LABALBU 2.8 02/02/2022       INR  Recent Labs     02/01/22  1907   PROTIME 17.8*   INR 1.6       APTT  Recent Labs     02/01/22 1907   APTT 30.7       Lactic Acid  Lab Results   Component Value Date    LACTA 9.2 02/02/2022    LACTA 10.3 02/02/2022    LACTA 9.0 02/01/2022        BNP   No results for input(s): BNP in the last 72 hours. Cultures     No results for input(s): BC in the last 72 hours.   No results for input(s): Gage Stauffer in the last 72 hours. No results for input(s): LABURIN in the last 72 hours. Radiology   CXR  Impression   Nasogastric tube terminates in the gastric fundus. CT Scans  Impression   CTA OF THE CHEST:       1. No pulmonary embolism detected.  Suboptimal visualization of the right   lower lobe pulmonary arteries due to respiratory motion artifact. 2.  No acute cardiopulmonary abnormality. 3. Small hernia. CT OF THE ABDOMEN AND PELVIS:       1. Small amount of GAS IN THE ENDOMETRIAL CAVITY of unclear etiology. Clinical correlation for infection is recommended.  A rectouterine fistula is   not detected but cannot be excluded.  If indicated, very minimally may be   obtained for further evaluation. 2. QUESTIONABLE MURAL THICKENING in the distal descending and proximal   sigmoid colon, which may signify colitis or simply represent an artifact of   under distension. 3. The small amount of free fluid seen in the left paracolic gutter. 4. Moderate fecal retention in the colon, some liquid.       RECOMMENDATIONS:   Unavailable       SYSTEMS ASSESSMENT    Neuro     Patient with Alzheimer's and dementia, bedridden and unresponsive, she is at baseline. Respiratory   No Oxygen Requirements at this time    Cardiovascular   Hypotension, Levophed 30mcg/min titrate for MAP>65  Cortisol Pending, Solucortef 100mg q8hr  Will place NICOM for fluid responsiveness      Hx of Afib, Digoxin 125mcg Daily    Gastrointestinal     Chronic constipation   BRBPR given FEIBA by hospitalist  service  1:15 in a.m.  H&H stable  Continue to monitor H&H    Renal   Cr, 0.9  Lactic acidosis, trend lactic acid  Continue to monitor     Infectious Disease   Sepsis  SEptic shock    Colitis, Vanc Zosyn, Consult ID appreciate recs  Possible UTI    Hematology/Oncology   DVT Prophylaxis held due to bleed    Endocrine   Hyperglycemia, Lantus 15 units, MDSSI    Social/Spiritual/DNR/Other   Patient is a Limited Code, Meds only    Depression, Celexa 10mg Daily    Alzheimer's, memantine 5mg Daily, Depakote 125mg, Nightly        MECRED    \"Thank you for asking us to see this complex patient. \"        I personally saw, examined and provided care for the patient. Radiographs, labs and medication list were reviewed by me independently. I spoke with bedside nursing, therapists and consultants. Talked with the daughter at the bedside and answered all questions. Critical care services and times documented are independent of procedures and multidisciplinary rounds with Residents. Additionally comprehensive, multidisciplinary rounds were conducted with the MICU team. The case was discussed in detail and plans for care were established. Review of Residents documentation was conducted and revisions were made as appropriate. I agree with the above documented exam, problem list and plan of care.   Anish Mclain MD  CCT excluding procedures:38'

## 2022-02-02 NOTE — CONSULTS
GENERAL SURGERY  CONSULT NOTE  2022    Physician Consulted: Dr. Amita Landrum  Reason for Consult: GI Bleed/Anemia  Referring Physician: Dr. Nupur Pena is a 80 y.o. female with PMHx dementia, DM, GERD, A-fib on Eliquis, who presents to the ED from SNF after being found unresponsive. She was also noted to have BRBPR. Patient is minimally responsive, most of information obtained from chart review. She was recently hospitalized for COVID pneumonia. Hgb on arrival 13.0. Patient reportedly had 3 BMs that were bright red, and stat H/H drawn at that time 14.1. Last colonoscopy in  with polyps. She is currently on 30 levo. Past Medical History:   Diagnosis Date    Alzheimer disease ([facility]     Atrial fibrillation ([facility]     Constipation     Dementia ([facility]     Diabetes mellitus ([facility]     Dysphagia, oropharyngeal phase     GERD (gastroesophageal reflux disease)     Headache     Hyperlipidemia     Hypertension     MDD (major depressive disorder)     Muscle wasting and atrophy, not elsewhere classified, unspecified site     Muscle weakness (generalized)     PAF (paroxysmal atrial fibrillation) (HCC)     SVT (supraventricular tachycardia) (HCC)        Past Surgical History:   Procedure Laterality Date     SECTION      CHOLECYSTECTOMY, LAPAROSCOPIC      COLONOSCOPY      UPPER GASTROINTESTINAL ENDOSCOPY         Medications Prior to Admission:    Prior to Admission medications    Medication Sig Start Date End Date Taking?  Authorizing Provider   docusate (COLACE) 50 MG/5ML liquid Take 10 mLs by mouth 2 times daily 22  Yes Khoa Hric, DO   metoprolol tartrate (LOPRESSOR) 25 MG tablet Take 1 tablet by mouth 2 times daily 22  Yes Khoa Hric, DO   insulin glargine (LANTUS) 100 UNIT/ML injection vial Inject 15 Units into the skin 2 times daily 22  Yes Khoa Hric, DO   lisinopril (PRINIVIL;ZESTRIL) 10 MG tablet Take 1 tablet by mouth daily 22  Yes Khoa Hric, DO to assess  Hematological and Lymphatic ROS: unable to assess  Respiratory ROS: unable to assess  Cardiovascular ROS: unable to assess  Gastrointestinal ROS: unable to assess  Genito-Urinary ROS: unable to assess  Musculoskeletal ROS: unable to assess      PHYSICAL EXAM:    Vitals:    02/02/22 0400   BP: 115/73   Pulse: 99   Resp: 24   Temp: 98.4 °F (36.9 °C)   SpO2: 100%       General Appearance: lethargic, minimally responsive, appears chronically ill  Skin:  Skin color, texture, turgor normal. No rashes or lesions. Head/face:  NCAT  Eyes:  No gross abnormalities. Lungs:  Normal respiratory effort on room air  Heart:  Heart regular rate and rhythm  Abdomen:  Soft, non-distended, no grimace to palpation, no rebound or guarding  Extremities: Extremities warm to touch, pink, with no edema. Female Rectal: Rectal exam deferred    LABS:  CBC  Recent Labs     02/01/22  1907 02/01/22  2258 02/02/22  0123   WBC 32.7*  --   --    HGB 13.7   < > 14.1   HCT 43.2   < > 42.4     --   --     < > = values in this interval not displayed. BMP  Recent Labs     02/01/22 1907      K 4.3      CO2 14*   BUN 30*   CREATININE 1.0   CALCIUM 8.5*     Liver Function  Recent Labs     02/01/22 1907   LIPASE 30   BILITOT 0.9   BILIDIR 0.4*   AST 21   ALT 18   ALKPHOS 161*   PROT 5.2*   LABALBU 3.0*     No results for input(s): LACTATE in the last 72 hours. Recent Labs     02/01/22 1907   INR 1.6       RADIOLOGY    CT Head WO Contrast    Result Date: 2/1/2022  EXAMINATION: CT OF THE HEAD WITHOUT CONTRAST  2/1/2022 9:44 pm TECHNIQUE: CT of the head was performed without the administration of intravenous contrast. Dose modulation, iterative reconstruction, and/or weight based adjustment of the mA/kV was utilized to reduce the radiation dose to as low as reasonably achievable. COMPARISON: None.  HISTORY: ORDERING SYSTEM PROVIDED HISTORY: AMS TECHNOLOGIST PROVIDED HISTORY: Reason for exam:->AMS Has a \"code stroke\" or \"stroke alert\" been called? ->No Decision Support Exception - unselect if not a suspected or confirmed emergency medical condition->Emergency Medical Condition (MA) FINDINGS: BRAIN/VENTRICLES: There is no acute intracranial hemorrhage, mass effect or midline shift. No abnormal extra-axial fluid collection. The gray-white differentiation is maintained without evidence of an acute infarct. There is no evidence of hydrocephalus. Periventricular white matter changes consistent chronic microvascular disease. Diffuse volume loss. Changes of encephalomalacia right parietooccipital region. ORBITS: The visualized portion of the orbits demonstrate no acute abnormality. SINUSES: The visualized paranasal sinuses and mastoid air cells demonstrate no acute abnormality. SOFT TISSUES/SKULL:  No acute abnormality of the visualized skull or soft tissues. No acute intracranial abnormality. Changes of encephalomalacia right parietooccipital region. Periventricular white matter changes consistent chronic microvascular disease. Diffuse volume loss. CT ABDOMEN PELVIS W IV CONTRAST Additional Contrast? None    Result Date: 2/1/2022  EXAMINATION: CT OF THE ABDOMEN AND PELVIS WITH CONTRAST; CTA OF THE CHEST 2/1/2022 9:44 pm TECHNIQUE: CT of the abdomen and pelvis was performed with the administration of intravenous contrast. Multiplanar reformatted images are provided for review. Dose modulation, iterative reconstruction, and/or weight based adjustment of the mA/kV was utilized to reduce the radiation dose to as low as reasonably achievable.; CTA of the chest was performed after the administration of intravenous contrast.  Multiplanar reformatted images are provided for review. MIP images are provided for review. Dose modulation, iterative reconstruction, and/or weight based adjustment of the mA/kV was utilized to reduce the radiation dose to as low as reasonably achievable. COMPARISON: None.  HISTORY: ORDERING SYSTEM PROVIDED HISTORY: gi bleed, hypotension TECHNOLOGIST PROVIDED HISTORY: Additional Contrast?->None Reason for exam:->gi bleed, hypotension Decision Support Exception - unselect if not a suspected or confirmed emergency medical condition->Emergency Medical Condition (MA) FINDINGS: CT ANGIOGRAPHY OF THE CHEST: Pulmonary Arteries: Due to prominent respiratory motion artifact, the right lower lobe pulmonary arteries are not well evaluated. Within this limitation, no pulmonary embolism is seen. The main pulmonary artery is normal in caliber. Mediastinum: The heart is grossly normal in size. Mild calcified atherosclerosis is seen in the aorta. No aneurysm. No lymphadenopathy seen in the chest mildly enlarged thyroid gland with multiple small nodules. Small hiatal hernia. Lungs/pleura: Pulmonary scarring with mild traction bronchiectasis in the medial basal segment of the right lower lobe. The lungs are otherwise clear. The central airway is clear. No pneumothorax or pleural effusion is seen. Soft Tissues/Bones: No acute fracture or bony destructive lesion. Multiple old healed left rib fractures. CT OF THE ABDOMEN AND PELVIS: ORGANS: Liver: Unremarkable. Gallbladder: Surgically absent Pancreas: Moderately atrophied. Otherwise, unremarkable. Spleen:  Multiple calcified granuloma. Otherwise, unremarkable. Adrenals: Unremarkable. Kidneys: Unremarkable. GI/BOWEL: Prominent fecal retention is seen in the colon. Some of the stool is liquid. Questionable bowel wall thickening in the descending colon and the proximal sigmoid colon. Small amount of free fluid is seen in the left paracolic gutter. PERITONEUM/EXTRA PERITONEUM: No lymphadenopathy. Mild calcified atherosclerosis is seen in the aorta. No aneurysm. No free air. PELVIS: A Segura catheter is seen decompressing the urinary bladder. The uterus is normal in size and contour. Small amount of gas in the endometrial cavity.  BONES/SOFT TISSUES: The visualized bones are intact without fracture or focal lesion. CTA OF THE CHEST: 1. No pulmonary embolism detected. Suboptimal visualization of the right lower lobe pulmonary arteries due to respiratory motion artifact. 2.  No acute cardiopulmonary abnormality. 3. Small hernia. CT OF THE ABDOMEN AND PELVIS: 1. Small amount of GAS IN THE ENDOMETRIAL CAVITY of unclear etiology. Clinical correlation for infection is recommended. A rectouterine fistula is not detected but cannot be excluded. If indicated, very minimally may be obtained for further evaluation. 2. QUESTIONABLE MURAL THICKENING in the distal descending and proximal sigmoid colon, which may signify colitis or simply represent an artifact of under distension. 3. The small amount of free fluid seen in the left paracolic gutter. 4. Moderate fecal retention in the colon, some liquid. RECOMMENDATIONS: Unavailable     XR CHEST PORTABLE    Result Date: 2/2/2022  EXAMINATION: ONE XRAY VIEW OF THE CHEST 2/2/2022 4:04 am COMPARISON: 02/01/2022 HISTORY: ORDERING SYSTEM PROVIDED HISTORY: ? aspiration TECHNOLOGIST PROVIDED HISTORY: Reason for exam:->? aspiration FINDINGS: EKG leads overlie the chest.  Nasogastric tube terminates in the gastric fundus. Cardiac silhouette is near the upper limits of normal.  Scattered vascular calcifications. Coarsened interstitial opacities are likely chronic. No pneumothorax. No focal consolidation or effusion. Interval placement of nasogastric tube. No other significant change. XR CHEST PORTABLE    Result Date: 2/1/2022  EXAMINATION: ONE XRAY VIEW OF THE CHEST 2/1/2022 7:07 pm COMPARISON: 01/20/2022 HISTORY: ORDERING SYSTEM PROVIDED HISTORY: sepsis, hypotension TECHNOLOGIST PROVIDED HISTORY: Reason for exam:->sepsis, hypotension FINDINGS: The lungs are without acute focal process. There is no effusion or pneumothorax. The cardiomediastinal silhouette is without acute process. The osseous structures are without acute process.      No acute process. CTA PULMONARY W CONTRAST    Result Date: 2/1/2022  EXAMINATION: CT OF THE ABDOMEN AND PELVIS WITH CONTRAST; CTA OF THE CHEST 2/1/2022 9:44 pm TECHNIQUE: CT of the abdomen and pelvis was performed with the administration of intravenous contrast. Multiplanar reformatted images are provided for review. Dose modulation, iterative reconstruction, and/or weight based adjustment of the mA/kV was utilized to reduce the radiation dose to as low as reasonably achievable.; CTA of the chest was performed after the administration of intravenous contrast.  Multiplanar reformatted images are provided for review. MIP images are provided for review. Dose modulation, iterative reconstruction, and/or weight based adjustment of the mA/kV was utilized to reduce the radiation dose to as low as reasonably achievable. COMPARISON: None. HISTORY: ORDERING SYSTEM PROVIDED HISTORY: gi bleed, hypotension TECHNOLOGIST PROVIDED HISTORY: Additional Contrast?->None Reason for exam:->gi bleed, hypotension Decision Support Exception - unselect if not a suspected or confirmed emergency medical condition->Emergency Medical Condition (MA) FINDINGS: CT ANGIOGRAPHY OF THE CHEST: Pulmonary Arteries: Due to prominent respiratory motion artifact, the right lower lobe pulmonary arteries are not well evaluated. Within this limitation, no pulmonary embolism is seen. The main pulmonary artery is normal in caliber. Mediastinum: The heart is grossly normal in size. Mild calcified atherosclerosis is seen in the aorta. No aneurysm. No lymphadenopathy seen in the chest mildly enlarged thyroid gland with multiple small nodules. Small hiatal hernia. Lungs/pleura: Pulmonary scarring with mild traction bronchiectasis in the medial basal segment of the right lower lobe. The lungs are otherwise clear. The central airway is clear. No pneumothorax or pleural effusion is seen. Soft Tissues/Bones: No acute fracture or bony destructive lesion. Multiple old healed left rib fractures. CT OF THE ABDOMEN AND PELVIS: ORGANS: Liver: Unremarkable. Gallbladder: Surgically absent Pancreas: Moderately atrophied. Otherwise, unremarkable. Spleen:  Multiple calcified granuloma. Otherwise, unremarkable. Adrenals: Unremarkable. Kidneys: Unremarkable. GI/BOWEL: Prominent fecal retention is seen in the colon. Some of the stool is liquid. Questionable bowel wall thickening in the descending colon and the proximal sigmoid colon. Small amount of free fluid is seen in the left paracolic gutter. PERITONEUM/EXTRA PERITONEUM: No lymphadenopathy. Mild calcified atherosclerosis is seen in the aorta. No aneurysm. No free air. PELVIS: A Segura catheter is seen decompressing the urinary bladder. The uterus is normal in size and contour. Small amount of gas in the endometrial cavity. BONES/SOFT TISSUES: The visualized bones are intact without fracture or focal lesion. CTA OF THE CHEST: 1. No pulmonary embolism detected. Suboptimal visualization of the right lower lobe pulmonary arteries due to respiratory motion artifact. 2.  No acute cardiopulmonary abnormality. 3. Small hernia. CT OF THE ABDOMEN AND PELVIS: 1. Small amount of GAS IN THE ENDOMETRIAL CAVITY of unclear etiology. Clinical correlation for infection is recommended. A rectouterine fistula is not detected but cannot be excluded. If indicated, very minimally may be obtained for further evaluation. 2. QUESTIONABLE MURAL THICKENING in the distal descending and proximal sigmoid colon, which may signify colitis or simply represent an artifact of under distension. 3. The small amount of free fluid seen in the left paracolic gutter. 4. Moderate fecal retention in the colon, some liquid. RECOMMENDATIONS: Unavailable     XR CHEST ABDOMEN NG PLACEMENT    Result Date: 2/2/2022  EXAMINATION: ONE SUPINE XRAY VIEW(S) OF THE ABDOMEN 2/2/2022 4:04 am COMPARISON: None.  HISTORY: ORDERING SYSTEM PROVIDED HISTORY: Confirmation of course of NG/OG/NE tube and location of tip of tube TECHNOLOGIST PROVIDED HISTORY: With stat reading Reason for exam:->Confirmation of course of NG/OG/NE tube and location of tip of tube FINDINGS: Nasogastric tube terminates in the gastric fundus. Nonspecific bowel gas pattern without obvious obstruction. Moderate stool in portions of the colon. Contrast is present in the ureters and renal collecting systems from recent CT dated 02/01/2022. Lower abdomen/pelvis partially excluded from field of view. Degenerative changes are present in the spine. Nasogastric tube terminates in the gastric fundus. ASSESSMENT:  80 y.o. female with reports of BRBPR. Hgb stable at this time. PLAN:  - Okay for diet from surgical POV  - No plans for scopes at this time  - Continue to monitor H/H  - PPI/Carafate  - If patient's Hgb continues to drop significantly or clinically worsens then will consider EGD  - Discussed with Dr. Gaines & Agustin    Electronically signed by Abram Carlson MD on 2/2/22 at 5:04 AM EST    The patient was seen and the chart was reviewed. The patient had rectal bleeding. The patient is in [facility] isolation. The family has decided to make the patient comfort care with medications only. The family does not want any intervention for the gastrointestinal hemorrhage.

## 2022-02-02 NOTE — PROGRESS NOTES
.Patient admitted from er to 18, with the following belongings zero placed on monitor, patient oriented to room and unit visiting hours. Patient guide at bedside, reviewed patient rights and responsibilities. MRSA nasal swab obtained. Bed alarm on. Call light within reach.

## 2022-02-02 NOTE — PROGRESS NOTES
Patient found in bed with a large amount of emesis covering chest, neck, forehead, blankets and hair. Patient cleaned, linens changed. Patient also cleaned for a large red bloody stool.

## 2022-02-02 NOTE — ACP (ADVANCE CARE PLANNING)
Advance Care Planning     Advance Care Planning Activator (Inpatient)  Conversation Note      Date of ACP Conversation: 2/2/2022     Conversation Conducted with:  Healthcare Decision Maker: Named in Advance Directive or Healthcare Power of  (name) Jing Langley     ACP Activator: Rajwinder Martin [facility]      Health Care Decision Maker:     Current Designated Health Care Decision Maker:     Primary Decision Maker: Jonh Husbands - Child - [phone]    Secondary Decision Maker: Kandi Prime - [phone]  Click here to complete Healthcare Decision Makers including section of the Healthcare Decision Maker Relationship (ie \"Primary\")  Today we documented Decision Maker(s) consistent with Legal Next of Kin hierarchy. Care Preferences    Ventilation: \"If you were in your present state of health and suddenly became very ill and were unable to breathe on your own, what would your preference be about the use of a ventilator (breathing machine) if it were available to you? \"      Would the patient desire the use of ventilator (breathing machine)?: no    \"If your health worsens and it becomes clear that your chance of recovery is unlikely, what would your preference be about the use of a ventilator (breathing machine) if it were available to you? \"     Would the patient desire the use of ventilator (breathing machine)?: No      Resuscitation  \"CPR works best to restart the heart when there is a sudden event, like a heart attack, in someone who is otherwise healthy. Unfortunately, CPR does not typically restart the heart for people who have serious health conditions or who are very sick. \"    \"In the event your heart stopped as a result of an underlying serious health condition, would you want attempts to be made to restart your heart (answer \"yes\" for attempt to resuscitate) or would you prefer a natural death (answer \"no\" for do not attempt to resuscitate)? \" no       [] Yes   [] No   Educated Patient / Decision Maker regarding differences between Advance Directives and portable DNR orders.     Length of ACP Conversation in minutes:  10 minutes    Conversation Outcomes:  [x] ACP discussion completed  [] Existing advance directive reviewed with patient; no changes to patient's previously recorded wishes  [] New Advance Directive completed  [] Portable Do Not Rescitate prepared for Provider review and signature  [] POLST/POST/MOLST/MOST prepared for Provider review and signature      Follow-up plan:    [x] Schedule follow-up conversation to continue planning  [] Referred individual to Provider for additional questions/concerns   [] Advised patient/agent/surrogate to review completed ACP document and update if needed with changes in condition, patient preferences or care setting    [] This note routed to one or more involved healthcare providers

## 2022-02-02 NOTE — PLAN OF CARE
Problem: Airway Clearance - Ineffective  Goal: Achieve or maintain patent airway  2/2/2022 0744 by Jannie Ramirez RN  Outcome: Ongoing  2/2/2022 0122 by Malia Keith RN  Outcome: Met This Shift     Problem: Gas Exchange - Impaired  Goal: Absence of hypoxia  2/2/2022 0744 by Jannie Ramirez RN  Outcome: Ongoing  2/2/2022 0122 by Malia Keith RN  Outcome: Met This Shift  Goal: Promote optimal lung function  2/2/2022 0744 by Jannie Ramirez RN  Outcome: Ongoing  2/2/2022 0122 by Malia Keith RN  Outcome: Met This Shift     Problem: Breathing Pattern - Ineffective  Goal: Ability to achieve and maintain a regular respiratory rate  2/2/2022 0744 by Jannie Ramirez RN  Outcome: Ongoing  2/2/2022 0122 by Malia Keith RN  Outcome: Met This Shift     Problem:  Body Temperature -  Risk of, Imbalanced  Goal: Ability to maintain a body temperature within defined limits  2/2/2022 0744 by Jannie Ramirez RN  Outcome: Ongoing  2/2/2022 0122 by Malia Keith RN  Outcome: Met This Shift  Goal: Will regain or maintain usual level of consciousness  2/2/2022 0744 by Jannie Ramirez RN  Outcome: Ongoing  2/2/2022 0122 by Malia Keith RN  Outcome: Not Met This Shift  Goal: Complications related to the disease process, condition or treatment will be avoided or minimized  2/2/2022 0744 by Jannie Ramirez RN  Outcome: Ongoing  2/2/2022 0122 by Malia Keith RN  Outcome: Not Met This Shift     Problem: Isolation Precautions - Risk of Spread of Infection  Goal: Prevent transmission of infection  2/2/2022 0744 by Jannie Ramirez RN  Outcome: Ongoing  2/2/2022 0122 by Malia Keith RN  Outcome: Met This Shift     Problem: Nutrition Deficits  Goal: Optimize nutritional status  2/2/2022 0744 by Jannie Ramirez RN  Outcome: Ongoing  2/2/2022 0122 by Malia Keith RN  Outcome: Not Met This Shift     Problem: Risk for Fluid Volume Deficit  Goal: Maintain normal heart rhythm  2/2/2022 0744 by Jannie Ramirez RN  Outcome: Ongoing  2/2/2022 0122 by Anum Covarrubias RN  Outcome: Not Met This Shift  Goal: Maintain absence of muscle cramping  2/2/2022 0744 by Mercedes Omalley RN  Outcome: Ongoing  2/2/2022 0122 by Anum Covarrubias RN  Outcome: Met This Shift  Goal: Maintain normal serum potassium, sodium, calcium, phosphorus, and pH  2/2/2022 0744 by Mercedes Omalley RN  Outcome: Ongoing  2/2/2022 0122 by Anum Covarrubias RN  Outcome: Met This Shift     Problem: Loneliness or Risk for Loneliness  Goal: Demonstrate positive use of time alone when socialization is not possible  2/2/2022 0744 by Mercedes Omalley RN  Outcome: Ongoing  2/2/2022 0122 by Anum Covarrubias RN  Outcome: Met This Shift     Problem: Fatigue  Goal: Verbalize increase energy and improved vitality  2/2/2022 0744 by Mercedes Omalley RN  Outcome: Ongoing  2/2/2022 0122 by Anum Covarrubias RN  Outcome: Met This Shift     Problem: Patient Education: Go to Patient Education Activity  Goal: Patient/Family Education  2/2/2022 6640 by Mercedes Omalley RN  Outcome: Ongoing  2/2/2022 0122 by Anum Covarrubias RN  Outcome: Not Met This Shift     Problem: Skin Integrity:  Goal: Will show no infection signs and symptoms  Description: Will show no infection signs and symptoms  2/2/2022 0744 by Mercedes Omalley RN  Outcome: Ongoing  2/2/2022 0122 by Anum Covarrubias RN  Outcome: Met This Shift  Goal: Absence of new skin breakdown  Description: Absence of new skin breakdown  2/2/2022 0744 by Mercedes Omalley RN  Outcome: Ongoing  2/2/2022 0122 by Anum oCvarrubias RN  Outcome: Met This Shift     Problem: Falls - Risk of:  Goal: Will remain free from falls  Description: Will remain free from falls  2/2/2022 0744 by Mercedes Omalley RN  Outcome: Ongoing  2/2/2022 0122 by Anum Covarrubias RN  Outcome: Met This Shift  Goal: Absence of physical injury  Description: Absence of physical injury  2/2/2022 0744 by Mecredes Omalley RN  Outcome: Ongoing  2/2/2022 0122 by Anum Covarrubias RN  Outcome: Met This Shift

## 2022-02-03 LAB
ALBUMIN SERPL-MCNC: 2.4 G/DL (ref 2.8–4.4)
ALP BLD-CCNC: 167 U/L (ref 0–249)
ALT SERPL-CCNC: 15 U/L (ref 0–32)
ANION GAP SERPL CALCULATED.3IONS-SCNC: 11 MMOL/L (ref 7–16)
ANISOCYTOSIS: ABNORMAL
AST SERPL-CCNC: 27 U/L (ref 0–31)
BASOPHILS ABSOLUTE: 0.03 E9/L (ref 0.1–0.4)
BASOPHILS RELATIVE PERCENT: 0.1 % (ref 0–2)
BILIRUB SERPL-MCNC: 0.8 MG/DL (ref 2–6)
BUN BLDV-MCNC: 17 MG/DL (ref 4–19)
CALCIUM SERPL-MCNC: 7.3 MG/DL (ref 8.6–10.2)
CHLORIDE BLD-SCNC: 98 MMOL/L (ref 98–107)
CO2: 32 MMOL/L (ref 22–29)
CORTISOL TOTAL: 27.63 MCG/DL (ref 2.68–18.4)
CREAT SERPL-MCNC: 0.7 MG/DL (ref 0.4–0.7)
EOSINOPHILS ABSOLUTE: 0 E9/L (ref 0.1–0.7)
EOSINOPHILS RELATIVE PERCENT: 0 % (ref 0–12)
FERRITIN: 168 NG/ML
FOLATE: 4.9 NG/ML (ref 4.8–24.2)
GFR AFRICAN AMERICAN: >60
GFR NON-AFRICAN AMERICAN: >60 ML/MIN/1.73
GLUCOSE BLD-MCNC: 134 MG/DL (ref 70–110)
HCT VFR BLD CALC: 34.4 % (ref 45–66)
HCT VFR BLD CALC: 38.3 % (ref 45–66)
HCT VFR BLD CALC: 39.4 % (ref 45–66)
HEMOGLOBIN: 11.3 G/DL (ref 14.5–22)
HEMOGLOBIN: 12.7 G/DL (ref 14.5–22)
HEMOGLOBIN: 13.1 G/DL (ref 14.5–22)
IMMATURE GRANULOCYTES #: 1.1 E9/L
IMMATURE GRANULOCYTES %: 2.6 % (ref 0–5)
IRON SATURATION: 17 % (ref 15–50)
IRON: 32 MCG/DL (ref 37–145)
LACTIC ACID: 3.2 MMOL/L (ref 0.5–2.2)
LACTIC ACID: 3.8 MMOL/L (ref 0.5–2.2)
LACTIC ACID: 4.5 MMOL/L (ref 0.5–2.2)
LACTIC ACID: 4.6 MMOL/L (ref 0.5–2.2)
LYMPHOCYTES ABSOLUTE: 2.13 E9/L (ref 3–15)
LYMPHOCYTES RELATIVE PERCENT: 5.1 % (ref 15–60)
MAGNESIUM: 2 MG/DL (ref 1.6–2.6)
MCH RBC QN AUTO: 29.9 PG (ref 30–42)
MCHC RBC AUTO-ENTMCNC: 33.2 % (ref 29–37)
MCV RBC AUTO: 90.1 FL (ref 95–121)
METER GLUCOSE: 104 MG/DL (ref 70–110)
METER GLUCOSE: 114 MG/DL (ref 74–99)
METER GLUCOSE: 114 MG/DL (ref 74–99)
METER GLUCOSE: 134 MG/DL (ref 74–99)
METER GLUCOSE: 136 MG/DL (ref 74–99)
METER GLUCOSE: 162 MG/DL (ref 70–110)
MONOCYTES ABSOLUTE: 2.74 E9/L (ref 1–3)
MONOCYTES RELATIVE PERCENT: 6.6 % (ref 3–15)
MRSA CULTURE ONLY: NORMAL
NEUTROPHILS ABSOLUTE: 35.73 E9/L (ref 5–20)
NEUTROPHILS RELATIVE PERCENT: 85.6 % (ref 15–80)
PDW BLD-RTO: 16.5 FL (ref 11–19)
PHOSPHORUS: 2.7 MG/DL (ref 2.5–4.5)
PLATELET # BLD: 217 E9/L (ref 130–480)
PMV BLD AUTO: 12.1 FL (ref 7–12)
POTASSIUM SERPL-SCNC: 3.2 MMOL/L (ref 3.4–4.5)
POTASSIUM SERPL-SCNC: 3.5 MMOL/L (ref 3.5–5)
PROCALCITONIN: 0.34 NG/ML (ref 0–0.08)
RBC # BLD: 4.25 E12/L (ref 3.7–5.3)
REASON FOR REJECTION: NORMAL
REJECTED TEST: NORMAL
SODIUM BLD-SCNC: 141 MMOL/L (ref 132–146)
TOTAL IRON BINDING CAPACITY: 188 MCG/DL (ref 250–450)
TOTAL PROTEIN: 4.2 G/DL (ref 6.4–8.3)
VALPROIC ACID LEVEL: 3 MCG/ML (ref 50–100)
VITAMIN B-12: 722 PG/ML (ref 211–946)
WBC # BLD: 41.7 E9/L (ref 9.4–34)

## 2022-02-03 PROCEDURE — 6360000002 HC RX W HCPCS: Performed by: STUDENT IN AN ORGANIZED HEALTH CARE EDUCATION/TRAINING PROGRAM

## 2022-02-03 PROCEDURE — 6370000000 HC RX 637 (ALT 250 FOR IP): Performed by: SPECIALIST

## 2022-02-03 PROCEDURE — 80164 ASSAY DIPROPYLACETIC ACD TOT: CPT

## 2022-02-03 PROCEDURE — 82962 GLUCOSE BLOOD TEST: CPT

## 2022-02-03 PROCEDURE — 85018 HEMOGLOBIN: CPT

## 2022-02-03 PROCEDURE — 82533 TOTAL CORTISOL: CPT

## 2022-02-03 PROCEDURE — 36415 COLL VENOUS BLD VENIPUNCTURE: CPT

## 2022-02-03 PROCEDURE — 6360000002 HC RX W HCPCS: Performed by: INTERNAL MEDICINE

## 2022-02-03 PROCEDURE — 85025 COMPLETE CBC W/AUTO DIFF WBC: CPT

## 2022-02-03 PROCEDURE — 6370000000 HC RX 637 (ALT 250 FOR IP): Performed by: INTERNAL MEDICINE

## 2022-02-03 PROCEDURE — 87449 NOS EACH ORGANISM AG IA: CPT

## 2022-02-03 PROCEDURE — 84145 PROCALCITONIN (PCT): CPT

## 2022-02-03 PROCEDURE — 84132 ASSAY OF SERUM POTASSIUM: CPT

## 2022-02-03 PROCEDURE — 2580000003 HC RX 258: Performed by: EMERGENCY MEDICINE

## 2022-02-03 PROCEDURE — 2580000003 HC RX 258: Performed by: STUDENT IN AN ORGANIZED HEALTH CARE EDUCATION/TRAINING PROGRAM

## 2022-02-03 PROCEDURE — 2500000003 HC RX 250 WO HCPCS: Performed by: EMERGENCY MEDICINE

## 2022-02-03 PROCEDURE — 99223 1ST HOSP IP/OBS HIGH 75: CPT | Performed by: INTERNAL MEDICINE

## 2022-02-03 PROCEDURE — 85014 HEMATOCRIT: CPT

## 2022-02-03 PROCEDURE — 2500000003 HC RX 250 WO HCPCS: Performed by: INTERNAL MEDICINE

## 2022-02-03 PROCEDURE — 2580000003 HC RX 258: Performed by: INTERNAL MEDICINE

## 2022-02-03 PROCEDURE — C9113 INJ PANTOPRAZOLE SODIUM, VIA: HCPCS | Performed by: INTERNAL MEDICINE

## 2022-02-03 PROCEDURE — 84100 ASSAY OF PHOSPHORUS: CPT

## 2022-02-03 PROCEDURE — 36592 COLLECT BLOOD FROM PICC: CPT

## 2022-02-03 PROCEDURE — 83605 ASSAY OF LACTIC ACID: CPT

## 2022-02-03 PROCEDURE — 80053 COMPREHEN METABOLIC PANEL: CPT

## 2022-02-03 PROCEDURE — 83735 ASSAY OF MAGNESIUM: CPT

## 2022-02-03 PROCEDURE — 2000000000 HC ICU R&B

## 2022-02-03 RX ORDER — FLUCONAZOLE 100 MG/1
200 TABLET ORAL DAILY
Status: DISCONTINUED | OUTPATIENT
Start: 2022-02-03 | End: 2022-02-09

## 2022-02-03 RX ORDER — 0.9 % SODIUM CHLORIDE 0.9 %
1000 INTRAVENOUS SOLUTION INTRAVENOUS ONCE
Status: COMPLETED | OUTPATIENT
Start: 2022-02-03 | End: 2022-02-03

## 2022-02-03 RX ORDER — SODIUM CHLORIDE 450 MG/100ML
INJECTION, SOLUTION INTRAVENOUS CONTINUOUS
Status: DISCONTINUED | OUTPATIENT
Start: 2022-02-03 | End: 2022-02-04

## 2022-02-03 RX ORDER — POTASSIUM CHLORIDE 29.8 MG/ML
20 INJECTION INTRAVENOUS
Status: COMPLETED | OUTPATIENT
Start: 2022-02-03 | End: 2022-02-03

## 2022-02-03 RX ORDER — 0.9 % SODIUM CHLORIDE 0.9 %
250 INTRAVENOUS SOLUTION INTRAVENOUS ONCE
Status: COMPLETED | OUTPATIENT
Start: 2022-02-03 | End: 2022-02-03

## 2022-02-03 RX ORDER — POTASSIUM CHLORIDE 29.8 MG/ML
20 INJECTION INTRAVENOUS PRN
Status: DISCONTINUED | OUTPATIENT
Start: 2022-02-03 | End: 2022-02-07 | Stop reason: CLARIF

## 2022-02-03 RX ORDER — MIDODRINE HYDROCHLORIDE 5 MG/1
10 TABLET ORAL EVERY 8 HOURS SCHEDULED
Status: DISCONTINUED | OUTPATIENT
Start: 2022-02-03 | End: 2022-02-09 | Stop reason: HOSPADM

## 2022-02-03 RX ADMIN — POTASSIUM CHLORIDE 20 MEQ: 29.8 INJECTION INTRAVENOUS at 09:52

## 2022-02-03 RX ADMIN — SODIUM CHLORIDE 250 ML: 9 INJECTION, SOLUTION INTRAVENOUS at 14:47

## 2022-02-03 RX ADMIN — FLUCONAZOLE 200 MG: 100 TABLET ORAL at 12:08

## 2022-02-03 RX ADMIN — PIPERACILLIN AND TAZOBACTAM 3375 MG: 3; .375 INJECTION, POWDER, LYOPHILIZED, FOR SOLUTION INTRAVENOUS at 02:17

## 2022-02-03 RX ADMIN — HYDROCORTISONE SODIUM SUCCINATE 100 MG: 100 INJECTION, POWDER, FOR SOLUTION INTRAMUSCULAR; INTRAVENOUS at 12:39

## 2022-02-03 RX ADMIN — MIDODRINE HYDROCHLORIDE 10 MG: 5 TABLET ORAL at 21:59

## 2022-02-03 RX ADMIN — Medication 10 ML: at 22:00

## 2022-02-03 RX ADMIN — POTASSIUM CHLORIDE 20 MEQ: 29.8 INJECTION INTRAVENOUS at 11:15

## 2022-02-03 RX ADMIN — PIPERACILLIN AND TAZOBACTAM 3375 MG: 3; .375 INJECTION, POWDER, LYOPHILIZED, FOR SOLUTION INTRAVENOUS at 23:56

## 2022-02-03 RX ADMIN — SODIUM CHLORIDE, PRESERVATIVE FREE 10 ML: 5 INJECTION INTRAVENOUS at 08:21

## 2022-02-03 RX ADMIN — SODIUM CHLORIDE: 4.5 INJECTION, SOLUTION INTRAVENOUS at 16:44

## 2022-02-03 RX ADMIN — Medication 10 ML: at 08:21

## 2022-02-03 RX ADMIN — DEXTROSE MONOHYDRATE 4 MCG/MIN: 50 INJECTION, SOLUTION INTRAVENOUS at 22:00

## 2022-02-03 RX ADMIN — HYDROCORTISONE SODIUM SUCCINATE 50 MG: 100 INJECTION, POWDER, FOR SOLUTION INTRAMUSCULAR; INTRAVENOUS at 05:37

## 2022-02-03 RX ADMIN — Medication: at 03:24

## 2022-02-03 RX ADMIN — POTASSIUM CHLORIDE 20 MEQ: 29.8 INJECTION INTRAVENOUS at 16:08

## 2022-02-03 RX ADMIN — HYDROCORTISONE SODIUM SUCCINATE 100 MG: 100 INJECTION, POWDER, FOR SOLUTION INTRAMUSCULAR; INTRAVENOUS at 21:59

## 2022-02-03 RX ADMIN — SODIUM CHLORIDE, PRESERVATIVE FREE 10 ML: 5 INJECTION INTRAVENOUS at 22:00

## 2022-02-03 RX ADMIN — PANTOPRAZOLE SODIUM 40 MG: 40 INJECTION, POWDER, FOR SOLUTION INTRAVENOUS at 21:59

## 2022-02-03 RX ADMIN — Medication 250 MG: at 12:08

## 2022-02-03 RX ADMIN — PIPERACILLIN AND TAZOBACTAM 3375 MG: 3; .375 INJECTION, POWDER, LYOPHILIZED, FOR SOLUTION INTRAVENOUS at 08:21

## 2022-02-03 RX ADMIN — SODIUM CHLORIDE 1000 ML: 9 INJECTION, SOLUTION INTRAVENOUS at 14:30

## 2022-02-03 RX ADMIN — POTASSIUM CHLORIDE 20 MEQ: 29.8 INJECTION INTRAVENOUS at 08:20

## 2022-02-03 RX ADMIN — SODIUM CHLORIDE 1000 ML: 9 INJECTION, SOLUTION INTRAVENOUS at 18:36

## 2022-02-03 RX ADMIN — CITALOPRAM HYDROBROMIDE 10 MG: 20 TABLET ORAL at 10:00

## 2022-02-03 RX ADMIN — PIPERACILLIN AND TAZOBACTAM 3375 MG: 3; .375 INJECTION, POWDER, LYOPHILIZED, FOR SOLUTION INTRAVENOUS at 16:23

## 2022-02-03 RX ADMIN — DIGOXIN 125 MCG: 0.12 TABLET ORAL at 11:14

## 2022-02-03 RX ADMIN — MEMANTINE HYDROCHLORIDE 5 MG: 5 TABLET, FILM COATED ORAL at 11:14

## 2022-02-03 RX ADMIN — MIDODRINE HYDROCHLORIDE 10 MG: 5 TABLET ORAL at 11:15

## 2022-02-03 RX ADMIN — PANTOPRAZOLE SODIUM 40 MG: 40 INJECTION, POWDER, FOR SOLUTION INTRAVENOUS at 08:21

## 2022-02-03 RX ADMIN — DIVALPROEX SODIUM 125 MG: 125 CAPSULE ORAL at 21:59

## 2022-02-03 RX ADMIN — DEXTROSE MONOHYDRATE 32 MCG/MIN: 50 INJECTION, SOLUTION INTRAVENOUS at 02:47

## 2022-02-03 RX ADMIN — INSULIN LISPRO 2 UNITS: 100 INJECTION, SOLUTION INTRAVENOUS; SUBCUTANEOUS at 22:00

## 2022-02-03 ASSESSMENT — PAIN SCALES - PAIN ASSESSMENT IN ADVANCED DEMENTIA (PAINAD)
FACIALEXPRESSION: 0
TOTALSCORE: 0
FACIALEXPRESSION: 0
NEGVOCALIZATION: 0
TOTALSCORE: 0
FACIALEXPRESSION: 0
FACIALEXPRESSION: 0
TOTALSCORE: 0
BREATHING: 0
CONSOLABILITY: 0
NEGVOCALIZATION: 0
NEGVOCALIZATION: 0
CONSOLABILITY: 0
BREATHING: 0
TOTALSCORE: 0
FACIALEXPRESSION: 0
BODYLANGUAGE: 0
CONSOLABILITY: 0
BREATHING: 0
FACIALEXPRESSION: 0
BODYLANGUAGE: 0
BODYLANGUAGE: 0
FACIALEXPRESSION: 0
NEGVOCALIZATION: 0
BREATHING: 0
NEGVOCALIZATION: 0
TOTALSCORE: 0
BREATHING: 0
CONSOLABILITY: 0
CONSOLABILITY: 0
TOTALSCORE: 0
CONSOLABILITY: 0
NEGVOCALIZATION: 0
BODYLANGUAGE: 0
NEGVOCALIZATION: 0
TOTALSCORE: 0
FACIALEXPRESSION: 0
BODYLANGUAGE: 0
NEGVOCALIZATION: 0
CONSOLABILITY: 0
BREATHING: 0
BODYLANGUAGE: 0
CONSOLABILITY: 0
BODYLANGUAGE: 0
BODYLANGUAGE: 0
TOTALSCORE: 0
BREATHING: 0
BREATHING: 0

## 2022-02-03 ASSESSMENT — PAIN SCALES - GENERAL
PAINLEVEL_OUTOF10: 0

## 2022-02-03 NOTE — PROGRESS NOTES
PROGRESS NOTE  By Laci Corrales M.D. [facility]  Dr. David John M.D.,  Dr. Marcos Wise M.D.,   Dr. Kasia Diehl D.O.,  Dr. Diogenes Simpson M.D.,  Dr. Deisy Unger D.O.,  Jessie Silva  80 y.o.  female    SUBJECTIVE:  Epifanio Maxcy but slightly more awake than yesterday. Patient's daughter Reyna Figures is at bedside    OBJECTIVE:    BP (!) 133/58   Pulse 68   Temp 98 °F (36.7 °C) (Axillary)   Resp 18   Ht 5' 4\" (1.626 m)   Wt 189 lb 6 oz (85.9 kg)   LMP  (LMP Unknown)   SpO2 97%   BMI 32.51 kg/m²     General: NAD/elderly  female. Appears lethargic however slightly more awake than yesterday -patient does not verbalize and mumbles  HEENT: Poor dentition/moist oral mucosa. NG tube in place draining no blood or coffee-ground material  Neck: Supple with trachea midline  Chest: Symmetrical excursion/nonlabored respirations  Cor: Regular/S1-S2  Abd.: Soft and obese. Appears nontender  Extr.:  Trace lower extremity edema. Decreased muscle bulk  Skin: Warm and dry/anicteric  Other: Segura catheter in place      DATA:    Monitor data reviewed -sinus rhythm noted.     Stool (measured) : 200 mL  Lab Results   Component Value Date    WBC 41.7 02/03/2022    RBC 4.25 02/03/2022    HGB 12.7 02/03/2022    HCT 38.3 02/03/2022    MCV 90.1 02/03/2022    MCH 29.9 02/03/2022    MCHC 33.2 02/03/2022    RDW 16.5 02/03/2022     02/03/2022    MPV 12.1 02/03/2022     Lab Results   Component Value Date     02/03/2022    K 3.2 02/03/2022    K 3.9 02/02/2022    CL 98 02/03/2022    CO2 32 02/03/2022    BUN 17 02/03/2022    CREATININE 0.7 02/03/2022    CALCIUM 7.3 02/03/2022    PROT 4.2 02/03/2022    LABALBU 2.4 02/03/2022    BILITOT 0.8 02/03/2022    ALKPHOS 167 02/03/2022    AST 27 02/03/2022    ALT 15 02/03/2022     Lab Results   Component Value Date    LIPASE 30 02/01/2022     Lab Results   Component Value Date    AMYLASE 49 10/29/2014 ASSESSMENT/PLAN:    1. Rectal bleed  -Bloody bowel movements  -Slightly decreased H&H  -Possibly related to infection/ischemia -unlikely to be amenable to endoscopic treatment  -Cannot exclude stercoral ulceration  -With via NG tube  -Significant concern for C. difficile given leukocytosis on presentation C. difficile test has been canceled by -lab  -Antibiotics per ID service  -With also decreased total iron-binding capacity  -Monitor hemoglobin  -Hold off endoscopy at this time -see discussion below     2. Sepsis   -improved hypotension and tachycardia  -Supportive/symptomatic treatment per [facility]  -Antibiotics per admitting/ID service     3. Lactic acidosis  -Likely related to generalized hypoperfusion  -Contrasted CT on presentation does not report bowel gas  -Just reported team endometrial cavity    4. Evaded troponin  -. However primary CAD cannot be excluded  -Recommend cardiology evaluation    Has been discussed in detail with patient's daughter at bedside. All questions has been answered to her satisfaction. She states that patient condition as well as quality of life has significantly decreased in the past 2 years  -I have explained that at this time there is no clear benefit from endoscopy patient's daughter does not want CPR/resuscitation at this time we will plan to hold off endoscopy unless overt bleed/precipitous drop in H&H. Pastor Brina MD  2/3/2022  2:23 PM    NOTE:  This report was transcribed using voice recognition software. Every effort was made to ensure accuracy; however, inadvertent computerized transcription errors may be present.

## 2022-02-03 NOTE — PROGRESS NOTES
Critical Care Team - Daily Progress Note         Date and time: 2/3/2022 5:18 PM  Patient's name:  Judd [address] Record Number: [id number]  Patient's account/billing number: [de-identified]  Patient's YOB: 1940  Age: 80 y.o. Date of Admission: 2/1/2022  6:51 PM  Length of stay during current admission: 2      Primary Care Physician: Shine Adams DO  ICU Attending Physician: Dr. Parker Pearson Status: Limited    Reason for ICU admission: Shock, GI Bleed, Acute Respiratory Failure with Hypoxia      SUBJECTIVE:     OVERNIGHT EVENTS:       2/3: No overnight events. Patient's cortef was increased due to continued pressor requirements. Patient was started on midodrine. NICOM was positive greater than 10% response. Patient was given another Saline Bolus. Repeat NICOM pending. Patient was cleared for tube feeds.       CURRENT VENTILATION STATUS:     [] Ventilator  [] BIPAP  [] Nasal Cannula [x] Room Air      IF INTUBATED, ET TUBE MARKING AT LOWER LIP:       cms    SECRETIONS Amount:  [] Small [] Moderate  [] Large  [x] None  Color:     [] White [] Colored  [] Bloody    SEDATION:  RAAS Score: -1  [] Propofol gtt  [] Versed gtt  [] Ativan gtt   [] No Sedation    PARALYZED:  [x] No    [] Yes      VASOPRESSORS:  [] No    [x] Yes    If yes -   [x] Levophed       [] Dopamine     [] Vasopressin       [] Dobutamine  [] Phenylephrine         [] Epinephrine    CENTRAL LINES:     [] No   [x] Yes   (Date of Insertion: 2/1  )           If yes -     [] Right IJ     [] Left IJ [x] Right Femoral [] Left Femoral                   [] Right Subclavian [] Left Subclavian       ROY'S CATHETER:   [] No   [x] Yes  (Date of Insertion: 2/1  )     URINE OUTPUT:            [x] Good   [] Low              [] Anuric      OBJECTIVE:     VITAL SIGNS:  BP (!) 138/58   Pulse 65   Temp 98 °F (36.7 °C) (Axillary)   Resp 14   Ht 5' 4\" (1.626 m)   Wt 189 lb 6 oz (85.9 kg)   LMP  (LMP Unknown)   SpO2 98%   BMI 32.51 kg/m²   Tmax over 24 hours:  Temp (24hrs), Av.8 °F (37.1 °C), Min:98 °F (36.7 °C), Max:99.5 °F (37.5 °C)      Patient Vitals for the past 6 hrs:   BP Temp Temp src Pulse Resp SpO2 Height   22 1500 (!) 138/58 -- -- 65 14 98 % --   22 1400 (!) 133/58 -- -- 68 18 97 % --   22 1300 (!) 154/58 -- -- 62 17 96 % --   22 1240 -- -- -- -- -- -- 5' 4\" (1.626 m)   22 1200 (!) 142/63 98 °F (36.7 °C) Axillary 74 18 97 % --         Intake/Output Summary (Last 24 hours) at 2/3/2022 1718  Last data filed at 2/3/2022 1400  Gross per 24 hour   Intake 6228.49 ml   Output 1600 ml   Net 4628.49 ml     Wt Readings from Last 2 Encounters:   22 189 lb 6 oz (85.9 kg)   22 182 lb 9.6 oz (82.8 kg)     Body mass index is 32.51 kg/m². PHYSICAL EXAMINATION:  CONSTITUTIONAL:somnolent, uncooperative and no apparent distress  EYES:  Lids and lashes normal, pupils equal, round and reactive to light, extra ocular muscles intact, sclera clear, conjunctiva normal  ENT:  Normocephalic, without obvious abnormality, atraumatic,  oral pharynx with moist mucus membranes, tonsils without erythema or exudates, gums normal and good dentition. LUNGS:  No increased work of breathing, good air exchange, clear to auscultation bilaterally, no crackles or wheezing  CARDIOVASCULAR:  Normal apical impulse, regular rate and rhythm, and no murmur noted  ABDOMEN: soft, non-distended, non-tender, no masses palpated, no hepatosplenomegally  MUSCULOSKELETAL:  There is no redness, warmth, or swelling of the joints.  Tone is normal.  NEUROLOGIC:  Mental Status Exam:  Level of Alertness:   somnolent  Orientation:   Disoriented  SKIN:  no rashes    Any additional physical findings:    MEDICATIONS:    Scheduled Meds:   hydrocortisone sodium succinate PF  100 mg IntraVENous Q8H    midodrine  10 mg Oral 3 times per day    fluconazole  200 mg Oral Daily    citalopram  10 mg Oral Daily    digoxin  125 mcg Oral Daily    divalproex  125 mg Oral Nightly    memantine  5 mg Oral Daily    sodium chloride flush  5-40 mL IntraVENous 2 times per day    piperacillin-tazobactam  3,375 mg IntraVENous Q8H    pantoprazole  40 mg IntraVENous BID    And    sodium chloride (PF)  10 mL IntraVENous BID    insulin lispro  0-12 Units SubCUTAneous Q4H     Continuous Infusions:   sodium chloride 150 mL/hr at 02/03/22 1644    sodium chloride      sodium chloride      norepinephrine 12 mcg/min (02/03/22 1451)     PRN Meds:   potassium chloride, 20 mEq, PRN  sodium chloride flush, 5-40 mL, PRN  sodium chloride, 25 mL, PRN  ondansetron, 4 mg, Q8H PRN   Or  ondansetron, 4 mg, Q6H PRN  polyethylene glycol, 17 g, Daily PRN  acetaminophen, 650 mg, Q6H PRN   Or  acetaminophen, 650 mg, Q6H PRN  magnesium sulfate, 2,000 mg, PRN  sodium chloride, , PRN          VENT SETTINGS (Comprehensive) (if applicable):  Vent Information  SpO2: 98 %  Additional Respiratory  Assessments  Pulse: 65  Resp: 14  SpO2: 98 %  Oral Care: Mouth swabbed,Mouth moisturizer,Mouth suctioned    ABGs:   Recent Labs     02/01/22 1904   PH 7.426   PCO2 18.2*   PO2 89.6   HCO3 11.7*   BE -9.8*   O2SAT 97.2       Laboratory findings:    Complete Blood Count:   Recent Labs     02/01/22  1907 02/01/22  2258 02/02/22  0624 02/02/22  1217 02/02/22  1752 02/03/22  0130 02/03/22  0546   WBC 32.7*  --  49.1*  --   --   --  41.7*   HGB 13.7   < > 13.9   < > 13.8 13.1 12.7   HCT 43.2   < > 43.3   < > 41.6 39.4 38.3     --  296  --   --   --  217    < > = values in this interval not displayed.         Last 3 Blood Glucose:   Recent Labs     02/02/22  0624 02/02/22  1217 02/03/22  0546   GLUCOSE 353* 278* 134*        PT/INR:    Lab Results   Component Value Date    PROTIME 17.8 02/01/2022    INR 1.6 02/01/2022     PTT:    Lab Results   Component Value Date    APTT 30.7 02/01/2022       Comprehensive Metabolic Profile:   Recent Labs     02/01/22  1907 02/02/22  0373 02/02/22  6592 02/02/22  1217 02/03/22  0546 02/03/22  1330   NA  --  139  --  139 141  --    K   < > 3.9  --  3.7 3.2* 3.5   CL  --  100  --  101 98  --    CO2  --  18*  --  22 32*  --    BUN  --  28*  --  26* 17  --    CREATININE  --  0.9  --  0.8 0.7  --    GLUCOSE  --  353*  --  278* 134*  --    CALCIUM  --  7.7*  --  7.7* 7.3*  --    PROT  --  4.8*   < >  --  4.2*  --    LABALBU  --  2.8*   < >  --  2.4*  --    BILITOT  --  0.8   < >  --  0.8  --    ALKPHOS  --  209*   < >  --  167*  --    AST  --  33*   < >  --  27  --    ALT  --  19   < >  --  15  --     < > = values in this interval not displayed. Magnesium:   Lab Results   Component Value Date    MG 2.0 02/03/2022     Phosphorus:   Lab Results   Component Value Date    PHOS 2.7 02/03/2022     Ionized Calcium: No results found for: CAION     Urinalysis:     Troponin: No results for input(s): TROPONINI in the last 72 hours. Microbiology:    Cultures during this admission:     Blood cultures:                 [] None drawn      [x] Negative             []  Positive (Details:  )  Urine Culture:                   [] None drawn      [x] Negative             []  Positive (Details:  )  Sputum Culture:               [x] None drawn       [] Negative             []  Positive (Details:  )   Endotracheal aspirate:     [x] None drawn       [] Negative             []  Positive (Details:  )     Other pertinent Labs:       Radiology/Imaging:     Chest Xray (2/3/2022): None        PLAN:     WEAN PER PROTOCOL:  [] No   [x] Yes  [] N/A    DISCONTINUE ANY LABS:   [x] No   [] Yes    ICU PROPHYLAXIS:  Stress ulcer:  [x] PPI Agent  [] [id number] [] Sucralfate  [] Other:  VTE:   [] Enoxaparin  [] Unfract.  Heparin Subcut  [x] EPC Cuffs    NUTRITION:  [] NPO [] Tube Feeding (Specify: ) [] TPN  [x] PO (Diet: Diet NPO  ADULT TUBE FEEDING; Nasogastric; Standard without Fiber; Continuous; 20; Yes; 20; Q 6 hours; 55; 30; Q 4 hours)    HOME MEDICATIONS RECONCILED: [] No  [x] Yes    INSULIN DRIP:   [x] No   [] Yes    CONSULTATION NEEDED:  [x] No   [] Yes    FAMILY UPDATED:    [x] No   [] Yes    TRANSFER OUT OF ICU:   [x] No   [] Yes    SYSTEMS ASSESSMENT    Neuro   Patient with Alzheimer's and dementia, bedridden and unresponsive, she is at baseline.     Respiratory   No Oxygen Requirements at this time     Cardiovascular   Hypotension, Levophed 30mcg/min titrate for MAP>65  Cortisol Pending, Solucortef 100mg q8hr  Midodrine 10mg TID  Patient was fluid responsive by NICOM, 1L NS Bolus ordered    Hx of Afib, Digoxin 125mcg Daily     Gastrointestinal   Chronic constipation,   GI Bleed by Rectum given FEIBA by hospitalist  service  1:15 in a.m. H&H stable, [facility] Gen Surg was consulted with no plans for scopes at this time. Continue to monitor H&H    Renal   Cr, 0.7  Lactic acidosis, trend lactic acid  Continue to monitor    Hypokalemia, 3.2, replacement PRN     Infectious Disease   Sepsis  Septic shock    Colitis, Continue Zosyn, added diflucan, C. Difficile stool culture pending, PO Vanc if positive according to ID appreciate recs    Possible UTI, Already on Antibiotics with coverage     Hematology/Oncology   Hg 12.7  Continue to monitor  DVT Prophylaxis held due to GI bleed     Endocrine   Hyperglycemia, Lantus 15 units, MDSSI     Social/Spiritual/DNR/Other   Patient is a Limited Code, Meds only     Depression, Celexa 10mg Daily     Alzheimer's, memantine 5mg Daily, Depakote 125mg, Nightly      Geroldine DO Isaac              2/3/2022, 5:18 PM      NOTE: This report, in part or full, may have been transcribed using voice recognition software. Every effort was made to ensure accuracy; however, inadvertent computerized transcription errors may be present. Please excuse any transcriptional grammatical or spelling errors that may have escaped my editorial review. I personally saw, examined and provided care for the patient. Radiographs, labs and medication list were reviewed by me independently.  I spoke with bedside nursing, therapists and consultants. Critical care services and times documented are independent of procedures and multidisciplinary rounds with Residents. Additionally comprehensive, multidisciplinary rounds were conducted with the MICU team. The case was discussed in detail and plans for care were established. Review of Residents documentation was conducted and revisions were made as appropriate. I agree with the above documented exam, problem list and plan of care.   Holly Garcia MD   CCT excluding procedures:35'

## 2022-02-03 NOTE — PROGRESS NOTES
GENERAL SURGERY  DAILY PROGRESS NOTE  2/3/2022    CHIEF COMPLAINT:  Chief Complaint   Patient presents with    Altered Mental Status     found by staff at SNF in bed unresponsive with pulse ox 64%, placed on 2L NC, 6L NC via EMS- pt more responsive. also ? GIB staff reported blood clots in stool. SUBJECTIVE:  Patient is limited code and does not want further intervention for the reported bloody stools. OBJECTIVE:  BP (!) 96/47   Pulse 73   Temp 98.3 °F (36.8 °C) (Axillary)   Resp 16   Ht 5' 4\" (1.626 m)   Wt 189 lb 6 oz (85.9 kg)   LMP  (LMP Unknown)   SpO2 96%   BMI 32.51 kg/m²     GENERAL:  Lethargic, minimally responsive, appears chronically ill   LUNGS:  No increased work of breathing. CARDIOVASCULAR: RR  ABDOMEN:  Soft, non-distended, no grimace to palpation. No guarding, rigidity, rebound. EXT: warm and well perfused     ASSESSMENT/PLAN:  80 y.o. female with reports of BRBPR. Hgb remains stable. - patient is limited code and per family discussion they would not like any further intervention for the possible GIB    Rosales Duran MD  Surgery Resident PGY-2  2/3/2022  12:25 PM     The patient was seen and examined and the chart was reviewed. I agree with the assessment and plan.

## 2022-02-03 NOTE — PROGRESS NOTES
CI HR MAP TPRI SVI TFC   Baseline 2.1 72 88 3411 29 52.8   Test 2.6 70 86 2614 41 55.4   % Change 27.5 -1.7 -2.3 -23.3 40.5 5.0   noninvasive -  250cc fluid challenge

## 2022-02-03 NOTE — FLOWSHEET NOTE
Spoke to micro at [facility]. Inquired why Cdiff specimen was cancelled by them. They advised that stool was formed and not loose or liquid.  They further advised that we may re-order and re-send if stool becomes liquid or loose

## 2022-02-03 NOTE — PROGRESS NOTES
[facility] Progress Note    Admitting Date and Time: 2/1/2022  6:51 PM      Subjective:  Laying in bed, responsive to tactile stimuli with facial grimacing, condition remains guarded. ROS: denies fever, chills, cp, sob, n/v, HA unless stated above.  hydrocortisone sodium succinate PF  100 mg IntraVENous Q8H    midodrine  10 mg Oral 3 times per day    fluconazole  200 mg Oral Daily    citalopram  10 mg Oral Daily    digoxin  125 mcg Oral Daily    divalproex  125 mg Oral Nightly    memantine  5 mg Oral Daily    sodium chloride flush  5-40 mL IntraVENous 2 times per day    piperacillin-tazobactam  3,375 mg IntraVENous Q8H    pantoprazole  40 mg IntraVENous BID    And    sodium chloride (PF)  10 mL IntraVENous BID    insulin lispro  0-12 Units SubCUTAneous Q4H     potassium chloride, 20 mEq, PRN  sodium chloride flush, 5-40 mL, PRN  sodium chloride, 25 mL, PRN  ondansetron, 4 mg, Q8H PRN   Or  ondansetron, 4 mg, Q6H PRN  polyethylene glycol, 17 g, Daily PRN  acetaminophen, 650 mg, Q6H PRN   Or  acetaminophen, 650 mg, Q6H PRN  magnesium sulfate, 2,000 mg, PRN  sodium chloride, , PRN         Objective:    BP (!) 138/58   Pulse 65   Temp 98 °F (36.7 °C) (Axillary)   Resp 14   Ht 5' 4\" (1.626 m)   Wt 189 lb 6 oz (85.9 kg)   LMP  (LMP Unknown)   SpO2 98%   BMI 32.51 kg/m²     General: NAD/elderly  female. Appears lethargic however slightly more awake than yesterday -patient does not verbalize and mumbles  HEENT: Poor dentition/moist oral mucosa. NG tube in place draining no blood or coffee-ground material  Neck: Supple with trachea midline  Chest: Symmetrical excursion/nonlabored respirations  Cor: Regular/S1-S2  Abd.: Soft and obese. Appears nontender  Extr.:  Trace lower extremity edema.   Decreased muscle bulk  Skin: Warm and dry/anicteric  Other: Segura catheter in place        Recent Labs     02/01/22  1907 02/02/22  0448 02/02/22  1217 02/03/22  0546 02/03/22  1330   NA  --  139 139 141  --    K   < > 3.9 3.7 3.2* 3.5   CL  --  100 101 98  --    CO2  --  18* 22 32*  --    BUN  --  28* 26* 17  --    CREATININE  --  0.9 0.8 0.7  --    GLUCOSE  --  353* 278* 134*  --    CALCIUM  --  7.7* 7.7* 7.3*  --     < > = values in this interval not displayed. Recent Labs     02/01/22  1907 02/01/22  2258 02/02/22  0624 02/02/22  1217 02/02/22  1752 02/03/22  0130 02/03/22  0546   WBC 32.7*  --  49.1*  --   --   --  41.7*   RBC 4.62  --  4.73  --   --   --  4.25   HGB 13.7   < > 13.9   < > 13.8 13.1 12.7   HCT 43.2   < > 43.3   < > 41.6 39.4 38.3   MCV 93.5  --  91.5  --   --   --  90.1   MCH 29.7  --  29.4  --   --   --  29.9   MCHC 31.7*  --  32.1  --   --   --  33.2   RDW 15.2*  --  16.2*  --   --   --  16.5*     --  296  --   --   --  217   MPV 12.0  --  12.4*  --   --   --  12.1*    < > = values in this interval not displayed. Assessment and Plan     Timeline:     -Discharged on jan 28,2022 on decadron 4mg daily, lisinopril increased to 10 mg and Started on digoxin 125 mcg, metoprolol changed to 25mg BID    -Admitted on 2/1/22; lactic 9.0, Bicarb 18, elevaed beta hydroxy, AG 21, Urine ketones,  glucose 353, WBC 49.1,  +UA, Negative UC,  CT head reported Changes of encephalomalacia right parietooccipital region. In Afib with RVR, Ct abdomen showed small amount of Gas in endometrial cavity  While canales was in place. Was also seen on previost CT scan 1 month prior. questionable thickening in sigmoid colon with concern for colitis or rectouterine fistula. BC and UC negative at that time. Hgb13, , corrected calcium 8.3, cortisol 19.61    ______________________________________________  #Severe Sepsis  -Dx via Lactic acidosis, UA+, 2/3 sirs on admission . Bp was soft but not hypotensive on admission.   -Source presumed to be urine in origin.  Other possible sources endocarditis, sacral wound/osteo, abscess,   -Differential includes: autonomic dysregulation, adrenal insufficiency, reactive leukocytosis from decadron on discharge. Viral or fungal infection. Seizure given hx of seizure and CT head showing parietal lobe activity. On depakote 125mg daily (cant crush this med, she has NG tube, can be dissolved if sprinkle, curious if this was being given or not at her facility.)   -Obtain pro calcitonin []  limited utility in setting of isolated UTI, may aid in [address] antibiotics. Appreciate ID recs  -Obtain Beta-D fungitel [], Fluconazole 200mg daily started on 2/3/22  -Started on hydrocortisone 10mg Q8 hours, titration levophed down with IVF running. Started on midodrine 10mg TID  -continue broad spectrum antibiotics, maintain tissue perfusion, wean off levophed, monitor urine output, continue stress ulcer ppx  -Consider Ct maxilla-facial for abscess given prolong NG tube placement []  -EEG monitoring if applicable []  -Consider changing depakote sprinkle to valproic acid liquid if concerned about bioavilability , F/U on level []      #HyperNatremia  -Sodium today 141, Cl 98, bicarb 32 (up from 22)  -On 0.9 NC, will transition to 0.45% NC on 2/3/22    #DKA  -On admission patient presented with DKA. Likely iatrogenic from decadron that placed on discharge as well as underlying infection.  -Resolved at this time. #Atrial Fibrillation/tachy randal syndrome  -At home shes on metoprolol and eliquis.  Both on hold at this time.   -Started on digoxin 125mcg, metoprolol changed to 25mg BID on last discharge by cardiology  -Eliquis remains on hold  -K and Mg replacement until at goal.   -Review event monitor []  -Monitor Qtc, fluconazole, escitalopram and mamantine all have effect on Qtc []    #DM2  -nutrition being started via NG tube, monitor for diarrhea, insensible fluid loses, ext.   -Sliding scale , monitor glucose given hydrocortisone initiation and tube feeding [][]  -Inpatient goal ~180    #Dementia, Late onset alzheimer's  -clinically has rigidity BLT upper extremity, tardive dyskinesia  -On depakote 125mg daily (cant crush this med, she has NG tube, curious if this was being given or not)   -On memantine, citalopam 10mg   -Obtain Valproic level []    #Rectal Bleeding  -Reported to have Bloody bowel movements.  Had GI bleed In past. Was also on eliquis on admission.   -Slightly decreased H&H  -Possibly related to infection/ischemia -unlikely to be amenable to endoscopic treatment  -Cannot exclude stercoral ulceration given the NG tube  -Significant concern for C. difficile given leukocytosis on presentation C. difficile test has been canceled by -lab  -Antibiotics per ID service  -Gi on board, they are Holding off endoscopy unless active bleeding occurs []      Lines: Left femoral , indwelling canales, Ng tube

## 2022-02-03 NOTE — PLAN OF CARE
Problem:  Body Temperature -  Risk of, Imbalanced  Goal: Ability to maintain a body temperature within defined limits  2/3/2022 0850 by Dillon Lloyd RN  Outcome: Ongoing  2/3/2022 0258 by Jose A Méndez RN  Outcome: Met This Shift  Goal: Will regain or maintain usual level of consciousness  2/3/2022 0850 by Dillon Lloyd RN  Outcome: Ongoing  2/3/2022 0258 by Jose A Méndez RN  Outcome: Met This Shift  Goal: Complications related to the disease process, condition or treatment will be avoided or minimized  2/3/2022 0850 by Dillon Lloyd RN  Outcome: Ongoing  2/3/2022 0258 by Jose A Méndez RN  Outcome: Met This Shift     Problem: Isolation Precautions - Risk of Spread of Infection  Goal: Prevent transmission of infection  2/3/2022 0850 by Dillon Lloyd RN  Outcome: Ongoing  2/3/2022 0258 by Jose A Méndez RN  Outcome: Met This Shift     Problem: Risk for Fluid Volume Deficit  Goal: Maintain normal heart rhythm  2/3/2022 0850 by Dillon Lloyd RN  Outcome: Ongoing  2/3/2022 0258 by Jose A Méndez RN  Outcome: Met This Shift  Goal: Maintain absence of muscle cramping  2/3/2022 0850 by Dillon Lloyd RN  Outcome: Ongoing  2/3/2022 0258 by Jose A Méndez RN  Outcome: Met This Shift  Goal: Maintain normal serum potassium, sodium, calcium, phosphorus, and pH  2/3/2022 0850 by Dillon Lloyd RN  Outcome: Ongoing  2/3/2022 0258 by Jose A Méndez RN  Outcome: Met This Shift     Problem: Skin Integrity:  Goal: Will show no infection signs and symptoms  Description: Will show no infection signs and symptoms  2/3/2022 0850 by Dillon Lloyd RN  Outcome: Ongoing  2/3/2022 0258 by Jose A Méndez RN  Outcome: Met This Shift  Goal: Absence of new skin breakdown  Description: Absence of new skin breakdown  2/3/2022 0850 by Dillon Lloyd RN  Outcome: Ongoing  2/3/2022 0258 by Jose A Méndez RN  Outcome: Met This Shift     Problem: Falls - Risk of:  Goal: Will remain free from falls  Description: Will remain free from falls  2/3/2022 0850 by Isaac Pearce RN  Outcome: Ongoing  2/3/2022 0258 by Jen Hensley RN  Outcome: Met This Shift  Goal: Absence of physical injury  Description: Absence of physical injury  2/3/2022 0850 by Isaac Pearce RN  Outcome: Ongoing  2/3/2022 0258 by Jen Hensley RN  Outcome: Met This Shift

## 2022-02-03 NOTE — PROGRESS NOTES
[address] [place] [address] [facility]  Progress Note      Chief Complaint   Patient presents with    Altered Mental Status     found by staff at SNF in bed unresponsive with pulse ox 64%, placed on 2L NC, 6L NC via EMS- pt more responsive. also ? [facility] staff reported blood clots in stool. SUBJECTIVE:  Patient is tolerating medications. No reported adverse drug reactions. No nausea, vomiting, positive for loose BMs/diarrhea. CorPak tolerating tube feeding  Still on Levophed  Patient is nonverbal historically and currently  Afebrile  Room air      Review of systems:  As stated above in the chief complaint, otherwise negative.     Medications:  Scheduled Meds:   potassium chloride  20 mEq IntraVENous Q1H    hydrocortisone sodium succinate PF  100 mg IntraVENous Q8H    midodrine  10 mg Oral 3 times per day    sodium chloride  250 mL IntraVENous Once    citalopram  10 mg Oral Daily    digoxin  125 mcg Oral Daily    divalproex  125 mg Oral Nightly    memantine  5 mg Oral Daily    sodium chloride flush  5-40 mL IntraVENous 2 times per day    piperacillin-tazobactam  3,375 mg IntraVENous Q8H    pantoprazole  40 mg IntraVENous BID    And    sodium chloride (PF)  10 mL IntraVENous BID    insulin lispro  0-12 Units SubCUTAneous Q4H    vancomycin  250 mg Oral 4 times per day    fluconazole  200 mg IntraVENous Daily     Continuous Infusions:   sodium chloride      sodium chloride      sodium chloride      norepinephrine 24 mcg/min (22 1040)     PRN Meds:potassium chloride, sodium chloride flush, sodium chloride, ondansetron **OR** ondansetron, polyethylene glycol, acetaminophen **OR** acetaminophen, magnesium sulfate, sodium chloride    OBJECTIVE:  BP (!) 96/47   Pulse 73   Temp 98.3 °F (36.8 °C) (Axillary)   Resp 16   Ht 5' 4\" (1.626 m)   Wt 189 lb 6 oz (85.9 kg)   LMP  (LMP Unknown)   SpO2 96%   BMI 32.51 kg/m²   Temp  Av.6 °F (37 °C)  Min: 97.4 °F (36.3 °C)  Max: 99.5 °F (37.5 °C)  Constitutional: The patient is nonverbal.   Skin: Warm and dry. No rashes were noted. HEENT: Round and reactive pupils. Moist mucous membranes. No ulcerations or thrush. Back  Neck: Supple to movements. Chest: No use of accessory muscles to breathe. Symmetrical expansion. No wheezing, crackles or rhonchi. Cardiovascular: S1 and S2 are rhythmic and regular. No murmurs appreciated. Abdomen: Positive bowel sounds to auscultation. Benign to palpation. No masses felt. No hepatosplenomegaly. Genitourinary: Segura  Extremities: No clubbing, no cyanosis, no edema.   Lines: peripheral    Laboratory and Tests Review:  Lab Results   Component Value Date    WBC 41.7 (H) 02/03/2022    WBC 49.1 (H) 02/02/2022    WBC 32.7 (H) 02/01/2022    HGB 12.7 02/03/2022    HCT 38.3 02/03/2022    MCV 90.1 02/03/2022     02/03/2022     Lab Results   Component Value Date    NEUTROABS 35.73 (H) 02/03/2022    NEUTROABS 47.14 (H) 02/02/2022    NEUTROABS 31.72 (H) 02/01/2022     No results found for: [facility]  Lab Results   Component Value Date    ALT 15 02/03/2022    AST 27 02/03/2022    ALKPHOS 167 (H) 02/03/2022    BILITOT 0.8 02/03/2022     Lab Results   Component Value Date     02/03/2022    K 3.2 02/03/2022    K 3.9 02/02/2022    CL 98 02/03/2022    CO2 32 02/03/2022    BUN 17 02/03/2022    CREATININE 0.7 02/03/2022    CREATININE 0.8 02/02/2022    CREATININE 0.9 02/02/2022    GFRAA >60 02/03/2022    LABGLOM >60 02/03/2022    GLUCOSE 134 02/03/2022    PROT 4.2 02/03/2022    LABALBU 2.4 02/03/2022    CALCIUM 7.3 02/03/2022    BILITOT 0.8 02/03/2022    ALKPHOS 167 02/03/2022    AST 27 02/03/2022    ALT 15 02/03/2022     Lab Results   Component Value Date    CRP 0.3 02/01/2022    CRP 16.6 (H) 01/17/2022     Lab Results   Component Value Date    SEDRATE 70 (H) 10/22/2020    SEDRATE 19 04/14/2016     Radiology:  Reviewed    Microbiology:   Lab Results   Component Value Date    BC 24 Hours no growth 02/02/2022 BC 5 Days no growth 01/17/2022    ORG Escherichia coli 01/17/2022    ORG Escherichia coli 10/28/2021    ORG Proteus mirabilis 05/09/2021     Lab Results   Component Value Date    BLOODCULT2 24 Hours no growth 02/02/2022    BLOODCULT2 5 Days no growth 01/17/2022    ORG Escherichia coli 01/17/2022    ORG Escherichia coli 10/28/2021    ORG Proteus mirabilis 05/09/2021     No results found for: WNDABS  No results found for: RESPSMEAR  No results found for: MPNEUMO, CLAMYDCU, LABLEGI, AFBCX, FUNGSM, LABFUNG  No results found for: CULTRESP  No results found for: CXCATHTIP  No results found for: BFCS  No results found for: CXSURG  Urine Culture, Routine   Date Value Ref Range Status   02/02/2022 Growth not present, incubation continues  Preliminary   01/17/2022 >100,000 CFU/ml  Final   10/28/2021 >100,000 CFU/ml  Final     MRSA Culture Only   Date Value Ref Range Status   02/02/2022 Methicillin resistant Staph aureus not isolated  Final   10/12/2020 Methicillin resistant Staph aureus not isolated  Final       ASSESSMENT:      · Sepsis most likely related to GI tract-fecal impaction with possible microperforation/ulceration  · CT scan of descending colon showed bowel wall thickening; C. difficile some currently  · Rule out recurrent UTI indwelling Segura; patient was treated no more than a week to 10 days ago for UTI  · Severe volume contraction intravascularly-improving  · Leukocytosis related to the above-slightly improved  · COVID-19 at this point is colonization-at any rate the respiratory function is very good considering hemorrhage        PLAN:  · Continue  Zosyn; change Diflucan to p.o.   · Check final cultures  · Monitor labs    Geoffrey Tracey MD  10:56 AM  2/3/2022

## 2022-02-03 NOTE — CONSULTS
2/3/2022  12:56 PM      Comprehensive Nutrition Assessment    Type and Reason for Visit:  Initial,Consult,Positive Nutrition Screen (Tube Feeding Ordering and Management)    Nutrition Recommendations/Plan: Continue current TF order, as tolerated:    TF ORDER: Standard TF without Fiber (Osmolite 1.2) to goal rate 55 ml/hr and 30 ml flush Q 4 hr  This will provide: 1320 ml/d, 1584 mary ellen, 73 g pro, 1262 ml total free water  This regimen will meet 100% calorie and protein needs    Nutrition Assessment:  Pt admit from NH w/sepsis, likely d/t microperf from stool impact per ID. Covid +PNA. Pt had bloody stools/clots but no plans for scopes and OK for diet per Surgery. PMH=Alzheimer's, DM, Dysphagia. Will start TF and monitor progress    Malnutrition Assessment:  Malnutrition Status: At risk for malnutrition   Context:  Acute Illness     Findings of the 6 clinical characteristics of malnutrition:  Energy Intake:  Mild decrease in energy intake (Comment) (NPO since admit)  Weight Loss:  No significant weight loss     Body Fat Loss:  Unable to assess (Covid Isolation)     Muscle Mass Loss:  Unable to assess (Covid Isolation)    Fluid Accumulation:  No significant fluid accumulation     Strength:  Not Performed    Estimated Daily Nutrient Needs:  Energy (kcal):   (1582 mary ellen); Weight Used for Energy Requirements:  Current     Protein (g):  65-75 (1.2-1.4 g/kg);  Weight Used for Protein Requirements:  Ideal        Fluid (ml/day):  per Critical Care; Method Used for Fluid Requirements:  Other (Comment)      Nutrition Related Findings:  nonverbal, NGT, +1 gen edema, diarrhea, +BS, +I/O 6.3L, pressor (MAP 97)      Wounds:   (excoriated sacrum, pannus, flaco cleft)       Current Nutrition Therapies:    Diet NPO    Anthropometric Measures:  · Height: 5' 4\" (162.6 cm)  · Current Body Weight: 189 lb 6 oz (85.9 kg) (2/2 bedscale)   · Admission Body Weight: 189 lb 6 oz (85.9 kg) (2/2 bedscale)    · Usual Body Weight: 175 lb

## 2022-02-03 NOTE — PROGRESS NOTES
CI HR MAP TPRI SVI TFC   Baseline 1.8 67 107 4864 26 56.5   Test 2 68 110 4351 32 57.7   % Change 14.9 1.1 2.8 -10.5 22.7 2.3   noninvasive -  250cc fluid challenge

## 2022-02-03 NOTE — PROGRESS NOTES
.  Intensive Care Daily Quality Rounding Checklist      ICU Team Members: Dr. Karthikeyan Muller, charge nurse, bedside nurse, clinical pharmacist    ICU Day #: NUMBER: 3    Intubation Date:  N/A    Ventilator Day #: N/A    Central Line Insertion Date: February 1        Day #: NUMBER: 3     Arterial Line Insertion Date:  N/A      Day #: N/A    Temporary Hemodialysis Catheter Insertion Date:  N/A      Day # N/A    DVT Prophylaxis: on hold (GI bleed)    GI Prophylaxis: Protonix    Segura Catheter Insertion Date:  February 1       Day #: 3      Continued need (if yes, reason documented and discussed with physician): yes, strict I&O in critical patient    Skin Issues/ Wounds and ordered treatment discussed on rounds: wound  cleft    Goals/ Plans for the Day: Daily labs, replace K+, start tube feeds, start Midodrine, check NICOM and give more fluids if needed, stop Bicarb drip

## 2022-02-04 LAB
ALBUMIN SERPL-MCNC: 2 G/DL (ref 2.8–4.4)
ALP BLD-CCNC: 145 U/L (ref 0–249)
ALT SERPL-CCNC: 12 U/L (ref 0–32)
ANION GAP SERPL CALCULATED.3IONS-SCNC: 8 MMOL/L (ref 7–16)
AST SERPL-CCNC: 18 U/L (ref 0–31)
BASOPHILS ABSOLUTE: 0.02 E9/L (ref 0.1–0.4)
BASOPHILS RELATIVE PERCENT: 0.1 % (ref 0–2)
BILIRUB SERPL-MCNC: 0.7 MG/DL (ref 2–6)
BUN BLDV-MCNC: 14 MG/DL (ref 4–19)
CALCIUM SERPL-MCNC: 6.8 MG/DL (ref 8.6–10.2)
CHLORIDE BLD-SCNC: 105 MMOL/L (ref 98–107)
CO2: 24 MMOL/L (ref 22–29)
CREAT SERPL-MCNC: 0.6 MG/DL (ref 0.4–0.7)
EOSINOPHILS ABSOLUTE: 0.01 E9/L (ref 0.1–0.7)
EOSINOPHILS RELATIVE PERCENT: 0 % (ref 0–12)
GFR AFRICAN AMERICAN: >60
GFR NON-AFRICAN AMERICAN: >60 ML/MIN/1.73
GLUCOSE BLD-MCNC: 188 MG/DL (ref 70–110)
HCT VFR BLD CALC: 32.4 % (ref 45–66)
HEMOGLOBIN: 10.3 G/DL (ref 14.5–22)
IMMATURE GRANULOCYTES #: 0.4 E9/L
IMMATURE GRANULOCYTES %: 1.9 % (ref 0–5)
LACTIC ACID: 3.1 MMOL/L (ref 0.5–2.2)
LACTIC ACID: 3.1 MMOL/L (ref 0.5–2.2)
LACTIC ACID: 3.4 MMOL/L (ref 0.5–2.2)
LYMPHOCYTES ABSOLUTE: 1.09 E9/L (ref 3–15)
LYMPHOCYTES RELATIVE PERCENT: 5.1 % (ref 15–60)
MAGNESIUM: 1.7 MG/DL (ref 1.6–2.6)
MCH RBC QN AUTO: 29.4 PG (ref 30–42)
MCHC RBC AUTO-ENTMCNC: 31.8 % (ref 29–37)
MCV RBC AUTO: 92.6 FL (ref 95–121)
METER GLUCOSE: 159 MG/DL (ref 70–110)
METER GLUCOSE: 193 MG/DL (ref 70–110)
METER GLUCOSE: 201 MG/DL (ref 70–110)
METER GLUCOSE: 206 MG/DL (ref 70–110)
METER GLUCOSE: 210 MG/DL (ref 70–110)
MONOCYTES ABSOLUTE: 1.21 E9/L (ref 1–3)
MONOCYTES RELATIVE PERCENT: 5.6 % (ref 3–15)
NEUTROPHILS ABSOLUTE: 18.85 E9/L (ref 5–20)
NEUTROPHILS RELATIVE PERCENT: 87.3 % (ref 15–80)
PDW BLD-RTO: 16.2 FL (ref 11–19)
PHOSPHORUS: 1.6 MG/DL (ref 2.5–4.5)
PLATELET # BLD: 127 E9/L (ref 130–480)
PMV BLD AUTO: 12.7 FL (ref 7–12)
POTASSIUM SERPL-SCNC: 3.5 MMOL/L (ref 3.4–4.5)
RBC # BLD: 3.5 E12/L (ref 3.7–5.3)
SODIUM BLD-SCNC: 137 MMOL/L (ref 132–146)
TOTAL PROTEIN: 3.7 G/DL (ref 6.4–8.3)
URINE CULTURE, ROUTINE: NORMAL
WBC # BLD: 21.6 E9/L (ref 9.4–34)

## 2022-02-04 PROCEDURE — 85025 COMPLETE CBC W/AUTO DIFF WBC: CPT

## 2022-02-04 PROCEDURE — 2580000003 HC RX 258: Performed by: STUDENT IN AN ORGANIZED HEALTH CARE EDUCATION/TRAINING PROGRAM

## 2022-02-04 PROCEDURE — 6370000000 HC RX 637 (ALT 250 FOR IP): Performed by: INTERNAL MEDICINE

## 2022-02-04 PROCEDURE — 36592 COLLECT BLOOD FROM PICC: CPT

## 2022-02-04 PROCEDURE — 2580000003 HC RX 258: Performed by: INTERNAL MEDICINE

## 2022-02-04 PROCEDURE — 6360000002 HC RX W HCPCS: Performed by: INTERNAL MEDICINE

## 2022-02-04 PROCEDURE — 99223 1ST HOSP IP/OBS HIGH 75: CPT | Performed by: INTERNAL MEDICINE

## 2022-02-04 PROCEDURE — 83605 ASSAY OF LACTIC ACID: CPT

## 2022-02-04 PROCEDURE — 1200000000 HC SEMI PRIVATE

## 2022-02-04 PROCEDURE — 36415 COLL VENOUS BLD VENIPUNCTURE: CPT

## 2022-02-04 PROCEDURE — 83735 ASSAY OF MAGNESIUM: CPT

## 2022-02-04 PROCEDURE — 82962 GLUCOSE BLOOD TEST: CPT

## 2022-02-04 PROCEDURE — C9113 INJ PANTOPRAZOLE SODIUM, VIA: HCPCS | Performed by: INTERNAL MEDICINE

## 2022-02-04 PROCEDURE — 80053 COMPREHEN METABOLIC PANEL: CPT

## 2022-02-04 PROCEDURE — 84100 ASSAY OF PHOSPHORUS: CPT

## 2022-02-04 PROCEDURE — 2500000003 HC RX 250 WO HCPCS: Performed by: STUDENT IN AN ORGANIZED HEALTH CARE EDUCATION/TRAINING PROGRAM

## 2022-02-04 PROCEDURE — 99221 1ST HOSP IP/OBS SF/LOW 40: CPT | Performed by: NURSE PRACTITIONER

## 2022-02-04 RX ORDER — MAGNESIUM SULFATE IN WATER 40 MG/ML
2000 INJECTION, SOLUTION INTRAVENOUS ONCE
Status: COMPLETED | OUTPATIENT
Start: 2022-02-04 | End: 2022-02-04

## 2022-02-04 RX ORDER — LANSOPRAZOLE
30 KIT
Status: DISCONTINUED | OUTPATIENT
Start: 2022-02-04 | End: 2022-02-09 | Stop reason: HOSPADM

## 2022-02-04 RX ADMIN — POTASSIUM BICARBONATE 40 MEQ: 782 TABLET, EFFERVESCENT ORAL at 09:12

## 2022-02-04 RX ADMIN — Medication 10 ML: at 09:13

## 2022-02-04 RX ADMIN — PANTOPRAZOLE SODIUM 40 MG: 40 INJECTION, POWDER, FOR SOLUTION INTRAVENOUS at 09:11

## 2022-02-04 RX ADMIN — INSULIN LISPRO 4 UNITS: 100 INJECTION, SOLUTION INTRAVENOUS; SUBCUTANEOUS at 17:11

## 2022-02-04 RX ADMIN — SODIUM CHLORIDE, PRESERVATIVE FREE 10 ML: 5 INJECTION INTRAVENOUS at 09:12

## 2022-02-04 RX ADMIN — PIPERACILLIN AND TAZOBACTAM 3375 MG: 3; .375 INJECTION, POWDER, LYOPHILIZED, FOR SOLUTION INTRAVENOUS at 17:08

## 2022-02-04 RX ADMIN — POTASSIUM PHOSPHATE, MONOBASIC AND POTASSIUM PHOSPHATE, DIBASIC 20 MMOL: 224; 236 INJECTION, SOLUTION, CONCENTRATE INTRAVENOUS at 09:10

## 2022-02-04 RX ADMIN — MIDODRINE HYDROCHLORIDE 10 MG: 5 TABLET ORAL at 21:21

## 2022-02-04 RX ADMIN — HYDROCORTISONE SODIUM SUCCINATE 100 MG: 100 INJECTION, POWDER, FOR SOLUTION INTRAMUSCULAR; INTRAVENOUS at 21:22

## 2022-02-04 RX ADMIN — MAGNESIUM SULFATE HEPTAHYDRATE 2000 MG: 2 INJECTION, SOLUTION INTRAVENOUS at 09:29

## 2022-02-04 RX ADMIN — INSULIN LISPRO 2 UNITS: 100 INJECTION, SOLUTION INTRAVENOUS; SUBCUTANEOUS at 12:55

## 2022-02-04 RX ADMIN — SODIUM CHLORIDE 25 ML: 9 INJECTION, SOLUTION INTRAVENOUS at 17:08

## 2022-02-04 RX ADMIN — MEMANTINE HYDROCHLORIDE 5 MG: 5 TABLET, FILM COATED ORAL at 09:11

## 2022-02-04 RX ADMIN — Medication 10 ML: at 21:22

## 2022-02-04 RX ADMIN — DIGOXIN 125 MCG: 0.12 TABLET ORAL at 09:12

## 2022-02-04 RX ADMIN — INSULIN LISPRO 4 UNITS: 100 INJECTION, SOLUTION INTRAVENOUS; SUBCUTANEOUS at 08:14

## 2022-02-04 RX ADMIN — HYDROCORTISONE SODIUM SUCCINATE 100 MG: 100 INJECTION, POWDER, FOR SOLUTION INTRAMUSCULAR; INTRAVENOUS at 05:39

## 2022-02-04 RX ADMIN — INSULIN LISPRO 2 UNITS: 100 INJECTION, SOLUTION INTRAVENOUS; SUBCUTANEOUS at 05:00

## 2022-02-04 RX ADMIN — SODIUM CHLORIDE, PRESERVATIVE FREE 10 ML: 5 INJECTION INTRAVENOUS at 17:09

## 2022-02-04 RX ADMIN — CALCIUM GLUCONATE 1000 MG: 98 INJECTION, SOLUTION INTRAVENOUS at 09:11

## 2022-02-04 RX ADMIN — SODIUM CHLORIDE: 4.5 INJECTION, SOLUTION INTRAVENOUS at 06:51

## 2022-02-04 RX ADMIN — PIPERACILLIN AND TAZOBACTAM 3375 MG: 3; .375 INJECTION, POWDER, LYOPHILIZED, FOR SOLUTION INTRAVENOUS at 09:49

## 2022-02-04 RX ADMIN — FLUCONAZOLE 200 MG: 100 TABLET ORAL at 12:43

## 2022-02-04 RX ADMIN — MIDODRINE HYDROCHLORIDE 10 MG: 5 TABLET ORAL at 14:08

## 2022-02-04 RX ADMIN — INSULIN LISPRO 4 UNITS: 100 INJECTION, SOLUTION INTRAVENOUS; SUBCUTANEOUS at 21:32

## 2022-02-04 RX ADMIN — SODIUM CHLORIDE, PRESERVATIVE FREE 10 ML: 5 INJECTION INTRAVENOUS at 14:44

## 2022-02-04 RX ADMIN — CITALOPRAM HYDROBROMIDE 10 MG: 20 TABLET ORAL at 09:12

## 2022-02-04 RX ADMIN — MIDODRINE HYDROCHLORIDE 10 MG: 5 TABLET ORAL at 05:40

## 2022-02-04 RX ADMIN — HYDROCORTISONE SODIUM SUCCINATE 100 MG: 100 INJECTION, POWDER, FOR SOLUTION INTRAMUSCULAR; INTRAVENOUS at 12:43

## 2022-02-04 ASSESSMENT — PAIN SCALES - PAIN ASSESSMENT IN ADVANCED DEMENTIA (PAINAD)
CONSOLABILITY: 0
CONSOLABILITY: 0
BREATHING: 0
BODYLANGUAGE: 0
BREATHING: 0
BREATHING: 0
TOTALSCORE: 0
TOTALSCORE: 0
CONSOLABILITY: 0
BREATHING: 0
BODYLANGUAGE: 0
BODYLANGUAGE: 0
CONSOLABILITY: 0
FACIALEXPRESSION: 0
CONSOLABILITY: 0
NEGVOCALIZATION: 0
NEGVOCALIZATION: 0
CONSOLABILITY: 0
BODYLANGUAGE: 0
NEGVOCALIZATION: 0
BREATHING: 0
BODYLANGUAGE: 0
BREATHING: 0
FACIALEXPRESSION: 0
BODYLANGUAGE: 0
FACIALEXPRESSION: 0
NEGVOCALIZATION: 0
FACIALEXPRESSION: 0
NEGVOCALIZATION: 0
CONSOLABILITY: 0
BODYLANGUAGE: 0
FACIALEXPRESSION: 0
FACIALEXPRESSION: 0
TOTALSCORE: 0
TOTALSCORE: 0
FACIALEXPRESSION: 0
NEGVOCALIZATION: 0
BREATHING: 0
TOTALSCORE: 0
NEGVOCALIZATION: 0
TOTALSCORE: 0
TOTALSCORE: 0

## 2022-02-04 ASSESSMENT — PAIN SCALES - GENERAL
PAINLEVEL_OUTOF10: 0

## 2022-02-04 NOTE — PROGRESS NOTES
Nurse to nurse report called to [facility]. Update then called to floor, this nurse spoke to Zack Pierre and informed that the patients R fem 3x lumen central line was d/c and a 22G peripheral IV was started in the left forearm. Also informed that the canales cath was d/c at 1500. Informed that patient is awaiting transport out of the icu. All other questions answered.

## 2022-02-04 NOTE — CARE COORDINATION
COVID+, continue ICU, iv fluids, pt transfer step down.  Plan return to [facility] when Kwesi Saleh [phone]

## 2022-02-04 NOTE — PLAN OF CARE
Problem: Airway Clearance - Ineffective  Goal: Achieve or maintain patent airway  2/4/2022 1104 by Abhilash Elizondo RN  Outcome: Ongoing  2/4/2022 0125 by Bassem Botello RN  Outcome: Met This Shift     Problem: Gas Exchange - Impaired  Goal: Absence of hypoxia  2/4/2022 1104 by Abhilash Elizondo RN  Outcome: Ongoing  2/4/2022 0125 by Bassem Botello RN  Outcome: Met This Shift  Goal: Promote optimal lung function  2/4/2022 1104 by Abhilash Elizondo RN  Outcome: Ongoing  2/4/2022 0125 by Bassem Botello RN  Outcome: Met This Shift     Problem: Breathing Pattern - Ineffective  Goal: Ability to achieve and maintain a regular respiratory rate  2/4/2022 1104 by Abhilash Elizondo RN  Outcome: Ongoing  2/4/2022 0125 by Bassem Botello RN  Outcome: Met This Shift     Problem:  Body Temperature -  Risk of, Imbalanced  Goal: Ability to maintain a body temperature within defined limits  2/4/2022 1104 by Abhilash Elizondo RN  Outcome: Ongoing  2/4/2022 0125 by Bassem Botello RN  Outcome: Met This Shift  Goal: Will regain or maintain usual level of consciousness  2/4/2022 1104 by Abhilash Elizondo RN  Outcome: Ongoing  2/4/2022 0125 by Bassem Botello RN  Outcome: Met This Shift  Goal: Complications related to the disease process, condition or treatment will be avoided or minimized  2/4/2022 1104 by Abhilash Elizondo RN  Outcome: Ongoing  2/4/2022 0125 by Bassem Botello RN  Outcome: Met This Shift     Problem: Isolation Precautions - Risk of Spread of Infection  Goal: Prevent transmission of infection  2/4/2022 1104 by Abhilash Elizondo RN  Outcome: Ongoing  2/4/2022 0125 by Bassem Botello RN  Outcome: Met This Shift     Problem: Nutrition Deficits  Goal: Optimize nutritional status  2/4/2022 1104 by Abhilash Elizondo RN  Outcome: Ongoing  2/4/2022 0125 by Bassem Botello RN  Outcome: Met This Shift     Problem: Risk for Fluid Volume Deficit  Goal: Maintain normal heart rhythm  2/4/2022 1104 by Abhilash Elizondo RN  Outcome: Ongoing  2/4/2022 0125 by Howard Rivera RN  Outcome: Met This Shift  Goal: Maintain absence of muscle cramping  2/4/2022 1104 by Andrew Law RN  Outcome: Ongoing  2/4/2022 0125 by Howard Rivera RN  Outcome: Met This Shift  Goal: Maintain normal serum potassium, sodium, calcium, phosphorus, and pH  2/4/2022 1104 by Andrew Law RN  Outcome: Ongoing  2/4/2022 0125 by Howard Rivera RN  Outcome: Met This Shift     Problem: Loneliness or Risk for Loneliness  Goal: Demonstrate positive use of time alone when socialization is not possible  2/4/2022 1104 by Andrew Law RN  Outcome: Ongoing  2/4/2022 0125 by Howard Rivera RN  Outcome: Met This Shift     Problem: Fatigue  Goal: Verbalize increase energy and improved vitality  2/4/2022 1104 by Andrew Law RN  Outcome: Ongoing  2/4/2022 0125 by Howard Rivera RN  Outcome: Met This Shift     Problem: Patient Education: Go to Patient Education Activity  Goal: Patient/Family Education  2/4/2022 1104 by Andrew Law RN  Outcome: Ongoing  2/4/2022 0125 by Howard Rivera RN  Outcome: Met This Shift     Problem: Skin Integrity:  Goal: Will show no infection signs and symptoms  Description: Will show no infection signs and symptoms  2/4/2022 1104 by Andrew Law RN  Outcome: Ongoing  2/4/2022 0125 by Howard Rivera RN  Outcome: Met This Shift  Goal: Absence of new skin breakdown  Description: Absence of new skin breakdown  2/4/2022 1104 by Andrew Law RN  Outcome: Ongoing  2/4/2022 0125 by Howard Rivera RN  Outcome: Met This Shift     Problem: Falls - Risk of:  Goal: Will remain free from falls  Description: Will remain free from falls  2/4/2022 1104 by Andrew Law RN  Outcome: Ongoing  2/4/2022 0125 by Howard Rivera RN  Outcome: Met This Shift  Goal: Absence of physical injury  Description: Absence of physical injury  2/4/2022 1104 by Andrew Law RN  Outcome: Ongoing  2/4/2022 0125 by Howard Rivera RN  Outcome: Met This Shift

## 2022-02-04 NOTE — PROGRESS NOTES
dry/anicteric  Other: Canales catheter in place             Recent Labs     02/02/22  1217 02/02/22  1217 02/03/22  0546 02/03/22  1330 02/04/22  0609     --  141  --  137   K 3.7   < > 3.2* 3.5 3.5     --  98  --  105   CO2 22  --  32*  --  24   BUN 26*  --  17  --  14   CREATININE 0.8  --  0.7  --  0.6   GLUCOSE 278*  --  134*  --  188*   CALCIUM 7.7*  --  7.3*  --  6.8*    < > = values in this interval not displayed. Recent Labs     02/02/22  0624 02/02/22  1217 02/03/22  0546 02/03/22  1815 02/04/22  0609   WBC 49.1*  --  41.7*  --  21.6*   RBC 4.73  --  4.25  --  3.50   HGB 13.9   < > 12.7 11.3* 10.3*   HCT 43.3   < > 38.3 34.4 32.4*   MCV 91.5  --  90.1  --  92.6   MCH 29.4  --  29.9  --  29.4   MCHC 32.1  --  33.2  --  31.8*   RDW 16.2*  --  16.5*  --  16.2*     --  217  --  127*   MPV 12.4*  --  12.1*  --  12.7*    < > = values in this interval not displayed. UC + gram positive cocci, on zosyn  MRSA nares negative  BC remain negative to date  WBC down trending  Lactic trending down  Low Valproic acid level   Replenish Mg, Ca+ and K      Timeline:      -Discharged on jan 28,2022 on decadron 4mg daily, lisinopril increased to 10 mg and Started on digoxin 125 mcg, metoprolol changed to 25mg BID     -Admitted on 2/1/22; lactic 9.0, Bicarb 18, elevaed beta hydroxy, AG 21, Urine ketones,  glucose 353, WBC 49.1,  +UA, Negative UC,  CT head reported Changes of encephalomalacia right parietooccipital region. In Afib with RVR, Ct abdomen showed small amount of Gas in endometrial cavity  While canales was in place. Was also seen on previost CT scan 1 month prior. questionable thickening in sigmoid colon with concern for colitis or rectouterine fistula. BC and UC negative at that time. Hgb13, , corrected calcium 8.3, cortisol 19.61     ______________________________________________  #Severe Sepsis  -Dx via Lactic acidosis, UA+, 2/3 sirs on admission .  Bp was soft but not hypotensive on admission.   -Source presumed to be urine in origin. Other possible sources endocarditis, sacral wound/osteo, abscess,   -Differential includes: autonomic dysregulation, adrenal insufficiency, reactive leukocytosis from decadron on discharge. Viral or fungal infection. Seizure given hx of seizure and CT head showing parietal lobe activity. On depakote 125mg daily (cant crush this med, she has NG tube, can be dissolved if sprinkle, curious if this was being given or not at her facility.)   -UC + gram positive cocci, on zosyn/ sensitivities pending []  -pro calcitonin returned at 0.34 while on Abx for 3 days,  limited utility in setting of isolated UTI, may aid in [address] antibiotics. Appreciate ID recs[]  -Obtain Beta-D fungitel [], Fluconazole 200mg daily started on 2/3/22  -Started on hydrocortisone 10mg Q8 hours, titration levophed down with IVF running. Started on midodrine 10mg TID  -continue broad spectrum antibiotics, maintain tissue perfusion, wean off levophed, monitor urine output, continue stress ulcer ppx  -Consider Ct maxilla-facial for abscess given prolong NG tube placement []  -EEG monitoring if applicable []  -Consider changing depakote sprinkle to valproic acid liquid if concerned about bioavilability , F/U on level []        #HyperNatremia  -Sodium today 137, Cl 98, bicarb 32 (up from 22)  -On 0.9 NC, will transition to 0.45% NC on 2/3/22     #DKA  -On admission patient presented with DKA. Likely iatrogenic from decadron that placed on discharge as well as underlying infection.  -Resolved at this time.         #Atrial Fibrillation/tachy randal syndrome  -At home shes on metoprolol and eliquis.  Both on hold at this time.   -Started on digoxin 125mcg, metoprolol changed to 25mg BID on last discharge by cardiology  -Eliquis remains on hold  -K and Mg replacement until at goal.   -Review event monitor []  -Monitor Qtc, fluconazole, escitalopram and mamantine all have effect on Qtc []     #DM2  -nutrition being started via NG tube, monitor for diarrhea, insensible fluid loses, ext.   -Sliding scale , monitor glucose given hydrocortisone initiation and tube feeding [][]  -Inpatient goal ~180     #Dementia, Late onset alzheimer's  -clinically has rigidity BLT upper extremity, tardive dyskinesia  -On depakote 125mg daily (cant crush this med, she has NG tube, curious if this was being given or not)   -On memantine, citalopam 10mg   -Valproic level returned low-->will adjust or change to liquid [facility].     #Rectal Bleeding  -Reported to have Bloody bowel movements. Had GI bleed In past. Was also on eliquis on admission.   -Slightly decreased H&H  -Possibly related to infection/ischemia -unlikely to be amenable to endoscopic treatment  -Cannot exclude stercoral ulceration given the NG tube  -Significant concern for C. difficile given leukocytosis on presentation C. difficile test has been canceled by -lab  -Antibiotics per ID service  -Gi on board, they are Holding off endoscopy unless active bleeding occurs-->concern  For occult bleeding, stool had clots in it when examined at bedside. Would also consider peg tube placement after discussion with family. Appreciate GI recs []        Lines: Left femoral , indwelling canales, Ng tube           NOTE: This report was transcribed using voice recognition software. Every effort was made to ensure accuracy; however, inadvertent computerized transcription errors may be present.   Electronically signed by Dang Brandon MD on 2/4/2022 at 12:57 PM

## 2022-02-04 NOTE — PROGRESS NOTES
PROGRESS NOTE  By Lydia Madden M.D. [facility]  Dr. Alfredo Mosqueda M.D.,  Dr. Levar Minaya M.D.,   ZARINA Childs.O.,  Dr. Joby Friedman M.D.,  Dr. Gurwinder Gibbons D.O.,  Silvinaon Caraway, Colletta Mallick  80 y.o.  female    SUBJECTIVE:  Remains nonverbal.  No family at bedside    OBJECTIVE:    BP (!) 82/55   Pulse 78   Temp 97.4 °F (36.3 °C) (Axillary)   Resp 22   Ht 5' 4\" (1.626 m)   Wt 197 lb 12 oz (89.7 kg)   LMP  (LMP Unknown)   SpO2 97%   BMI 33.94 kg/m²     General: Elderly  female. Nonverbal.  NAD  HEENT: NG tube/no scleral icterus  Neck: Supple with trachea midline  Chest: Symmetric excursion/time with respirations  Cor: Regular  Abd.: Soft and obese  Extr.:  Bilateral lower extremity edema  Skin: Warm and dry    DATA:  Stool (measured) : 200 mL  Lab Results   Component Value Date    WBC 21.6 02/04/2022    RBC 3.50 02/04/2022    HGB 10.3 02/04/2022    HCT 32.4 02/04/2022    MCV 92.6 02/04/2022    MCH 29.4 02/04/2022    MCHC 31.8 02/04/2022    RDW 16.2 02/04/2022     02/04/2022    MPV 12.7 02/04/2022     Lab Results   Component Value Date     02/04/2022    K 3.5 02/04/2022    K 3.9 02/02/2022     02/04/2022    CO2 24 02/04/2022    BUN 14 02/04/2022    CREATININE 0.6 02/04/2022    CALCIUM 6.8 02/04/2022    PROT 3.7 02/04/2022    LABALBU 2.0 02/04/2022    BILITOT 0.7 02/04/2022    ALKPHOS 145 02/04/2022    AST 18 02/04/2022    ALT 12 02/04/2022     Lab Results   Component Value Date    LIPASE 30 02/01/2022     Lab Results   Component Value Date    AMYLASE 49 10/29/2014         ASSESSMENT/PLAN:    1.  Rectal bleed  -Bloody bowel movements previously reported  -Iron deficient/normocytic  -Persistent decreased in H&H  -Possibly related to infection/ischemia -unlikely to be amenable to endoscopic treatment  -Cannot exclude stercoral ulceration  -No evidence of bleeding or coffee-ground material from NG tube  -Significant concern for C. difficile given leukocytosis on presentation C. difficile test has been canceled by lab  -Antibiotics per ID service  -With also decreased total iron-binding capacity  -Monitor hemoglobin  -With concern for microperforation will be of high risk of extending perforation with endoscopy,; no evidence of upper bleeding given no blood from NG tube and no melena.     2. Sepsis   -Possibly related to GI source concern for microperforation/fecal impaction)  -improved hypotension and tachycardia  -Supportive/symptomatic treatment per [facility]  -Antibiotics per admitting/ID service     3. Lactic acidosis  -Likely related to generalized hypoperfusion  -Persistent lactic acidosis  -Contrasted CT on presentation does not report bowel gas  -Just reported team endometrial cavity     4. Evaded troponin  -Likely demand ischemia secondary to hypotension  -  However primary CAD cannot be excluded  -Recommend cardiology evaluation    5. History of atrial fibrillation  -Continue holding oral anticoagulation    6. Diabetes mellitus  -Per admitting    7. Elevated alkaline phosphatase  -Patient is status post cholecystectomy  -Nonelevated bilirubin and transaminases/nonobstructive liver pattern  -Monitor lab            Alicia Gray MD  2/4/2022  5:15 PM    NOTE:  This report was transcribed using voice recognition software. Every effort was made to ensure accuracy; however, inadvertent computerized transcription errors may be present.

## 2022-02-04 NOTE — PROGRESS NOTES
.  Intensive Care Daily Quality Rounding Checklist      ICU Team Members:     ICU Day #: NUMBER: 4    Intubation Date:  N/A    Ventilator Day #: N/A    Central Line Insertion Date: February 1        Day #: NUMBER: 4     Arterial Line Insertion Date: N/A       Day #: N/A    Temporary Hemodialysis Catheter Insertion Date:  N/A      Day # N/A    DVT Prophylaxis: on hold (GI bleed)    GI Prophylaxis: Protonix    Segura Catheter Insertion Date: February 1       Day #: 4      Continued need (if yes, reason documented and discussed with physician): yes, strict I&O in critical patient    Skin Issues/ Wounds and ordered treatment discussed on rounds: wound  cleft    Goals/ Plans for the Day: monitor labs and vitals,electrolyte replacement as needed, wean off levophed, continue tube feed as ordered, continue critical care management, transfer to IM unit,

## 2022-02-04 NOTE — PROGRESS NOTES
[address] [place] [address] [facility]  Progress Note      Chief Complaint   Patient presents with    Altered Mental Status     found by staff at SNF in bed unresponsive with pulse ox 64%, placed on 2L NC, 6L NC via EMS- pt more responsive. also ? [facility] staff reported blood clots in stool. SUBJECTIVE:  Patient is tolerating medications. No reported adverse drug reactions. No nausea, vomiting, positive for loose BMs/diarrhea. CorPak tolerating tube feeding  Off Levophed  Patient is nonverbal historically and currently  Afebrile  O2 sats 96 to 100% on room air room air      Review of systems:  As stated above in the chief complaint, otherwise negative. Medications:  Scheduled Meds:   potassium phosphate IVPB  20 mmol IntraVENous Once    lansoprazole  30 mg Per NG tube BID AC    hydrocortisone sodium succinate PF  100 mg IntraVENous Q8H    midodrine  10 mg Oral 3 times per day    fluconazole  200 mg Oral Daily    citalopram  10 mg Oral Daily    digoxin  125 mcg Oral Daily    divalproex  125 mg Oral Nightly    memantine  5 mg Oral Daily    sodium chloride flush  5-40 mL IntraVENous 2 times per day    piperacillin-tazobactam  3,375 mg IntraVENous Q8H    insulin lispro  0-12 Units SubCUTAneous Q4H     Continuous Infusions:   sodium chloride      sodium chloride       PRN Meds:potassium chloride, sodium chloride flush, sodium chloride, ondansetron **OR** ondansetron, polyethylene glycol, acetaminophen **OR** acetaminophen, magnesium sulfate, sodium chloride    OBJECTIVE:  BP (!) 106/49   Pulse 72   Temp 96.8 °F (36 °C) (Axillary)   Resp 20   Ht 5' 4\" (1.626 m)   Wt 197 lb 12 oz (89.7 kg)   LMP  (LMP Unknown)   SpO2 100%   BMI 33.94 kg/m²   Temp  Av.4 °F (36.3 °C)  Min: 96.8 °F (36 °C)  Max: 98.2 °F (36.8 °C)  Constitutional: The patient is nonverbal.   Skin: Warm and dry. No rashes were noted. HEENT: Round and reactive pupils. Moist mucous membranes.   No ulcerations or thrush. Back  Neck: Supple to movements. Chest: No use of accessory muscles to breathe. Symmetrical expansion. No wheezing, crackles or rhonchi. Cardiovascular: S1 and S2 are rhythmic and regular. No murmurs appreciated. Abdomen: Positive bowel sounds to auscultation. Benign to palpation. No masses felt. No hepatosplenomegaly. Genitourinary: Segura  Extremities: No clubbing, no cyanosis, no edema.   Lines: peripheral    Laboratory and Tests Review:  Lab Results   Component Value Date    WBC 21.6 (H) 02/04/2022    WBC 41.7 (H) 02/03/2022    WBC 49.1 (H) 02/02/2022    HGB 10.3 (L) 02/04/2022    HCT 32.4 (L) 02/04/2022    MCV 92.6 02/04/2022     (L) 02/04/2022     Lab Results   Component Value Date    NEUTROABS 18.85 (H) 02/04/2022    NEUTROABS 35.73 (H) 02/03/2022    NEUTROABS 47.14 (H) 02/02/2022     No results found for: [facility]  Lab Results   Component Value Date    ALT 12 02/04/2022    AST 18 02/04/2022    ALKPHOS 145 (H) 02/04/2022    BILITOT 0.7 02/04/2022     Lab Results   Component Value Date     02/04/2022    K 3.5 02/04/2022    K 3.9 02/02/2022     02/04/2022    CO2 24 02/04/2022    BUN 14 02/04/2022    CREATININE 0.6 02/04/2022    CREATININE 0.7 02/03/2022    CREATININE 0.8 02/02/2022    GFRAA >60 02/04/2022    LABGLOM >60 02/04/2022    GLUCOSE 188 02/04/2022    PROT 3.7 02/04/2022    LABALBU 2.0 02/04/2022    CALCIUM 6.8 02/04/2022    BILITOT 0.7 02/04/2022    ALKPHOS 145 02/04/2022    AST 18 02/04/2022    ALT 12 02/04/2022     Lab Results   Component Value Date    CRP 0.3 02/01/2022    CRP 16.6 (H) 01/17/2022     Lab Results   Component Value Date    SEDRATE 70 (H) 10/22/2020    SEDRATE 19 04/14/2016     Radiology:  Reviewed    Microbiology:   Lab Results   Component Value Date    BC 24 Hours no growth 02/02/2022    BC 5 Days no growth 01/17/2022    ORG Enterococcus faecalis 02/01/2022    ORG Gram negative moris 02/01/2022    ORG Escherichia coli 01/17/2022     Lab Results Component Value Date    BLOODCULT2 24 Hours no growth 02/02/2022    BLOODCULT2 5 Days no growth 01/17/2022    ORG Enterococcus faecalis 02/01/2022    ORG Gram negative moris 02/01/2022    ORG Escherichia coli 01/17/2022     No results found for: WNDABS  No results found for: RESPSMEAR  No results found for: MPNEUMO, CLAMYDCU, LABLEGI, AFBCX, FUNGSM, LABFUNG  No results found for: CULTRESP  No results found for: CXCATHTIP  No results found for: BFCS  No results found for: CXSURG  Urine Culture, Routine   Date Value Ref Range Status   02/02/2022 Growth not present  Final   02/01/2022 (A)  Preliminary    Additional growth present, also evaluating for;  Gram negative rods     02/01/2022   Preliminary    >100,000 CFU/ml  Identification and sensitivity to follow     02/01/2022   Preliminary    75,000 CFU/ml  Identification and sensitivity to follow       MRSA Culture Only   Date Value Ref Range Status   02/02/2022 Methicillin resistant Staph aureus not isolated  Final   10/12/2020 Methicillin resistant Staph aureus not isolated  Final       ASSESSMENT:      · Sepsis most likely related to GI tract-fecal impaction with possible microperforation/ulceration  · CT scan of descending colon showed bowel wall thickening; C. difficile some currently  · Rule out recurrent UTI indwelling Segura; patient was treated no more than a week to 10 days ago for UTI  · Severe volume contraction intravascularly-improving  · Leukocytosis related to the above- improved  · COVID-19 at this point is colonization-at any rate the respiratory function is very good considering hemorrhage        PLAN:  · Continue  Zosyn; change Diflucan to p.o.   · Check final cultures  · Monitor labs    Leonardo Mccabe MD  1:01 PM  2/4/2022

## 2022-02-04 NOTE — PROGRESS NOTES
Critical Care Team - Daily Progress Note         Date and time: 2/4/2022 6:55 PM  Patient's name:  Judd Chapman Record Number: [id number]  Patient's account/billing number: [de-identified]  Patient's YOB: 1940  Age: 80 y.o. Date of Admission: 2/1/2022  6:51 PM  Length of stay during current admission: 3      Primary Care Physician: Gayla Son DO  ICU Attending Physician: Dr. India Nazario Status: Limited    Reason for ICU admission: Shock, GI Bleed, Acute Respiratory Failure with Hypoxia      SUBJECTIVE:     OVERNIGHT EVENTS:       2/3: No overnight events. Patient's cortef was increased due to continued pressor requirements. Patient was started on midodrine. NICOM was positive greater than 10% response. Patient was given another Saline Bolus. Repeat NICOM pending. Patient was cleared for tube feeds. 2/4: No overnight events. Patient is now weaned off pressors. Patient does appear significantly improved.        CURRENT VENTILATION STATUS:     [] Ventilator  [] BIPAP  [] Nasal Cannula [x] Room Air      IF INTUBATED, ET TUBE MARKING AT LOWER LIP:       cms    SECRETIONS Amount:  [] Small [] Moderate  [] Large  [x] None  Color:     [] White [] Colored  [] Bloody    SEDATION:  RAAS Score: -1  [] Propofol gtt  [] Versed gtt  [] Ativan gtt   [] No Sedation    PARALYZED:  [x] No    [] Yes      VASOPRESSORS:  [x] No    [] Yes    If yes -   [] Levophed       [] Dopamine     [] Vasopressin       [] Dobutamine  [] Phenylephrine         [] Epinephrine    CENTRAL LINES:     [] No   [x] Yes   (Date of Insertion: 2/1  )           If yes -     [] Right IJ     [] Left IJ [x] Right Femoral [] Left Femoral                   [] Right Subclavian [] Left Subclavian       ROY'S CATHETER:   [] No   [x] Yes  (Date of Insertion: 2/1 )     URINE OUTPUT:            [x] Good   [] Low              [] Anuric      OBJECTIVE:     VITAL SIGNS:  BP (!) 82/55   Pulse 78   Temp 97.4 °F (36.3 °C) (Axillary)   Resp 22   Ht 5' 4\" (1.626 m)   Wt 197 lb 12 oz (89.7 kg)   LMP  (LMP Unknown)   SpO2 97%   BMI 33.94 kg/m²   Tmax over 24 hours:  Temp (24hrs), Av.3 °F (36.3 °C), Min:96.8 °F (36 °C), Max:97.8 °F (36.6 °C)      Patient Vitals for the past 6 hrs:   BP Temp Temp src Pulse Resp SpO2   22 1545 (!) 82/55 97.4 °F (36.3 °C) Axillary 78 22 97 %   22 1400 (!) 98/45 -- -- 79 17 100 %   22 1300 (!) 104/51 -- -- 70 15 100 %         Intake/Output Summary (Last 24 hours) at 2022 1855  Last data filed at 2022 1851  Gross per 24 hour   Intake 3463 ml   Output 2070 ml   Net 1393 ml     Wt Readings from Last 2 Encounters:   22 197 lb 12 oz (89.7 kg)   22 182 lb 9.6 oz (82.8 kg)     Body mass index is 33.94 kg/m². PHYSICAL EXAMINATION:    CONSTITUTIONAL:somnolent, uncooperative and no apparent distress  EYES:  Lids and lashes normal, pupils equal, round and reactive to light, extra ocular muscles intact, sclera clear, conjunctiva normal  ENT:  Normocephalic, without obvious abnormality, atraumatic,  oral pharynx with moist mucus membranes, tonsils without erythema or exudates, gums normal and good dentition. LUNGS:  No increased work of breathing, good air exchange, clear to auscultation bilaterally, no crackles or wheezing  CARDIOVASCULAR:  Normal apical impulse, regular rate and rhythm, and no murmur noted  ABDOMEN: soft, non-distended, non-tender, no masses palpated, no hepatosplenomegally  MUSCULOSKELETAL:  There is no redness, warmth, or swelling of the joints.  Tone is normal.  NEUROLOGIC:  Mental Status Exam:  Level of Alertness:   somnolent  Orientation:   Disoriented  SKIN:  no rashes     Any additional physical findings:    MEDICATIONS:    Scheduled Meds:   lansoprazole  30 mg Per NG tube BID AC    hydrocortisone sodium succinate PF  100 mg IntraVENous Q8H    midodrine  10 mg Oral 3 times per day    fluconazole  200 mg Oral Daily    citalopram  10 mg Oral Daily    digoxin  125 mcg Oral Daily    divalproex  125 mg Oral Nightly    memantine  5 mg Oral Daily    sodium chloride flush  5-40 mL IntraVENous 2 times per day    piperacillin-tazobactam  3,375 mg IntraVENous Q8H    insulin lispro  0-12 Units SubCUTAneous Q4H     Continuous Infusions:   sodium chloride 25 mL (02/04/22 1708)    sodium chloride       PRN Meds:   potassium chloride, 20 mEq, PRN  sodium chloride flush, 5-40 mL, PRN  sodium chloride, 25 mL, PRN  ondansetron, 4 mg, Q8H PRN   Or  ondansetron, 4 mg, Q6H PRN  polyethylene glycol, 17 g, Daily PRN  acetaminophen, 650 mg, Q6H PRN   Or  acetaminophen, 650 mg, Q6H PRN  magnesium sulfate, 2,000 mg, PRN  sodium chloride, , PRN          VENT SETTINGS (Comprehensive) (if applicable):  Vent Information  Skin Assessment: Clean, dry, & intact  SpO2: 97 %  Additional Respiratory  Assessments  Pulse: 78  Resp: 22  SpO2: 97 %  Oral Care: Mouth swabbed    ABGs:   Recent Labs     02/01/22  1904   PH 7.426   PCO2 18.2*   PO2 89.6   HCO3 11.7*   BE -9.8*   O2SAT 97.2       Laboratory findings:    Complete Blood Count:   Recent Labs     02/02/22  0624 02/02/22  1217 02/03/22  0546 02/03/22  1815 02/04/22  0609   WBC 49.1*  --  41.7*  --  21.6*   HGB 13.9   < > 12.7 11.3* 10.3*   HCT 43.3   < > 38.3 34.4 32.4*     --  217  --  127*    < > = values in this interval not displayed.         Last 3 Blood Glucose:   Recent Labs     02/02/22  1217 02/03/22  0546 02/04/22  0609   GLUCOSE 278* 134* 188*        PT/INR:    Lab Results   Component Value Date    PROTIME 17.8 02/01/2022    INR 1.6 02/01/2022     PTT:    Lab Results   Component Value Date    APTT 30.7 02/01/2022       Comprehensive Metabolic Profile:   Recent Labs     02/02/22  0624 02/02/22  1217 02/03/22  0546 02/03/22  0546 02/03/22  1330 02/04/22  0609   NA  --  139 141  --   --  137   K   < > 3.7 3.2*  --  3.5 3.5   CL  --  101 98  --   --  105   CO2  --  22 32*  --   --  24   BUN  --  26* 17  --   --  14 CREATININE  --  0.8 0.7  --   --  0.6   GLUCOSE  --  278* 134*  --   --  188*   CALCIUM  --  7.7* 7.3*  --   --  6.8*   PROT   < >  --  4.2*   < >  --  3.7*   LABALBU   < >  --  2.4*   < >  --  2.0*   BILITOT   < >  --  0.8   < >  --  0.7   ALKPHOS   < >  --  167*   < >  --  145*   AST   < >  --  27   < >  --  18   ALT   < >  --  15   < >  --  12    < > = values in this interval not displayed. Magnesium:   Lab Results   Component Value Date    MG 1.7 02/04/2022     Phosphorus:   Lab Results   Component Value Date    PHOS 1.6 02/04/2022     Ionized Calcium: No results found for: CAION     Urinalysis:     Troponin: No results for input(s): TROPONINI in the last 72 hours. Microbiology:    Cultures during this admission:     Blood cultures:                 [] None drawn      [x] Negative             []  Positive (Details:  )  Urine Culture:                   [] None drawn      [x] Negative             []  Positive (Details:  )  Sputum Culture:               [x] None drawn       [] Negative             []  Positive (Details:  )   Endotracheal aspirate:     [x] None drawn       [] Negative             []  Positive (Details:  )     Other pertinent Labs:       Radiology/Imaging:     Chest Xray (2/4/2022): None        PLAN:     WEAN PER PROTOCOL:  [] No   [x] Yes  [] N/A    DISCONTINUE ANY LABS:   [x] No   [] Yes    ICU PROPHYLAXIS:  Stress ulcer:  [x] PPI Agent  [] [id number] [] Sucralfate  [] Other:  VTE:   [] Enoxaparin  [] Unfract.  Heparin Subcut  [x] EPC Cuffs    NUTRITION:  [] NPO [] Tube Feeding (Specify: ) [] TPN  [x] PO (Diet: Diet NPO  ADULT TUBE FEEDING; Nasogastric; Standard without Fiber; Continuous; 20; Yes; 20; Q 6 hours; 55; 30; Q 4 hours)    HOME MEDICATIONS RECONCILED: [] No  [x] Yes    INSULIN DRIP:   [x] No   [] Yes    CONSULTATION NEEDED:  [x] No   [] Yes    FAMILY UPDATED:    [x] No   [] Yes    TRANSFER OUT OF ICU:   [] No   [x] Yes    SYSTEMS ASSESSMENT     Neuro   Patient with Alzheimer's and dementia, bedridden and unresponsive, she is at baseline. Respiratory   No Oxygen Requirements at this time     Cardiovascular   Hypotension, Levophed weaned  Cortisol 27.63 up from 19.61, Solucortef 100mg q8hr  Midodrine 10mg TID  Patient was fluid responsive by NICOM, 2L NS Bolus ordered     Hx of Afib, Digoxin 125mcg Daily     Gastrointestinal   Chronic constipation,   GI Bleed by Rectum given FEIBA by hospitalist  service  1:15 in a.m. H&H stable, Protonix Gen Surg was consulted with no plans for scopes at this time. Continue to monitor H&H     Renal   Cr, 0.7  Lactic acidosis, trend lactic acid  Continue to monitor     Hypokalemia, 3.5, resolved, replacement PRN    Hypophosphatemia, 1.6, Replacement given     Infectious Disease   Sepsis  Septic shock     Colitis, Continue Zosyn, added diflucan, C. Difficile stool culture pending, PO Vanc if positive according to ID appreciate recs     Possible UTI, Already on Antibiotics with coverage     Hematology/Oncology   Hg 10.3  Continue to monitor  DVT Prophylaxis held due to GI bleed     Endocrine   Hyperglycemia, 188, Lantus 15 units, MDSSI     Social/Spiritual/DNR/Other   Patient is a Limited Code, Meds only     Depression, Celexa 10mg Daily     Alzheimer's, memantine 5mg Daily, Depakote 125mg, Nightly      Jayce German, DO              2/4/2022, 6:55 PM      NOTE: This report, in part or full, may have been transcribed using voice recognition software. Every effort was made to ensure accuracy; however, inadvertent computerized transcription errors may be present. Please excuse any transcriptional grammatical or spelling errors that may have escaped my editorial review. I personally saw, examined and provided care for the patient. Radiographs, labs and medication list were reviewed by me independently. I spoke with bedside nursing, therapists and consultants.  Critical care services and times documented are independent of procedures and multidisciplinary rounds with Residents. Additionally comprehensive, multidisciplinary rounds were conducted with the MICU team. The case was discussed in detail and plans for care were established. Review of Residents documentation was conducted and revisions were made as appropriate. I agree with the above documented exam, problem list and plan of care.    Meeta Brenner MD   CCT excluding procedures 28'

## 2022-02-05 PROBLEM — E11.10 DIABETIC KETOACIDOSIS WITHOUT COMA ASSOCIATED WITH TYPE 2 DIABETES MELLITUS (HCC): Status: ACTIVE | Noted: 2022-02-05

## 2022-02-05 PROBLEM — K56.41 FECAL IMPACTION (HCC): Status: ACTIVE | Noted: 2022-02-05

## 2022-02-05 PROBLEM — E87.0 HYPERNATREMIA: Status: ACTIVE | Noted: 2022-02-05

## 2022-02-05 LAB
ALBUMIN SERPL-MCNC: 2.2 G/DL (ref 2.8–4.4)
ALP BLD-CCNC: 115 U/L (ref 0–249)
ALT SERPL-CCNC: 14 U/L (ref 0–32)
ANION GAP SERPL CALCULATED.3IONS-SCNC: 8 MMOL/L (ref 7–16)
AST SERPL-CCNC: 15 U/L (ref 0–31)
BASOPHILS ABSOLUTE: 0.02 E9/L (ref 0.1–0.4)
BASOPHILS RELATIVE PERCENT: 0.2 % (ref 0–2)
BILIRUB SERPL-MCNC: 0.4 MG/DL (ref 2–6)
BLOOD BANK DISPENSE STATUS: NORMAL
BLOOD BANK DISPENSE STATUS: NORMAL
BLOOD BANK PRODUCT CODE: NORMAL
BLOOD BANK PRODUCT CODE: NORMAL
BPU ID: NORMAL
BPU ID: NORMAL
BUN BLDV-MCNC: 17 MG/DL (ref 4–19)
CALCIUM SERPL-MCNC: 7.3 MG/DL (ref 8.6–10.2)
CHLORIDE BLD-SCNC: 103 MMOL/L (ref 98–107)
CO2: 24 MMOL/L (ref 22–29)
CREAT SERPL-MCNC: 0.5 MG/DL (ref 0.4–0.7)
DESCRIPTION BLOOD BANK: NORMAL
DESCRIPTION BLOOD BANK: NORMAL
EOSINOPHILS ABSOLUTE: 0 E9/L (ref 0.1–0.7)
EOSINOPHILS RELATIVE PERCENT: 0 % (ref 0–12)
GFR AFRICAN AMERICAN: >60
GFR NON-AFRICAN AMERICAN: >60 ML/MIN/1.73
GLUCOSE BLD-MCNC: 248 MG/DL (ref 70–110)
HCT VFR BLD CALC: 31.8 % (ref 45–66)
HEMOGLOBIN: 10.3 G/DL (ref 14.5–22)
IMMATURE GRANULOCYTES #: 0.09 E9/L
IMMATURE GRANULOCYTES %: 0.7 % (ref 0–5)
LYMPHOCYTES ABSOLUTE: 0.84 E9/L (ref 3–15)
LYMPHOCYTES RELATIVE PERCENT: 6.7 % (ref 15–60)
MAGNESIUM: 2 MG/DL (ref 1.6–2.6)
MCH RBC QN AUTO: 29.9 PG (ref 30–42)
MCHC RBC AUTO-ENTMCNC: 32.4 % (ref 29–37)
MCV RBC AUTO: 92.2 FL (ref 95–121)
METER GLUCOSE: 178 MG/DL (ref 70–110)
METER GLUCOSE: 234 MG/DL (ref 70–110)
METER GLUCOSE: 236 MG/DL (ref 70–110)
METER GLUCOSE: 272 MG/DL (ref 70–110)
METER GLUCOSE: 274 MG/DL (ref 70–110)
MONOCYTES ABSOLUTE: 0.63 E9/L (ref 1–3)
MONOCYTES RELATIVE PERCENT: 5 % (ref 3–15)
NEUTROPHILS ABSOLUTE: 11.03 E9/L (ref 5–20)
NEUTROPHILS RELATIVE PERCENT: 87.4 % (ref 15–80)
ORGANISM: ABNORMAL
ORGANISM: ABNORMAL
PDW BLD-RTO: 15.7 FL (ref 11–19)
PHOSPHORUS: 1.5 MG/DL (ref 2.5–4.5)
PLATELET # BLD: 120 E9/L (ref 130–480)
PMV BLD AUTO: 12.5 FL (ref 7–12)
POTASSIUM SERPL-SCNC: 3.4 MMOL/L (ref 3.4–4.5)
RBC # BLD: 3.45 E12/L (ref 3.7–5.3)
SODIUM BLD-SCNC: 135 MMOL/L (ref 132–146)
TOTAL PROTEIN: 3.9 G/DL (ref 6.4–8.3)
URINE CULTURE, ROUTINE: ABNORMAL
URINE CULTURE, ROUTINE: ABNORMAL
WBC # BLD: 12.6 E9/L (ref 9.4–34)

## 2022-02-05 PROCEDURE — 6370000000 HC RX 637 (ALT 250 FOR IP): Performed by: INTERNAL MEDICINE

## 2022-02-05 PROCEDURE — 6370000000 HC RX 637 (ALT 250 FOR IP): Performed by: FAMILY MEDICINE

## 2022-02-05 PROCEDURE — 80053 COMPREHEN METABOLIC PANEL: CPT

## 2022-02-05 PROCEDURE — 6360000002 HC RX W HCPCS: Performed by: INTERNAL MEDICINE

## 2022-02-05 PROCEDURE — 84100 ASSAY OF PHOSPHORUS: CPT

## 2022-02-05 PROCEDURE — 99232 SBSQ HOSP IP/OBS MODERATE 35: CPT | Performed by: FAMILY MEDICINE

## 2022-02-05 PROCEDURE — 6370000000 HC RX 637 (ALT 250 FOR IP): Performed by: NURSE PRACTITIONER

## 2022-02-05 PROCEDURE — 85025 COMPLETE CBC W/AUTO DIFF WBC: CPT

## 2022-02-05 PROCEDURE — 82962 GLUCOSE BLOOD TEST: CPT

## 2022-02-05 PROCEDURE — 36415 COLL VENOUS BLD VENIPUNCTURE: CPT

## 2022-02-05 PROCEDURE — 1200000000 HC SEMI PRIVATE

## 2022-02-05 PROCEDURE — 2580000003 HC RX 258: Performed by: INTERNAL MEDICINE

## 2022-02-05 PROCEDURE — 83735 ASSAY OF MAGNESIUM: CPT

## 2022-02-05 RX ORDER — POLYETHYLENE GLYCOL 3350 17 G/17G
17 POWDER, FOR SOLUTION ORAL DAILY
Status: DISCONTINUED | OUTPATIENT
Start: 2022-02-05 | End: 2022-02-09 | Stop reason: HOSPADM

## 2022-02-05 RX ADMIN — PIPERACILLIN AND TAZOBACTAM 3375 MG: 3; .375 INJECTION, POWDER, LYOPHILIZED, FOR SOLUTION INTRAVENOUS at 00:22

## 2022-02-05 RX ADMIN — MEMANTINE HYDROCHLORIDE 5 MG: 5 TABLET, FILM COATED ORAL at 08:44

## 2022-02-05 RX ADMIN — HYDROCORTISONE SODIUM SUCCINATE 100 MG: 100 INJECTION, POWDER, FOR SOLUTION INTRAMUSCULAR; INTRAVENOUS at 21:51

## 2022-02-05 RX ADMIN — INSULIN LISPRO 2 UNITS: 100 INJECTION, SOLUTION INTRAVENOUS; SUBCUTANEOUS at 17:14

## 2022-02-05 RX ADMIN — Medication 10 ML: at 21:00

## 2022-02-05 RX ADMIN — INSULIN LISPRO 4 UNITS: 100 INJECTION, SOLUTION INTRAVENOUS; SUBCUTANEOUS at 04:34

## 2022-02-05 RX ADMIN — HYDROCORTISONE SODIUM SUCCINATE 100 MG: 100 INJECTION, POWDER, FOR SOLUTION INTRAMUSCULAR; INTRAVENOUS at 12:23

## 2022-02-05 RX ADMIN — HYDROCORTISONE SODIUM SUCCINATE 100 MG: 100 INJECTION, POWDER, FOR SOLUTION INTRAMUSCULAR; INTRAVENOUS at 04:32

## 2022-02-05 RX ADMIN — MIDODRINE HYDROCHLORIDE 10 MG: 5 TABLET ORAL at 21:52

## 2022-02-05 RX ADMIN — INSULIN LISPRO 6 UNITS: 100 INJECTION, SOLUTION INTRAVENOUS; SUBCUTANEOUS at 22:15

## 2022-02-05 RX ADMIN — INSULIN LISPRO 4 UNITS: 100 INJECTION, SOLUTION INTRAVENOUS; SUBCUTANEOUS at 08:02

## 2022-02-05 RX ADMIN — LANSOPRAZOLE 30 MG: KIT at 18:09

## 2022-02-05 RX ADMIN — POLYETHYLENE GLYCOL 3350 17 G: 17 POWDER, FOR SOLUTION ORAL at 17:06

## 2022-02-05 RX ADMIN — DIGOXIN 125 MCG: 0.12 TABLET ORAL at 08:44

## 2022-02-05 RX ADMIN — CITALOPRAM HYDROBROMIDE 10 MG: 20 TABLET ORAL at 08:44

## 2022-02-05 RX ADMIN — PIPERACILLIN AND TAZOBACTAM 3375 MG: 3; .375 INJECTION, POWDER, LYOPHILIZED, FOR SOLUTION INTRAVENOUS at 17:05

## 2022-02-05 RX ADMIN — INSULIN LISPRO 6 UNITS: 100 INJECTION, SOLUTION INTRAVENOUS; SUBCUTANEOUS at 00:25

## 2022-02-05 RX ADMIN — MIDODRINE HYDROCHLORIDE 10 MG: 5 TABLET ORAL at 06:02

## 2022-02-05 RX ADMIN — PIPERACILLIN AND TAZOBACTAM 3375 MG: 3; .375 INJECTION, POWDER, LYOPHILIZED, FOR SOLUTION INTRAVENOUS at 08:44

## 2022-02-05 RX ADMIN — INSULIN LISPRO 4 UNITS: 100 INJECTION, SOLUTION INTRAVENOUS; SUBCUTANEOUS at 12:00

## 2022-02-05 RX ADMIN — MIDODRINE HYDROCHLORIDE 10 MG: 5 TABLET ORAL at 14:32

## 2022-02-05 RX ADMIN — FLUCONAZOLE 200 MG: 100 TABLET ORAL at 12:22

## 2022-02-05 RX ADMIN — Medication 10 ML: at 08:55

## 2022-02-05 NOTE — CONSULTS
Palliative Care Department  [phone]  Palliative Care Initial Consult  Provider Zhang Whitlock, SHARI - CNP     Laila Burks  [id number]  Hospital Day: 4  Date of Initial Consult: 2/4/2022  Referring Provider: Connie Cannon MD  Palliative Medicine was consulted for assistance with: Goals of Care, Family Support    HPI:   Laila Burks is a 80 y.o. with a medical history of dementia who was admitted on 2/1/2022 from facility with a CHIEF COMPLAINT of altered mental status and decreased responsiveness. Patient was noted to be hypoxic upon arrival. Patient admitted for treatment of severe sepsis. Patient positive for Covid 19. Palliative medicine consulted for goals of care and family support. ASSESSMENT/PLAN:     Pertinent Hospital Diagnoses      Sepsis   Hx dementia      Palliative Care Encounter / Counseling Regarding Goals of Care  Please see detailed goals of care discussion as below   At this time, Laila Burks, Does Not have capacity for medical decision-making. Capacity is time limited and situation/question specific   Outcome of goals of care meeting: Continue current management, continue limited code, await callback from patient's child   Code status Limited -Okay for resuscitative medications only   Advanced Directives: no POA or living will in [facility]   Surrogate/Legal NOK:  Luis Davenport, child, [phone], [phone]      Spiritual assessment: no spiritual distress identified  Bereavement and grief: to be determined  Referrals to: none today  SUBJECTIVE:     Current medical issues leading to Palliative Medicine involvement include   Active Hospital Problems    Diagnosis Date Noted    Septic shock ([facility] [address] [A41.9, R65.21] 02/01/2022       Details of Conversation: Chart reviewed. Call placed to patient's child, [place] Joshua. No answer and message left. Will await callback for goals of care discussion.         OBJECTIVE:   Prognosis: depends upon goals and unknown    Physical Exam:  BP (!) 82/55   Pulse 78   Temp 97.4 °F (36.3 °C) (Axillary)   Resp 22   Ht 5' 4\" (1.626 m)   Wt 197 lb 12 oz (89.7 kg)   LMP  (LMP Unknown)   SpO2 97%   BMI 33.94 kg/m²   Due to the current efforts to prevent transmission of COVID-19 and also the need to preserve PPE for other caregivers, a face-to-face encounter with the patient was not performed. That being said, all relevant records and diagnostic tests were reviewed, including laboratory results and imaging. Please reference any relevant documentation elsewhere. Care will be coordinated with the primary service. Objective data reviewed: labs, images, records, medication use, vitals and chart    Discussed patient and the plan of care with the other IDT members: Palliative Medicine IDT Team    Time/Communication  Greater than 50% of time spent, total 30 minutes in counseling and coordination of care at the bedside regarding goals of care, diagnosis and prognosis and see above.     Thank you for allowing Palliative Medicine to participate in the care of [address] [place] [address].

## 2022-02-05 NOTE — PROGRESS NOTES
PROGRESS NOTE  By Kristyn Merritt M.D. [facility]  Dr. Ermias Mota M.D.,  Dr. Lorenza Yao M.D.,   Dr. Pako Garcia D.O.,  Dr. Marilyn Candelaria M.D.,  Dr. Ciro Shone, DJesusO.,  Thom Kolb, [address]  80 y.o.  female    SUBJECTIVE:  No in person exam today in order to prevent exposure and preserve PPE. Discussed with nursing. Moderate-sized brown, soft stool this morning. Patient had a very large bowel movement when in the ICU. Patient otherwise fairly unchanged. OBJECTIVE:    BP (!) 128/58   Pulse 81   Temp 97.6 °F (36.4 °C) (Axillary)   Resp 16   Ht 5' 4\" (1.626 m)   Wt 197 lb 12 oz (89.7 kg)   LMP  (LMP Unknown)   SpO2 99%   BMI 33.94 kg/m²     No in person exam today in order to prevent exposure and preserve PPE. DATA:  Stool (measured) : 200 mL  Lab Results   Component Value Date    WBC 12.6 02/05/2022    RBC 3.45 02/05/2022    HGB 10.3 02/05/2022    HCT 31.8 02/05/2022    MCV 92.2 02/05/2022    MCH 29.9 02/05/2022    MCHC 32.4 02/05/2022    RDW 15.7 02/05/2022     02/05/2022    MPV 12.5 02/05/2022     Lab Results   Component Value Date     02/05/2022    K 3.4 02/05/2022    K 3.9 02/02/2022     02/05/2022    CO2 24 02/05/2022    BUN 17 02/05/2022    CREATININE 0.5 02/05/2022    CALCIUM 7.3 02/05/2022    PROT 3.9 02/05/2022    LABALBU 2.2 02/05/2022    BILITOT 0.4 02/05/2022    ALKPHOS 115 02/05/2022    AST 15 02/05/2022    ALT 14 02/05/2022     Lab Results   Component Value Date    LIPASE 30 02/01/2022     Lab Results   Component Value Date    AMYLASE 49 10/29/2014         ASSESSMENT/PLAN:    1.   Rectal bleed  -Bloody bowel movements previously reported  -Iron deficient/normocytic  -Persistent decreased in H&H  -Possibly related to infection/ischemia -unlikely to be amenable to endoscopic treatment  -Cannot exclude stercoral ulceration  -No evidence of bleeding or coffee-ground material from NG tube  -Significant concern for C. difficile given leukocytosis on presentation C. difficile test has been canceled by lab  -Antibiotics per ID service  -With also decreased total iron-binding capacity  -Monitor hemoglobin  -With concern for microperforation will be of high risk of extending perforation with endoscopy,; no evidence of upper bleeding given no blood from NG tube and no melena.     2. Sepsis   -Possibly related to GI source concern for microperforation/fecal impaction)  -improved hypotension and tachycardia  -Supportive/symptomatic treatment per [facility]  -Antibiotics per admitting/ID service     3. Lactic acidosis  -Likely related to generalized hypoperfusion  -Persistent lactic acidosis  -Contrasted CT on presentation does not report bowel gas  -Just reported team endometrial cavity     4.   Elevated troponin  -Likely demand ischemia secondary to hypotension  -  However primary CAD cannot be excluded  -Recommend cardiology evaluation    5. History of atrial fibrillation  -Continue holding oral anticoagulation    6. Diabetes mellitus  -Per admitting    7. Elevated alkaline phosphatase  -Patient is status post cholecystectomy  -Nonelevated bilirubin and transaminases/nonobstructive liver pattern  -Monitor lab         Antibiotics per ID. MiraLAX daily to prevent reaccumulation of impaction. Bowels currently moving well. Hemoglobin fairly stable. Monitor labs. Will follow    SHARI Jacob - CNP  2/5/2022  12:52 PM    NOTE:  This report was transcribed using voice recognition software. Every effort was made to ensure accuracy; however, inadvertent computerized transcription errors may be present.

## 2022-02-05 NOTE — PROGRESS NOTES
1962 [address] [place] [address] [facility]  Progress Note      Chief Complaint   Patient presents with    Altered Mental Status     found by staff at SNF in bed unresponsive with pulse ox 64%, placed on 2L NC, 6L NC via EMS- pt more responsive. also ? GIB staff reported blood clots in stool. SUBJECTIVE:    No responsive  Afebrile  Remains on RA. No emesis or diarrhea documented      Review of systems:  As stated above in the chief complaint, otherwise negative. Medications:  Scheduled Meds:   lansoprazole  30 mg Per NG tube BID AC    hydrocortisone sodium succinate PF  100 mg IntraVENous Q8H    midodrine  10 mg Oral 3 times per day    fluconazole  200 mg Oral Daily    citalopram  10 mg Oral Daily    digoxin  125 mcg Oral Daily    divalproex  125 mg Oral Nightly    memantine  5 mg Oral Daily    sodium chloride flush  5-40 mL IntraVENous 2 times per day    piperacillin-tazobactam  3,375 mg IntraVENous Q8H    insulin lispro  0-12 Units SubCUTAneous Q4H     Continuous Infusions:   sodium chloride 25 mL (22 1708)    sodium chloride       PRN Meds:potassium chloride, sodium chloride flush, sodium chloride, ondansetron **OR** ondansetron, polyethylene glycol, acetaminophen **OR** acetaminophen, magnesium sulfate, sodium chloride    OBJECTIVE:  BP (!) 128/58   Pulse 81   Temp 97.6 °F (36.4 °C) (Axillary)   Resp 16   Ht 5' 4\" (1.626 m)   Wt 197 lb 12 oz (89.7 kg)   LMP  (LMP Unknown)   SpO2 99%   BMI 33.94 kg/m²   Temp  Av.4 °F (36.3 °C)  Min: 96.8 °F (36 °C)  Max: 97.8 °F (36.6 °C)  Constitutional: The patient is nonverbal. Does not open eyes or respond meaningfully  Skin: Warm and dry. No rashes were noted. HEENT: Round and reactive pupils. Moist mucous membranes. NG in  Neck: Supple to movements. Chest: No use of accessory muscles to breathe. Symmetrical expansion. No wheezing, crackles or rhonchi. Cardiovascular: S1 and S2 are rhythmic and regular.  No murmurs appreciated. Abdomen: Positive bowel sounds to auscultation. Obese, soft, nondistended  Extremities: No clubbing, no cyanosis, no edema.   Lines: peripheral    Laboratory and Tests Review:  Lab Results   Component Value Date    WBC 21.6 (H) 02/04/2022    WBC 41.7 (H) 02/03/2022    WBC 49.1 (H) 02/02/2022    HGB 10.3 (L) 02/04/2022    HCT 32.4 (L) 02/04/2022    MCV 92.6 02/04/2022     (L) 02/04/2022     Lab Results   Component Value Date    NEUTROABS 18.85 (H) 02/04/2022    NEUTROABS 35.73 (H) 02/03/2022    NEUTROABS 47.14 (H) 02/02/2022     No results found for: [facility]  Lab Results   Component Value Date    ALT 12 02/04/2022    AST 18 02/04/2022    ALKPHOS 145 (H) 02/04/2022    BILITOT 0.7 02/04/2022     Lab Results   Component Value Date     02/04/2022    K 3.5 02/04/2022    K 3.9 02/02/2022     02/04/2022    CO2 24 02/04/2022    BUN 14 02/04/2022    CREATININE 0.6 02/04/2022    CREATININE 0.7 02/03/2022    CREATININE 0.8 02/02/2022    GFRAA >60 02/04/2022    LABGLOM >60 02/04/2022    GLUCOSE 188 02/04/2022    PROT 3.7 02/04/2022    LABALBU 2.0 02/04/2022    CALCIUM 6.8 02/04/2022    BILITOT 0.7 02/04/2022    ALKPHOS 145 02/04/2022    AST 18 02/04/2022    ALT 12 02/04/2022     Lab Results   Component Value Date    CRP 0.3 02/01/2022    CRP 16.6 (H) 01/17/2022     Lab Results   Component Value Date    SEDRATE 70 (H) 10/22/2020    SEDRATE 19 04/14/2016     Radiology:  Reviewed    Microbiology:   Lab Results   Component Value Date    BC 24 Hours no growth 02/02/2022    BC 5 Days no growth 01/17/2022    ORG Enterococcus faecalis 02/01/2022    ORG Escherichia coli 02/01/2022    ORG Escherichia coli 01/17/2022     Lab Results   Component Value Date    BLOODCULT2 24 Hours no growth 02/02/2022    BLOODCULT2 5 Days no growth 01/17/2022    ORG Enterococcus faecalis 02/01/2022    ORG Escherichia coli 02/01/2022    ORG Escherichia coli 01/17/2022     No results found for: WNDABS  No results found for: RESPSMEAR  No results found for: MPNEUMO, CLAMYDCU, LABLEGI, AFBCX, FUNGSM, LABFUNG  No results found for: CULTRESP  No results found for: CXCATHTIP  No results found for: BFCS  No results found for: CXSURG  Urine Culture, Routine   Date Value Ref Range Status   02/02/2022 Growth not present  Final   02/01/2022 >100,000 CFU/ml  Final   02/01/2022 75,000 CFU/ml  Final     MRSA Culture Only   Date Value Ref Range Status   02/02/2022 Methicillin resistant Staph aureus not isolated  Final   10/12/2020 Methicillin resistant Staph aureus not isolated  Final       ASSESSMENT:      · Sepsis most likely related to GI tract-fecal impaction with possible microperforation/ulceration  · CT scan of descending colon showed bowel wall thickening; C. difficile some currently  · Rule out recurrent UTI indwelling Segura; patient was treated no more than a week to 10 days ago for UTI  · Severe volume contraction intravascularly-improving  · Leukocytosis related to the above- improved  · COVID-19 at this point is colonization-at any rate the respiratory function is very good considering hemorrhage  · Leukocytosis - improving        PLAN:  · Continue  Zosyn; Diflucan to p.o. · Check final cultures  · Monitor labs    April Burnet Core, APRN - CNP  10:41 AM  2/5/2022     Pt seen and examined. Above discussed agree with advanced practice nurse. Labs, cultures, and radiographs reviewed. Face to Face encounter occurred. Changes made as necessary.      Jaz De León MD

## 2022-02-06 LAB
ALBUMIN SERPL-MCNC: 2.4 G/DL (ref 2.8–4.4)
ALP BLD-CCNC: 157 U/L (ref 0–249)
ALT SERPL-CCNC: 17 U/L (ref 0–32)
ANION GAP SERPL CALCULATED.3IONS-SCNC: 10 MMOL/L (ref 7–16)
AST SERPL-CCNC: 16 U/L (ref 0–31)
BASOPHILS ABSOLUTE: 0.01 E9/L (ref 0.1–0.4)
BASOPHILS RELATIVE PERCENT: 0.1 % (ref 0–2)
BILIRUB SERPL-MCNC: 0.5 MG/DL (ref 2–6)
BUN BLDV-MCNC: 17 MG/DL (ref 4–19)
CALCIUM SERPL-MCNC: 7.3 MG/DL (ref 8.6–10.2)
CHLORIDE BLD-SCNC: 100 MMOL/L (ref 98–107)
CO2: 24 MMOL/L (ref 22–29)
CREAT SERPL-MCNC: 0.5 MG/DL (ref 0.4–0.7)
EOSINOPHILS ABSOLUTE: 0 E9/L (ref 0.1–0.7)
EOSINOPHILS RELATIVE PERCENT: 0 % (ref 0–12)
GFR AFRICAN AMERICAN: >60
GFR NON-AFRICAN AMERICAN: >60 ML/MIN/1.73
GLUCOSE BLD-MCNC: 300 MG/DL (ref 70–110)
HCT VFR BLD CALC: 35.3 % (ref 45–66)
HEMOGLOBIN: 11.2 G/DL (ref 14.5–22)
IMMATURE GRANULOCYTES #: 0.04 E9/L
IMMATURE GRANULOCYTES %: 0.5 % (ref 0–5)
LYMPHOCYTES ABSOLUTE: 1.16 E9/L (ref 3–15)
LYMPHOCYTES RELATIVE PERCENT: 15.7 % (ref 15–60)
MAGNESIUM: 2 MG/DL (ref 1.6–2.6)
MCH RBC QN AUTO: 29.2 PG (ref 30–42)
MCHC RBC AUTO-ENTMCNC: 31.7 % (ref 29–37)
MCV RBC AUTO: 91.9 FL (ref 95–121)
METER GLUCOSE: 256 MG/DL (ref 70–110)
METER GLUCOSE: 264 MG/DL (ref 70–110)
METER GLUCOSE: 280 MG/DL (ref 70–110)
METER GLUCOSE: 292 MG/DL (ref 70–110)
MONOCYTES ABSOLUTE: 0.53 E9/L (ref 1–3)
MONOCYTES RELATIVE PERCENT: 7.2 % (ref 3–15)
NEUTROPHILS ABSOLUTE: 5.64 E9/L (ref 5–20)
NEUTROPHILS RELATIVE PERCENT: 76.5 % (ref 15–80)
PDW BLD-RTO: 15.5 FL (ref 11–19)
PHOSPHORUS: 1.6 MG/DL (ref 2.5–4.5)
PLATELET # BLD: 135 E9/L (ref 130–480)
PMV BLD AUTO: 12.5 FL (ref 7–12)
POTASSIUM SERPL-SCNC: 3.3 MMOL/L (ref 3.4–4.5)
RBC # BLD: 3.84 E12/L (ref 3.7–5.3)
SODIUM BLD-SCNC: 134 MMOL/L (ref 132–146)
TOTAL PROTEIN: 4.3 G/DL (ref 6.4–8.3)
WBC # BLD: 7.4 E9/L (ref 9.4–34)

## 2022-02-06 PROCEDURE — 85025 COMPLETE CBC W/AUTO DIFF WBC: CPT

## 2022-02-06 PROCEDURE — 6370000000 HC RX 637 (ALT 250 FOR IP): Performed by: FAMILY MEDICINE

## 2022-02-06 PROCEDURE — 80053 COMPREHEN METABOLIC PANEL: CPT

## 2022-02-06 PROCEDURE — 6360000002 HC RX W HCPCS: Performed by: INTERNAL MEDICINE

## 2022-02-06 PROCEDURE — 2580000003 HC RX 258: Performed by: INTERNAL MEDICINE

## 2022-02-06 PROCEDURE — 84100 ASSAY OF PHOSPHORUS: CPT

## 2022-02-06 PROCEDURE — 99233 SBSQ HOSP IP/OBS HIGH 50: CPT | Performed by: FAMILY MEDICINE

## 2022-02-06 PROCEDURE — 36415 COLL VENOUS BLD VENIPUNCTURE: CPT

## 2022-02-06 PROCEDURE — 6370000000 HC RX 637 (ALT 250 FOR IP): Performed by: INTERNAL MEDICINE

## 2022-02-06 PROCEDURE — 1200000000 HC SEMI PRIVATE

## 2022-02-06 PROCEDURE — 83735 ASSAY OF MAGNESIUM: CPT

## 2022-02-06 PROCEDURE — 82962 GLUCOSE BLOOD TEST: CPT

## 2022-02-06 RX ORDER — INSULIN GLARGINE 100 [IU]/ML
10 INJECTION, SOLUTION SUBCUTANEOUS NIGHTLY
Status: DISCONTINUED | OUTPATIENT
Start: 2022-02-06 | End: 2022-02-09 | Stop reason: HOSPADM

## 2022-02-06 RX ADMIN — PIPERACILLIN AND TAZOBACTAM 3375 MG: 3; .375 INJECTION, POWDER, LYOPHILIZED, FOR SOLUTION INTRAVENOUS at 08:00

## 2022-02-06 RX ADMIN — MEMANTINE HYDROCHLORIDE 5 MG: 5 TABLET, FILM COATED ORAL at 09:00

## 2022-02-06 RX ADMIN — FLUCONAZOLE 200 MG: 100 TABLET ORAL at 13:00

## 2022-02-06 RX ADMIN — LANSOPRAZOLE 30 MG: KIT at 06:32

## 2022-02-06 RX ADMIN — INSULIN GLARGINE 10 UNITS: 100 INJECTION, SOLUTION SUBCUTANEOUS at 22:18

## 2022-02-06 RX ADMIN — LANSOPRAZOLE 30 MG: KIT at 16:00

## 2022-02-06 RX ADMIN — HYDROCORTISONE SODIUM SUCCINATE 100 MG: 100 INJECTION, POWDER, FOR SOLUTION INTRAMUSCULAR; INTRAVENOUS at 21:43

## 2022-02-06 RX ADMIN — HYDROCORTISONE SODIUM SUCCINATE 100 MG: 100 INJECTION, POWDER, FOR SOLUTION INTRAMUSCULAR; INTRAVENOUS at 05:27

## 2022-02-06 RX ADMIN — INSULIN LISPRO 6 UNITS: 100 INJECTION, SOLUTION INTRAVENOUS; SUBCUTANEOUS at 22:20

## 2022-02-06 RX ADMIN — PIPERACILLIN AND TAZOBACTAM 3375 MG: 3; .375 INJECTION, POWDER, LYOPHILIZED, FOR SOLUTION INTRAVENOUS at 01:00

## 2022-02-06 RX ADMIN — MIDODRINE HYDROCHLORIDE 10 MG: 5 TABLET ORAL at 22:01

## 2022-02-06 RX ADMIN — PIPERACILLIN AND TAZOBACTAM 3375 MG: 3; .375 INJECTION, POWDER, LYOPHILIZED, FOR SOLUTION INTRAVENOUS at 16:00

## 2022-02-06 RX ADMIN — CITALOPRAM HYDROBROMIDE 10 MG: 20 TABLET ORAL at 09:00

## 2022-02-06 RX ADMIN — INSULIN LISPRO 6 UNITS: 100 INJECTION, SOLUTION INTRAVENOUS; SUBCUTANEOUS at 16:46

## 2022-02-06 RX ADMIN — HYDROCORTISONE SODIUM SUCCINATE 100 MG: 100 INJECTION, POWDER, FOR SOLUTION INTRAMUSCULAR; INTRAVENOUS at 13:30

## 2022-02-06 RX ADMIN — MIDODRINE HYDROCHLORIDE 10 MG: 5 TABLET ORAL at 06:32

## 2022-02-06 RX ADMIN — INSULIN LISPRO 6 UNITS: 100 INJECTION, SOLUTION INTRAVENOUS; SUBCUTANEOUS at 10:00

## 2022-02-06 RX ADMIN — Medication 10 ML: at 21:16

## 2022-02-06 RX ADMIN — Medication 10 ML: at 08:00

## 2022-02-06 RX ADMIN — INSULIN LISPRO 6 UNITS: 100 INJECTION, SOLUTION INTRAVENOUS; SUBCUTANEOUS at 04:55

## 2022-02-06 RX ADMIN — MIDODRINE HYDROCHLORIDE 10 MG: 5 TABLET ORAL at 14:00

## 2022-02-06 RX ADMIN — DIGOXIN 125 MCG: 0.12 TABLET ORAL at 09:00

## 2022-02-06 ASSESSMENT — PAIN SCALES - GENERAL
PAINLEVEL_OUTOF10: 0
PAINLEVEL_OUTOF10: 0

## 2022-02-06 NOTE — PROGRESS NOTES
Coordination of care discussion and chart review with PM team. No family at pt bedside, LSW did not enter pt room due to pt being in [place] isolation. Left compliant msg for pt daughter, will await return call. No immediate PM psychosocial needs identified for [address]  will remain available for on-going psychosocial support, as needed.

## 2022-02-06 NOTE — PROGRESS NOTES
[address] [place] [address] [facility]  Progress Note      Chief Complaint   Patient presents with    Altered Mental Status     found by staff at SNF in bed unresponsive with pulse ox 64%, placed on 2L NC, 6L NC via EMS- pt more responsive. also ? GIB staff reported blood clots in stool. SUBJECTIVE:    No response  Afebrile  Remains on RA. No emesis or diarrhea documented      Review of systems:  As stated above in the chief complaint, otherwise negative. Medications:  Scheduled Meds:   polyethylene glycol  17 g Oral Daily    insulin lispro  0-12 Units SubCUTAneous Q6H    lansoprazole  30 mg Per NG tube BID AC    hydrocortisone sodium succinate PF  100 mg IntraVENous Q8H    midodrine  10 mg Oral 3 times per day    fluconazole  200 mg Oral Daily    citalopram  10 mg Oral Daily    digoxin  125 mcg Oral Daily    divalproex  125 mg Oral Nightly    memantine  5 mg Oral Daily    sodium chloride flush  5-40 mL IntraVENous 2 times per day    piperacillin-tazobactam  3,375 mg IntraVENous Q8H     Continuous Infusions:   sodium chloride 25 mL (22 1708)    sodium chloride       PRN Meds:potassium chloride, sodium chloride flush, sodium chloride, ondansetron **OR** ondansetron, acetaminophen **OR** acetaminophen, magnesium sulfate, sodium chloride    OBJECTIVE:  BP (!) 127/55   Pulse 77   Temp 98.7 °F (37.1 °C) (Axillary)   Resp 18   Ht 5' 4\" (1.626 m)   Wt 198 lb 3.2 oz (89.9 kg)   LMP  (LMP Unknown)   SpO2 97%   BMI 34.02 kg/m²   Temp  Av.4 °F (36.9 °C)  Min: 98 °F (36.7 °C)  Max: 98.7 °F (37.1 °C)  Constitutional: The patient is nonverbal. Opens eyes briefly, no tracking or response  Skin: Warm and dry. No rashes were noted. HEENT: Round and reactive pupils. Moist mucous membranes. NG in  Neck: Supple to movements. Chest: No use of accessory muscles to breathe. Symmetrical expansion. No wheezing, crackles or rhonchi. Cardiovascular: S1 and S2 are rhythmic and regular.  No murmurs appreciated. Abdomen: Positive bowel sounds to auscultation. Obese, soft, nondistended  Extremities: No clubbing, no cyanosis, no edema.   Lines: peripheral    Laboratory and Tests Review:  Lab Results   Component Value Date    WBC 12.6 (H) 02/05/2022    WBC 21.6 (H) 02/04/2022    WBC 41.7 (H) 02/03/2022    HGB 10.3 (L) 02/05/2022    HCT 31.8 (L) 02/05/2022    MCV 92.2 02/05/2022     (L) 02/05/2022     Lab Results   Component Value Date    NEUTROABS 11.03 (H) 02/05/2022    NEUTROABS 18.85 (H) 02/04/2022    NEUTROABS 35.73 (H) 02/03/2022     No results found for: [facility]  Lab Results   Component Value Date    ALT 14 02/05/2022    AST 15 02/05/2022    ALKPHOS 115 (H) 02/05/2022    BILITOT 0.4 02/05/2022     Lab Results   Component Value Date     02/05/2022    K 3.4 02/05/2022    K 3.9 02/02/2022     02/05/2022    CO2 24 02/05/2022    BUN 17 02/05/2022    CREATININE 0.5 02/05/2022    CREATININE 0.6 02/04/2022    CREATININE 0.7 02/03/2022    GFRAA >60 02/05/2022    LABGLOM >60 02/05/2022    GLUCOSE 248 02/05/2022    PROT 3.9 02/05/2022    LABALBU 2.2 02/05/2022    CALCIUM 7.3 02/05/2022    BILITOT 0.4 02/05/2022    ALKPHOS 115 02/05/2022    AST 15 02/05/2022    ALT 14 02/05/2022     Lab Results   Component Value Date    CRP 0.3 02/01/2022    CRP 16.6 (H) 01/17/2022     Lab Results   Component Value Date    SEDRATE 70 (H) 10/22/2020    SEDRATE 19 04/14/2016     Radiology:  Reviewed    Microbiology:   Urine cx E faecalis, E coli  Blood cx no growth    ASSESSMENT:      Sepsis most likely related to GI tract-fecal impaction with possible microperforation/ulceration  CT scan of descending colon showed bowel wall thickening; C. difficile some currently  Rule out recurrent UTI indwelling Segura; patient was treated no more than a week to 10 days ago for UTI  Severe volume contraction intravascularly-improving  Leukocytosis related to the above- improved  COVID-19 at this point is colonization-at any rate the respiratory function is very good considering hemorrhage  Leukocytosis - improving        PLAN:  Continue  Zosyn; Diflucan p.o.; switch to orals in the a.m. Check final cultures  Monitor labs    April Ponderay Lizy, SHARI - CNP  11:59 AM  2/6/2022   Pt seen and examined. Above discussed agree with advanced practice nurse. Labs, cultures, and radiographs reviewed. Face to Face encounter occurred. Changes made as necessary.      Octavia Simpson MD

## 2022-02-06 NOTE — PROGRESS NOTES
[facility] Progress Note    Admitting Date and Time: 2/1/2022  6:51 PM  Admit Dx: Metabolic encephalopathy [V92.70]  Lactic acidosis [E87.2]  Acute cystitis with hematuria [N30.01]  Septic shock (HCC) [A41.9, R65.21]  Gastrointestinal hemorrhage, unspecified gastrointestinal hemorrhage type [K92.2]    Subjective:  Patient is being followed for Metabolic encephalopathy [U39.07]  Lactic acidosis [E87.2]  Acute cystitis with hematuria [N30.01]  Septic shock (HCC) [A41.9, R65.21]  Gastrointestinal hemorrhage, unspecified gastrointestinal hemorrhage type [K92.2]     No fevers. No events overnight. Tolerating NG feeds. 5 stools yesterday  1 stool today    Per RN: nothing to report    ROS: can't get subjective ROS -- no fever, chills, cp, sob, n/v, HA unless stated above.       polyethylene glycol  17 g Oral Daily    insulin lispro  0-12 Units SubCUTAneous Q6H    lansoprazole  30 mg Per NG tube BID AC    hydrocortisone sodium succinate PF  100 mg IntraVENous Q8H    midodrine  10 mg Oral 3 times per day    fluconazole  200 mg Oral Daily    citalopram  10 mg Oral Daily    digoxin  125 mcg Oral Daily    divalproex  125 mg Oral Nightly    memantine  5 mg Oral Daily    sodium chloride flush  5-40 mL IntraVENous 2 times per day    piperacillin-tazobactam  3,375 mg IntraVENous Q8H     potassium chloride, 20 mEq, PRN  sodium chloride flush, 5-40 mL, PRN  sodium chloride, 25 mL, PRN  ondansetron, 4 mg, Q8H PRN   Or  ondansetron, 4 mg, Q6H PRN  acetaminophen, 650 mg, Q6H PRN   Or  acetaminophen, 650 mg, Q6H PRN  magnesium sulfate, 2,000 mg, PRN  sodium chloride, , PRN         Objective:    BP (!) 127/55   Pulse 77   Temp 98.7 °F (37.1 °C) (Axillary)   Resp 18   Ht 5' 4\" (1.626 m)   Wt 198 lb 3.2 oz (89.9 kg)   LMP  (LMP Unknown)   SpO2 97%   BMI 34.02 kg/m²   General Appearance: somnolent, not arousable to name or light touch, in no acute distress  Skin: warm and dry, good turgor, no rashes, no jaundice  Head: normocephalic and atraumatic  Mouth: clear/moist, no thrush  NG tube in place  Eyes: pupils equal, round, and reactive to light, extraocular eye movements intact, conjunctivae normal  Neck: neck supple and non tender without mass   Pulmonary/Chest: clear to auscultation bilaterally- no wheezes, rales or rhonchi, disminished air movement in the bases, no respiratory distress on RA at 45 degree angle in bed  Cardiovascular: normal rate, normal S1 and S2 and no carotid bruits  Abdomen: soft, non-tender, non-distended, normal bowel sounds, no masses or organomegaly  Extremities: no cyanosis, no clubbing and no edema  Neurologic: no cranial nerve deficit and speech normal        Recent Labs     02/04/22  0609 02/05/22  1125 02/06/22  1509    135 134   K 3.5 3.4* 3.3*    103 100   CO2 24 24 24   BUN 14 17 17   CREATININE 0.6 0.5 0.5   GLUCOSE 188* 248* 300*   CALCIUM 6.8* 7.3* 7.3*       Recent Labs     02/04/22  0609 02/05/22  1125 02/06/22  1509   WBC 21.6* 12.6* 7.4   RBC 3.50 3.45* 3.84   HGB 10.3* 10.3* 11.2*   HCT 32.4* 31.8* 35.3   MCV 92.6 92.2 91.9   MCH 29.4 29.9 29.2   MCHC 31.8* 32.4 31.7*   RDW 16.2* 15.7* 15.5*   * 120* 135   MPV 12.7* 12.5* 12.5*       Radiology:   None new    Assessment:    Principal Problem:    Septic shock (HCC)  Active Problems:    Hypertension    Type II diabetes mellitus (HCC)    Tachy-randal syndrome (HCC)    Acute metabolic encephalopathy    Uncontrolled type 2 diabetes mellitus with hyperglycemia (HCC)    Diabetic ketoacidosis without coma associated with type 2 diabetes mellitus (HCC)    Hypernatremia    Fecal impaction (HCC)  Resolved Problems:    * No resolved hospital problems. *    WBC now 7.4 -- normalized. No longer septic. Afebrile. Plan:  1. Severe sepsis -- likely due to fecal bacterial/colonic stretch due to severe fecal impaction -- improving, WBC decreasing  -IV Zosyn  -ID following  -Follow cultures     2.   Severe fecal impaction -- resolved; possible stercoral ulcer, had some clots per rectum yesterday; no blood seen today by nursing staff, soft brown stool  -Continue miralax daily  -GI on board     3.  GI Bleed -- likely due to stercoral ulceration  -Trend H/H  -GI on board -- considering endoscopy but holding off for now     4. Hypernatremia -- due to dehydration -- resolved  -Continue 1/2 NS for now  -Free water flushes into NG tube     5. DKA -- resolved -- present on admit     6. DM2 -- uncontrolled  -Glucose: 274/256/280/300  -Start Lantus 10 units nightly  -Check sugars intensively 4 times/day     7. Afib, tachy-randal syndrome  -Controlled  -Eliquis on hold due to GI bleed  -Telemetry  -On metoprolol at home     8. Dementia -- late onset Alzheimer's  -With tardive dyskinesia  -On memantine     9. Acute Metabolic encephalopathy -- due to sepsis; still with altered mental status; very somnolent  -Continue to monitor mental status     Disp: back to [address] [facility]      NOTE: This report was transcribed using voice recognition software. Every effort was made to ensure accuracy; however, inadvertent computerized transcription errors may be present.     Electronically signed by Terry Ceballos DO on 2/6/2022 at 4:37 PM

## 2022-02-06 NOTE — PROGRESS NOTES
[facility] Progress Note    Admitting Date and Time: 2/1/2022  6:51 PM  Admit Dx: Metabolic encephalopathy [T07.24]  Lactic acidosis [E87.2]  Acute cystitis with hematuria [N30.01]  Septic shock (HCC) [A41.9, R65.21]  Gastrointestinal hemorrhage, unspecified gastrointestinal hemorrhage type [K92.2]    Subjective:  Patient is being followed for Metabolic encephalopathy [P40.60]  Lactic acidosis [E87.2]  Acute cystitis with hematuria [N30.01]  Septic shock (HCC) [A41.9, R65.21]  Gastrointestinal hemorrhage, unspecified gastrointestinal hemorrhage type [K92.2]     No events overnight. Not verbal.  No fevers. Per RN: 4 BM's today    ROS: denies fever, chills, cp, sob, n/v, HA unless stated above.       polyethylene glycol  17 g Oral Daily    insulin lispro  0-12 Units SubCUTAneous Q6H    lansoprazole  30 mg Per NG tube BID AC    hydrocortisone sodium succinate PF  100 mg IntraVENous Q8H    midodrine  10 mg Oral 3 times per day    fluconazole  200 mg Oral Daily    citalopram  10 mg Oral Daily    digoxin  125 mcg Oral Daily    divalproex  125 mg Oral Nightly    memantine  5 mg Oral Daily    sodium chloride flush  5-40 mL IntraVENous 2 times per day    piperacillin-tazobactam  3,375 mg IntraVENous Q8H     potassium chloride, 20 mEq, PRN  sodium chloride flush, 5-40 mL, PRN  sodium chloride, 25 mL, PRN  ondansetron, 4 mg, Q8H PRN   Or  ondansetron, 4 mg, Q6H PRN  acetaminophen, 650 mg, Q6H PRN   Or  acetaminophen, 650 mg, Q6H PRN  magnesium sulfate, 2,000 mg, PRN  sodium chloride, , PRN         Objective:    BP (!) 128/58   Pulse 81   Temp 97.6 °F (36.4 °C) (Axillary)   Resp 16   Ht 5' 4\" (1.626 m)   Wt 197 lb 12 oz (89.7 kg)   LMP  (LMP Unknown)   SpO2 99%   BMI 33.94 kg/m²   General Appearance: somnolent, not arousable to light touch or name, in no acute distress  Skin: warm and dry, good turgor, no rashes, no jaundice  Head: normocephalic and atraumatic  NG in place  Mouth: clear/moist  Eyes: pupils equal, round, and reactive to light, extraocular eye movements intact, conjunctivae normal  Neck: neck supple and non tender without mass   Pulmonary/Chest: clear to auscultation bilaterally- no wheezes, rales or rhonchi, normal air movement, no respiratory distress on RA  Cardiovascular: normal rate, normal S1 and S2 and no carotid bruits  Abdomen: soft, non-tender, non-distended, normal bowel sounds, no masses or organomegaly  Extremities: no cyanosis, no clubbing and no edema  Neurologic: somnolent        Recent Labs     02/03/22  0546 02/03/22  0546 02/03/22  1330 02/04/22  0609 02/05/22  1125     --   --  137 135   K 3.2*   < > 3.5 3.5 3.4*   CL 98  --   --  105 103   CO2 32*  --   --  24 24   BUN 17  --   --  14 17   CREATININE 0.7  --   --  0.6 0.5   GLUCOSE 134*  --   --  188* 248*   CALCIUM 7.3*  --   --  6.8* 7.3*    < > = values in this interval not displayed. Recent Labs     02/03/22  0546 02/03/22  0546 02/03/22  1815 02/04/22  0609 02/05/22  1125   WBC 41.7*  --   --  21.6* 12.6*   RBC 4.25  --   --  3.50 3.45*   HGB 12.7   < > 11.3* 10.3* 10.3*   HCT 38.3   < > 34.4 32.4* 31.8*   MCV 90.1  --   --  92.6 92.2   MCH 29.9  --   --  29.4 29.9   MCHC 33.2  --   --  31.8* 32.4   RDW 16.5*  --   --  16.2* 15.7*     --   --  127* 120*   MPV 12.1*  --   --  12.7* 12.5*    < > = values in this interval not displayed. Radiology:   None new    Assessment:    Principal Problem:    Septic shock ([facility]  Active Problems:    Hypertension    Type II diabetes mellitus (HCC)    Tachy-randal syndrome ([address]    Acute metabolic encephalopathy    Uncontrolled type 2 diabetes mellitus with hyperglycemia ([address]    Diabetic ketoacidosis without coma associated with type 2 diabetes mellitus ([address]    Hypernatremia  Resolved Problems:    * No resolved hospital problems. *      Plan:  1.   Severe sepsis -- likely due to fecal bacterial/colonic stretch due to severe fecal impaction -- improving, WBC decreasing  -IV Zosyn  -ID following  -Follow cultures    2. Severe fecal impaction -- possible stercoral ulcer, had some clots per rectum yesterday; no blood seen today by nursing staff, soft brown stool  -Continue miralax daily  -GI on board    3. GI Bleed -- likely due to stercoral ulceration  -Trend H/H  -GI on board -- considering endoscopy but holding off for now    4. Hypernatremia -- due to dehydration -- resolved  -Continue 1/2 NS for now  -Free water flushes into NG tube    5. DKA -- resolved -- present on admit    6. DM2 -- uncontrolled    7. mAfib, tachy-randal syndroe  -Controlled  -Eliquis on hold due to GI bleed  -Telemetry  -On metoprolol at home    8. Dementia -- late onset Alzheimer's  -With tardive dyskinesia  -On memantine    9. Acute Metabolic encephalopathy -- due to sepsis; still with altered mental status  -Continue to monitor mental status    Disp: back to [address] [facility]      NOTE: This report was transcribed using voice recognition software. Every effort was made to ensure accuracy; however, inadvertent computerized transcription errors may be present.   Electronically signed by Sina Cohen DO on 2/5/2022 at 7:59 PM Hospitalist

## 2022-02-06 NOTE — PLAN OF CARE
Problem: Airway Clearance - Ineffective  Goal: Achieve or maintain patent airway  Outcome: Met This Shift     Problem: Gas Exchange - Impaired  Goal: Absence of hypoxia  Outcome: Met This Shift  Goal: Promote optimal lung function  Outcome: Met This Shift     Problem: Breathing Pattern - Ineffective  Goal: Ability to achieve and maintain a regular respiratory rate  Outcome: Met This Shift     Problem:  Body Temperature -  Risk of, Imbalanced  Goal: Ability to maintain a body temperature within defined limits  Outcome: Met This Shift     Problem: Skin Integrity:  Goal: Will show no infection signs and symptoms  Description: Will show no infection signs and symptoms  Outcome: Met This Shift  Goal: Absence of new skin breakdown  Description: Absence of new skin breakdown  Outcome: Met This Shift

## 2022-02-07 LAB
(1,3)-BETA-D-GLUCAN (FUNGITELL) INTERPRETATION: NEGATIVE
(1,3)-BETA-D-GLUCAN (FUNGITELL): <31 PG/ML
ALBUMIN SERPL-MCNC: 2.6 G/DL (ref 2.8–4.4)
ALP BLD-CCNC: 84 U/L (ref 0–249)
ALT SERPL-CCNC: 21 U/L (ref 0–32)
ANION GAP SERPL CALCULATED.3IONS-SCNC: 9 MMOL/L (ref 7–16)
ANISOCYTOSIS: ABNORMAL
AST SERPL-CCNC: 15 U/L (ref 0–31)
BASOPHILS ABSOLUTE: 0 E9/L (ref 0.1–0.4)
BASOPHILS RELATIVE PERCENT: 0 % (ref 0–2)
BILIRUB SERPL-MCNC: 0.4 MG/DL (ref 2–6)
BLOOD CULTURE, ROUTINE: NORMAL
BUN BLDV-MCNC: 16 MG/DL (ref 4–19)
CALCIUM SERPL-MCNC: 7.6 MG/DL (ref 8.6–10.2)
CHLORIDE BLD-SCNC: 100 MMOL/L (ref 98–107)
CO2: 27 MMOL/L (ref 22–29)
CREAT SERPL-MCNC: 0.5 MG/DL (ref 0.4–0.7)
CULTURE, BLOOD 2: NORMAL
EOSINOPHILS ABSOLUTE: 0 E9/L (ref 0.1–0.7)
EOSINOPHILS RELATIVE PERCENT: 0 % (ref 0–12)
GFR AFRICAN AMERICAN: >60
GFR NON-AFRICAN AMERICAN: >60 ML/MIN/1.73
GLUCOSE BLD-MCNC: 279 MG/DL (ref 70–110)
HCT VFR BLD CALC: 37 % (ref 45–66)
HEMOGLOBIN: 11.7 G/DL (ref 14.5–22)
LYMPHOCYTES ABSOLUTE: 0.55 E9/L (ref 3–15)
LYMPHOCYTES RELATIVE PERCENT: 8 % (ref 15–60)
MAGNESIUM: 1.9 MG/DL (ref 1.6–2.6)
MCH RBC QN AUTO: 29.5 PG (ref 30–42)
MCHC RBC AUTO-ENTMCNC: 31.6 % (ref 29–37)
MCV RBC AUTO: 93.2 FL (ref 95–121)
METER GLUCOSE: 246 MG/DL (ref 70–110)
METER GLUCOSE: 256 MG/DL (ref 70–110)
METER GLUCOSE: 257 MG/DL (ref 70–110)
METER GLUCOSE: 287 MG/DL (ref 70–110)
MONOCYTES ABSOLUTE: 0.69 E9/L (ref 1–3)
MONOCYTES RELATIVE PERCENT: 9.7 % (ref 3–15)
NEUTROPHILS ABSOLUTE: 5.66 E9/L (ref 5–20)
NEUTROPHILS RELATIVE PERCENT: 82.3 % (ref 15–80)
NUCLEATED RED BLOOD CELLS: 0 /100 WBC
OVALOCYTES: ABNORMAL
PDW BLD-RTO: 15.3 FL (ref 11–19)
PHOSPHORUS: 1.8 MG/DL (ref 2.5–4.5)
PLATELET # BLD: 131 E9/L (ref 130–480)
PMV BLD AUTO: 12.3 FL (ref 7–12)
POIKILOCYTES: ABNORMAL
POLYCHROMASIA: ABNORMAL
POTASSIUM SERPL-SCNC: 3 MMOL/L (ref 3.4–4.5)
RBC # BLD: 3.97 E12/L (ref 3.7–5.3)
SODIUM BLD-SCNC: 136 MMOL/L (ref 132–146)
TOTAL PROTEIN: 4.4 G/DL (ref 6.4–8.3)
WBC # BLD: 6.9 E9/L (ref 9.4–34)

## 2022-02-07 PROCEDURE — 85025 COMPLETE CBC W/AUTO DIFF WBC: CPT

## 2022-02-07 PROCEDURE — 6370000000 HC RX 637 (ALT 250 FOR IP): Performed by: INTERNAL MEDICINE

## 2022-02-07 PROCEDURE — 82962 GLUCOSE BLOOD TEST: CPT

## 2022-02-07 PROCEDURE — 99232 SBSQ HOSP IP/OBS MODERATE 35: CPT | Performed by: FAMILY MEDICINE

## 2022-02-07 PROCEDURE — 2580000003 HC RX 258: Performed by: INTERNAL MEDICINE

## 2022-02-07 PROCEDURE — 36415 COLL VENOUS BLD VENIPUNCTURE: CPT

## 2022-02-07 PROCEDURE — 6370000000 HC RX 637 (ALT 250 FOR IP): Performed by: FAMILY MEDICINE

## 2022-02-07 PROCEDURE — 83735 ASSAY OF MAGNESIUM: CPT

## 2022-02-07 PROCEDURE — 6360000002 HC RX W HCPCS: Performed by: INTERNAL MEDICINE

## 2022-02-07 PROCEDURE — 6370000000 HC RX 637 (ALT 250 FOR IP): Performed by: REGISTERED NURSE

## 2022-02-07 PROCEDURE — 80053 COMPREHEN METABOLIC PANEL: CPT

## 2022-02-07 PROCEDURE — 1200000000 HC SEMI PRIVATE

## 2022-02-07 PROCEDURE — 99231 SBSQ HOSP IP/OBS SF/LOW 25: CPT | Performed by: NURSE PRACTITIONER

## 2022-02-07 PROCEDURE — 6360000002 HC RX W HCPCS: Performed by: FAMILY MEDICINE

## 2022-02-07 PROCEDURE — 84100 ASSAY OF PHOSPHORUS: CPT

## 2022-02-07 RX ORDER — CLOTRIMAZOLE AND BETAMETHASONE DIPROPIONATE 10; .5 MG/ML; MG/ML
LOTION TOPICAL 2 TIMES DAILY
Status: DISCONTINUED | OUTPATIENT
Start: 2022-02-07 | End: 2022-02-09 | Stop reason: HOSPADM

## 2022-02-07 RX ORDER — POTASSIUM CHLORIDE 7.45 MG/ML
10 INJECTION INTRAVENOUS
Status: COMPLETED | OUTPATIENT
Start: 2022-02-07 | End: 2022-02-07

## 2022-02-07 RX ADMIN — DIVALPROEX SODIUM 125 MG: 125 CAPSULE ORAL at 20:57

## 2022-02-07 RX ADMIN — CLOTRIMAZOLE AND BETAMETHASONE DIPROPIONATE: 10; .5 LOTION TOPICAL at 20:58

## 2022-02-07 RX ADMIN — MEMANTINE HYDROCHLORIDE 5 MG: 5 TABLET, FILM COATED ORAL at 08:38

## 2022-02-07 RX ADMIN — POTASSIUM CHLORIDE 10 MEQ: 10 INJECTION, SOLUTION INTRAVENOUS at 14:42

## 2022-02-07 RX ADMIN — INSULIN LISPRO 6 UNITS: 100 INJECTION, SOLUTION INTRAVENOUS; SUBCUTANEOUS at 21:39

## 2022-02-07 RX ADMIN — MIDODRINE HYDROCHLORIDE 10 MG: 5 TABLET ORAL at 20:57

## 2022-02-07 RX ADMIN — INSULIN LISPRO 4 UNITS: 100 INJECTION, SOLUTION INTRAVENOUS; SUBCUTANEOUS at 03:56

## 2022-02-07 RX ADMIN — Medication 10 ML: at 20:58

## 2022-02-07 RX ADMIN — MIDODRINE HYDROCHLORIDE 10 MG: 5 TABLET ORAL at 13:27

## 2022-02-07 RX ADMIN — LANSOPRAZOLE 30 MG: KIT at 09:00

## 2022-02-07 RX ADMIN — FLUCONAZOLE 200 MG: 100 TABLET ORAL at 13:26

## 2022-02-07 RX ADMIN — HYDROCORTISONE SODIUM SUCCINATE 100 MG: 100 INJECTION, POWDER, FOR SOLUTION INTRAMUSCULAR; INTRAVENOUS at 20:58

## 2022-02-07 RX ADMIN — POTASSIUM CHLORIDE 10 MEQ: 10 INJECTION, SOLUTION INTRAVENOUS at 13:26

## 2022-02-07 RX ADMIN — INSULIN LISPRO 6 UNITS: 100 INJECTION, SOLUTION INTRAVENOUS; SUBCUTANEOUS at 10:30

## 2022-02-07 RX ADMIN — MIDODRINE HYDROCHLORIDE 10 MG: 5 TABLET ORAL at 06:21

## 2022-02-07 RX ADMIN — CITALOPRAM HYDROBROMIDE 10 MG: 20 TABLET ORAL at 08:39

## 2022-02-07 RX ADMIN — POTASSIUM CHLORIDE 10 MEQ: 10 INJECTION, SOLUTION INTRAVENOUS at 11:49

## 2022-02-07 RX ADMIN — PIPERACILLIN AND TAZOBACTAM 3375 MG: 3; .375 INJECTION, POWDER, LYOPHILIZED, FOR SOLUTION INTRAVENOUS at 08:40

## 2022-02-07 RX ADMIN — CLOTRIMAZOLE AND BETAMETHASONE DIPROPIONATE: 10; .5 LOTION TOPICAL at 13:26

## 2022-02-07 RX ADMIN — INSULIN GLARGINE 10 UNITS: 100 INJECTION, SOLUTION SUBCUTANEOUS at 21:39

## 2022-02-07 RX ADMIN — POTASSIUM CHLORIDE 10 MEQ: 10 INJECTION, SOLUTION INTRAVENOUS at 16:22

## 2022-02-07 RX ADMIN — PIPERACILLIN AND TAZOBACTAM 3375 MG: 3; .375 INJECTION, POWDER, LYOPHILIZED, FOR SOLUTION INTRAVENOUS at 01:10

## 2022-02-07 RX ADMIN — INSULIN LISPRO 6 UNITS: 100 INJECTION, SOLUTION INTRAVENOUS; SUBCUTANEOUS at 16:30

## 2022-02-07 RX ADMIN — DIGOXIN 125 MCG: 0.12 TABLET ORAL at 08:39

## 2022-02-07 RX ADMIN — HYDROCORTISONE SODIUM SUCCINATE 100 MG: 100 INJECTION, POWDER, FOR SOLUTION INTRAMUSCULAR; INTRAVENOUS at 13:27

## 2022-02-07 RX ADMIN — LANSOPRAZOLE 30 MG: KIT at 17:52

## 2022-02-07 RX ADMIN — HYDROCORTISONE SODIUM SUCCINATE 100 MG: 100 INJECTION, POWDER, FOR SOLUTION INTRAMUSCULAR; INTRAVENOUS at 06:21

## 2022-02-07 RX ADMIN — Medication 10 ML: at 08:40

## 2022-02-07 ASSESSMENT — PAIN SCALES - GENERAL
PAINLEVEL_OUTOF10: 0

## 2022-02-07 NOTE — PROGRESS NOTES
[address] [place] [address] [facility]  Progress Note      Chief Complaint   Patient presents with    Altered Mental Status     found by staff at SNF in bed unresponsive with pulse ox 64%, placed on 2L NC, 6L NC via EMS- pt more responsive. also ? [facility] staff reported blood clots in stool. SUBJECTIVE:  Patient is lying in bed, nonverbal.   NGT in place with feeding  Diffuse erythematous rash noted to neck, chest, under arms, & back- nursing reports this is new  No fevers    Review of systems:  As stated above in the chief complaint, otherwise negative. Medications:  Scheduled Meds:   potassium chloride  10 mEq IntraVENous Q1H    clotrimazole-betamethasone   Topical BID    insulin glargine  10 Units SubCUTAneous Nightly    polyethylene glycol  17 g Oral Daily    insulin lispro  0-12 Units SubCUTAneous Q6H    lansoprazole  30 mg Per NG tube BID AC    hydrocortisone sodium succinate PF  100 mg IntraVENous Q8H    midodrine  10 mg Oral 3 times per day    fluconazole  200 mg Oral Daily    citalopram  10 mg Oral Daily    digoxin  125 mcg Oral Daily    divalproex  125 mg Oral Nightly    memantine  5 mg Oral Daily    sodium chloride flush  5-40 mL IntraVENous 2 times per day     Continuous Infusions:   sodium chloride 25 mL (22 1708)    sodium chloride       PRN Meds:sodium chloride flush, sodium chloride, ondansetron **OR** ondansetron, acetaminophen **OR** acetaminophen, magnesium sulfate, sodium chloride    OBJECTIVE:  /82   Pulse 78   Temp 97.8 °F (36.6 °C) (Axillary)   Resp 16   Ht 5' 4\" (1.626 m)   Wt 198 lb 3.2 oz (89.9 kg)   LMP  (LMP Unknown)   SpO2 98%   BMI 34.02 kg/m²   Temp  Av.9 °F (36.6 °C)  Min: 97.8 °F (36.6 °C)  Max: 98 °F (36.7 °C)  Constitutional: The patient is nonverbal. Eyes are open. Does not track  Skin: Warm and dry. Diffuse erythematous rash to neck, chest, underarms, back. HEENT: Round and reactive pupils. Moist mucous membranes.  NG in place with feeding  Neck: Supple to movements. Chest: No respiratory distress. Symmetrical expansion. No wheezing, crackles or rhonchi. Cardiovascular: S1 and S2 are rhythmic and regular. No murmurs appreciated. Abdomen: Positive bowel sounds to auscultation. Obese, soft, nondistended  Extremities: No edema.   Lines: peripheral    Laboratory and Tests Review:  Lab Results   Component Value Date    WBC 6.9 02/07/2022    WBC 7.4 02/06/2022    WBC 12.6 (H) 02/05/2022    HGB 11.7 02/07/2022    HCT 37.0 02/07/2022    MCV 93.2 02/07/2022     02/07/2022     Lab Results   Component Value Date    NEUTROABS 5.66 02/07/2022    NEUTROABS 5.64 02/06/2022    NEUTROABS 11.03 (H) 02/05/2022     No results found for: [facility]  Lab Results   Component Value Date    ALT 21 02/07/2022    AST 15 02/07/2022    ALKPHOS 84 02/07/2022    BILITOT 0.4 02/07/2022     Lab Results   Component Value Date     02/07/2022    K 3.0 02/07/2022    K 3.9 02/02/2022     02/07/2022    CO2 27 02/07/2022    BUN 16 02/07/2022    CREATININE 0.5 02/07/2022    CREATININE 0.5 02/06/2022    CREATININE 0.5 02/05/2022    GFRAA >60 02/07/2022    LABGLOM >60 02/07/2022    GLUCOSE 279 02/07/2022    PROT 4.4 02/07/2022    LABALBU 2.6 02/07/2022    CALCIUM 7.6 02/07/2022    BILITOT 0.4 02/07/2022    ALKPHOS 84 02/07/2022    AST 15 02/07/2022    ALT 21 02/07/2022     Lab Results   Component Value Date    CRP 0.3 02/01/2022    CRP 16.6 (H) 01/17/2022     Lab Results   Component Value Date    SEDRATE 70 (H) 10/22/2020    SEDRATE 19 04/14/2016     Radiology:  Reviewed    Microbiology:   Urine cx E faecalis, E coli  Blood cx no growth    ASSESSMENT:  Sepsis most likely related to GI tract-fecal impaction with possible microperforation/ulceration  CT scan of descending colon showed bowel wall thickening; C. difficile some currently  Rule out recurrent UTI indwelling Segura; patient was treated no more than a week to 10 days ago for UTI  Severe volume contraction intravascularly-improving  Leukocytosis related to the above- improved  COVID-19 at this point is colonization-at any rate the respiratory function is very good considering hemorrhage  Leukocytosis - improving  Rash - possibly drug related     PLAN:  Stop Zosyn  Continue Diflucan p.o. Start lotrisone to rash  Check final cultures  Monitor labs    Yisel Gifforderty, APRN - CNP  12:11 PM  2/7/2022     Pt seen and examined. Above discussed agree with advanced practice nurse. Labs, cultures, and radiographs reviewed. Face to Face encounter occurred. Changes made as necessary.      Afsaneh Holm MD

## 2022-02-07 NOTE — CARE COORDINATION
Social Work / Discharge Planning:    COVID positive 2/1/22. Patient admitted from [facility] at [facility] LOC. Per liaison Isabela patient is long term care and can return COVID positive at discharge. Per patient daughter Ashley Saez (ph: [phone]) plan at discharge is for patient to return to [facility]. Patient on room air. Patient has an NG tube to and [facility] can not accept patient with an NG tube. SANDRA in Epic and transportation envelope placed in chart. Social work will continue to follow and assist with discharge planning.

## 2022-02-07 NOTE — PROGRESS NOTES
PROGRESS NOTE  By Pedro Pickens M.D. [facility]  Dr. Primo Parekh M.D.,  Dr. Rebekah Casanova M.D.,   Dr. Inez Lucia D.O.,  Dr. Robin Resendez M.D.,  Dr. Wen Capellan D.O.,  Tessa Bradford, [address]  80 y.o.  female    Patient not examined in face-to-face encounter secondary to COVID-19 positive status in an effort to limit transmission/exposure and to preserve PPE. Patient observed from a distance and appears to remain lethargic with NG tube. Pertinent notes/labs reviewed. Discussed with healthcare team/nursing      OBJECTIVE:    /60   Pulse 85   Temp 98.1 °F (36.7 °C) (Axillary)   Resp 16   Ht 5' 4\" (1.626 m)   Wt 198 lb 3.2 oz (89.9 kg)   LMP  (LMP Unknown)   SpO2 98%   BMI 34.02 kg/m²         DATA:  Stool (measured) : 200 mL  Lab Results   Component Value Date    WBC 6.9 02/07/2022    RBC 3.97 02/07/2022    HGB 11.7 02/07/2022    HCT 37.0 02/07/2022    MCV 93.2 02/07/2022    MCH 29.5 02/07/2022    MCHC 31.6 02/07/2022    RDW 15.3 02/07/2022     02/07/2022    MPV 12.3 02/07/2022     Lab Results   Component Value Date     02/07/2022    K 3.0 02/07/2022    K 3.9 02/02/2022     02/07/2022    CO2 27 02/07/2022    BUN 16 02/07/2022    CREATININE 0.5 02/07/2022    CALCIUM 7.6 02/07/2022    PROT 4.4 02/07/2022    LABALBU 2.6 02/07/2022    BILITOT 0.4 02/07/2022    ALKPHOS 84 02/07/2022    AST 15 02/07/2022    ALT 21 02/07/2022     Lab Results   Component Value Date    LIPASE 30 02/01/2022     Lab Results   Component Value Date    AMYLASE 49 10/29/2014         ASSESSMENT/PLAN:    1.   Rectal bleed  -Bloody bowel movements reported today  -Iron deficient/normocytic  -Currently stabilized H&H  -Possibly related to infection/ischemia - unlikely to be amenable to endoscopic treatment  -Cannot exclude stercoral ulceration  -No evidence of bleeding or coffee-ground material from NG tube  -Antibiotics per ID service  -With also decreased total iron-binding capacity  -Monitor hemoglobin  -With concern for microperforation will be of high risk of extending perforation with endoscopy,; no evidence of upper bleeding given no blood from NG tube and no melena.     2. Sepsis   -Possibly related to GI source concern for microperforation/fecal impaction)  -improved hypotension and tachycardia  -Supportive/symptomatic treatment per admitting  -Antibiotics per admitting/ID service     3. Lactic acidosis  -Likely related to generalized hypoperfusion  -Most recently 3.4 on 4 February     4.   Elevated troponin  -Likely demand ischemia secondary to hypotension  -  However primary CAD cannot be excluded  -Recommend cardiology evaluation     5. History of atrial fibrillation  -Continue holding oral anticoagulation if clinically feels     6. Diabetes mellitus  -Per admitting     7.  Elevated alkaline phosphatase  -Patient is status post cholecystectomy  -Nonelevated bilirubin and transaminases/nonobstructive liver pattern  -Monitor lab    8. COVID-19 positive -according to infectious disease likely colonization  -Patient remains saturating 98% on room           Antibiotics per ID. MiraLAX daily to prevent reaccumulation of impaction. Bowels currently moving well. Hemoglobin fairly stable. Monitor labs. Will follow  Deepa Moss MD  2/7/2022  2:14 PM    NOTE:  This report was transcribed using voice recognition software. Every effort was made to ensure accuracy; however, inadvertent computerized transcription errors may be present.

## 2022-02-07 NOTE — PROGRESS NOTES
[facility] Progress Note    Admitting Date and Time: 2/1/2022  6:51 PM  Admit Dx: Metabolic encephalopathy [N29.77]  Lactic acidosis [E87.2]  Acute cystitis with hematuria [N30.01]  Septic shock (HCC) [A41.9, R65.21]  Gastrointestinal hemorrhage, unspecified gastrointestinal hemorrhage type [K92.2]    Subjective:  Patient is being followed for Metabolic encephalopathy [V56.25]  Lactic acidosis [E87.2]  Acute cystitis with hematuria [N30.01]  Septic shock (HCC) [A41.9, R65.21]  Gastrointestinal hemorrhage, unspecified gastrointestinal hemorrhage type [K92.2]     No events overnight. No fevers. Tolerating tube feedings. Per RN: nothing to report    ROS: denies fever, chills, cp, sob, n/v, HA unless stated above. Not verbal -- unable to give subjective ROS.      clotrimazole-betamethasone   Topical BID    insulin glargine  10 Units SubCUTAneous Nightly    polyethylene glycol  17 g Oral Daily    insulin lispro  0-12 Units SubCUTAneous Q6H    lansoprazole  30 mg Per NG tube BID AC    hydrocortisone sodium succinate PF  100 mg IntraVENous Q8H    midodrine  10 mg Oral 3 times per day    fluconazole  200 mg Oral Daily    citalopram  10 mg Oral Daily    digoxin  125 mcg Oral Daily    divalproex  125 mg Oral Nightly    memantine  5 mg Oral Daily    sodium chloride flush  5-40 mL IntraVENous 2 times per day     sodium chloride flush, 5-40 mL, PRN  sodium chloride, 25 mL, PRN  ondansetron, 4 mg, Q8H PRN   Or  ondansetron, 4 mg, Q6H PRN  acetaminophen, 650 mg, Q6H PRN   Or  acetaminophen, 650 mg, Q6H PRN  magnesium sulfate, 2,000 mg, PRN  sodium chloride, , PRN         Objective:    /60   Pulse 85   Temp 98.1 °F (36.7 °C) (Axillary)   Resp 16   Ht 5' 4\" (1.626 m)   Wt 198 lb 3.2 oz (89.9 kg)   LMP  (LMP Unknown)   SpO2 98%   BMI 34.02 kg/m²   General Appearance: somnolent, arousable to light touch and name, daughter present, not oriented, and in no acute distress  Skin: warm and dry, no rashes, no jaundice  Head: normocephalic and atraumatic  Eyes: pupils equal, round, and reactive to light, extraocular eye movements intact, conjunctivae normal  Neck: neck supple and non tender without mass   Pulmonary/Chest: clear to auscultation bilaterally- no wheezes, rales or rhonchi, normal air movement, no respiratory distress  Cardiovascular: normal rate, normal S1 and S2 and no carotid bruits  Abdomen: soft, non-tender, non-distended, normal bowel sounds, no masses or organomegaly  Extremities: no cyanosis, no clubbing and no edema  Neurologic: alert, non-verbal, moving extremities equally        Recent Labs     02/05/22  1125 02/06/22  1509 02/07/22  0955    134 136   K 3.4* 3.3* 3.0*    100 100   CO2 24 24 27   BUN 17 17 16   CREATININE 0.5 0.5 0.5   GLUCOSE 248* 300* 279*   CALCIUM 7.3* 7.3* 7.6*       Recent Labs     02/05/22  1125 02/06/22  1509 02/07/22  0955   WBC 12.6* 7.4 6.9   RBC 3.45* 3.84 3.97   HGB 10.3* 11.2* 11.7   HCT 31.8* 35.3 37.0   MCV 92.2 91.9 93.2   MCH 29.9 29.2 29.5   MCHC 32.4 31.7* 31.6*   RDW 15.7* 15.5* 15.3*   * 135 131   MPV 12.5* 12.5* 12.3*       Radiology:   None new    Assessment:    Principal Problem:    Septic shock (HCC)  Active Problems:    Hypertension    Type II diabetes mellitus (HCC)    Tachy-randal syndrome (HCC)    Acute metabolic encephalopathy    Uncontrolled type 2 diabetes mellitus with hyperglycemia (HCC)    Diabetic ketoacidosis without coma associated with type 2 diabetes mellitus (HCC)    Hypernatremia    Fecal impaction (HCC)  Resolved Problems:    * No resolved hospital problems. *    Hg stable. No GI bleeding. Sepsis resolved. WBC normal.  Still with altered mental status.     Plan:  1.  Severe sepsis -- likely due to fecal bacterial/colonic stretch due to severe fecal impaction -- improving, WBC decreasing  -IV Zosyn  -ID following  -Follow cultures     2.  Severe fecal impaction -- resolved; possible stercoral ulcer, had some clots per rectum yesterday; no blood seen today by nursing staff, soft brown stool  -Continue miralax daily  -GI on board     3.  GI Bleed -- likely due to stercoral ulceration -- Hg normal, GI bleeding stopped  -Trend H/H -- check daily  -GI on board -- considering endoscopy but holding off for now     4.  Hypernatremia -- due to dehydration -- resolved  -Continue 1/2 NS for now  -Free water flushes into NG tube     5.  DKA -- resolved -- present on admit     6.  DM2 -- uncontrolled  -Glucose: 274/256/280/300  -Start Lantus 10 units nightly  -Check sugars intensively 4 times/day     7.  Afib, tachy-randal syndrome  -Controlled  -Eliquis on hold due to GI bleed  -Telemetry  -On metoprolol at home     8.  Dementia -- late onset Alzheimer's  -With tardive dyskinesia  -On memantine     9.  Acute Metabolic encephalopathy -- due to sepsis; still with altered mental status; very somnolent  -Continue to monitor mental status     Disp: back to [address] [facility] -- about 2 days  Family deciding if they want pt to start hospice care. NOTE: This report was transcribed using voice recognition software. Every effort was made to ensure accuracy; however, inadvertent computerized transcription errors may be present.   Electronically signed by John Adames DO on 2/7/2022 at 5:56 PM

## 2022-02-07 NOTE — PROGRESS NOTES
Palliative Care Department  [phone]  Palliative Care Progress Note  Provider Nuvia Mendoza, APRN - CNP     Mickie Boone  [id number]  Hospital Day: 7  Date of Initial Consult: 2/4/2022  Referring Provider: Mallory Mabry MD  Palliative Medicine was consulted for assistance with: Goals of Care, Family Support    HPI:   Mickie Boone is a 80 y.o. with a medical history of dementia who was admitted on 2/1/2022 from facility with a CHIEF COMPLAINT of altered mental status and decreased responsiveness. Patient was noted to be hypoxic upon arrival. Patient admitted for treatment of severe sepsis. Patient positive for Covid 19. Palliative medicine consulted for goals of care and family support. ASSESSMENT/PLAN:     Pertinent Hospital Diagnoses      Sepsis   Hx dementia      Palliative Care Encounter / Counseling Regarding Goals of Care  Please see detailed goals of care discussion as below   At this time, Mickie Boone, Does Not have capacity for medical decision-making.   Capacity is time limited and situation/question specific   Outcome of goals of care meeting: Continue current management, continue limited code   Code status Limited -Okay for resuscitative medications only   Advanced Directives: no POA or living will in [facility]   Surrogate/Legal NOK:  Shila Olivas, child, [phone], [phone]      Spiritual assessment: no spiritual distress identified  Bereavement and grief: to be determined  Referrals to: none today  SUBJECTIVE:     Current medical issues leading to Palliative Medicine involvement include   Active Hospital Problems    Diagnosis Date Noted    Diabetic ketoacidosis without coma associated with type 2 diabetes mellitus ([address] [E11.10] 02/05/2022    Hypernatremia [E87.0] 02/05/2022    Fecal impaction ([address] [K56.41] 02/05/2022    Septic shock ([address] [A41.9, R65.21] 02/01/2022    Uncontrolled type 2 diabetes mellitus with hyperglycemia ([address] [E11.65]     Acute metabolic encephalopathy [S10.63] 01/17/2022    Tachy-randal syndrome ([facility] [I49.5]     Type II diabetes mellitus ([facility] [E11.9] 09/29/2014    Hypertension [I10] 08/27/2014       Details of Conversation: Chart reviewed. Call placed to patient's child, Raf Parkinson. Granddaughter, Henrietta Dobbs, also on call. Discussion regarding goals of care. Raf Parkinson questioning next steps and considering hospice care. Raf Parkinson plans to speak to her brother and call palliative medicine team back tomorrow further [address] discussion. Support offered. OBJECTIVE:   Prognosis: depends upon goals and unknown    Physical Exam:  /60   Pulse 85   Temp 98.1 °F (36.7 °C) (Axillary)   Resp 16   Ht 5' 4\" (1.626 m)   Wt 198 lb 3.2 oz (89.9 kg)   LMP  (LMP Unknown)   SpO2 98%   BMI 34.02 kg/m²   Due to the current efforts to prevent transmission of COVID-19 and also the need to preserve PPE for other caregivers, a face-to-face encounter with the patient was not performed. That being said, all relevant records and diagnostic tests were reviewed, including laboratory results and imaging. Please reference any relevant documentation elsewhere. Care will be coordinated with the primary service. Objective data reviewed: labs, images, records, medication use, vitals and chart    Discussed patient and the plan of care with the other IDT members: Palliative Medicine IDT Team    Time/Communication  Greater than 50% of time spent, total 15 minutes in counseling and coordination of care at the bedside regarding goals of care, diagnosis and prognosis and see above.     Thank you for allowing Palliative Medicine to participate in the care of [address] [place] [address].

## 2022-02-08 LAB
ALBUMIN SERPL-MCNC: 2.6 G/DL (ref 2.8–4.4)
ALP BLD-CCNC: 84 U/L (ref 0–249)
ALT SERPL-CCNC: 22 U/L (ref 0–32)
ANION GAP SERPL CALCULATED.3IONS-SCNC: 11 MMOL/L (ref 7–16)
AST SERPL-CCNC: 18 U/L (ref 0–31)
ATYPICAL LYMPHOCYTE RELATIVE PERCENT: 4.3 % (ref 0–4)
BASOPHILS ABSOLUTE: 0 E9/L (ref 0.1–0.4)
BASOPHILS RELATIVE PERCENT: 0 % (ref 0–2)
BILIRUB SERPL-MCNC: 0.4 MG/DL (ref 2–6)
BUN BLDV-MCNC: 17 MG/DL (ref 4–19)
CALCIUM SERPL-MCNC: 7.9 MG/DL (ref 8.6–10.2)
CHLORIDE BLD-SCNC: 100 MMOL/L (ref 98–107)
CO2: 23 MMOL/L (ref 22–29)
CREAT SERPL-MCNC: 0.4 MG/DL (ref 0.4–0.7)
EOSINOPHILS ABSOLUTE: 0 E9/L (ref 0.1–0.7)
EOSINOPHILS RELATIVE PERCENT: 0 % (ref 0–12)
GFR AFRICAN AMERICAN: >60
GFR NON-AFRICAN AMERICAN: >60 ML/MIN/1.73
GLUCOSE BLD-MCNC: 280 MG/DL (ref 70–110)
HCT VFR BLD CALC: 38 % (ref 45–66)
HEMOGLOBIN: 12.3 G/DL (ref 14.5–22)
LYMPHOCYTES ABSOLUTE: 1.29 E9/L (ref 3–15)
LYMPHOCYTES RELATIVE PERCENT: 14.8 % (ref 15–60)
MAGNESIUM: 2 MG/DL (ref 1.6–2.6)
MCH RBC QN AUTO: 29.1 PG (ref 30–42)
MCHC RBC AUTO-ENTMCNC: 32.4 % (ref 29–37)
MCV RBC AUTO: 90 FL (ref 95–121)
METER GLUCOSE: 227 MG/DL (ref 70–110)
METER GLUCOSE: 238 MG/DL (ref 70–110)
METER GLUCOSE: 251 MG/DL (ref 70–110)
METER GLUCOSE: 263 MG/DL (ref 70–110)
MONOCYTES ABSOLUTE: 0.75 E9/L (ref 1–3)
MONOCYTES RELATIVE PERCENT: 11.3 % (ref 3–15)
NEUTROPHILS ABSOLUTE: 4.76 E9/L (ref 5–20)
NEUTROPHILS RELATIVE PERCENT: 69.6 % (ref 15–80)
NUCLEATED RED BLOOD CELLS: 0 /100 WBC
PDW BLD-RTO: 15.2 FL (ref 11–19)
PHOSPHORUS: 1.8 MG/DL (ref 2.5–4.5)
PLATELET # BLD: 137 E9/L (ref 130–480)
PMV BLD AUTO: 12.4 FL (ref 7–12)
POTASSIUM SERPL-SCNC: 3.6 MMOL/L (ref 3.4–4.5)
RBC # BLD: 4.22 E12/L (ref 3.7–5.3)
RBC # BLD: NORMAL 10*6/UL
SODIUM BLD-SCNC: 134 MMOL/L (ref 132–146)
TOTAL PROTEIN: 4.6 G/DL (ref 6.4–8.3)
WBC # BLD: 6.8 E9/L (ref 9.4–34)

## 2022-02-08 PROCEDURE — 1200000000 HC SEMI PRIVATE

## 2022-02-08 PROCEDURE — 6370000000 HC RX 637 (ALT 250 FOR IP): Performed by: INTERNAL MEDICINE

## 2022-02-08 PROCEDURE — 36415 COLL VENOUS BLD VENIPUNCTURE: CPT

## 2022-02-08 PROCEDURE — 2580000003 HC RX 258: Performed by: INTERNAL MEDICINE

## 2022-02-08 PROCEDURE — 99232 SBSQ HOSP IP/OBS MODERATE 35: CPT | Performed by: FAMILY MEDICINE

## 2022-02-08 PROCEDURE — 84100 ASSAY OF PHOSPHORUS: CPT

## 2022-02-08 PROCEDURE — 82962 GLUCOSE BLOOD TEST: CPT

## 2022-02-08 PROCEDURE — 6360000002 HC RX W HCPCS: Performed by: INTERNAL MEDICINE

## 2022-02-08 PROCEDURE — 85025 COMPLETE CBC W/AUTO DIFF WBC: CPT

## 2022-02-08 PROCEDURE — 6370000000 HC RX 637 (ALT 250 FOR IP): Performed by: FAMILY MEDICINE

## 2022-02-08 PROCEDURE — 80053 COMPREHEN METABOLIC PANEL: CPT

## 2022-02-08 PROCEDURE — 83735 ASSAY OF MAGNESIUM: CPT

## 2022-02-08 RX ADMIN — CITALOPRAM HYDROBROMIDE 10 MG: 20 TABLET ORAL at 09:52

## 2022-02-08 RX ADMIN — Medication 10 ML: at 20:14

## 2022-02-08 RX ADMIN — HYDROCORTISONE SODIUM SUCCINATE 100 MG: 100 INJECTION, POWDER, FOR SOLUTION INTRAMUSCULAR; INTRAVENOUS at 20:14

## 2022-02-08 RX ADMIN — Medication 10 ML: at 09:53

## 2022-02-08 RX ADMIN — MEMANTINE HYDROCHLORIDE 5 MG: 5 TABLET, FILM COATED ORAL at 09:52

## 2022-02-08 RX ADMIN — DIVALPROEX SODIUM 125 MG: 125 CAPSULE ORAL at 20:13

## 2022-02-08 RX ADMIN — CLOTRIMAZOLE AND BETAMETHASONE DIPROPIONATE: 10; .5 LOTION TOPICAL at 09:53

## 2022-02-08 RX ADMIN — MIDODRINE HYDROCHLORIDE 10 MG: 5 TABLET ORAL at 14:30

## 2022-02-08 RX ADMIN — MIDODRINE HYDROCHLORIDE 10 MG: 5 TABLET ORAL at 05:50

## 2022-02-08 RX ADMIN — DIGOXIN 125 MCG: 0.12 TABLET ORAL at 09:52

## 2022-02-08 RX ADMIN — FLUCONAZOLE 200 MG: 100 TABLET ORAL at 14:30

## 2022-02-08 RX ADMIN — INSULIN LISPRO 6 UNITS: 100 INJECTION, SOLUTION INTRAVENOUS; SUBCUTANEOUS at 10:35

## 2022-02-08 RX ADMIN — LANSOPRAZOLE 30 MG: KIT at 16:51

## 2022-02-08 RX ADMIN — INSULIN LISPRO 4 UNITS: 100 INJECTION, SOLUTION INTRAVENOUS; SUBCUTANEOUS at 03:58

## 2022-02-08 RX ADMIN — CLOTRIMAZOLE AND BETAMETHASONE DIPROPIONATE: 10; .5 LOTION TOPICAL at 20:13

## 2022-02-08 RX ADMIN — HYDROCORTISONE SODIUM SUCCINATE 100 MG: 100 INJECTION, POWDER, FOR SOLUTION INTRAMUSCULAR; INTRAVENOUS at 14:30

## 2022-02-08 RX ADMIN — HYDROCORTISONE SODIUM SUCCINATE 100 MG: 100 INJECTION, POWDER, FOR SOLUTION INTRAMUSCULAR; INTRAVENOUS at 05:50

## 2022-02-08 RX ADMIN — MIDODRINE HYDROCHLORIDE 10 MG: 5 TABLET ORAL at 20:13

## 2022-02-08 RX ADMIN — LANSOPRAZOLE 30 MG: KIT at 05:53

## 2022-02-08 RX ADMIN — INSULIN LISPRO 4 UNITS: 100 INJECTION, SOLUTION INTRAVENOUS; SUBCUTANEOUS at 21:22

## 2022-02-08 RX ADMIN — INSULIN GLARGINE 10 UNITS: 100 INJECTION, SOLUTION SUBCUTANEOUS at 21:22

## 2022-02-08 RX ADMIN — INSULIN LISPRO 6 UNITS: 100 INJECTION, SOLUTION INTRAVENOUS; SUBCUTANEOUS at 16:50

## 2022-02-08 ASSESSMENT — PAIN SCALES - PAIN ASSESSMENT IN ADVANCED DEMENTIA (PAINAD)
NEGVOCALIZATION: 0
TOTALSCORE: 0
CONSOLABILITY: 0
BREATHING: 0
FACIALEXPRESSION: 0
BODYLANGUAGE: 0

## 2022-02-08 ASSESSMENT — PAIN SCALES - GENERAL: PAINLEVEL_OUTOF10: 0

## 2022-02-08 NOTE — PROGRESS NOTES
[address] [place] [address]     Liaison Information Visit Note              Patient Name: Marifer Washington   Admit date:  2/1/2022   Hospital Admitting Physician:  Ronald Hankins DO   PCP:  Felisa Dominguez DO  Primary Insurance: Payor: [facility] /  /  /    Emergency Contact:   Maximus Pathak [phone] cell [phone]  Terminal Diagnosis Alzheimer's dementia with a FAST score of 7E, and a PPS of 10% as confirmed by Dr. Hewitt Skiff, [facility] Problem List:   Patient Active Problem List   Diagnosis Code    Hypertension I10    Type II diabetes mellitus ([address] E11.9    Hyperlipidemia E78.5    GERD (gastroesophageal reflux disease) K21.9    History of cholecystectomy Z90.49    Gastroparesis K31.84    Late onset Alzheimer's disease without behavioral disturbance (HCC) G30.1, F02.80    New onset atrial fibrillation (HCC) I48.91    Atrial flutter with rapid ventricular response (HCC) I48.92    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    Severe sepsis ([address] A41.9, R65.20    Acute urinary tract infection N39.0    Acute sepsis ([address] A41.9    Acute metabolic encephalopathy M23.11    COVID-19 U07.1    JOSE DE JESUS (acute kidney injury) ([address] N17.9    Atrial fibrillation with RVR ([address] I48.91    Pneumonia due to COVID-19 virus U07.1, J12.82    Atrial fibrillation ([address] I48.91    Uncontrolled type 2 diabetes mellitus with hyperglycemia ([address] E11.65    Septic shock ([address] A41.9, R65.21    Diabetic ketoacidosis without coma associated with type 2 diabetes mellitus ([address] E11.10    Hypernatremia E87.0    Fecal impaction ([address] K56.41     Code Status Order: Limited   Allergies:  Asa [aspirin]  Family Goal: comfort  Meeting held with DTMATTEO Hyde over the phone. The hospice benefit and philosophy were explained including that hospice is end of life care in which, per Medicare, a patient has a terminal diagnosis that life expectancy would be 6 months or less.   Hospice care is a service that is covered by most insurance plans. The following levels of hospice care were discussed: routine level of hospice care at private [place] [facility]- patient/family are responsible for any room and board fees at the facility, and general in patient level of care (GIP) at the [facility] for short term symptom management. Per Medicare guidelines, a patient is considered appropriate for GIP if they have uncontrolled symptoms such as pain, agitation, labored breathing or nausea/vomiting that are not being managed with current medication regimen. Once symptoms become managed, the patient would need to be moved to a lower level of care such as home with hospice, or ECF with hospice. Family informed that with the routine level of care at home or ECF,  the hospice team consists of the RN, a , and personal care team who make intermittent visits along with optional services of non-medical volunteers and Chaplains. Explained that at home in routine level of care, familles are responsible for the 24 hour care. Discussed that under hospice care patient would not receive chemotherapy, radiation, immune therapy, IV antibiotics, dialysis or blood transfusions. Explained that once in hospice care, all aggressive treatments would be stopped and allow nature to takes its course with focus on comfort care for the patient. Assessed patient, and placed call to her [address], [phone]. We reviewed hospice philosophy and services. We talked about return to [facility] in [facility] with hospice services. No longer doing any further aggressive treatments, removing the N/G tube. Ashley Sykes wanted to consider this information. She will call me back when she is ready for hospice services. Discharge Plan:  Discharge Disposition; unknown  [facility] plan:  1. Await her call back after considering this information. 2. Please call [facility] [phone] with any questions.   3. Patient not currently under the care of hospice.     Electronically signed by Mariaa Shirley RN on 2/8/2022 at 2:34 PM

## 2022-02-08 NOTE — PROGRESS NOTES
[facility] Progress Note    Admitting Date and Time: 2/1/2022  6:51 PM  Admit Dx: Metabolic encephalopathy [E01.80]  Lactic acidosis [E87.2]  Acute cystitis with hematuria [N30.01]  Septic shock (HCC) [A41.9, R65.21]  Gastrointestinal hemorrhage, unspecified gastrointestinal hemorrhage type [K92.2]    Subjective:  Patient is being followed for Metabolic encephalopathy [G91.76]  Lactic acidosis [E87.2]  Acute cystitis with hematuria [N30.01]  Septic shock (HCC) [A41.9, R65.21]  Gastrointestinal hemorrhage, unspecified gastrointestinal hemorrhage type [K92.2]     No events overnight. No fevers. Tolerating tube feedings. Per RN: nothing to report    ROS: denies fever, chills, cp, sob, n/v, HA unless stated above. Not verbal -- unable to give subjective ROS.      clotrimazole-betamethasone   Topical BID    insulin glargine  10 Units SubCUTAneous Nightly    polyethylene glycol  17 g Oral Daily    insulin lispro  0-12 Units SubCUTAneous Q6H    lansoprazole  30 mg Per NG tube BID AC    hydrocortisone sodium succinate PF  100 mg IntraVENous Q8H    midodrine  10 mg Oral 3 times per day    fluconazole  200 mg Oral Daily    citalopram  10 mg Oral Daily    digoxin  125 mcg Oral Daily    divalproex  125 mg Oral Nightly    memantine  5 mg Oral Daily    sodium chloride flush  5-40 mL IntraVENous 2 times per day     sodium chloride flush, 5-40 mL, PRN  sodium chloride, 25 mL, PRN  ondansetron, 4 mg, Q8H PRN   Or  ondansetron, 4 mg, Q6H PRN  acetaminophen, 650 mg, Q6H PRN   Or  acetaminophen, 650 mg, Q6H PRN  magnesium sulfate, 2,000 mg, PRN  sodium chloride, , PRN         Objective:    /65   Pulse 86   Temp 98.9 °F (37.2 °C) (Axillary)   Resp 16   Ht 5' 4\" (1.626 m)   Wt 216 lb (98 kg)   LMP  (LMP Unknown)   SpO2 98%   BMI 37.08 kg/m²   General Appearance: woke up when I walked into the room, arousable to light touch and name, not oriented, and in no acute distress, stercoral ulcer, had some clots per rectum yesterday; no blood seen today by nursing staff, soft brown stool  -Continue miralax daily  -GI on board     3.  GI Bleed -- likely due to stercoral ulceration -- Hg normal, GI bleeding stopped  -Trend H/H -- check daily  -GI on board -- considering endoscopy but holding off for now     4.  Hypernatremia -- due to dehydration -- resolved  -Continue 1/2 NS for now  -Free water flushes into NG tube     5.  DKA -- resolved -- present on admit     6.  DM2 -- uncontrolled  -Glucose: 274/256/280/300  -Start Lantus 10 units nightly  -Check sugars intensively 4 times/day     7.  Afib, tachy-randal syndrome  -Controlled  -Eliquis on hold due to GI bleed  -Telemetry  -On metoprolol at home     8.  Dementia -- late onset Alzheimer's  -With tardive dyskinesia  -On memantine     9.  Acute Metabolic encephalopathy -- due to sepsis; still with altered mental status; very somnolent -- not much improvement since admission  -Continue to monitor mental status     Disp: back to [address] [facility] -- tomorrow once hospice services set up  Family has decided on hospice care. NOTE: This report was transcribed using voice recognition software. Every effort was made to ensure accuracy; however, inadvertent computerized transcription errors may be present.   Electronically signed by Juan Carlos Cleary DO on 2/8/2022 at 4:45 PM

## 2022-02-08 NOTE — PROGRESS NOTES
Referral received. Chart reviewed. Call to family member Andrew Malcolm at [phone], no answer left VM message. In to evaluate patient. Pateint is minimally responsive, respirations are not labored at this time. Lung sounds are diminished on room air. Heart sounds are regular rhythm. Abdomen is large, soft with present bowel sounds. Her RUE has 3+ edema, RUE 1+ edema, trace edema BLE. Skin is warm to touch. Patient has a PPS of 10%, and a FAST score 7E, with a history of Alzheimer's dementia. F/U phone call to Trevor Urbano at [phone], will await her return call.   Thank you for the referral,  Colby Bella [phone]

## 2022-02-08 NOTE — PROGRESS NOTES
Palliative Care Department  [phone]  Palliative Care Progress Note  Provider Myra Dixon, APRN - CNP     Kirstie Matos  [id number]  Hospital Day: 8  Date of Initial Consult: 2/4/2022  Referring Provider: Lisa Thacker MD  Palliative Medicine was consulted for assistance with: Goals of Care, Family Support    HPI:   Kirstie Matos is a 80 y.o. with a medical history of dementia who was admitted on 2/1/2022 from facility with a CHIEF COMPLAINT of altered mental status and decreased responsiveness. Patient was noted to be hypoxic upon arrival. Patient admitted for treatment of severe sepsis. Patient positive for Covid 19. Palliative medicine consulted for goals of care and family support. ASSESSMENT/PLAN:     Pertinent Hospital Diagnoses      Sepsis   Hx dementia      Palliative Care Encounter / Counseling Regarding Goals of Care  Please see detailed goals of care discussion as below   At this time, Kirstie Matos, Does Not have capacity for medical decision-making.   Capacity is time limited and situation/question specific   Outcome of goals of care meeting: HOTV consult   Code status Limited -Okay for resuscitative medications only   Advanced Directives: no POA or living will in [facility]   Surrogate/Legal NOK:  Wilian Page, child, [phone], [phone]      Spiritual assessment: no spiritual distress identified  Bereavement and grief: to be determined  Referrals to: none today  SUBJECTIVE:     Current medical issues leading to Palliative Medicine involvement include   Active Hospital Problems    Diagnosis Date Noted    Diabetic ketoacidosis without coma associated with type 2 diabetes mellitus ([address] [E11.10] 02/05/2022    Hypernatremia [E87.0] 02/05/2022    Fecal impaction ([address] [K56.41] 02/05/2022    Septic shock ([address] [A41.9, R65.21] 02/01/2022    Uncontrolled type 2 diabetes mellitus with hyperglycemia ([address] [E11.65]     Acute metabolic encephalopathy [N77.52] 01/17/2022    Tachy-randal syndrome ([facility] [I49.5]     Type II diabetes mellitus ([facility] [E11.9] 09/29/2014    Hypertension [I10] 08/27/2014       Details of Conversation: Received call from patient's daughter, Nikita Faye. Nikita Faye states she has spoken to her brother and the rest of the family. Family has decided to consult hospice at this time. Nikita Faye choiced and wishes to speak with hospice of the [place]. Order placed and support provided. OBJECTIVE:   Prognosis: depends upon goals and unknown    Physical Exam:  /65   Pulse 86   Temp 98.9 °F (37.2 °C) (Axillary)   Resp 16   Ht 5' 4\" (1.626 m)   Wt 216 lb (98 kg)   LMP  (LMP Unknown)   SpO2 98%   BMI 37.08 kg/m²   Due to the current efforts to prevent transmission of COVID-19 and also the need to preserve PPE for other caregivers, a face-to-face encounter with the patient was not performed. That being said, all relevant records and diagnostic tests were reviewed, including laboratory results and imaging. Please reference any relevant documentation elsewhere. Care will be coordinated with the primary service. Objective data reviewed: labs, images, records, medication use, vitals and chart    Discussed patient and the plan of care with the other IDT members: Palliative Medicine IDT Team    Time/Communication  Greater than 50% of time spent, total 15 minutes in counseling and coordination of care at the bedside regarding goals of care, diagnosis and prognosis and see above.     Thank you for allowing Palliative Medicine to participate in the care of [address] [place] [address].

## 2022-02-08 NOTE — CARE COORDINATION
Social Work / Discharge Planning:    Hospice consult noted, family prefers [address]. Social work contacted [employer] and provided referral. Liaison will follow up with family. The Plan for Transition of Care is related to the following treatment goals: Hospice    The Patient and/or patient representative patient's daughter Yuliana Andino was provided with a choice of provider and agrees   with the discharge plan. [x] Yes [] No    Freedom of choice list was provided with basic dialogue that supports the patient's individualized plan of care/goals, treatment preferences and shares the quality data associated with the providers.  [x] Yes [] No

## 2022-02-08 NOTE — PROGRESS NOTES
[address] [place] [address] [facility]  Progress Note      Chief Complaint   Patient presents with    Altered Mental Status     found by staff at SNF in bed unresponsive with pulse ox 64%, placed on 2L NC, 6L NC via EMS- pt more responsive. also ? GIB staff reported blood clots in stool. SUBJECTIVE:  Patient is lying in bed, nonverbal.   NGT in place with feeding  Neck rash better today-dry  No fevers  Opens eyes    Review of systems:  As stated above in the chief complaint, otherwise negative. Medications:  Scheduled Meds:   clotrimazole-betamethasone   Topical BID    insulin glargine  10 Units SubCUTAneous Nightly    polyethylene glycol  17 g Oral Daily    insulin lispro  0-12 Units SubCUTAneous Q6H    lansoprazole  30 mg Per NG tube BID AC    hydrocortisone sodium succinate PF  100 mg IntraVENous Q8H    midodrine  10 mg Oral 3 times per day    fluconazole  200 mg Oral Daily    citalopram  10 mg Oral Daily    digoxin  125 mcg Oral Daily    divalproex  125 mg Oral Nightly    memantine  5 mg Oral Daily    sodium chloride flush  5-40 mL IntraVENous 2 times per day     Continuous Infusions:   sodium chloride 25 mL (22 1708)    sodium chloride       PRN Meds:sodium chloride flush, sodium chloride, ondansetron **OR** ondansetron, acetaminophen **OR** acetaminophen, magnesium sulfate, sodium chloride    OBJECTIVE:  /65   Pulse 86   Temp 98.9 °F (37.2 °C) (Axillary)   Resp 16   Ht 5' 4\" (1.626 m)   Wt 216 lb (98 kg)   LMP  (LMP Unknown)   SpO2 98%   BMI 37.08 kg/m²   Temp  Av.5 °F (36.9 °C)  Min: 98.1 °F (36.7 °C)  Max: 98.9 °F (37.2 °C)  Constitutional: The patient is nonverbal. Eyes are open. Does not track  Skin: Warm and dry. Diffuse erythematous rash to neck, chest, underarms, back. HEENT: Round and reactive pupils. Moist mucous membranes. NG in place with feeding  Neck: Supple to movements. Chest: No respiratory distress. Symmetrical expansion. No wheezing, crackles or rhonchi. Cardiovascular: S1 and S2 are rhythmic and regular. No murmurs appreciated. Abdomen: Positive bowel sounds to auscultation. Obese, soft, nondistended  Extremities: No edema.   Lines: peripheral    Laboratory and Tests Review:  Lab Results   Component Value Date    WBC 6.8 02/08/2022    WBC 6.9 02/07/2022    WBC 7.4 02/06/2022    HGB 12.3 02/08/2022    HCT 38.0 02/08/2022    MCV 90.0 02/08/2022     02/08/2022     Lab Results   Component Value Date    NEUTROABS 4.76 02/08/2022    NEUTROABS 5.66 02/07/2022    NEUTROABS 5.64 02/06/2022     No results found for: [facility]  Lab Results   Component Value Date    ALT 22 02/08/2022    AST 18 02/08/2022    ALKPHOS 84 02/08/2022    BILITOT 0.4 02/08/2022     Lab Results   Component Value Date     02/08/2022    K 3.6 02/08/2022    K 3.9 02/02/2022     02/08/2022    CO2 23 02/08/2022    BUN 17 02/08/2022    CREATININE 0.4 02/08/2022    CREATININE 0.5 02/07/2022    CREATININE 0.5 02/06/2022    GFRAA >60 02/08/2022    LABGLOM >60 02/08/2022    GLUCOSE 280 02/08/2022    PROT 4.6 02/08/2022    LABALBU 2.6 02/08/2022    CALCIUM 7.9 02/08/2022    BILITOT 0.4 02/08/2022    ALKPHOS 84 02/08/2022    AST 18 02/08/2022    ALT 22 02/08/2022     Lab Results   Component Value Date    CRP 0.3 02/01/2022    CRP 16.6 (H) 01/17/2022     Lab Results   Component Value Date    SEDRATE 70 (H) 10/22/2020    SEDRATE 19 04/14/2016     Radiology:  Reviewed    Microbiology:   Urine cx E faecalis, E coli  Blood cx no growth    ASSESSMENT:  · Sepsis most likely related to GI tract-fecal impaction with possible microperforation/ulceration  · CT scan of descending colon showed bowel wall thickening; C. difficile some currently  · Rule out recurrent UTI indwelling Segura; patient was treated no more than a week to 10 days ago for UTI  · Severe volume contraction intravascularly-improving  · Leukocytosis related to the above- improved  · COVID-19 at this point is colonization-at any rate the respiratory function is very good considering hemorrhage  · Rash - possibly drug related - improving    PLAN:  · Continue to watch off antibiotics  · Continue Diflucan p.o.  · Continue lotrisone to rash  · Check final cultures  · Monitor labs    SHARI Blevins - CNP  11:38 AM  2/8/2022     Pt seen and examined. Above discussed agree with advanced practice nurse. Labs, cultures, and radiographs reviewed. Face to Face encounter occurred. Changes made as necessary.      Tony Perez MD

## 2022-02-08 NOTE — CARE COORDINATION
Palliative working with family; hospice consult ; HOTV.pt with NGT for TF's; [facility] WILL NOT accept pt back with an NGT if   TF's required at discharge. Await family decision. transport forms on chart. Will follow. [facility].

## 2022-02-08 NOTE — TELEPHONE ENCOUNTER
Patient's nurse will call to schedule patient when she gets back from the hospital. They do not know when she will return.

## 2022-02-09 VITALS
RESPIRATION RATE: 16 BRPM | SYSTOLIC BLOOD PRESSURE: 135 MMHG | HEART RATE: 87 BPM | BODY MASS INDEX: 37.27 KG/M2 | OXYGEN SATURATION: 97 % | TEMPERATURE: 98.4 F | DIASTOLIC BLOOD PRESSURE: 78 MMHG | HEIGHT: 64 IN | WEIGHT: 218.3 LBS

## 2022-02-09 LAB
ALBUMIN SERPL-MCNC: 2.7 G/DL (ref 2.8–4.4)
ALP BLD-CCNC: 77 U/L (ref 0–249)
ALT SERPL-CCNC: 26 U/L (ref 0–32)
ANION GAP SERPL CALCULATED.3IONS-SCNC: 11 MMOL/L (ref 7–16)
AST SERPL-CCNC: 20 U/L (ref 0–31)
BASOPHILS ABSOLUTE: 0.01 E9/L (ref 0.1–0.4)
BASOPHILS RELATIVE PERCENT: 0.1 % (ref 0–2)
BILIRUB SERPL-MCNC: 0.5 MG/DL (ref 2–6)
BUN BLDV-MCNC: 21 MG/DL (ref 4–19)
CALCIUM SERPL-MCNC: 8 MG/DL (ref 8.6–10.2)
CHLORIDE BLD-SCNC: 100 MMOL/L (ref 98–107)
CO2: 25 MMOL/L (ref 22–29)
CREAT SERPL-MCNC: 0.5 MG/DL (ref 0.4–0.7)
EOSINOPHILS ABSOLUTE: 0 E9/L (ref 0.1–0.7)
EOSINOPHILS RELATIVE PERCENT: 0 % (ref 0–12)
GFR AFRICAN AMERICAN: >60
GFR NON-AFRICAN AMERICAN: >60 ML/MIN/1.73
GLUCOSE BLD-MCNC: 212 MG/DL (ref 70–110)
HCT VFR BLD CALC: 40.1 % (ref 45–66)
HEMOGLOBIN: 12.6 G/DL (ref 14.5–22)
IMMATURE GRANULOCYTES #: 0.1 E9/L
IMMATURE GRANULOCYTES %: 1.4 % (ref 0–5)
LYMPHOCYTES ABSOLUTE: 1.08 E9/L (ref 3–15)
LYMPHOCYTES RELATIVE PERCENT: 15.6 % (ref 15–60)
MAGNESIUM: 1.9 MG/DL (ref 1.6–2.6)
MCH RBC QN AUTO: 28.7 PG (ref 30–42)
MCHC RBC AUTO-ENTMCNC: 31.4 % (ref 29–37)
MCV RBC AUTO: 91.3 FL (ref 95–121)
METER GLUCOSE: 182 MG/DL (ref 70–110)
METER GLUCOSE: 253 MG/DL (ref 70–110)
MONOCYTES ABSOLUTE: 0.55 E9/L (ref 1–3)
MONOCYTES RELATIVE PERCENT: 7.9 % (ref 3–15)
NEUTROPHILS ABSOLUTE: 5.2 E9/L (ref 5–20)
NEUTROPHILS RELATIVE PERCENT: 75 % (ref 15–80)
PDW BLD-RTO: 15.3 FL (ref 11–19)
PHOSPHORUS: 2 MG/DL (ref 2.5–4.5)
PLATELET # BLD: 144 E9/L (ref 130–480)
PMV BLD AUTO: 12 FL (ref 7–12)
POTASSIUM SERPL-SCNC: 3.6 MMOL/L (ref 3.4–4.5)
RBC # BLD: 4.39 E12/L (ref 3.7–5.3)
SODIUM BLD-SCNC: 136 MMOL/L (ref 132–146)
TOTAL PROTEIN: 4.6 G/DL (ref 6.4–8.3)
WBC # BLD: 6.9 E9/L (ref 9.4–34)

## 2022-02-09 PROCEDURE — 83735 ASSAY OF MAGNESIUM: CPT

## 2022-02-09 PROCEDURE — 2580000003 HC RX 258: Performed by: INTERNAL MEDICINE

## 2022-02-09 PROCEDURE — 6370000000 HC RX 637 (ALT 250 FOR IP): Performed by: INTERNAL MEDICINE

## 2022-02-09 PROCEDURE — 84100 ASSAY OF PHOSPHORUS: CPT

## 2022-02-09 PROCEDURE — 36415 COLL VENOUS BLD VENIPUNCTURE: CPT

## 2022-02-09 PROCEDURE — 82962 GLUCOSE BLOOD TEST: CPT

## 2022-02-09 PROCEDURE — 80053 COMPREHEN METABOLIC PANEL: CPT

## 2022-02-09 PROCEDURE — 99238 HOSP IP/OBS DSCHRG MGMT 30/<: CPT | Performed by: FAMILY MEDICINE

## 2022-02-09 PROCEDURE — 6360000002 HC RX W HCPCS: Performed by: INTERNAL MEDICINE

## 2022-02-09 PROCEDURE — 85025 COMPLETE CBC W/AUTO DIFF WBC: CPT

## 2022-02-09 PROCEDURE — 6370000000 HC RX 637 (ALT 250 FOR IP): Performed by: FAMILY MEDICINE

## 2022-02-09 RX ORDER — CLOTRIMAZOLE AND BETAMETHASONE DIPROPIONATE 10; .5 MG/ML; MG/ML
LOTION TOPICAL
DISCHARGE
Start: 2022-02-09

## 2022-02-09 RX ORDER — ZINC OXIDE 100 MG/G
1 CREAM TOPICAL 2 TIMES DAILY
Refills: 0 | DISCHARGE
Start: 2022-02-09

## 2022-02-09 RX ORDER — GLYCOPYRROLATE 2 MG/1
2 TABLET ORAL EVERY 4 HOURS
Qty: 90 TABLET | Refills: 3 | DISCHARGE
Start: 2022-02-09

## 2022-02-09 RX ORDER — MORPHINE SULFATE 20 MG/5ML
5 SOLUTION ORAL EVERY 4 HOURS PRN
Refills: 0 | Status: SHIPPED | DISCHARGE
Start: 2022-02-09 | End: 2022-02-12

## 2022-02-09 RX ORDER — LORAZEPAM 1 MG/1
1 TABLET ORAL EVERY 6 HOURS PRN
Status: SHIPPED | DISCHARGE
Start: 2022-02-09 | End: 2022-03-11

## 2022-02-09 RX ADMIN — INSULIN LISPRO 2 UNITS: 100 INJECTION, SOLUTION INTRAVENOUS; SUBCUTANEOUS at 10:50

## 2022-02-09 RX ADMIN — CLOTRIMAZOLE AND BETAMETHASONE DIPROPIONATE: 10; .5 LOTION TOPICAL at 08:41

## 2022-02-09 RX ADMIN — DIGOXIN 125 MCG: 0.12 TABLET ORAL at 08:40

## 2022-02-09 RX ADMIN — INSULIN LISPRO 6 UNITS: 100 INJECTION, SOLUTION INTRAVENOUS; SUBCUTANEOUS at 05:15

## 2022-02-09 RX ADMIN — Medication 10 ML: at 08:45

## 2022-02-09 RX ADMIN — HYDROCORTISONE SODIUM SUCCINATE 100 MG: 100 INJECTION, POWDER, FOR SOLUTION INTRAMUSCULAR; INTRAVENOUS at 14:03

## 2022-02-09 RX ADMIN — LANSOPRAZOLE 30 MG: KIT at 08:40

## 2022-02-09 RX ADMIN — HYDROCORTISONE SODIUM SUCCINATE 100 MG: 100 INJECTION, POWDER, FOR SOLUTION INTRAMUSCULAR; INTRAVENOUS at 05:10

## 2022-02-09 RX ADMIN — CITALOPRAM HYDROBROMIDE 10 MG: 20 TABLET ORAL at 08:40

## 2022-02-09 RX ADMIN — MEMANTINE HYDROCHLORIDE 5 MG: 5 TABLET, FILM COATED ORAL at 08:40

## 2022-02-09 RX ADMIN — MIDODRINE HYDROCHLORIDE 10 MG: 5 TABLET ORAL at 05:10

## 2022-02-09 ASSESSMENT — PAIN SCALES - GENERAL
PAINLEVEL_OUTOF10: 0
PAINLEVEL_OUTOF10: 0

## 2022-02-09 NOTE — DISCHARGE INSTR - DIET
Good nutrition is important when healing from an illness, injury, or surgery. Follow any nutrition recommendations given to you during your hospital stay. If you were given an oral nutrition supplement while in the hospital, continue to take this supplement at home. You can take it with meals, in-between meals, and/or before bedtime. These supplements can be purchased at most local grocery stores, pharmacies, and chain [employer]-stores. If you have any questions about your diet or nutrition, call the hospital and ask for the dietitian.       Pleasure feeds as per hospice -- pureed

## 2022-02-09 NOTE — PROGRESS NOTES
[address] [place] [address] [facility]  Progress Note      Chief Complaint   Patient presents with    Altered Mental Status     found by staff at SNF in bed unresponsive with pulse ox 64%, placed on 2L NC, 6L NC via EMS- pt more responsive. also ? GIB staff reported blood clots in stool. SUBJECTIVE:  Patient is lying in bed, nonverbal.   NGT in place. Family is to make decision on hospice. Review of systems:  As stated above in the chief complaint, otherwise negative. Medications:  Scheduled Meds:   clotrimazole-betamethasone   Topical BID    insulin glargine  10 Units SubCUTAneous Nightly    polyethylene glycol  17 g Oral Daily    insulin lispro  0-12 Units SubCUTAneous Q6H    lansoprazole  30 mg Per NG tube BID AC    hydrocortisone sodium succinate PF  100 mg IntraVENous Q8H    midodrine  10 mg Oral 3 times per day    fluconazole  200 mg Oral Daily    citalopram  10 mg Oral Daily    digoxin  125 mcg Oral Daily    divalproex  125 mg Oral Nightly    memantine  5 mg Oral Daily    sodium chloride flush  5-40 mL IntraVENous 2 times per day     Continuous Infusions:   sodium chloride 25 mL (22 1708)    sodium chloride       PRN Meds:sodium chloride flush, sodium chloride, ondansetron **OR** ondansetron, acetaminophen **OR** acetaminophen, magnesium sulfate, sodium chloride    OBJECTIVE:  /60   Pulse 85   Temp 98.7 °F (37.1 °C) (Axillary)   Resp 16   Ht 5' 4\" (1.626 m)   Wt 218 lb 4.8 oz (99 kg)   LMP  (LMP Unknown)   SpO2 96%   BMI 37.47 kg/m²   Temp  Av.7 °F (37.1 °C)  Min: 98.6 °F (37 °C)  Max: 98.7 °F (37.1 °C)  Constitutional: The patient is nonverbal. Eyes are open. Does not track  Skin: Warm and dry. Rash is improved   HEENT: Round and reactive pupils. Moist mucous membranes. NG in place with feeding  Neck: Supple to movements. Chest: No respiratory distress. Symmetrical expansion. No wheezing, crackles or rhonchi.   Cardiovascular: S1 and S2 are rhythmic and regular. No murmurs appreciated. Abdomen: Positive bowel sounds to auscultation. Obese, soft, nondistended  Extremities: No edema.   Lines: peripheral    Laboratory and Tests Review:  Lab Results   Component Value Date    WBC 6.9 02/09/2022    WBC 6.8 02/08/2022    WBC 6.9 02/07/2022    HGB 12.6 02/09/2022    HCT 40.1 02/09/2022    MCV 91.3 02/09/2022     02/09/2022     Lab Results   Component Value Date    NEUTROABS 5.20 02/09/2022    NEUTROABS 4.76 02/08/2022    NEUTROABS 5.66 02/07/2022     No results found for: [facility]  Lab Results   Component Value Date    ALT 26 02/09/2022    AST 20 02/09/2022    ALKPHOS 77 02/09/2022    BILITOT 0.5 02/09/2022     Lab Results   Component Value Date     02/09/2022    K 3.6 02/09/2022    K 3.9 02/02/2022     02/09/2022    CO2 25 02/09/2022    BUN 21 02/09/2022    CREATININE 0.5 02/09/2022    CREATININE 0.4 02/08/2022    CREATININE 0.5 02/07/2022    GFRAA >60 02/09/2022    LABGLOM >60 02/09/2022    GLUCOSE 212 02/09/2022    PROT 4.6 02/09/2022    LABALBU 2.7 02/09/2022    CALCIUM 8.0 02/09/2022    BILITOT 0.5 02/09/2022    ALKPHOS 77 02/09/2022    AST 20 02/09/2022    ALT 26 02/09/2022     Lab Results   Component Value Date    CRP 0.3 02/01/2022    CRP 16.6 (H) 01/17/2022     Lab Results   Component Value Date    SEDRATE 70 (H) 10/22/2020    SEDRATE 19 04/14/2016     Radiology:  Reviewed    Microbiology:   Urine cx E faecalis, E coli  Blood cx no growth    ASSESSMENT:  · Sepsis most likely related to GI tract-fecal impaction with possible microperforation/ulceration  · CT scan of descending colon showed bowel wall thickening; C. difficile some currently  · Rule out recurrent UTI indwelling Segura; patient was treated no more than a week to 10 days ago for UTI  · Severe volume contraction intravascularly-improving  · Leukocytosis related to the above- improved  · COVID-19 at this point is colonization-at any rate the respiratory function is very good considering hemorrhage  · Rash - possibly drug related - improving    PLAN:  · Stop Diflucan  · ID will sign off     SHARI Lambert CNP  12:14 PM  2/9/2022       Pt seen and examined. Above discussed agree with advanced practice nurse. Labs, cultures, and radiographs reviewed. Face to Face encounter occurred. Changes made as necessary.      Katherine Goddard MD

## 2022-02-09 NOTE — DISCHARGE SUMMARY
[facility] Physician Discharge Summary       [facility]  [facility], [address] [place] [address] Nevaeh Spears [facility] [phone]    In 3 days  Hospital follow up -- in facility      Activity level:   As tolerated with assistance    Diet: Diet NPO  ADULT TUBE FEEDING; Nasogastric; Standard without Fiber; Continuous; 20; Yes; 20; Q 6 hours; 55; 30; Q 4 hours    Labs:   None    Condition at discharge: stable, guarded, poor prognosis    Dispo: to prior ECF with hospice care    Continue supplemental oxygen via nasal canula @ 2 LPM round-the-clock -- if needed for breathlessness. Patient ID:  Maira Lopez  [id number]  80 y.o.  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  *      Consults:  IP CONSULT TO GENERAL SURGERY  IP CONSULT TO CRITICAL CARE  IP CONSULT TO GI  IP CONSULT TO DIETITIAN  IP CONSULT TO INFECTIOUS DISEASES  IP CONSULT TO INFECTIOUS DISEASES  IP CONSULT TO DIETITIAN  IP CONSULT TO IV TEAM  IP CONSULT TO PALLIATIVE CARE  IP CONSULT TO IV TEAM  IP CONSULT TO IV TEAM  IP CONSULT TO IV TEAM  IP CONSULT TO IV TEAM  IP CONSULT TO HOSPICE    Procedures:   NG tube insertion -- NG tube feedings      Hospital Course:   81-year-old female with a past medical history of Alzheimer's disease, atrial fibrillation, type 2 diabetes mellitus, dysphagia, GERD, hypertension, major depressive disorder, muscle weakness who presented to the emergency room from her nursing home [facility]. She was found by staff to have a pulse ox at 64%. She is not normally on oxygen. They applied 2 L and called paramedics. Paramedics noted that the patient required 6 L. She also was noted to have altered mental status. Obtunded. Found to have severe sepsis. This was likely thought to be due to fecal bacterial/colonic stretch due to severe fecal impaction. Also had bright red blood per rectum with clots, also thought to be due to severe colonic stretch from her fecal impaction, stercoral type of ulceration of the colon. GI was consulted. They put her on bowel regimen and her fecal impaction was relieved. Her bright red blood per rectum stopped. Did not perform any endoscopies. Infectious disease also saw the patient. Placed on IV Zosyn. Also placed on Diflucan 200 mg tablets for yeast infection of skin. Blood cultures were negative. Urine culture did show Enterococcus faecalis at greater than 100,000 colonies and Escherichia coli. The Enterococcus had intermediate sensitivity to doxycycline, resistance to gentamicin. The E. coli was resistant to ampicillin and intermediate resistant to ampicillin sulbactam.  This was not ESBL patient sepsis resolved. Also had hypernatremia. This was due to dehydration. The daughter was offered PEG placement as a possibility as patient would likely have ongoing problems with oral intake due to her dementia.   The daughter declined PEG tube.  Patient did have NG feeding started while here. Free water flushes started. Nephrology was consulted. Hypernatremia resolved mainly with the free water flushes into the NG tube. NG tube was pulled today as patient was made hospice today. Also presented with diabetic ketoacidosis and likely starvation ketosis as well. This resolved. Blood sugars were severely uncontrolled and was given Lantus 10 units nightly and sliding scale insulin. Patient's memantine for her Alzheimer's dementia was continued while here. Unfortunately, the patient's acute metabolic encephalopathy on top of her dementia never improved. She remains encephalopathic. Patient has a poor prognosis for mental recovery. Also is not likely to start drinking and eating on her own. Dementia has greatly progressed recently. For this reason patient will be on hospice care when she goes back [facility]. Daughter made that decision this morning. I spoke with the patient's nurse. Discharge Exam:   obtunded, in NAD, not in respiratory distress, breathing comfortably on RA at rest in bed, generally ill-appearing  Eyes and mouth clear, dry, tongue and uvula midline, neck supple, no JVD  S1/S2 with RRR, no M/R/G  Lungs CTA bilaterally, no W/R/R, decreased breath sounds in the bases  Abd soft/NT/ND/normoactive BS's  No LE edema, pos pedal pulses, no cyanosis, good cap refill of digits  Skin warm, dry, good turgor, no rashes, no jaundice       I/O last 3 completed shifts: In: 774 [NG/GT:774]  Out: 300 [Urine:300]  I/O this shift:   In: 941 [NG/GT:941]  Out: -       LABS:  Recent Labs     02/07/22  0955 02/08/22  0620 02/09/22  0415    134 136   K 3.0* 3.6 3.6    100 100   CO2 27 23 25   BUN 16 17 21   CREATININE 0.5 0.4* 0.5   GLUCOSE 279* 280* 212*   CALCIUM 7.6* 7.9* 8.0*       Recent Labs     02/07/22  0955 02/08/22  0620 02/09/22  0415   WBC 6.9 6.8 6.9   RBC 3.97 4.22 4.39   HGB 11.7 12.3 12.6   HCT 37.0 38.0 40.1   MCV 93.2 90.0 91.3   MCH 29.5 29.1 28.7   MCHC 31.6* 32.4 31.4*   RDW 15.3* 15.2* 15.3*    137 144   MPV 12.3* 12.4* 12.0       No results for input(s): POCGLU in the last 72 hours. Imaging:  CT Head WO Contrast  Result Date: 2/1/2022  EXAMINATION: CT OF THE HEAD WITHOUT CONTRAST  2/1/2022 9:44 pm TECHNIQUE: CT of the head was performed without the administration of intravenous contrast. Dose modulation, iterative reconstruction, and/or weight based adjustment of the mA/kV was utilized to reduce the radiation dose to as low as reasonably achievable. COMPARISON: None. HISTORY: ORDERING SYSTEM PROVIDED HISTORY: AMS TECHNOLOGIST PROVIDED HISTORY: Reason for exam:->AMS Has a \"code stroke\" or \"stroke alert\" been called? ->No Decision Support Exception - unselect if not a suspected or confirmed emergency medical condition->Emergency Medical Condition (MA) FINDINGS: BRAIN/VENTRICLES: There is no acute intracranial hemorrhage, mass effect or midline shift. No abnormal extra-axial fluid collection. The gray-white differentiation is maintained without evidence of an acute infarct. There is no evidence of hydrocephalus. Periventricular white matter changes consistent chronic microvascular disease. Diffuse volume loss. Changes of encephalomalacia right parietooccipital region. ORBITS: The visualized portion of the orbits demonstrate no acute abnormality. SINUSES: The visualized paranasal sinuses and mastoid air cells demonstrate no acute abnormality. SOFT TISSUES/SKULL:  No acute abnormality of the visualized skull or soft tissues. No acute intracranial abnormality. Changes of encephalomalacia right parietooccipital region. Periventricular white matter changes consistent chronic microvascular disease. Diffuse volume loss.      CT ABDOMEN PELVIS W IV CONTRAST Additional Contrast? None  Result Date: 2/1/2022  EXAMINATION: CT OF THE ABDOMEN AND PELVIS WITH CONTRAST; CTA OF THE CHEST 2/1/2022 9:44 pm TECHNIQUE: CT of the abdomen and pelvis was performed with the administration of intravenous contrast. Multiplanar reformatted images are provided for review. Dose modulation, iterative reconstruction, and/or weight based adjustment of the mA/kV was utilized to reduce the radiation dose to as low as reasonably achievable.; CTA of the chest was performed after the administration of intravenous contrast.  Multiplanar reformatted images are provided for review. MIP images are provided for review. Dose modulation, iterative reconstruction, and/or weight based adjustment of the mA/kV was utilized to reduce the radiation dose to as low as reasonably achievable. COMPARISON: None. HISTORY: ORDERING SYSTEM PROVIDED HISTORY: gi bleed, hypotension TECHNOLOGIST PROVIDED HISTORY: Additional Contrast?->None Reason for exam:->gi bleed, hypotension Decision Support Exception - unselect if not a suspected or confirmed emergency medical condition->Emergency Medical Condition (MA) FINDINGS: CT ANGIOGRAPHY OF THE CHEST: Pulmonary Arteries: Due to prominent respiratory motion artifact, the right lower lobe pulmonary arteries are not well evaluated. Within this limitation, no pulmonary embolism is seen. The main pulmonary artery is normal in caliber. Mediastinum: The heart is grossly normal in size. Mild calcified atherosclerosis is seen in the aorta. No aneurysm. No lymphadenopathy seen in the chest mildly enlarged thyroid gland with multiple small nodules. Small hiatal hernia. Lungs/pleura: Pulmonary scarring with mild traction bronchiectasis in the medial basal segment of the right lower lobe. The lungs are otherwise clear. The central airway is clear. No pneumothorax or pleural effusion is seen. Soft Tissues/Bones: No acute fracture or bony destructive lesion. Multiple old healed left rib fractures. CT OF THE ABDOMEN AND PELVIS: ORGANS: Liver: Unremarkable. Gallbladder: Surgically absent Pancreas: Moderately atrophied. Otherwise, unremarkable. Spleen:  Multiple calcified granuloma. Otherwise, unremarkable. Adrenals: Unremarkable. Kidneys: Unremarkable. GI/BOWEL: Prominent fecal retention is seen in the colon. Some of the stool is liquid. Questionable bowel wall thickening in the descending colon and the proximal sigmoid colon. Small amount of free fluid is seen in the left paracolic gutter. PERITONEUM/EXTRA PERITONEUM: No lymphadenopathy. Mild calcified atherosclerosis is seen in the aorta. No aneurysm. No free air. PELVIS: A Segura catheter is seen decompressing the urinary bladder. The uterus is normal in size and contour. Small amount of gas in the endometrial cavity. BONES/SOFT TISSUES: The visualized bones are intact without fracture or focal lesion. CTA OF THE CHEST: 1. No pulmonary embolism detected. Suboptimal visualization of the right lower lobe pulmonary arteries due to respiratory motion artifact. 2.  No acute cardiopulmonary abnormality. 3. Small hernia. CT OF THE ABDOMEN AND PELVIS: 1. Small amount of GAS IN THE ENDOMETRIAL CAVITY of unclear etiology. Clinical correlation for infection is recommended. A rectouterine fistula is not detected but cannot be excluded. If indicated, very minimally may be obtained for further evaluation. 2. QUESTIONABLE MURAL THICKENING in the distal descending and proximal sigmoid colon, which may signify colitis or simply represent an artifact of under distension. 3. The small amount of free fluid seen in the left paracolic gutter. 4. Moderate fecal retention in the colon, some liquid. RECOMMENDATIONS: Unavailable     XR CHEST PORTABLE  Result Date: 2/2/2022  EXAMINATION: ONE XRAY VIEW OF THE CHEST 2/2/2022 4:04 am COMPARISON: 02/01/2022 HISTORY: ORDERING SYSTEM PROVIDED HISTORY: ? aspiration TECHNOLOGIST PROVIDED HISTORY: Reason for exam:->? aspiration FINDINGS: EKG leads overlie the chest.  Nasogastric tube terminates in the gastric fundus.   Cardiac silhouette is near the upper limits of normal.  Scattered vascular calcifications. Coarsened interstitial opacities are likely chronic. No pneumothorax. No focal consolidation or effusion. Interval placement of nasogastric tube. No other significant change. XR CHEST PORTABLE  Result Date: 2/1/2022  EXAMINATION: ONE XRAY VIEW OF THE CHEST 2/1/2022 7:07 pm COMPARISON: 01/20/2022 HISTORY: ORDERING SYSTEM PROVIDED HISTORY: sepsis, hypotension TECHNOLOGIST PROVIDED HISTORY: Reason for exam:->sepsis, hypotension FINDINGS: The lungs are without acute focal process. There is no effusion or pneumothorax. The cardiomediastinal silhouette is without acute process. The osseous structures are without acute process. No acute process. CTA PULMONARY W CONTRAST  Result Date: 2/1/2022  EXAMINATION: CT OF THE ABDOMEN AND PELVIS WITH CONTRAST; CTA OF THE CHEST 2/1/2022 9:44 pm TECHNIQUE: CT of the abdomen and pelvis was performed with the administration of intravenous contrast. Multiplanar reformatted images are provided for review. Dose modulation, iterative reconstruction, and/or weight based adjustment of the mA/kV was utilized to reduce the radiation dose to as low as reasonably achievable.; CTA of the chest was performed after the administration of intravenous contrast.  Multiplanar reformatted images are provided for review. MIP images are provided for review. Dose modulation, iterative reconstruction, and/or weight based adjustment of the mA/kV was utilized to reduce the radiation dose to as low as reasonably achievable. COMPARISON: None.  HISTORY: ORDERING SYSTEM PROVIDED HISTORY: gi bleed, hypotension TECHNOLOGIST PROVIDED HISTORY: Additional Contrast?->None Reason for exam:->gi bleed, hypotension Decision Support Exception - unselect if not a suspected or confirmed emergency medical condition->Emergency Medical Condition (MA) FINDINGS: CT ANGIOGRAPHY OF THE CHEST: Pulmonary Arteries: Due to prominent respiratory motion artifact, the right lower lobe pulmonary arteries are not well evaluated. Within this limitation, no pulmonary embolism is seen. The main pulmonary artery is normal in caliber. Mediastinum: The heart is grossly normal in size. Mild calcified atherosclerosis is seen in the aorta. No aneurysm. No lymphadenopathy seen in the chest mildly enlarged thyroid gland with multiple small nodules. Small hiatal hernia. Lungs/pleura: Pulmonary scarring with mild traction bronchiectasis in the medial basal segment of the right lower lobe. The lungs are otherwise clear. The central airway is clear. No pneumothorax or pleural effusion is seen. Soft Tissues/Bones: No acute fracture or bony destructive lesion. Multiple old healed left rib fractures. CT OF THE ABDOMEN AND PELVIS: ORGANS: Liver: Unremarkable. Gallbladder: Surgically absent Pancreas: Moderately atrophied. Otherwise, unremarkable. Spleen:  Multiple calcified granuloma. Otherwise, unremarkable. Adrenals: Unremarkable. Kidneys: Unremarkable. GI/BOWEL: Prominent fecal retention is seen in the colon. Some of the stool is liquid. Questionable bowel wall thickening in the descending colon and the proximal sigmoid colon. Small amount of free fluid is seen in the left paracolic gutter. PERITONEUM/EXTRA PERITONEUM: No lymphadenopathy. Mild calcified atherosclerosis is seen in the aorta. No aneurysm. No free air. PELVIS: A Segura catheter is seen decompressing the urinary bladder. The uterus is normal in size and contour. Small amount of gas in the endometrial cavity. BONES/SOFT TISSUES: The visualized bones are intact without fracture or focal lesion. CTA OF THE CHEST: 1. No pulmonary embolism detected. Suboptimal visualization of the right lower lobe pulmonary arteries due to respiratory motion artifact. 2.  No acute cardiopulmonary abnormality. 3. Small hernia. CT OF THE ABDOMEN AND PELVIS: 1. Small amount of GAS IN THE ENDOMETRIAL CAVITY of unclear etiology.  Clinical correlation for infection is recommended. A rectouterine fistula is not detected but cannot be excluded. If indicated, very minimally may be obtained for further evaluation. 2. QUESTIONABLE MURAL THICKENING in the distal descending and proximal sigmoid colon, which may signify colitis or simply represent an artifact of under distension. 3. The small amount of free fluid seen in the left paracolic gutter. 4. Moderate fecal retention in the colon, some liquid. RECOMMENDATIONS: Unavailable     XR CHEST ABDOMEN NG PLACEMENT  Result Date: 2/2/2022  EXAMINATION: ONE SUPINE XRAY VIEW(S) OF THE ABDOMEN 2/2/2022 4:04 am COMPARISON: None. HISTORY: ORDERING SYSTEM PROVIDED HISTORY: Confirmation of course of NG/OG/NE tube and location of tip of tube TECHNOLOGIST PROVIDED HISTORY: With stat reading Reason for exam:->Confirmation of course of NG/OG/NE tube and location of tip of tube FINDINGS: Nasogastric tube terminates in the gastric fundus. Nonspecific bowel gas pattern without obvious obstruction. Moderate stool in portions of the colon. Contrast is present in the ureters and renal collecting systems from recent CT dated 02/01/2022. Lower abdomen/pelvis partially excluded from field of view. Degenerative changes are present in the spine. Nasogastric tube terminates in the gastric fundus. Patient Instructions:   Current Discharge Medication List      START taking these medications    Details   clotrimazole-betamethasone (LOTRISONE) 1-0.05 % lotion Apply topically 2 times daily. morphine 20 MG/5ML solution Take 1.25 mLs by mouth every 4 hours as needed for Pain (or for breathlessness) for up to 3 days. Refills: 0    Comments: Reduce doses taken as pain becomes manageable  Associated Diagnoses: Septic shock ([address]  Gastrointestinal hemorrhage, unspecified gastrointestinal hemorrhage type; Acute metabolic encephalopathy      LORazepam (ATIVAN) 1 MG tablet Take 1 tablet by mouth every 6 hours as needed for Anxiety (or for breathlessness) for up to 30 days. Associated Diagnoses: Septic shock ([facility]  Gastrointestinal hemorrhage, unspecified gastrointestinal hemorrhage type; Acute metabolic encephalopathy      glycopyrrolate (ROBINUL) 2 MG tablet Take 1 tablet by mouth every 4 hours  Qty: 90 tablet, Refills: 3         CONTINUE these medications which have CHANGED    Details   ZINC OXIDE, TOPICAL, (SECURA PROTECTIVE) 10 % CREA Apply 1 g topically 2 times daily Apply to buttocks and periarea  Refills: 0         CONTINUE these medications which have NOT CHANGED    Details   docusate (COLACE) 50 MG/5ML liquid Take 10 mLs by mouth 2 times daily      digoxin (LANOXIN) 125 MCG tablet Take 1 tablet by mouth daily  Qty: 30 tablet, Refills: 3      divalproex (DEPAKOTE SPRINKLE) 125 MG capsule Take 125 mg by mouth nightly      citalopram (CELEXA) 10 MG tablet 10 mg daily       bisacodyl (DULCOLAX) 10 MG suppository Place 10 mg rectally daily as needed for Constipation      acetaminophen (TYLENOL) 325 MG tablet Take 650 mg by mouth every 4 hours as needed for Pain          STOP taking these medications       metoprolol tartrate (LOPRESSOR) 25 MG tablet Comments:   Reason for Stopping:         insulin glargine (LANTUS) 100 UNIT/ML injection vial Comments:   Reason for Stopping:         lisinopril (PRINIVIL;ZESTRIL) 10 MG tablet Comments:   Reason for Stopping:         clotrimazole (LOTRIMIN) 1 % cream Comments:   Reason for Stopping:         vitamin D (ERGOCALCIFEROL) 1.25 MG (73994 UT) CAPS capsule Comments:   Reason for Stopping:         memantine ER (NAMENDA XR) 21 MG CP24 extended release capsule Comments:   Reason for Stopping:         insulin lispro (HUMALOG) 100 UNIT/ML injection vial Comments:   Reason for Stopping:         megestrol (MEGACE ORAL) 40 MG/ML suspension Comments:   Reason for Stopping:         apixaban (ELIQUIS) 5 MG TABS tablet Comments:   Reason for Stopping:         Multiple Vitamins-Minerals (EYE VITAMINS) CAPS Comments:   Reason for Stopping:                 Note that less than 30 minutes was spent in preparing discharge papers, discussing discharge with patient, medication review, etc.    NOTE: This report was transcribed using voice recognition software. Every effort was made to ensure accuracy; however, inadvertent computerized transcription errors may be present.      Signed:  Electronically signed by Nicole Rand DO on 2/9/2022 at 1:20 PM

## 2022-02-09 NOTE — PROGRESS NOTES
F/U phone call from patients DTR Romain Tsang [phone], she states she has thought about her mothers health and has decided to proceed with [facility] hospice and comfort care at [facility]. Intake notified patient to be admitted with terminal diagnosis of Alzheimer's dementia with a FAST score of 7F and PPS of 10%. Dr Karsten Gilford will follow in the facility. Dr Ewa Boateng confirmed terminal diagnosis. Family was aware of and receptive to removal of the N/G tube prior to transfer.    Please ask physician to write prescriptions for comfort medications for the facility  Morphine sulfate 20mg/ml(conc) 5mg po/sl Q 4 hours PRN dyspnea or pain  Ativan 1mg tablet Q 6 hours PRN anxiety or agitation  Robinul 2mg tablet po/sl Q 4 hours congestion or secretions  DME ordered for delivery today between 2-5pm.  Thank you,  Laron Record [phone]

## 2022-02-09 NOTE — CARE COORDINATION
Spoke with yakelin at hospice; pt to return to [facility] today under hospice ; plan for transport via physician's ambulance  To [facility] 7:30 pm. n-n [phone]. Daughter Price Fuentes aware. Notifed Dr. Ericka Woods, she will finalize narcotic RX. Murali Han.

## 2022-02-09 NOTE — PROGRESS NOTES
2/9/2022  2:14 PM           Nutrition Note    Discharge plan for later today to [facility] under Hospice. Dietitian will be available per consult if need arises.     Electronically signed by Antoni Nation RD, CNSC, LD on 2/9/22 at 2:15 PM EST    Contact: [phone]

## 2022-02-09 NOTE — CARE COORDINATION
covid + plan is to return to [facility]; no covid needed. Forms on chart. Pt has NGT for feedings;facility cannot accept back with NGT. Await discussion between Hospice / family for final disposition. Lenard Melara.